# Patient Record
Sex: FEMALE | Race: WHITE | Employment: OTHER | URBAN - METROPOLITAN AREA
[De-identification: names, ages, dates, MRNs, and addresses within clinical notes are randomized per-mention and may not be internally consistent; named-entity substitution may affect disease eponyms.]

---

## 2017-02-10 ENCOUNTER — HOSPITAL ENCOUNTER (INPATIENT)
Age: 72
LOS: 6 days | Discharge: NURSING, REHAB OR LONG-TERM CARE | DRG: 291 | End: 2017-02-16
Attending: EMERGENCY MEDICINE | Admitting: INTERNAL MEDICINE
Payer: MEDICARE

## 2017-02-10 ENCOUNTER — APPOINTMENT (OUTPATIENT)
Dept: CT IMAGING | Age: 72
DRG: 291 | End: 2017-02-10
Attending: EMERGENCY MEDICINE
Payer: MEDICARE

## 2017-02-10 ENCOUNTER — APPOINTMENT (OUTPATIENT)
Dept: GENERAL RADIOLOGY | Age: 72
DRG: 291 | End: 2017-02-10
Attending: EMERGENCY MEDICINE
Payer: MEDICARE

## 2017-02-10 ENCOUNTER — APPOINTMENT (OUTPATIENT)
Dept: GENERAL RADIOLOGY | Age: 72
DRG: 291 | End: 2017-02-10
Attending: FAMILY MEDICINE
Payer: MEDICARE

## 2017-02-10 DIAGNOSIS — J18.9 PNEUMONIA OF BOTH LOWER LOBES DUE TO INFECTIOUS ORGANISM: ICD-10-CM

## 2017-02-10 DIAGNOSIS — E86.0 DEHYDRATION: ICD-10-CM

## 2017-02-10 DIAGNOSIS — G93.40 ACUTE ENCEPHALOPATHY: Primary | ICD-10-CM

## 2017-02-10 DIAGNOSIS — E87.5 ACUTE HYPERKALEMIA: ICD-10-CM

## 2017-02-10 DIAGNOSIS — N17.9 ACUTE RENAL FAILURE, UNSPECIFIED ACUTE RENAL FAILURE TYPE (HCC): ICD-10-CM

## 2017-02-10 LAB
ALBUMIN SERPL BCP-MCNC: 3.4 G/DL (ref 3.4–5)
ALBUMIN/GLOB SERPL: 0.8 {RATIO} (ref 0.8–1.7)
ALP SERPL-CCNC: 64 U/L (ref 45–117)
ALT SERPL-CCNC: 24 U/L (ref 13–56)
AMMONIA PLAS-SCNC: 12 UMOL/L (ref 11–32)
AMPHET UR QL SCN: NEGATIVE
ANION GAP BLD CALC-SCNC: 15 MMOL/L (ref 3–18)
ANION GAP BLD CALC-SCNC: 9 MMOL/L (ref 3–18)
APPEARANCE UR: CLEAR
ARTERIAL PATENCY WRIST A: YES
AST SERPL W P-5'-P-CCNC: 29 U/L (ref 15–37)
BACTERIA URNS QL MICRO: ABNORMAL /HPF
BARBITURATES UR QL SCN: NEGATIVE
BASE DEFICIT BLD-SCNC: 6 MMOL/L
BASOPHILS # BLD AUTO: 0 K/UL (ref 0–0.06)
BASOPHILS # BLD: 0 % (ref 0–2)
BDY SITE: ABNORMAL
BENZODIAZ UR QL: POSITIVE
BILIRUB SERPL-MCNC: 0.3 MG/DL (ref 0.2–1)
BILIRUB UR QL: NEGATIVE
BNP SERPL-MCNC: 1284 PG/ML (ref 0–900)
BUN SERPL-MCNC: 42 MG/DL (ref 7–18)
BUN SERPL-MCNC: 44 MG/DL (ref 7–18)
BUN/CREAT SERPL: 19 (ref 12–20)
BUN/CREAT SERPL: 20 (ref 12–20)
CALCIUM SERPL-MCNC: 8.6 MG/DL (ref 8.5–10.1)
CALCIUM SERPL-MCNC: 9.1 MG/DL (ref 8.5–10.1)
CANNABINOIDS UR QL SCN: NEGATIVE
CHLORIDE SERPL-SCNC: 105 MMOL/L (ref 100–108)
CHLORIDE SERPL-SCNC: 106 MMOL/L (ref 100–108)
CK MB CFR SERPL CALC: 2.1 % (ref 0–4)
CK MB SERPL-MCNC: 2.1 NG/ML (ref 0.5–3.6)
CK SERPL-CCNC: 101 U/L (ref 26–192)
CO2 SERPL-SCNC: 21 MMOL/L (ref 21–32)
CO2 SERPL-SCNC: 26 MMOL/L (ref 21–32)
COCAINE UR QL SCN: NEGATIVE
COLOR UR: YELLOW
CREAT SERPL-MCNC: 2.05 MG/DL (ref 0.6–1.3)
CREAT SERPL-MCNC: 2.35 MG/DL (ref 0.6–1.3)
DIFFERENTIAL METHOD BLD: ABNORMAL
EOSINOPHIL # BLD: 0 K/UL (ref 0–0.4)
EOSINOPHIL NFR BLD: 0 % (ref 0–5)
EPITH CASTS URNS QL MICRO: ABNORMAL /LPF (ref 0–5)
ERYTHROCYTE [DISTWIDTH] IN BLOOD BY AUTOMATED COUNT: 13.8 % (ref 11.6–14.5)
ERYTHROCYTE [DISTWIDTH] IN BLOOD BY AUTOMATED COUNT: 13.8 % (ref 11.6–14.5)
GAS FLOW.O2 O2 DELIVERY SYS: ABNORMAL L/MIN
GAS FLOW.O2 SETTING OXYMISER: 2 L/M
GLOBULIN SER CALC-MCNC: 4.1 G/DL (ref 2–4)
GLUCOSE SERPL-MCNC: 111 MG/DL (ref 74–99)
GLUCOSE SERPL-MCNC: 117 MG/DL (ref 74–99)
GLUCOSE UR STRIP.AUTO-MCNC: NEGATIVE MG/DL
HCO3 BLD-SCNC: 21.3 MMOL/L (ref 22–26)
HCT VFR BLD AUTO: 28.1 % (ref 35–45)
HCT VFR BLD AUTO: 29.2 % (ref 35–45)
HDSCOM,HDSCOM: ABNORMAL
HGB BLD-MCNC: 8.9 G/DL (ref 12–16)
HGB BLD-MCNC: 9.3 G/DL (ref 12–16)
HGB UR QL STRIP: NEGATIVE
INR PPP: 1.1 (ref 0.8–1.2)
KETONES UR QL STRIP.AUTO: NEGATIVE MG/DL
LACTATE SERPL-SCNC: 0.9 MMOL/L (ref 0.4–2)
LEUKOCYTE ESTERASE UR QL STRIP.AUTO: NEGATIVE
LYMPHOCYTES # BLD AUTO: 18 % (ref 21–52)
LYMPHOCYTES # BLD: 2.1 K/UL (ref 0.9–3.6)
MAGNESIUM SERPL-MCNC: 1.9 MG/DL (ref 1.8–2.4)
MCH RBC QN AUTO: 31.9 PG (ref 24–34)
MCH RBC QN AUTO: 32 PG (ref 24–34)
MCHC RBC AUTO-ENTMCNC: 31.7 G/DL (ref 31–37)
MCHC RBC AUTO-ENTMCNC: 31.8 G/DL (ref 31–37)
MCV RBC AUTO: 100.3 FL (ref 74–97)
MCV RBC AUTO: 100.7 FL (ref 74–97)
METHADONE UR QL: NEGATIVE
MONOCYTES # BLD: 1.4 K/UL (ref 0.05–1.2)
MONOCYTES NFR BLD AUTO: 12 % (ref 3–10)
NEUTS SEG # BLD: 8 K/UL (ref 1.8–8)
NEUTS SEG NFR BLD AUTO: 70 % (ref 40–73)
NITRITE UR QL STRIP.AUTO: NEGATIVE
O2/TOTAL GAS SETTING VFR VENT: 28 %
OPIATES UR QL: POSITIVE
PCO2 BLD: 46.3 MMHG (ref 35–45)
PCP UR QL: NEGATIVE
PH BLD: 7.27 [PH] (ref 7.35–7.45)
PH UR STRIP: 5 [PH] (ref 5–8)
PLATELET # BLD AUTO: 292 K/UL (ref 135–420)
PLATELET # BLD AUTO: 305 K/UL (ref 135–420)
PMV BLD AUTO: 10.5 FL (ref 9.2–11.8)
PMV BLD AUTO: 10.8 FL (ref 9.2–11.8)
PO2 BLD: 87 MMHG (ref 80–100)
POTASSIUM SERPL-SCNC: 5.8 MMOL/L (ref 3.5–5.5)
POTASSIUM SERPL-SCNC: 6 MMOL/L (ref 3.5–5.5)
PROT SERPL-MCNC: 7.5 G/DL (ref 6.4–8.2)
PROT UR STRIP-MCNC: ABNORMAL MG/DL
PROTHROMBIN TIME: 13.9 SEC (ref 11.5–15.2)
RBC # BLD AUTO: 2.79 M/UL (ref 4.2–5.3)
RBC # BLD AUTO: 2.91 M/UL (ref 4.2–5.3)
RBC #/AREA URNS HPF: 0 /HPF (ref 0–5)
RBC MORPH BLD: ABNORMAL
SAO2 % BLD: 95 % (ref 92–97)
SERVICE CMNT-IMP: ABNORMAL
SODIUM SERPL-SCNC: 140 MMOL/L (ref 136–145)
SODIUM SERPL-SCNC: 142 MMOL/L (ref 136–145)
SP GR UR REFRACTOMETRY: 1.02 (ref 1–1.03)
SPECIMEN TYPE: ABNORMAL
TROPONIN I SERPL-MCNC: <0.02 NG/ML (ref 0–0.06)
UROBILINOGEN UR QL STRIP.AUTO: 0.2 EU/DL (ref 0.2–1)
WBC # BLD AUTO: 11.5 K/UL (ref 4.6–13.2)
WBC # BLD AUTO: 11.7 K/UL (ref 4.6–13.2)
WBC URNS QL MICRO: ABNORMAL /HPF (ref 0–5)

## 2017-02-10 PROCEDURE — C1751 CATH, INF, PER/CENT/MIDLINE: HCPCS

## 2017-02-10 PROCEDURE — 94640 AIRWAY INHALATION TREATMENT: CPT

## 2017-02-10 PROCEDURE — 87040 BLOOD CULTURE FOR BACTERIA: CPT | Performed by: EMERGENCY MEDICINE

## 2017-02-10 PROCEDURE — 74011250636 HC RX REV CODE- 250/636: Performed by: INTERNAL MEDICINE

## 2017-02-10 PROCEDURE — 83735 ASSAY OF MAGNESIUM: CPT | Performed by: INTERNAL MEDICINE

## 2017-02-10 PROCEDURE — 96366 THER/PROPH/DIAG IV INF ADDON: CPT

## 2017-02-10 PROCEDURE — 74011000250 HC RX REV CODE- 250

## 2017-02-10 PROCEDURE — 74011000258 HC RX REV CODE- 258: Performed by: EMERGENCY MEDICINE

## 2017-02-10 PROCEDURE — 77030011943

## 2017-02-10 PROCEDURE — 74011250636 HC RX REV CODE- 250/636: Performed by: EMERGENCY MEDICINE

## 2017-02-10 PROCEDURE — 85610 PROTHROMBIN TIME: CPT | Performed by: EMERGENCY MEDICINE

## 2017-02-10 PROCEDURE — 74011250636 HC RX REV CODE- 250/636: Performed by: FAMILY MEDICINE

## 2017-02-10 PROCEDURE — 71010 XR CHEST PORT: CPT

## 2017-02-10 PROCEDURE — 74011000250 HC RX REV CODE- 250: Performed by: EMERGENCY MEDICINE

## 2017-02-10 PROCEDURE — 36600 WITHDRAWAL OF ARTERIAL BLOOD: CPT

## 2017-02-10 PROCEDURE — 77030013140 HC MSK NEB VYRM -A

## 2017-02-10 PROCEDURE — 85027 COMPLETE CBC AUTOMATED: CPT | Performed by: INTERNAL MEDICINE

## 2017-02-10 PROCEDURE — 82803 BLOOD GASES ANY COMBINATION: CPT

## 2017-02-10 PROCEDURE — 99285 EMERGENCY DEPT VISIT HI MDM: CPT

## 2017-02-10 PROCEDURE — 74011636637 HC RX REV CODE- 636/637: Performed by: EMERGENCY MEDICINE

## 2017-02-10 PROCEDURE — 77030034850

## 2017-02-10 PROCEDURE — 96365 THER/PROPH/DIAG IV INF INIT: CPT

## 2017-02-10 PROCEDURE — 80048 BASIC METABOLIC PNL TOTAL CA: CPT | Performed by: INTERNAL MEDICINE

## 2017-02-10 PROCEDURE — 82550 ASSAY OF CK (CPK): CPT | Performed by: EMERGENCY MEDICINE

## 2017-02-10 PROCEDURE — 82140 ASSAY OF AMMONIA: CPT | Performed by: EMERGENCY MEDICINE

## 2017-02-10 PROCEDURE — 51702 INSERT TEMP BLADDER CATH: CPT

## 2017-02-10 PROCEDURE — C1752 CATH,HEMODIALYSIS,SHORT-TERM: HCPCS

## 2017-02-10 PROCEDURE — 83605 ASSAY OF LACTIC ACID: CPT | Performed by: EMERGENCY MEDICINE

## 2017-02-10 PROCEDURE — 36415 COLL VENOUS BLD VENIPUNCTURE: CPT | Performed by: INTERNAL MEDICINE

## 2017-02-10 PROCEDURE — 93005 ELECTROCARDIOGRAM TRACING: CPT

## 2017-02-10 PROCEDURE — 80307 DRUG TEST PRSMV CHEM ANLYZR: CPT | Performed by: EMERGENCY MEDICINE

## 2017-02-10 PROCEDURE — 75810000137 HC PLCMT CENT VENOUS CATH

## 2017-02-10 PROCEDURE — 85025 COMPLETE CBC W/AUTO DIFF WBC: CPT | Performed by: EMERGENCY MEDICINE

## 2017-02-10 PROCEDURE — 96376 TX/PRO/DX INJ SAME DRUG ADON: CPT

## 2017-02-10 PROCEDURE — 70450 CT HEAD/BRAIN W/O DYE: CPT

## 2017-02-10 PROCEDURE — 83880 ASSAY OF NATRIURETIC PEPTIDE: CPT | Performed by: EMERGENCY MEDICINE

## 2017-02-10 PROCEDURE — 80053 COMPREHEN METABOLIC PANEL: CPT | Performed by: EMERGENCY MEDICINE

## 2017-02-10 PROCEDURE — 65610000006 HC RM INTENSIVE CARE

## 2017-02-10 PROCEDURE — 81001 URINALYSIS AUTO W/SCOPE: CPT | Performed by: EMERGENCY MEDICINE

## 2017-02-10 PROCEDURE — 96375 TX/PRO/DX INJ NEW DRUG ADDON: CPT

## 2017-02-10 RX ORDER — IPRATROPIUM BROMIDE AND ALBUTEROL SULFATE 2.5; .5 MG/3ML; MG/3ML
3 SOLUTION RESPIRATORY (INHALATION)
Status: COMPLETED | OUTPATIENT
Start: 2017-02-10 | End: 2017-02-10

## 2017-02-10 RX ORDER — LORAZEPAM 2 MG/ML
1 INJECTION INTRAMUSCULAR ONCE
Status: COMPLETED | OUTPATIENT
Start: 2017-02-10 | End: 2017-02-10

## 2017-02-10 RX ORDER — LEVOTHYROXINE SODIUM 25 UG/1
25 TABLET ORAL
Status: DISCONTINUED | OUTPATIENT
Start: 2017-02-11 | End: 2017-02-10 | Stop reason: SDUPTHER

## 2017-02-10 RX ORDER — CALCIUM GLUCONATE 94 MG/ML
1 INJECTION, SOLUTION INTRAVENOUS
Status: DISCONTINUED | OUTPATIENT
Start: 2017-02-10 | End: 2017-02-10 | Stop reason: CLARIF

## 2017-02-10 RX ORDER — NOREPINEPHRINE BITARTRATE/D5W 8 MG/250ML
2-16 PLASTIC BAG, INJECTION (ML) INTRAVENOUS
Status: DISCONTINUED | OUTPATIENT
Start: 2017-02-11 | End: 2017-02-16 | Stop reason: HOSPADM

## 2017-02-10 RX ORDER — ATENOLOL 25 MG/1
25 TABLET ORAL DAILY
Status: DISCONTINUED | OUTPATIENT
Start: 2017-02-11 | End: 2017-02-11

## 2017-02-10 RX ORDER — LEVOTHYROXINE SODIUM 200 UG/1
200 TABLET ORAL
Status: DISCONTINUED | OUTPATIENT
Start: 2017-02-11 | End: 2017-02-10 | Stop reason: SDUPTHER

## 2017-02-10 RX ORDER — FENTANYL CITRATE 50 UG/ML
100 INJECTION, SOLUTION INTRAMUSCULAR; INTRAVENOUS ONCE
Status: COMPLETED | OUTPATIENT
Start: 2017-02-10 | End: 2017-02-10

## 2017-02-10 RX ORDER — FLUTICASONE PROPIONATE AND SALMETEROL 500; 50 UG/1; UG/1
1 POWDER RESPIRATORY (INHALATION) EVERY 12 HOURS
Status: DISCONTINUED | OUTPATIENT
Start: 2017-02-10 | End: 2017-02-14

## 2017-02-10 RX ORDER — SODIUM CHLORIDE 0.9 % (FLUSH) 0.9 %
5-10 SYRINGE (ML) INJECTION AS NEEDED
Status: DISCONTINUED | OUTPATIENT
Start: 2017-02-10 | End: 2017-02-16 | Stop reason: HOSPADM

## 2017-02-10 RX ORDER — FENTANYL CITRATE 50 UG/ML
50 INJECTION, SOLUTION INTRAMUSCULAR; INTRAVENOUS
Status: COMPLETED | OUTPATIENT
Start: 2017-02-10 | End: 2017-02-10

## 2017-02-10 RX ORDER — FUROSEMIDE 10 MG/ML
40 INJECTION INTRAMUSCULAR; INTRAVENOUS
Status: COMPLETED | OUTPATIENT
Start: 2017-02-10 | End: 2017-02-10

## 2017-02-10 RX ORDER — SODIUM CHLORIDE 9 MG/ML
50 INJECTION, SOLUTION INTRAVENOUS CONTINUOUS
Status: DISCONTINUED | OUTPATIENT
Start: 2017-02-10 | End: 2017-02-14

## 2017-02-10 RX ORDER — SODIUM CHLORIDE 9 MG/ML
500 INJECTION, SOLUTION INTRAVENOUS
Status: COMPLETED | OUTPATIENT
Start: 2017-02-10 | End: 2017-02-10

## 2017-02-10 RX ORDER — SODIUM CHLORIDE 9 MG/ML
500 INJECTION, SOLUTION INTRAVENOUS ONCE
Status: COMPLETED | OUTPATIENT
Start: 2017-02-10 | End: 2017-02-10

## 2017-02-10 RX ORDER — SODIUM POLYSTYRENE SULFONATE 15 G/60ML
30 SUSPENSION ORAL; RECTAL
Status: ACTIVE | OUTPATIENT
Start: 2017-02-10 | End: 2017-02-11

## 2017-02-10 RX ORDER — ACETAMINOPHEN 325 MG/1
325 TABLET ORAL
Status: DISCONTINUED | OUTPATIENT
Start: 2017-02-10 | End: 2017-02-14

## 2017-02-10 RX ORDER — ASPIRIN 81 MG/1
81 TABLET ORAL DAILY
Status: DISCONTINUED | OUTPATIENT
Start: 2017-02-11 | End: 2017-02-16 | Stop reason: HOSPADM

## 2017-02-10 RX ORDER — NALOXONE HYDROCHLORIDE 1 MG/ML
1 INJECTION INTRAMUSCULAR; INTRAVENOUS; SUBCUTANEOUS
Status: COMPLETED | OUTPATIENT
Start: 2017-02-10 | End: 2017-02-10

## 2017-02-10 RX ORDER — DULOXETIN HYDROCHLORIDE 60 MG/1
60 CAPSULE, DELAYED RELEASE ORAL 2 TIMES DAILY
Status: DISCONTINUED | OUTPATIENT
Start: 2017-02-10 | End: 2017-02-16 | Stop reason: HOSPADM

## 2017-02-10 RX ORDER — PREGABALIN 100 MG/1
100 CAPSULE ORAL 3 TIMES DAILY
Status: DISCONTINUED | OUTPATIENT
Start: 2017-02-10 | End: 2017-02-16 | Stop reason: HOSPADM

## 2017-02-10 RX ORDER — ONDANSETRON 2 MG/ML
4 INJECTION INTRAMUSCULAR; INTRAVENOUS
Status: DISCONTINUED | OUTPATIENT
Start: 2017-02-10 | End: 2017-02-16 | Stop reason: HOSPADM

## 2017-02-10 RX ORDER — OMEPRAZOLE 20 MG/1
20 CAPSULE, DELAYED RELEASE ORAL DAILY
COMMUNITY

## 2017-02-10 RX ORDER — ALBUTEROL SULFATE 0.83 MG/ML
2.5 SOLUTION RESPIRATORY (INHALATION)
Status: DISCONTINUED | OUTPATIENT
Start: 2017-02-10 | End: 2017-02-16 | Stop reason: HOSPADM

## 2017-02-10 RX ORDER — HEPARIN SODIUM 5000 [USP'U]/ML
5000 INJECTION, SOLUTION INTRAVENOUS; SUBCUTANEOUS EVERY 8 HOURS
Status: DISCONTINUED | OUTPATIENT
Start: 2017-02-10 | End: 2017-02-16 | Stop reason: HOSPADM

## 2017-02-10 RX ORDER — FLUTICASONE PROPIONATE 50 MCG
1 SPRAY, SUSPENSION (ML) NASAL DAILY
Status: DISCONTINUED | OUTPATIENT
Start: 2017-02-11 | End: 2017-02-16 | Stop reason: HOSPADM

## 2017-02-10 RX ORDER — SODIUM CHLORIDE 0.9 % (FLUSH) 0.9 %
5-10 SYRINGE (ML) INJECTION EVERY 8 HOURS
Status: DISCONTINUED | OUTPATIENT
Start: 2017-02-10 | End: 2017-02-16 | Stop reason: HOSPADM

## 2017-02-10 RX ORDER — PANTOPRAZOLE SODIUM 40 MG/1
40 TABLET, DELAYED RELEASE ORAL DAILY
Status: DISCONTINUED | OUTPATIENT
Start: 2017-02-11 | End: 2017-02-16 | Stop reason: HOSPADM

## 2017-02-10 RX ORDER — IPRATROPIUM BROMIDE AND ALBUTEROL SULFATE 2.5; .5 MG/3ML; MG/3ML
SOLUTION RESPIRATORY (INHALATION)
Status: COMPLETED
Start: 2017-02-10 | End: 2017-02-10

## 2017-02-10 RX ORDER — DEXTROSE 50 % IN WATER (D50W) INTRAVENOUS SYRINGE
25
Status: COMPLETED | OUTPATIENT
Start: 2017-02-10 | End: 2017-02-10

## 2017-02-10 RX ADMIN — FENTANYL CITRATE 100 MCG: 50 INJECTION, SOLUTION INTRAMUSCULAR; INTRAVENOUS at 15:44

## 2017-02-10 RX ADMIN — METHYLPREDNISOLONE SODIUM SUCCINATE 125 MG: 125 INJECTION, POWDER, FOR SOLUTION INTRAMUSCULAR; INTRAVENOUS at 15:53

## 2017-02-10 RX ADMIN — PIPERACILLIN SODIUM,TAZOBACTAM SODIUM 3.38 G: 3; .375 INJECTION, POWDER, FOR SOLUTION INTRAVENOUS at 15:54

## 2017-02-10 RX ADMIN — HEPARIN SODIUM 5000 UNITS: 5000 INJECTION, SOLUTION INTRAVENOUS; SUBCUTANEOUS at 20:55

## 2017-02-10 RX ADMIN — SODIUM CHLORIDE 1000 MG: 900 INJECTION, SOLUTION INTRAVENOUS at 16:41

## 2017-02-10 RX ADMIN — LORAZEPAM 1 MG: 2 INJECTION INTRAMUSCULAR; INTRAVENOUS at 15:46

## 2017-02-10 RX ADMIN — IPRATROPIUM BROMIDE AND ALBUTEROL SULFATE 3 ML: .5; 3 SOLUTION RESPIRATORY (INHALATION) at 14:55

## 2017-02-10 RX ADMIN — SODIUM CHLORIDE 250 ML: 900 INJECTION, SOLUTION INTRAVENOUS at 21:30

## 2017-02-10 RX ADMIN — SODIUM CHLORIDE 125 ML/HR: 900 INJECTION, SOLUTION INTRAVENOUS at 20:44

## 2017-02-10 RX ADMIN — HUMAN INSULIN 10 UNITS: 100 INJECTION, SOLUTION SUBCUTANEOUS at 14:26

## 2017-02-10 RX ADMIN — CALCIUM GLUCONATE 1 G: 94 INJECTION, SOLUTION INTRAVENOUS at 14:34

## 2017-02-10 RX ADMIN — SODIUM CHLORIDE 500 ML/HR: 900 INJECTION, SOLUTION INTRAVENOUS at 18:09

## 2017-02-10 RX ADMIN — DEXTROSE MONOHYDRATE 25 G: 25 INJECTION, SOLUTION INTRAVENOUS at 14:27

## 2017-02-10 RX ADMIN — FUROSEMIDE 40 MG: 10 INJECTION, SOLUTION INTRAMUSCULAR; INTRAVENOUS at 17:15

## 2017-02-10 RX ADMIN — NALOXONE HYDROCHLORIDE 1 MG: 1 INJECTION PARENTERAL at 14:40

## 2017-02-10 RX ADMIN — FENTANYL CITRATE 50 MCG: 50 INJECTION, SOLUTION INTRAMUSCULAR; INTRAVENOUS at 15:19

## 2017-02-10 RX ADMIN — FENTANYL CITRATE 50 MCG: 50 INJECTION, SOLUTION INTRAMUSCULAR; INTRAVENOUS at 15:05

## 2017-02-10 RX ADMIN — Medication 1 MG: at 15:24

## 2017-02-10 RX ADMIN — IPRATROPIUM BROMIDE AND ALBUTEROL SULFATE 3 ML: .5; 3 SOLUTION RESPIRATORY (INHALATION) at 13:30

## 2017-02-10 RX ADMIN — SODIUM CHLORIDE 500 ML: 900 INJECTION, SOLUTION INTRAVENOUS at 15:57

## 2017-02-10 NOTE — ED NOTES
Patient being combative swinging legs and arms around. Not alert at this time. Patient on NC. Son at bedside. Dr. Steph Ordaz giving orders for ativan and fentanyl.

## 2017-02-10 NOTE — ED PROVIDER NOTES
HPI Comments: 12:29 PM  Sam Glaser is a 80 y.o. female presenting to the ED via EMS from [facility] for evaluation of AMS x 3 days. EMS reports pt is usually A&O x 4. EMS denies fever or vomiting. EMS reports pt 94% saturated on 3 L and . Pt denies HA. Hx limited due to pt's AMS. The history is provided by the EMS personnel. The history is limited by the condition of the patient. Past Medical History:   Diagnosis Date    Arthritis     Asthma     CAD (coronary artery disease)      angina, last episode 06/2012    Chronic bronchitis (HCC)     Chronic kidney disease      stage 3    Chronic obstructive pulmonary disease (HCC)     Chronic pain      cervical, lumbar, knees, ankles, elbows    Fibromyalgia     GERD (gastroesophageal reflux disease)     Hypertension 1993    Psychiatric disorder      anxiety, depression    Thyroid disease        Past Surgical History:   Procedure Laterality Date    Hx hysterectomy      Hx oophorectomy       left    Hx appendectomy      Hx cholecystectomy      Hx orthopaedic       cervical  x5    Hx urological  1984 and 1985     kidney stone surgery    Hx lithotripsy       x 4    Hx cervical fusion       anterior x 2    Hx cervical fusion       posterior x 3    Hx cervical fusion  1996     Removal of titanium plate and screws    Hx small bowel resection      Hx tonsillectomy      Hx adenoidectomy      Hx cataract removal       OU         History reviewed. No pertinent family history. Social History     Social History    Marital status: SINGLE     Spouse name: N/A    Number of children: N/A    Years of education: N/A     Occupational History    Not on file.      Social History Main Topics    Smoking status: Never Smoker    Smokeless tobacco: Never Used    Alcohol use No    Drug use: No    Sexual activity: Yes     Birth control/ protection: IUD, None     Other Topics Concern    Not on file     Social History Narrative         ALLERGIES: Ambien [zolpidem]; Aspirin; Codeine; Darvon [propoxyphene]; Iodinated contrast media - oral and iv dye; Pcn [penicillins]; Shellfish derived; Zanaflex [tizanidine]; and Levaquin [levofloxacin]    Review of Systems   Unable to perform ROS: Mental status change   Neurological: Negative for headaches. Vitals:    02/11/17 2320 02/12/17 0000 02/12/17 0100 02/12/17 0130   BP:  102/54 91/47 108/55   Pulse:  84 78 81   Resp:  18 17 18   Temp: 98.4 °F (36.9 °C)      SpO2:  95% 95% 96%   Weight:       Height:                Physical Exam   Constitutional: She appears well-developed and well-nourished. Obese, minimally responsive. HENT:   Head: Normocephalic and atraumatic. Right Ear: External ear normal.   Left Ear: External ear normal.   Nose: Nose normal.   Mouth/Throat: Oropharynx is clear and moist.   Eyes: Conjunctivae and EOM are normal. Pupils are equal, round, and reactive to light. Neck: Normal range of motion. Neck supple. No JVD present. No tracheal deviation present. Cardiovascular: Normal rate, regular rhythm, normal heart sounds and intact distal pulses. Exam reveals no gallop and no friction rub. No murmur heard. Pulmonary/Chest: Effort normal. No respiratory distress. She has no wheezes. She has no rales. Coarse breath sounds   Abdominal: Soft. Bowel sounds are normal. She exhibits no distension and no mass. There is no tenderness. There is no rebound and no guarding. Musculoskeletal: Normal range of motion. She exhibits no edema or tenderness. Neurological: She is alert. She has normal reflexes. No cranial nerve deficit. Confused to place and time. Able to say name. Skin: Skin is warm and dry. Ecchymosis (multiple) noted. No rash noted. Psychiatric: She has a normal mood and affect. Her behavior is normal.   Nursing note and vitals reviewed. RESULTS:    EKG interpretation: (Preliminary)  Rate 78 bpm. NSR. No acute changes.   EKG read by Sarah Bullard Madelaine Padilla MD at 12:44 PM    3:54 PM  RADIOLOGY FINDINGS  Chest X-ray shows poor lung volume. Nothing acute. Pending review by Radiologist  Recorded by JES Fenton, as dictated by Mukund Pond MD    CT HEAD WO CONT   Final Result   IMPRESSION:      1. No acute intracranial abnormality identified. Of note, noncontrast head CT  can be normal in the context of early acute stroke. 2. Subcortical and periventricular periventricular white matter hypoattenuation,  a nonspecific finding likely pertaining to chronic ischemic microvascular  Change. As read by the radiologist.          Labs Reviewed   CBC WITH AUTOMATED DIFF - Abnormal; Notable for the following:        Result Value    RBC 2.91 (*)     HGB 9.3 (*)     HCT 29.2 (*)     .3 (*)     LYMPHOCYTES 18 (*)     MONOCYTES 12 (*)     ABS.  MONOCYTES 1.4 (*)     All other components within normal limits   METABOLIC PANEL, COMPREHENSIVE - Abnormal; Notable for the following:     Potassium 6.0 (*)     Glucose 111 (*)     BUN 44 (*)     Creatinine 2.35 (*)     GFR est AA 25 (*)     GFR est non-AA 20 (*)     Globulin 4.1 (*)     All other components within normal limits   URINALYSIS W/ RFLX MICROSCOPIC - Abnormal; Notable for the following:     Protein TRACE (*)     All other components within normal limits   PRO-BNP - Abnormal; Notable for the following:     NT pro-BNP 1284 (*)     All other components within normal limits   DRUG SCREEN, URINE - Abnormal; Notable for the following:     BENZODIAZEPINE POSITIVE (*)     OPIATES POSITIVE (*)     All other components within normal limits   URINE MICROSCOPIC ONLY - Abnormal; Notable for the following:     Bacteria FEW (*)     All other components within normal limits   METABOLIC PANEL, BASIC - Abnormal; Notable for the following:     Potassium 5.8 (*)     Glucose 117 (*)     BUN 42 (*)     Creatinine 2.05 (*)     GFR est AA 29 (*)     GFR est non-AA 24 (*)     All other components within normal limits CBC W/O DIFF - Abnormal; Notable for the following:     RBC 2.79 (*)     HGB 8.9 (*)     HCT 28.1 (*)     .7 (*)     All other components within normal limits   METABOLIC PANEL, BASIC - Abnormal; Notable for the following:     CO2 18 (*)     Glucose 162 (*)     BUN 44 (*)     Creatinine 2.01 (*)     BUN/Creatinine ratio 22 (*)     GFR est AA 30 (*)     GFR est non-AA 24 (*)     Calcium 8.4 (*)     All other components within normal limits   CBC W/O DIFF - Abnormal; Notable for the following:     RBC 2.64 (*)     HGB 8.4 (*)     HCT 26.6 (*)     .8 (*)     All other components within normal limits   METABOLIC PANEL, BASIC - Abnormal; Notable for the following:     Chloride 110 (*)     CO2 18 (*)     Glucose 149 (*)     BUN 47 (*)     Creatinine 1.93 (*)     BUN/Creatinine ratio 24 (*)     GFR est AA 31 (*)     GFR est non-AA 26 (*)     Calcium 8.3 (*)     All other components within normal limits   POC G3 - Abnormal; Notable for the following:     pH (POC) 7.272 (*)     pCO2 (POC) 46.3 (*)     HCO3 (POC) 21.3 (*)     All other components within normal limits   CULTURE, BLOOD   CARDIAC PANEL,(CK, CKMB & TROPONIN)   BLOOD GAS, ARTERIAL   LACTIC ACID, PLASMA   PROTHROMBIN TIME + INR   AMMONIA   MAGNESIUM   CBC W/O DIFF       No results found for this or any previous visit (from the past 12 hour(s)). MDM  Number of Diagnoses or Management Options  Acute encephalopathy:   Acute hyperkalemia:   Acute renal failure, unspecified acute renal failure type [facility]):   Dehydration:   Diagnosis management comments: INITIAL CLINICAL IMPRESSION and PLANS:  The patient presents with the primary complaint(s) of: AMS. The presentation, to include historical aspects and clinical findings are consistent with the DX of Encephalopathy. However, other possible DX's to consider as primary, associated with, or exacerbated by include:    1. CVA  2. Metabolic derrangement  3.   Dehydration  DDx - Confusion, Stroke, Dehydration, Hypoglycemia, Respiratory failure, Sepsis, UTI    Considering the above, my initial management plan to evaluate and therapeutic interventions include the following and as noted in the orders:    1. Labs:CBC, CMP, U/A, cardia enzymes  2. Imaging: Head CT    The patient was minimally verbal on presentation. She was given Narcan and became more alert with withdrawal symptoms, tremor and vomiting. She was given sedation. Head CT scan was unremarkable. Labs remarkable for hyperkalemia. Patient was given Calcium gluconate, Insulin and D50. Patient has renal impairment with elevated BUN/Cr. The patient became hypotensive in ED and was given fluid resuscitation. CXR concerning for pneumonia, blood cultures were drawn and patient given Zosyn and vancomycin. CVL was placed for presser therapy. Patient has metabolic acidosis. Case discussed with hospitalist who agrees with admission to ICU.        Amount and/or Complexity of Data Reviewed  Clinical lab tests: ordered and reviewed  Tests in the radiology section of CPT®: ordered and reviewed (CT head, CXR)  Tests in the medicine section of CPT®: ordered and reviewed (EKG)    Critical Care  Total time providing critical care: 30-74 minutes    ED Course     MEDICATIONS GIVEN:  Medications   sodium chloride (NS) flush 5-10 mL (10 mL IntraVENous Given 2/11/17 2200)   sodium chloride (NS) flush 5-10 mL (not administered)   sodium polystyrene (KAYEXALATE) 15 gram/60 mL oral suspension 30 g (0 g Oral Held 2/10/17 1341)   sodium chloride (NS) flush 5-10 mL (not administered)   acetaminophen (TYLENOL) tablet 325 mg (not administered)   albuterol (PROVENTIL VENTOLIN) nebulizer solution 2.5 mg (2.5 mg Nebulization Given 2/11/17 0111)   aspirin delayed-release tablet 81 mg (81 mg Oral Given 2/11/17 1006)   DULoxetine (CYMBALTA) capsule 60 mg (60 mg Oral Given 2/11/17 2143)   pantoprazole (PROTONIX) tablet 40 mg (40 mg Oral Given 2/11/17 0904)   fluticasone (FLONASE) 50 mcg/actuation nasal spray 1 Spray (1 Spray Both Nostrils Given 2/11/17 0909)   fluticasone-salmeterol (ADVAIR) 500mcg-50mcg/puff (1 Puff Inhalation Given 2/11/17 2145)   guaiFENesin SR (MUCINEX) tablet 600 mg (600 mg Oral Given 2/11/17 2143)   pregabalin (LYRICA) capsule 100 mg (100 mg Oral Given 2/11/17 2143)   0.9% sodium chloride infusion (50 mL/hr IntraVENous Rate Change 2/11/17 1222)   ondansetron (ZOFRAN) injection 4 mg (not administered)   heparin (porcine) injection 5,000 Units (5,000 Units SubCUTAneous Given 2/11/17 2144)   levothyroxine (SYNTHROID) tablet 225 mcg (225 mcg Oral Given 2/11/17 0904)   NOREPINephrine (LEVOPHED) 8,000 mcg in dextrose 5% 250 mL infusion (0 mcg/min IntraVENous Stopped 2/11/17 1700)   piperacillin-tazobactam (ZOSYN) 3.375 g in 0.9% sodium chloride (MBP/ADV) 100 mL MBP (3.375 g IntraVENous New Bag 2/11/17 2144)   vancomycin (VANCOCIN) 1,500 mg in 0.9% sodium chloride 500 mL IVPB (1,500 mg IntraVENous New Bag 2/11/17 2344)   Vancomycin- Rx to Dose & Monitor (not administered)   bumetanide (BUMEX) injection 1 mg (1 mg IntraVENous Given 2/11/17 2145)   albuterol-ipratropium (DUO-NEB) 2.5 MG-0.5 MG/3 ML (3 mL Nebulization Given 2/10/17 1455)   dextrose (D50W) injection syrg 25 g (25 g IntraVENous Given 2/10/17 1427)   insulin regular (NOVOLIN R, HUMULIN R) injection 10 Units (10 Units IntraVENous Given 2/10/17 1426)   calcium gluconate 1 g in 0.9% sodium chloride 100 mL IVPB (0 g IntraVENous IV Completed 2/10/17 1627)   naloxone (NARCAN) injection 1 mg (1 mg IntraVENous Given 2/10/17 1440)   albuterol-ipratropium (DUO-NEB) 2.5 MG-0.5 MG/3 ML (3 mL Nebulization Given 2/10/17 1455)   fentaNYL citrate (PF) injection 50 mcg (50 mcg IntraVENous Given 2/10/17 1505)   methylPREDNISolone (PF) (SOLU-MEDROL) injection 125 mg (125 mg IntraVENous Given 2/10/17 1553)   piperacillin-tazobactam (ZOSYN) 3.375 g in 0.9% sodium chloride (MBP/ADV) 100 mL MBP (0 g IntraVENous IV Completed 2/10/17 1635) vancomycin (VANCOCIN) 1,000 mg in 0.9% sodium chloride (MBP/ADV) 250 mL adv (0 g IntraVENous IV Completed 2/10/17 1841)   fentaNYL citrate (PF) injection 50 mcg (50 mcg IntraVENous Given 2/10/17 1519)   LORazepam (ATIVAN) injection 1 mg (1 mg IntraVENous Given 2/10/17 1524)   fentaNYL citrate (PF) injection 100 mcg (100 mcg IntraVENous Given 2/10/17 1544)   LORazepam (ATIVAN) injection 1 mg (1 mg IntraVENous Given 2/10/17 1546)   0.9% sodium chloride infusion 500 mL (0 mL IntraVENous IV Completed 2/10/17 1636)   furosemide (LASIX) injection 40 mg (40 mg IntraVENous Given 2/10/17 1715)   0.9% sodium chloride infusion (0 mL/hr IntraVENous IV Completed 2/10/17 1848)   sodium chloride 0.9 % bolus infusion 250 mL (0 mL IntraVENous Stopped 2/10/17 2200)       Central Line  Date/Time: 2/10/2017 7:01 PM  Performed by: Jacquelyn Kaye by: Barron Enrique     Consent:     Consent obtained:  Verbal    Consent given by: Pt's son. Risks discussed:  Arterial puncture and bleeding    Alternatives discussed:  No treatment  Pre-procedure details:     Hand hygiene: Hand hygiene performed prior to insertion      Sterile barrier technique: All elements of maximal sterile technique followed      Skin preparation:  ChloraPrep  Procedure details:     Location:  R internal jugular    Site selection rationale:  Ultrasound    Patient position:  Trendelenburg    Procedural supplies:  Triple lumen    Catheter size:  7 Fr    Landmarks identified: yes      Ultrasound guidance: yes      Sterile ultrasound techniques: Sterile gel and sterile probe covers were used      Number of attempts:  1    Successful placement: yes    Post-procedure details:     Post-procedure:  Dressing applied and line sutured    Assessment:  Blood return through all ports and free fluid flow    Patient tolerance of procedure: Tolerated well, no immediate complications          PROGRESS NOTE:  12:29 PM  Initial assessment performed.      PROGRESS NOTE:  2:54 PM   Pt given narcan, became more alert started shaking and coughing. Pt is now wheezing and having breathing difficulty. PROGRESS NOTE:   3:06 PM  Pt has been re-examined by Jesus Best MD. Pt improved after DuoNeb. Pt still with coarse breath sounds. PROGRESS NOTE:   3:21 PM  Pt still confused and agitated. CONSULT NOTE:   3:31 PM  Juan Joseph MD spoke with Eileen Johnson MD   Specialty: Internal Medicine, hospitalist group   Discussed pt's hx, disposition, and available diagnostic and imaging results. Reviewed care plans. Consulting physician agrees to admit to ICU. ADMISSION NOTE:  3:31 PM  Patient is being admitted to the hospital by Eileen Johnson MD. The results of their tests and reasons for their admission have been discussed with them and/or available family. They convey agreement and understanding for the need to be admitted and for their admission diagnosis. CONSULT NOTE:   6:58 PM   Juan Joseph MD spoke with Eileen Johnson MD   Specialty: Internal Medicine, hospitalist group   Consulting physician agrees with central line placement. CONDITIONS ON ADMISSION:  Deep Vein Thrombosis is not present at the time of admission. Thrombosis is not present at the time of admission. Urinary Tract Infection is not present at the time of admission. Pneumonia is not present at the time of admission. MRSA is not present at the time of admission. Wound infection is not present at the time of admission. Pressure Ulcer is not present at the time of admission. CLINICAL IMPRESSION    1. Acute encephalopathy    2. Dehydration    3. Acute hyperkalemia    4. Acute renal failure, unspecified acute renal failure type ([address]    5. Pneumonia of both lower lobes due to infectious organism      3:34 PM  I have spent 30 minutes of critical care time involved in lab review, consultations with specialist, family decision-making, and documentation.   During this entire length of time I was immediately available to the patient. Critical Care: The reason for providing this level of medical care for this critically ill patient was due a critical illness that impaired one or more vital organ systems such that there was a high probability of imminent or life threatening deterioration in the patients condition. This care involved high complexity decision making to assess, manipulate, and support vital system functions, to treat this degreee vital organ system failure and to prevent further life threatening deterioration of the patients condition. SCRIBE ATTESTATION STATEMENT  Documented by:Lacho Cam for and in the presence of Ivette Giron MD.     PROVIDER ATTESTATION STATEMENT  I personally performed the services described in the documentation, reviewed the documentation, as recorded by the scribe in my presence, and it accurately and completely records my words and actions.   Ivette Giron MD.

## 2017-02-10 NOTE — Clinical Note
Status[de-identified] Inpatient [101] Type of Bed: Intensive Care [6] Inpatient Hospitalization Certified Necessary for the Following Reasons: 4. Patient requires ICU level of care interventions (further clarification in H&P documentation) Admitting Diagnosis: Acute encephalopathy [[id number]] Admitting Physician: Erin Bolden [[id number]] Attending Physician: Erin Bolden [[id number]] Estimated Length of Stay: > or = to 2 Midnights Discharge Plan[de-identified] Other (Specify)

## 2017-02-10 NOTE — ED NOTES
Fentanyl patch to upper back removed per Dr. Mark Bullard. Surjit lazar Rn witnessed removal of patch.

## 2017-02-10 NOTE — ED NOTES
Verbal consent via phone for central line insertion given by Margie Sanchez patients son. Due to patient being altered and unable to give consent.

## 2017-02-10 NOTE — ED NOTES
Patient sliding herself down in bed. Patient confused at this time. NC in place and IV vancomycin infusing. Son at bedside.

## 2017-02-10 NOTE — ED TRIAGE NOTES
Patient arrived via EMS from [facility] for altered mental status x3 days. Patient arrived alert x2. Wheezing noted. Sepsis Screening completed    (  )Patient meets SIRS criteria. ( x )Patient does not meet SIRS criteria.       SIRS Criteria is achieved when two or more of the following are present   Temperature < 96.8°F (36°C) or > 100.9°F (38.3°C)   Heart Rate > 90 beats per minute   Respiratory Rate > 20 beats per minute   WBC count > 12,000 or <4,000 or > 10% bands

## 2017-02-10 NOTE — ED NOTES
Patient given narcan patient laying at 30 degree angle. . Patient immediately began to wake up and start clinching her legs and arms up and coughing. Patient appears to have bitten her tongue. Dr. Thiago Murdock at bedside and suctioned patient. Thick yellow mucous obtained.

## 2017-02-10 NOTE — ED NOTES
Dr. Shreya Reyes aware of patient BP. Verbal order for 500ml bolus. Md aware of patients elevated BNP and still okay with fluids.

## 2017-02-10 NOTE — H&P
History & Physical    Patient: Jaky Gurrola MRN: [id number]  CSN: [id number]    YOB: 1945  Age: 80 y.o. Sex: female      DOA: 2/10/2017  Primary Care Provider:  Manju Hsieh. Trevon Jay MD      Assessment/Plan     Patient Active Problem List   Diagnosis Code    Cataract H26.9    DDD (degenerative disc disease), cervical M50.30    H/O cervical spine surgery Z98.890    COPD (chronic obstructive pulmonary disease) ([facility] J44.9    Acidosis E87.2    COPD exacerbation ([facility] J44.1    Acute on chronic respiratory failure ([facility]) J96.20    Obesity E66.9    Benign hypertension I10    GERD (gastroesophageal reflux disease) K21.9    Acute diastolic CHF (congestive heart failure) ([facility]) I50.31    Acute encephalopathy G93.40       Active problems:  1. CNS failure/altered mental status  2. Hyperkalemia  3. Acute renal failure  4. Pneumonia  5. Chronic pain syndrome    -Admit patient to ICU for close monitoring.  -Altered mental status is likely due to infection as is acute renal failure. Check renal ultrasound.  -Continue vancomycin and Zosyn.  -Hold opioid analgesia and benzodiazepines given altered mental status.  -Continue patients other medications except lisinopril given acute renal failure  -Monitor BMP every six hours times four and administer Kayexalate as needed. -Uncertain volume status. Will get echocardiogram to further evaluate. Given transient hypotension will administer fluid small boluses and continue gentle fluid hydration. -DVT prophylaxis: heparin and SCDs  -Code status: full code            CC: Altered mental status and low sats       HPI:     Jaky Gurrola is a 80 y.o. female who has a past medical history significant for hypothyroidism, anxiety disorder, hypertension, Thierno Lather, coronary artery disease, asthma/COPD and osteoarthritis presenting with altered mental status and decreased oxygen saturations.  The patient also has a history of chronic pain for which she uses chronic narcotics. Upon arrival she was given Narcan for reversal resulting in substantial agitation prompting her to be given additional analgesia. Upon interview she is unable to contribute to to a critical medical illness. On arrival she was afebrile, pulse 79, blood pressure 181/150, respirations 22 with oxygen saturations of 100% on 4 L by nasal cannula. Laboratory evaluation was significant for worsening anemia hemoglobin 9.3 down from 11.2, potassium 6.0 and creatinine which worsened to 2.35 from 1.01. A CT without contrast of the head showed no acute disease. A chest x-ray showed pulmonary vascular congestion with bilateral edema versus infiltrates. A recent echocardiogram from November of last year showed grade one diastolic dysfunction with a preserved ejection fraction of 62%. In the ER she was given breathing treatments, insulin, D5, Kayexalate, calcium gluconate, Narcan as mentioned above, fentanyl 50 µg, vancomycin, Zosyn, Solu-Medrol 125 mg, Ativan and Lasix. Subsequently, hospital medicine was contacted for admission to the hospital and further management.         Past Medical History   Diagnosis Date    Arthritis     Asthma     CAD (coronary artery disease)      angina, last episode 06/2012    Chronic bronchitis (HCC)     Chronic kidney disease      stage 3    Chronic obstructive pulmonary disease (HCC)     Chronic pain      cervical, lumbar, knees, ankles, elbows    Fibromyalgia     GERD (gastroesophageal reflux disease)     Hypertension 1993    Psychiatric disorder      anxiety, depression    Thyroid disease        Past Surgical History   Procedure Laterality Date    Hx hysterectomy      Hx oophorectomy       left    Hx appendectomy      Hx cholecystectomy      Hx orthopaedic       cervical  x5    Hx urological  1984 and 1985     kidney stone surgery    Hx lithotripsy       x 4    Hx cervical fusion       anterior x 2    Hx cervical fusion       posterior x 3    Hx cervical fusion 1996     Removal of titanium plate and screws    Hx small bowel resection      Hx tonsillectomy      Hx adenoidectomy      Hx cataract removal       OU       History reviewed. No pertinent family history. Social History     Social History    Marital status: SINGLE     Spouse name: N/A    Number of children: N/A    Years of education: N/A     Social History Main Topics    Smoking status: Never Smoker    Smokeless tobacco: Never Used    Alcohol use No    Drug use: No    Sexual activity: Yes     Birth control/ protection: IUD, None     Other Topics Concern    None     Social History Narrative       Prior to Admission medications    Medication Sig Start Date End Date Taking? Authorizing Provider   omeprazole (PRILOSEC) 20 mg capsule Take 20 mg by mouth daily. Yes Phys MD Thea   lisinopril (PRINIVIL, ZESTRIL) 40 mg tablet Take 1 Tab by mouth daily. 12/1/16  Yes Kelvin Gould MD   oxyCODONE-acetaminophen (PERCOCET 10)  mg per tablet Take 1-1.5 Tabs by mouth every four (4) hours as needed. Max Daily Amount: 9 Tabs. 12/1/16  Yes Kelvin Gould MD   levothyroxine (SYNTHROID) 25 mcg tablet Take  by mouth Daily (before breakfast). Takes a total of 225 mcg. Yes Historical Provider   levothyroxine (SYNTHROID) 200 mcg tablet Take 200 mcg by mouth Daily (before breakfast). Takes a total of 225 mcg. Yes Historical Provider   pregabalin (LYRICA) 100 mg capsule Take 100 mg by mouth three (3) times daily. Yes Historical Provider   cyanocobalamin (VITAMIN B12) 1,000 mcg/mL injection 1,000 mcg by IntraMUSCular route every month. Yes Historical Provider   albuterol (PROVENTIL VENTOLIN) 2.5 mg /3 mL (0.083 %) nebulizer solution 2.5 mg by Nebulization route every four (4) hours as needed. Yes Historical Provider   aspirin 81 mg tablet Take 81 mg by mouth daily. Yes Historical Provider   atenolol (TENORMIN) 25 mg tablet Take 25 mg by mouth daily as needed.  As needed for palpatations   Yes Historical Provider   Cetirizine (ZYRTEC) 10 mg cap Take  by mouth daily. Yes Historical Provider   montelukast (SINGULAIR) 10 mg tablet Take 10 mg by mouth daily. Yes Historical Provider   diazePAM (VALIUM) 5 mg tablet Take 1 Tab by mouth two (2) times daily as needed for Anxiety. Max Daily Amount: 10 mg. 12/1/16   Angel Mays MD   predniSONE (DELTASONE) 20 mg tablet 40 mg daily X 3 days, 20 mg daily X 4 days, then 10 mg daily X 4 days 12/1/16   Angel Mays MD   nystatin (MYCOSTATIN) powder Apply  to affected area two (2) times a day. 12/1/16   Angel Mays MD   fluticasone (FLONASE) 50 mcg/actuation nasal spray 1 Tichnor by Both Nostrils route daily. Historical Provider   magnesium hydroxide (MILK OF MAGNESIA) 400 mg/5 mL suspension Take 30 mL by mouth daily as needed for Constipation. Historical Provider   bisacodyl (DULCOLAX, BISACODYL,) 10 mg suppository Insert 10 mg into rectum daily as needed. Historical Provider   mineral oil (FLEET MINERAL OIL) enema Insert  into rectum daily as needed for Constipation. Historical Provider   esomeprazole (NEXIUM) 20 mg capsule Take 20 mg by mouth daily. Historical Provider   acetaminophen (TYLENOL) 325 mg tablet Take 325 mg by mouth every four (4) hours as needed for Pain. Historical Provider   diclofenac (VOLTAREN) 1 % gel Apply 2 g to affected area three (3) times daily. Apply to both shoulders and both knees. Historical Provider   miconazole (ELPIDIO ANTIFUNGAL) 2 % topical cream Apply  to affected area three (3) times daily. Apply to groin rash. Historical Provider   cholestyramine-sucrose Ney Huge) 4 gram powder Take  by mouth daily as needed (diarrhea). Historical Provider   hydrOXYzine (ATARAX) 25 mg tablet Take 25 mg by mouth every six (6) hours as needed for Itching. Irene Sidhu MD   cyclobenzaprine (FLEXERIL) 10 mg tablet Take 10 mg by mouth two (2) times daily as needed for Muscle Spasm(s).     Irene Sidhu MD   fluticasone-salmeterol (ADVAIR DISKUS) 500-50 mcg/dose diskus inhaler Take 1 Puff by inhalation every twelve (12) hours. May use inhaler on DOS    Historical Provider   nitroglycerin (NITROSTAT) 0.4 mg SL tablet 0.4 mg by SubLINGual route every five (5) minutes as needed. Historical Provider   guaiFENesin SR (MUCINEX) 600 mg SR tablet Take 600 mg by mouth two (2) times a day. Historical Provider   DULoxetine (CYMBALTA) 60 mg capsule Take 60 mg by mouth two (2) times a day. Indications: CHRONIC MUSCULOSKELETAL PAIN    Historical Provider       Allergies   Allergen Reactions    Ambien [Zolpidem] Other (comments)     Sleep drive    Aspirin Other (comments)     bruising    Codeine Hives    Darvon [Propoxyphene] Unknown (comments)    Iodinated Contrast Media - Oral And Iv Dye Hives     Iodine on skin is ok per pt.  Pcn [Penicillins] Nausea and Vomiting    Shellfish Derived Hives, Shortness of Breath and Swelling     Iodine on skin is ok per pt.  Zanaflex [Tizanidine] Other (comments)     disorientated    Levaquin [Levofloxacin] Hives     Red rash at IV site while infusing       Review of Systems  Unobtainable due to critical illness        Physical Exam:     Physical Exam:  Visit Vitals    BP 92/76    Pulse (!) 101    Temp 97.8 °F (36.6 °C)    Resp 19    Ht 4' 11.84\" (1.52 m)  Comment: from admission 16    Wt 99.8 kg (220 lb)    SpO2 99%    BMI 43.19 kg/m2    O2 Flow Rate (L/min): 2 l/min O2 Device: Nasal cannula    Temp (24hrs), Av.8 °F (36.6 °C), Min:97.8 °F (36.6 °C), Max:97.8 °F (36.6 °C)    02/10 0701 - 02/10 1900  In: -   Out: 550 [Urine:550]              General:  Somulent, agitated at times, no distress. Oriented x 1. Head:  Normocephalic, without obvious abnormality, atraumatic. Eyes:  Conjunctivae/corneas clear, sclera anicteric   Nose: Nares normal. No drainage or sinus tenderness. Throat: Lips, mucosa, and tongue normal.    Neck: Supple, symmetrical, trachea midline, no adenopathy. Lungs:   Clear to auscultation bilaterally. Heart:  Regular rate and rhythm, S1, S2 normal, no murmur, click, rub or gallop. Abdomen: Soft, non-tender. Bowel sounds normal. No masses,  No organomegaly. Extremities: Extremities normal, atraumatic, no cyanosis or edema. Capillary refill normal.   Pulses: 2+ and symmetric all extremities. Skin: Skin color pink, turgor normal. No rashes or lesions   Neurologic: CNII-XII intact. No focal motor or sensory deficit. Labs Reviewed: All lab results for the last 24 hours reviewed. Recent Results (from the past 24 hour(s))   EKG, 12 LEAD, INITIAL    Collection Time: 02/10/17 12:44 PM   Result Value Ref Range    Ventricular Rate 78 BPM    Atrial Rate 78 BPM    P-R Interval 160 ms    QRS Duration 82 ms    Q-T Interval 394 ms    QTC Calculation (Bezet) 449 ms    Calculated P Axis 55 degrees    Calculated R Axis 61 degrees    Calculated T Axis 65 degrees    Diagnosis       Normal sinus rhythm  Normal ECG  When compared with ECG of 25-NOV-2016 14:47,  No significant change was found     CBC WITH AUTOMATED DIFF    Collection Time: 02/10/17 12:55 PM   Result Value Ref Range    WBC 11.5 4.6 - 13.2 K/uL    RBC 2.91 (L) 4.20 - 5.30 M/uL    HGB 9.3 (L) 12.0 - 16.0 g/dL    HCT 29.2 (L) 35.0 - 45.0 %    .3 (H) 74.0 - 97.0 FL    MCH 32.0 24.0 - 34.0 PG    MCHC 31.8 31.0 - 37.0 g/dL    RDW 13.8 11.6 - 14.5 %    PLATELET 548 108 - 661 K/uL    MPV 10.8 9.2 - 11.8 FL    NEUTROPHILS 70 40 - 73 %    LYMPHOCYTES 18 (L) 21 - 52 %    MONOCYTES 12 (H) 3 - 10 %    EOSINOPHILS 0 0 - 5 %    BASOPHILS 0 0 - 2 %    ABS. NEUTROPHILS 8.0 1.8 - 8.0 K/UL    ABS. LYMPHOCYTES 2.1 0.9 - 3.6 K/UL    ABS. MONOCYTES 1.4 (H) 0.05 - 1.2 K/UL    ABS. EOSINOPHILS 0.0 0.0 - 0.4 K/UL    ABS.  BASOPHILS 0.0 0.0 - 0.06 K/UL    RBC COMMENTS NORMOCYTIC, NORMOCHROMIC      DF AUTOMATED     METABOLIC PANEL, COMPREHENSIVE    Collection Time: 02/10/17 12:55 PM   Result Value Ref Range    Sodium 140 136 - 145 mmol/L    Potassium 6.0 (H) 3.5 - 5.5 mmol/L    Chloride 105 100 - 108 mmol/L    CO2 26 21 - 32 mmol/L    Anion gap 9 3.0 - 18 mmol/L    Glucose 111 (H) 74 - 99 mg/dL    BUN 44 (H) 7.0 - 18 MG/DL    Creatinine 2.35 (H) 0.6 - 1.3 MG/DL    BUN/Creatinine ratio 19 12 - 20      GFR est AA 25 (L) >60 ml/min/1.73m2    GFR est non-AA 20 (L) >60 ml/min/1.73m2    Calcium 9.1 8.5 - 10.1 MG/DL    Bilirubin, total 0.3 0.2 - 1.0 MG/DL    ALT (SGPT) 24 13 - 56 U/L    AST (SGOT) 29 15 - 37 U/L    Alk.  phosphatase 64 45 - 117 U/L    Protein, total 7.5 6.4 - 8.2 g/dL    Albumin 3.4 3.4 - 5.0 g/dL    Globulin 4.1 (H) 2.0 - 4.0 g/dL    A-G Ratio 0.8 0.8 - 1.7     URINALYSIS W/ RFLX MICROSCOPIC    Collection Time: 02/10/17 12:55 PM   Result Value Ref Range    Color YELLOW      Appearance CLEAR      Specific gravity 1.020 1.005 - 1.030      pH (UA) 5.0 5.0 - 8.0      Protein TRACE (A) NEG mg/dL    Glucose NEGATIVE  NEG mg/dL    Ketone NEGATIVE  NEG mg/dL    Bilirubin NEGATIVE  NEG      Blood NEGATIVE  NEG      Urobilinogen 0.2 0.2 - 1.0 EU/dL    Nitrites NEGATIVE  NEG      Leukocyte Esterase NEGATIVE  NEG     CARDIAC PANEL,(CK, CKMB & TROPONIN)    Collection Time: 02/10/17 12:55 PM   Result Value Ref Range     26 - 192 U/L    CK - MB 2.1 0.5 - 3.6 ng/ml    CK-MB Index 2.1 0.0 - 4.0 %    Troponin-I, Qt. <0.02 0.00 - 0.06 NG/ML   PRO-BNP    Collection Time: 02/10/17 12:55 PM   Result Value Ref Range    NT pro-BNP 1284 (H) 0 - 900 PG/ML   LACTIC ACID, PLASMA    Collection Time: 02/10/17 12:55 PM   Result Value Ref Range    Lactic acid 0.9 0.4 - 2.0 MMOL/L   DRUG SCREEN, URINE    Collection Time: 02/10/17 12:55 PM   Result Value Ref Range    BENZODIAZEPINE POSITIVE (A) NEG      BARBITURATES NEGATIVE  NEG      THC (TH-CANNABINOL) NEGATIVE  NEG      OPIATES POSITIVE (A) NEG      PCP(PHENCYCLIDINE) NEGATIVE  NEG      COCAINE NEGATIVE  NEG      AMPHETAMINE NEGATIVE  NEG      METHADONE NEGATIVE  NEG      HDSCOM (NOTE) PROTHROMBIN TIME + INR    Collection Time: 02/10/17 12:55 PM   Result Value Ref Range    Prothrombin time 13.9 11.5 - 15.2 sec    INR 1.1 0.8 - 1.2     URINE MICROSCOPIC ONLY    Collection Time: 02/10/17 12:55 PM   Result Value Ref Range    WBC 2 to 4 0 - 5 /hpf    RBC 0 0 - 5 /hpf    Epithelial cells FEW 0 - 5 /lpf    Bacteria FEW (A) NEG /hpf   POC G3    Collection Time: 02/10/17  1:30 PM   Result Value Ref Range    Device: NASAL CANNULA      Flow rate (POC) 2 L/M    FIO2 (POC) 28 %    pH (POC) 7.272 (L) 7.35 - 7.45      pCO2 (POC) 46.3 (H) 35.0 - 45.0 MMHG    pO2 (POC) 87 80 - 100 MMHG    HCO3 (POC) 21.3 (L) 22 - 26 MMOL/L    sO2 (POC) 95 92 - 97 %    Base deficit (POC) 6 mmol/L    Allens test (POC) YES      Site RIGHT RADIAL      Specimen type (POC) ARTERIAL      Performed by Irvin De Dios    AMMONIA    Collection Time: 02/10/17  5:10 PM   Result Value Ref Range    Ammonia 12 11 - 32 UMOL/L       Procedures/imaging: see electronic medical records for all procedures/Xrays and details which were not copied into this note but were reviewed prior to creation of Plan        CC: Sheba Umana.  Elta Kanner, MD

## 2017-02-11 ENCOUNTER — APPOINTMENT (OUTPATIENT)
Dept: ULTRASOUND IMAGING | Age: 72
DRG: 291 | End: 2017-02-11
Attending: INTERNAL MEDICINE
Payer: MEDICARE

## 2017-02-11 PROBLEM — G92.8 TOXIC METABOLIC ENCEPHALOPATHY: Status: ACTIVE | Noted: 2017-02-11

## 2017-02-11 PROBLEM — E87.5 HYPERKALEMIA: Status: ACTIVE | Noted: 2017-02-11

## 2017-02-11 PROBLEM — N17.9 ACUTE RENAL FAILURE (ARF) (HCC): Status: ACTIVE | Noted: 2017-02-11

## 2017-02-11 LAB
ANION GAP BLD CALC-SCNC: 15 MMOL/L (ref 3–18)
ANION GAP BLD CALC-SCNC: 17 MMOL/L (ref 3–18)
BUN SERPL-MCNC: 44 MG/DL (ref 7–18)
BUN SERPL-MCNC: 47 MG/DL (ref 7–18)
BUN/CREAT SERPL: 22 (ref 12–20)
BUN/CREAT SERPL: 24 (ref 12–20)
CALCIUM SERPL-MCNC: 8.3 MG/DL (ref 8.5–10.1)
CALCIUM SERPL-MCNC: 8.4 MG/DL (ref 8.5–10.1)
CHLORIDE SERPL-SCNC: 108 MMOL/L (ref 100–108)
CHLORIDE SERPL-SCNC: 110 MMOL/L (ref 100–108)
CO2 SERPL-SCNC: 18 MMOL/L (ref 21–32)
CO2 SERPL-SCNC: 18 MMOL/L (ref 21–32)
CREAT SERPL-MCNC: 1.93 MG/DL (ref 0.6–1.3)
CREAT SERPL-MCNC: 2.01 MG/DL (ref 0.6–1.3)
ERYTHROCYTE [DISTWIDTH] IN BLOOD BY AUTOMATED COUNT: 13.7 % (ref 11.6–14.5)
GLUCOSE SERPL-MCNC: 149 MG/DL (ref 74–99)
GLUCOSE SERPL-MCNC: 162 MG/DL (ref 74–99)
HCT VFR BLD AUTO: 26.6 % (ref 35–45)
HGB BLD-MCNC: 8.4 G/DL (ref 12–16)
MCH RBC QN AUTO: 31.8 PG (ref 24–34)
MCHC RBC AUTO-ENTMCNC: 31.6 G/DL (ref 31–37)
MCV RBC AUTO: 100.8 FL (ref 74–97)
PLATELET # BLD AUTO: 294 K/UL (ref 135–420)
PMV BLD AUTO: 10.5 FL (ref 9.2–11.8)
POTASSIUM SERPL-SCNC: 5.2 MMOL/L (ref 3.5–5.5)
POTASSIUM SERPL-SCNC: 5.4 MMOL/L (ref 3.5–5.5)
RBC # BLD AUTO: 2.64 M/UL (ref 4.2–5.3)
SODIUM SERPL-SCNC: 141 MMOL/L (ref 136–145)
SODIUM SERPL-SCNC: 145 MMOL/L (ref 136–145)
WBC # BLD AUTO: 11.4 K/UL (ref 4.6–13.2)

## 2017-02-11 PROCEDURE — 80048 BASIC METABOLIC PNL TOTAL CA: CPT | Performed by: INTERNAL MEDICINE

## 2017-02-11 PROCEDURE — 80048 BASIC METABOLIC PNL TOTAL CA: CPT | Performed by: FAMILY MEDICINE

## 2017-02-11 PROCEDURE — 74011000250 HC RX REV CODE- 250: Performed by: FAMILY MEDICINE

## 2017-02-11 PROCEDURE — 74011000250 HC RX REV CODE- 250: Performed by: INTERNAL MEDICINE

## 2017-02-11 PROCEDURE — 36415 COLL VENOUS BLD VENIPUNCTURE: CPT | Performed by: INTERNAL MEDICINE

## 2017-02-11 PROCEDURE — 74011000258 HC RX REV CODE- 258: Performed by: FAMILY MEDICINE

## 2017-02-11 PROCEDURE — 85027 COMPLETE CBC AUTOMATED: CPT | Performed by: INTERNAL MEDICINE

## 2017-02-11 PROCEDURE — 77030011989 HC AIRWY NP ROBTZ RUSC -A

## 2017-02-11 PROCEDURE — 74011250637 HC RX REV CODE- 250/637: Performed by: INTERNAL MEDICINE

## 2017-02-11 PROCEDURE — 74011250636 HC RX REV CODE- 250/636: Performed by: FAMILY MEDICINE

## 2017-02-11 PROCEDURE — 65610000006 HC RM INTENSIVE CARE

## 2017-02-11 PROCEDURE — 77030013140 HC MSK NEB VYRM -A

## 2017-02-11 PROCEDURE — 77010033678 HC OXYGEN DAILY

## 2017-02-11 PROCEDURE — 74011250636 HC RX REV CODE- 250/636: Performed by: INTERNAL MEDICINE

## 2017-02-11 PROCEDURE — 76770 US EXAM ABDO BACK WALL COMP: CPT

## 2017-02-11 PROCEDURE — 94640 AIRWAY INHALATION TREATMENT: CPT

## 2017-02-11 PROCEDURE — 36591 DRAW BLOOD OFF VENOUS DEVICE: CPT

## 2017-02-11 RX ORDER — BUMETANIDE 0.25 MG/ML
1 INJECTION INTRAMUSCULAR; INTRAVENOUS EVERY 12 HOURS
Status: DISCONTINUED | OUTPATIENT
Start: 2017-02-11 | End: 2017-02-12

## 2017-02-11 RX ADMIN — DULOXETINE 60 MG: 60 CAPSULE, DELAYED RELEASE ORAL at 21:43

## 2017-02-11 RX ADMIN — GUAIFENESIN 600 MG: 600 TABLET, EXTENDED RELEASE ORAL at 09:04

## 2017-02-11 RX ADMIN — FLUTICASONE PROPIONATE AND SALMETEROL 1 PUFF: 50; 500 POWDER RESPIRATORY (INHALATION) at 21:45

## 2017-02-11 RX ADMIN — LEVOTHYROXINE SODIUM 225 MCG: 200 TABLET ORAL at 09:04

## 2017-02-11 RX ADMIN — BUMETANIDE 1 MG: 0.25 INJECTION, SOLUTION INTRAMUSCULAR; INTRAVENOUS at 12:23

## 2017-02-11 RX ADMIN — BUMETANIDE 1 MG: 0.25 INJECTION, SOLUTION INTRAMUSCULAR; INTRAVENOUS at 21:45

## 2017-02-11 RX ADMIN — FLUTICASONE PROPIONATE AND SALMETEROL 1 PUFF: 50; 500 POWDER RESPIRATORY (INHALATION) at 09:09

## 2017-02-11 RX ADMIN — HEPARIN SODIUM 5000 UNITS: 5000 INJECTION, SOLUTION INTRAVENOUS; SUBCUTANEOUS at 12:23

## 2017-02-11 RX ADMIN — PIPERACILLIN SODIUM,TAZOBACTAM SODIUM 3.38 G: 3; .375 INJECTION, POWDER, FOR SOLUTION INTRAVENOUS at 21:44

## 2017-02-11 RX ADMIN — Medication 10 ML: at 00:17

## 2017-02-11 RX ADMIN — PREGABALIN 100 MG: 100 CAPSULE ORAL at 21:43

## 2017-02-11 RX ADMIN — DULOXETINE 60 MG: 60 CAPSULE, DELAYED RELEASE ORAL at 09:04

## 2017-02-11 RX ADMIN — PIPERACILLIN SODIUM,TAZOBACTAM SODIUM 3.38 G: 3; .375 INJECTION, POWDER, FOR SOLUTION INTRAVENOUS at 10:32

## 2017-02-11 RX ADMIN — ASPIRIN 81 MG: 81 TABLET, COATED ORAL at 10:06

## 2017-02-11 RX ADMIN — PANTOPRAZOLE SODIUM 40 MG: 40 TABLET, DELAYED RELEASE ORAL at 09:04

## 2017-02-11 RX ADMIN — HEPARIN SODIUM 5000 UNITS: 5000 INJECTION, SOLUTION INTRAVENOUS; SUBCUTANEOUS at 06:11

## 2017-02-11 RX ADMIN — VANCOMYCIN HYDROCHLORIDE 1500 MG: 10 INJECTION, POWDER, LYOPHILIZED, FOR SOLUTION INTRAVENOUS at 23:44

## 2017-02-11 RX ADMIN — SODIUM CHLORIDE 125 ML/HR: 900 INJECTION, SOLUTION INTRAVENOUS at 03:52

## 2017-02-11 RX ADMIN — PREGABALIN 100 MG: 100 CAPSULE ORAL at 15:15

## 2017-02-11 RX ADMIN — Medication 10 ML: at 22:00

## 2017-02-11 RX ADMIN — Medication 2 MCG/MIN: at 00:09

## 2017-02-11 RX ADMIN — PREGABALIN 100 MG: 100 CAPSULE ORAL at 09:05

## 2017-02-11 RX ADMIN — FLUTICASONE PROPIONATE 1 SPRAY: 50 SPRAY, METERED NASAL at 09:09

## 2017-02-11 RX ADMIN — ALBUTEROL SULFATE 2.5 MG: 2.5 SOLUTION RESPIRATORY (INHALATION) at 01:11

## 2017-02-11 RX ADMIN — PIPERACILLIN SODIUM,TAZOBACTAM SODIUM 3.38 G: 3; .375 INJECTION, POWDER, FOR SOLUTION INTRAVENOUS at 15:15

## 2017-02-11 RX ADMIN — GUAIFENESIN 600 MG: 600 TABLET, EXTENDED RELEASE ORAL at 21:43

## 2017-02-11 RX ADMIN — HEPARIN SODIUM 5000 UNITS: 5000 INJECTION, SOLUTION INTRAVENOUS; SUBCUTANEOUS at 21:44

## 2017-02-11 RX ADMIN — Medication 10 ML: at 12:23

## 2017-02-11 RX ADMIN — Medication 10 ML: at 06:13

## 2017-02-11 RX ADMIN — VANCOMYCIN HYDROCHLORIDE 1500 MG: 10 INJECTION, POWDER, LYOPHILIZED, FOR SOLUTION INTRAVENOUS at 11:09

## 2017-02-11 NOTE — PROGRESS NOTES
Pharmacy Dosing Services: Vancomycin    Consult for Vancomycin Dosing by Pharmacy by Dr. Urmila Cueva provided for this 80y.o. year old female , for indication of HCAP. Day of Therapy 02    Ht Readings from Last 1 Encounters:   02/10/17 152 cm (59.84\")        Wt Readings from Last 1 Encounters:   02/10/17 99.8 kg (220 lb)        Previous Regimen Vancomycin 1gm x1 (ED Dose)   Last Level NA   Other Current Antibiotics Zosyn 3.375 gm IV q6h   Significant Cultures Pending   Serum Creatinine Lab Results   Component Value Date/Time    Creatinine 2.01 02/11/2017 02:50 AM      Creatinine Clearance Estimated Creatinine Clearance: 41 mL/min (based on Cr of 2.01). BUN Lab Results   Component Value Date/Time    BUN 44 02/11/2017 02:50 AM      WBC Lab Results   Component Value Date/Time    WBC 11.4 02/11/2017 02:50 AM      H/H Lab Results   Component Value Date/Time    HGB 8.4 02/11/2017 02:50 AM      Platelets Lab Results   Component Value Date/Time    PLATELET 246 24/44/5613 02:50 AM      Temp 98.4 °F (36.9 °C)     Vancomycin 1 gm dose given in ED on 10Feb 2017@ 17:00. Follow with maintenance dose of Vancomycin 1500 mg IV q12h, beginning @ 11:00 [id number]. Dose calculated to approximate a therapeutic trough of 15-20 mcg/mL. Pharmacy to follow daily and will make changes to dose and/or frequency based on clinical status. Damion Elias, Pharm. D.   Clinical Pharmacist  [phone]

## 2017-02-11 NOTE — ED NOTES
TRANSFER - OUT REPORT:    Verbal report given to [facility]) on [address]  being transferred to ICU(unit) for routine progression of care       Report consisted of patients Situation, Background, Assessment and   Recommendations(SBAR). Information from the following report(s) SBAR, ED Summary, Intake/Output, MAR and Recent Results was reviewed with the receiving nurse. Lines:   Peripheral IV 02/10/17 (Active)       Peripheral IV 02/10/17 Wrist (Active)   Site Assessment Clean, dry, & intact 2/10/2017  3:57 PM   Phlebitis Assessment 0 2/10/2017  3:57 PM   Infiltration Assessment 0 2/10/2017  3:57 PM   Dressing Status Clean, dry, & intact 2/10/2017  3:57 PM   Dressing Type Transparent 2/10/2017  3:57 PM       Peripheral IV 02/10/17 Right Wrist (Active)   Site Assessment Clean, dry, & intact 2/10/2017  3:57 PM   Phlebitis Assessment 0 2/10/2017  3:57 PM   Infiltration Assessment 0 2/10/2017  3:57 PM   Dressing Status Clean, dry, & intact 2/10/2017  3:57 PM   Dressing Type Transparent 2/10/2017  3:57 PM        Opportunity for questions and clarification was provided.       Patient transported with:   Monitor  O2 @ 3 liters  Registered Nurse  [facility]

## 2017-02-11 NOTE — ROUTINE PROCESS
TRANSFER - IN REPORT:    Verbal report received from [facility] (name) on Alessio Tolbert  being received from ED (unit) for routine progression of care      Report consisted of patients Situation, Background, Assessment and   Recommendations(SBAR). Information from the following report(s) SBAR, Kardex, Intake/Output and MAR was reviewed with the receiving nurse. Opportunity for questions and clarification was provided.

## 2017-02-11 NOTE — PROGRESS NOTES
Hospitalist Progress Note    Patient: Meenakshi Clay MRN: [id number]  CSN: [id number]    YOB: 1945  Age: 80 y.o. Sex: female    DOA: 2/10/2017 LOS:  LOS: 1 day          Chief Complaint:    Altered mental status. Assessment/Plan       Hospital Problems  Date Reviewed: 11/25/2016          Codes Class Noted POA    * (Principal)Toxic metabolic encephalopathy [facility]-FM: G92  ICD-9-CM: 349.82  2/11/2017 Yes        Acute renal failure (ARF) ([facility] ICD-10-CM: N17.9  ICD-9-CM: 584.9  2/11/2017 Yes        Hyperkalemia ICD-10-CM: E87.5  ICD-9-CM: 276.7  2/11/2017 Yes        Acute diastolic CHF (congestive heart failure) ([facility] ICD-10-CM: I50.31  ICD-9-CM: 428.31, 428.0  11/28/2016 Yes        COPD (chronic obstructive pulmonary disease) ([facility] ICD-10-CM: J44.9  ICD-9-CM: 212  11/25/2016 Yes        Benign hypertension ICD-10-CM: I10  ICD-9-CM: 401.1  11/25/2016 Yes        GERD (gastroesophageal reflux disease) ICD-10-CM: K21.9  ICD-9-CM: 530.81  11/25/2016 Yes            Re-initiated abx; continue supportive care; awaiting final cultures; wean pressors as tolerated; fluid and electrolyte management protocols; continue ICU status; transition to tele when able.       Subjective:    Patient admitted yesterday to ICU w/ Severe sepsis w/ MODS; TMEx3 days, ARF, HCAP (from [facility] R IJ placed yesterday evening via US guidance; currently on levophed for BP support; BP and HR stable since 0300; BG at goal; Hgb down from 9.3 on admission to 8.4; hyperK resolving; Cr improved; BNP elevated on admission; ammonia normal; UDS positive for benzos and opiates; BCx NGTD; Echo and US pending; vanc and zosyn not continued from ED    Review of systems:    Constitutional: denies fevers, chills, myalgias  Respiratory: denies SOB, cough  Cardiovascular: denies chest pain, palpitations  Gastrointestinal: denies nausea, vomiting, diarrhea      Vital signs/Intake and Output:  Visit Vitals    /51    Pulse 88    Temp 98.4 °F (36.9 °C)    Resp 25    Ht 4' 11.84\" (1.52 m)    Wt 99.8 kg (220 lb)    SpO2 97%    BMI 43.19 kg/m2     Current Shift:     Last three shifts:  02/09 1901 - 02/11 0700  In: 1260.4 [I.V.:1260.4]  Out: 2550 [Urine:2550]    Exam:    General: Well developed, alert, NAD, OX3  Head/Neck: NCAT, supple, No masses, No lymphadenopathy  CVS:Regular rate and rhythm, no M/R/G, S1/S2 heard, no thrill  Lungs:Clear to auscultation bilaterally, no wheezes, rhonchi, or rales  Abdomen: Soft, Nontender, No distention, Normal Bowel sounds, No hepatomegaly  Extremities: No C/C/E, pulses palpable 2+  Skin:normal texture and turgor, no rashes, no lesions  Neuro:grossly normal , follows commands  Psych:appropriate                Labs: Results:       Chemistry Recent Labs      02/11/17   0250  02/10/17   2045  02/10/17   1255   GLU  162*  117*  111*   NA  141  142  140   K  5.4  5.8*  6.0*   CL  108  106  105   CO2  18*  21  26   BUN  44*  42*  44*   CREA  2.01*  2.05*  2.35*   CA  8.4*  8.6  9.1   AGAP  15  15  9   BUCR  22*  20  19   AP   --    --   64   TP   --    --   7.5   ALB   --    --   3.4   GLOB   --    --   4.1*   AGRAT   --    --   0.8      CBC w/Diff Recent Labs      02/11/17   0250  02/10/17   2045  02/10/17   1255   WBC  11.4  11.7  11.5   RBC  2.64*  2.79*  2.91*   HGB  8.4*  8.9*  9.3*   HCT  26.6*  28.1*  29.2*   PLT  294  292  305   GRANS   --    --   70   LYMPH   --    --   18*   EOS   --    --   0      Cardiac Enzymes Recent Labs      02/10/17   1255   CPK  101   CKND1  2.1      Coagulation Recent Labs      02/10/17   1255   PTP  13.9   INR  1.1       Lipid Panel No results found for: CHOL, CHOLPOCT, CHOLX, CHLST, CHOLV, 997795, HDL, LDL, NLDLCT, DLDL, LDLC, DLDLP, 768816, VLDLC, VLDL, TGL, TGLX, TRIGL, [id number], TRIGP, TGLPOCT, 871924, CHHD, CHHDX   BNP No results for input(s): BNPP in the last 72 hours.    Liver Enzymes Recent Labs      02/10/17   1255   TP  7.5   ALB  3.4   AP  64   SGOT  29 Thyroid Studies Lab Results   Component Value Date/Time    TSH 0.04 11/26/2016 05:39 AM        Procedures/imaging: see electronic medical records for all procedures/Xrays and details which were not copied into this note but were reviewed prior to creation of Plan    35 minutes of critical care time spent in the direct evaluation and treatment of this high risk patient. The reason for providing this level of medical care for this critically ill patient was due a critical illness that impaired one or more vital organ systems such that there was a high probability of imminent or life threatening deterioration in the patients condition. This care involved high complexity decision making to assess, manipulate, and support vital system functions, to treat this degreee vital organ system failure and to prevent further life threatening deterioration of the patients condition.         Iram Smallwood Null, DO

## 2017-02-11 NOTE — ROUTINE PROCESS
Bedside and Verbal shift change report given to JOE Foy (oncoming nurse). Report included the following information SBAR, Kardex, Intake/Output, MAR and Recent Results. Alarm parameters reviewed and opportunities for questions provided.

## 2017-02-11 NOTE — WOUND CARE
Patient seen as a part of ICU rounds. Patients skin is intact at this time with bruising. Patient is repositioned frequently. Wound care will continue to monitor during admission.

## 2017-02-11 NOTE — CONSULTS
[facility]  Pulmonary, Critical Care, and Sleep Medicine    Name: Sonia Brewer MRN: [id number]   : 1945 Hospital: [facility] [place]   Date: 2017        Critical Care Initial Patient Consult    Requesting MD:                                                  Reason for CC Consult:  IMPRESSION:   · Hypoxia due to pulmonary edema and atelectasis in setting of prob OHS  · Hypotension, likely due to excessive analgesic  · Morbid obeisty  · Fibromyalgia and chronic pain syndrome  · Acute encephalopathy due to excessive analgesic,  Resolved. · DAVID injury possibly due to diastolic heart failure  · Pulmonary edema      RECOMMENDATIONS:   · Judicious with analgesic  · Prn bipap  · Wean levophed to to off if possible while maitaining sbp greater than 90  · Hold atenolol given hypotension  · Diurese  · ICS  · emperic abx ok  · Replete lyte as needed  · GI and DVt proh     Subjective/History: This patient has been seen and evaluated at the request of Dr. Nadja Yates for hypotension. Patient is a 80 y.o. female with multiple medical problems listed below resident of  [address] [place] [address], brought to ED with mental status changes, somnolent and hypoxia with spo2 of 86%. Pt is chronically on opioids for pain and fibromyalgia. Pt was given narcan,  She apparently woke up coughing  And agitated. Pt was given analgesic. Brought to ICU. Upon arrival noted to be  Hypotensive,  Pt was started on levophed. I was asked to assist in management. Pt remain somnolent but arouseable   Does not appears to be in distress only 2.5mcg/min of levophed.         Past Medical History   Diagnosis Date    Arthritis     Asthma     CAD (coronary artery disease)      angina, last episode 2012    Chronic bronchitis (HCC)     Chronic kidney disease      stage 3    Chronic obstructive pulmonary disease (HCC)     Chronic pain      cervical, lumbar, knees, ankles, elbows    Fibromyalgia  GERD (gastroesophageal reflux disease)     Hypertension 1993    Psychiatric disorder      anxiety, depression    Thyroid disease       Past Surgical History   Procedure Laterality Date    Hx hysterectomy      Hx oophorectomy       left    Hx appendectomy      Hx cholecystectomy      Hx orthopaedic       cervical  x5    Hx urological  1984 and 1985     kidney stone surgery    Hx lithotripsy       x 4    Hx cervical fusion       anterior x 2    Hx cervical fusion       posterior x 3    Hx cervical fusion  1996     Removal of titanium plate and screws    Hx small bowel resection      Hx tonsillectomy      Hx adenoidectomy      Hx cataract removal       OU      Prior to Admission medications    Medication Sig Start Date End Date Taking? Authorizing Provider   omeprazole (PRILOSEC) 20 mg capsule Take 20 mg by mouth daily. Yes Irene Sidhu, MD   lisinopril (PRINIVIL, ZESTRIL) 40 mg tablet Take 1 Tab by mouth daily. 12/1/16  Yes Denise Vazquez MD   oxyCODONE-acetaminophen (PERCOCET 10)  mg per tablet Take 1-1.5 Tabs by mouth every four (4) hours as needed. Max Daily Amount: 9 Tabs. 12/1/16  Yes Denise Vazquez MD   levothyroxine (SYNTHROID) 200 mcg tablet Take 225 mcg by mouth Daily (before breakfast). Takes a total of 225 mcg. Yes Historical Provider   pregabalin (LYRICA) 100 mg capsule Take 100 mg by mouth three (3) times daily. Yes Historical Provider   cyanocobalamin (VITAMIN B12) 1,000 mcg/mL injection 1,000 mcg by IntraMUSCular route every month. Yes Historical Provider   albuterol (PROVENTIL VENTOLIN) 2.5 mg /3 mL (0.083 %) nebulizer solution 2.5 mg by Nebulization route every four (4) hours as needed. Yes Historical Provider   aspirin 81 mg tablet Take 81 mg by mouth daily. Yes Historical Provider   atenolol (TENORMIN) 25 mg tablet Take 25 mg by mouth daily as needed. As needed for palpatations   Yes Historical Provider   Cetirizine (ZYRTEC) 10 mg cap Take  by mouth daily.    Yes Historical Provider   montelukast (SINGULAIR) 10 mg tablet Take 10 mg by mouth daily. Yes Historical Provider   diazePAM (VALIUM) 5 mg tablet Take 1 Tab by mouth two (2) times daily as needed for Anxiety. Max Daily Amount: 10 mg. 12/1/16   Ana Cristina Erwin MD   predniSONE (DELTASONE) 20 mg tablet 40 mg daily X 3 days, 20 mg daily X 4 days, then 10 mg daily X 4 days 12/1/16   Ana Cristina Erwin MD   nystatin (MYCOSTATIN) powder Apply  to affected area two (2) times a day. 12/1/16   Ana Cristina Erwin MD   fluticasone (FLONASE) 50 mcg/actuation nasal spray 1 [place] by Both Nostrils route daily. Historical Provider   magnesium hydroxide (MILK OF MAGNESIA) 400 mg/5 mL suspension Take 30 mL by mouth daily as needed for Constipation. Historical Provider   bisacodyl (DULCOLAX, BISACODYL,) 10 mg suppository Insert 10 mg into rectum daily as needed. Historical Provider   mineral oil (FLEET MINERAL OIL) enema Insert  into rectum daily as needed for Constipation. Historical Provider   esomeprazole (NEXIUM) 20 mg capsule Take 20 mg by mouth daily. Historical Provider   acetaminophen (TYLENOL) 325 mg tablet Take 325 mg by mouth every four (4) hours as needed for Pain. Historical Provider   diclofenac (VOLTAREN) 1 % gel Apply 2 g to affected area three (3) times daily. Apply to both shoulders and both knees. Historical Provider   miconazole (ELPIDIO ANTIFUNGAL) 2 % topical cream Apply  to affected area three (3) times daily. Apply to groin rash. Historical Provider   cholestyramine-sucrose Crow Pill) 4 gram powder Take  by mouth daily as needed (diarrhea). Historical Provider   hydrOXYzine (ATARAX) 25 mg tablet Take 25 mg by mouth every six (6) hours as needed for Itching. Irene Sidhu MD   cyclobenzaprine (FLEXERIL) 10 mg tablet Take 10 mg by mouth two (2) times daily as needed for Muscle Spasm(s).     Irene Sidhu MD   fluticasone-salmeterol (ADVAIR DISKUS) 500-50 mcg/dose diskus inhaler Take 1 Puff by inhalation every twelve (12) hours. May use inhaler on DOS    Historical Provider   nitroglycerin (NITROSTAT) 0.4 mg SL tablet 0.4 mg by SubLINGual route every five (5) minutes as needed. Historical Provider   guaiFENesin SR (MUCINEX) 600 mg SR tablet Take 600 mg by mouth two (2) times a day. Historical Provider   DULoxetine (CYMBALTA) 60 mg capsule Take 60 mg by mouth two (2) times a day.  Indications: CHRONIC MUSCULOSKELETAL PAIN    Historical Provider     Current Facility-Administered Medications   Medication Dose Route Frequency    piperacillin-tazobactam (ZOSYN) 3.375 g in 0.9% sodium chloride (MBP/ADV) 100 mL MBP  3.375 g IntraVENous Q6H    vancomycin (VANCOCIN) 1,500 mg in 0.9% sodium chloride 500 mL IVPB  1,500 mg IntraVENous Q12H    Vancomycin- Rx to Dose & Monitor  1 Each Other Rx Dosing/Monitoring    sodium chloride (NS) flush 5-10 mL  5-10 mL IntraVENous Q8H    aspirin delayed-release tablet 81 mg  81 mg Oral DAILY    atenolol (TENORMIN) tablet 25 mg  25 mg Oral DAILY    DULoxetine (CYMBALTA) capsule 60 mg  60 mg Oral BID    pantoprazole (PROTONIX) tablet 40 mg  40 mg Oral DAILY    fluticasone (FLONASE) 50 mcg/actuation nasal spray 1 Spray  1 Spray Both Nostrils DAILY    fluticasone-salmeterol (ADVAIR) 500mcg-50mcg/puff  1 Puff Inhalation Q12H    guaiFENesin SR (MUCINEX) tablet 600 mg  600 mg Oral BID    pregabalin (LYRICA) capsule 100 mg  100 mg Oral TID    0.9% sodium chloride infusion  150 mL/hr IntraVENous CONTINUOUS    heparin (porcine) injection 5,000 Units  5,000 Units SubCUTAneous Q8H    levothyroxine (SYNTHROID) tablet 225 mcg  225 mcg Oral ACB    NOREPINephrine (LEVOPHED) 8,000 mcg in dextrose 5% 250 mL infusion  2-16 mcg/min IntraVENous TITRATE     Allergies   Allergen Reactions    Ambien [Zolpidem] Other (comments)     Sleep drive    Aspirin Other (comments)     bruising    Codeine Hives    Darvon [Propoxyphene] Unknown (comments)    Iodinated Contrast Media - Oral And Iv Dye Hives     Iodine on skin is ok per pt.  Pcn [Penicillins] Nausea and Vomiting    Shellfish Derived Hives, Shortness of Breath and Swelling     Iodine on skin is ok per pt.  Zanaflex [Tizanidine] Other (comments)     disorientated    Levaquin [Levofloxacin] Hives     Red rash at IV site while infusing      Social History   Substance Use Topics    Smoking status: Never Smoker    Smokeless tobacco: Never Used    Alcohol use No      History reviewed. No pertinent family history. Review of Systems:  A comprehensive review of systems was negative except for that written in the HPI. Objective:   Vital Signs:    Visit Vitals    BP 93/45    Pulse 76    Temp 98.4 °F (36.9 °C)    Resp 21    Ht 4' 11.84\" (1.52 m)  Comment: from admission 16    Wt 99.8 kg (220 lb)    SpO2 93%    BMI 43.19 kg/m2       O2 Device: Nasal cannula   O2 Flow Rate (L/min): 3 l/min   Temp (24hrs), Av.3 °F (36.8 °C), Min:97.8 °F (36.6 °C), Max:98.8 °F (37.1 °C)       Intake/Output:   Last shift:         Last 3 shifts:  1901 -  0700  In: 1260.4 [I.V.:1260.4]  Out: 6941 [Urine:2550]    Intake/Output Summary (Last 24 hours) at 17 1149  Last data filed at 17 0630   Gross per 24 hour   Intake          1260.38 ml   Output             2550 ml   Net         -1289.62 ml     Hemodynamics:   . @MAP     . @CVP       Ventilator Settings:  Mode Rate Tidal Volume Pressure FiO2 PEEP                    Peak airway pressure:      Minute ventilation:        ARDS network Guidelines: Lung protective strategy and Pl pressure goals____________    Physical Exam:    General:  Alert, cooperative, no distress, appears stated age. Head:  Normocephalic, without obvious abnormality, atraumatic. Eyes:  Conjunctivae/corneas clear. PERRL, EOMs intact. Nose: Nares normal. Septum midline. Mucosa normal. No drainage or sinus tenderness.    Throat: Lips, mucosa, and tongue normal. Teeth and gums normal.   Neck: Supple, symmetrical, trachea midline, no adenopathy, thyroid: no enlargment/tenderness/nodules, no carotid bruit and no JVD. Back:   Symmetric, no curvature. ROM normal.   Lungs:   Clear to auscultation bilaterally. Chest wall:  No tenderness or deformity. Heart:  Regular rate and rhythm, S1, S2 normal, no murmur, click, rub or gallop. Abdomen:   Soft, non-tender. Bowel sounds normal. No masses,  No organomegaly. Extremities: Extremities normal, atraumatic, no cyanosis or edema. Pulses: 2+ and symmetric all extremities. Skin: Skin color, texture, turgor normal. No rashes or lesions   Lymph nodes: Cervical, supraclavicular, and axillary nodes normal.   Neurologic: Grossly nonfocal       Data:     Recent Results (from the past 24 hour(s))   EKG, 12 LEAD, INITIAL    Collection Time: 02/10/17 12:44 PM   Result Value Ref Range    Ventricular Rate 78 BPM    Atrial Rate 78 BPM    P-R Interval 160 ms    QRS Duration 82 ms    Q-T Interval 394 ms    QTC Calculation (Bezet) 449 ms    Calculated P Axis 55 degrees    Calculated R Axis 61 degrees    Calculated T Axis 65 degrees    Diagnosis       Normal sinus rhythm  Normal ECG  When compared with ECG of 25-NOV-2016 14:47,  No significant change was found     CBC WITH AUTOMATED DIFF    Collection Time: 02/10/17 12:55 PM   Result Value Ref Range    WBC 11.5 4.6 - 13.2 K/uL    RBC 2.91 (L) 4.20 - 5.30 M/uL    HGB 9.3 (L) 12.0 - 16.0 g/dL    HCT 29.2 (L) 35.0 - 45.0 %    .3 (H) 74.0 - 97.0 FL    MCH 32.0 24.0 - 34.0 PG    MCHC 31.8 31.0 - 37.0 g/dL    RDW 13.8 11.6 - 14.5 %    PLATELET 829 364 - 997 K/uL    MPV 10.8 9.2 - 11.8 FL    NEUTROPHILS 70 40 - 73 %    LYMPHOCYTES 18 (L) 21 - 52 %    MONOCYTES 12 (H) 3 - 10 %    EOSINOPHILS 0 0 - 5 %    BASOPHILS 0 0 - 2 %    ABS. NEUTROPHILS 8.0 1.8 - 8.0 K/UL    ABS. LYMPHOCYTES 2.1 0.9 - 3.6 K/UL    ABS. MONOCYTES 1.4 (H) 0.05 - 1.2 K/UL    ABS. EOSINOPHILS 0.0 0.0 - 0.4 K/UL    ABS.  BASOPHILS 0.0 0.0 - 0.06 K/UL    RBC COMMENTS NORMOCYTIC, NORMOCHROMIC      DF AUTOMATED     METABOLIC PANEL, COMPREHENSIVE    Collection Time: 02/10/17 12:55 PM   Result Value Ref Range    Sodium 140 136 - 145 mmol/L    Potassium 6.0 (H) 3.5 - 5.5 mmol/L    Chloride 105 100 - 108 mmol/L    CO2 26 21 - 32 mmol/L    Anion gap 9 3.0 - 18 mmol/L    Glucose 111 (H) 74 - 99 mg/dL    BUN 44 (H) 7.0 - 18 MG/DL    Creatinine 2.35 (H) 0.6 - 1.3 MG/DL    BUN/Creatinine ratio 19 12 - 20      GFR est AA 25 (L) >60 ml/min/1.73m2    GFR est non-AA 20 (L) >60 ml/min/1.73m2    Calcium 9.1 8.5 - 10.1 MG/DL    Bilirubin, total 0.3 0.2 - 1.0 MG/DL    ALT (SGPT) 24 13 - 56 U/L    AST (SGOT) 29 15 - 37 U/L    Alk.  phosphatase 64 45 - 117 U/L    Protein, total 7.5 6.4 - 8.2 g/dL    Albumin 3.4 3.4 - 5.0 g/dL    Globulin 4.1 (H) 2.0 - 4.0 g/dL    A-G Ratio 0.8 0.8 - 1.7     URINALYSIS W/ RFLX MICROSCOPIC    Collection Time: 02/10/17 12:55 PM   Result Value Ref Range    Color YELLOW      Appearance CLEAR      Specific gravity 1.020 1.005 - 1.030      pH (UA) 5.0 5.0 - 8.0      Protein TRACE (A) NEG mg/dL    Glucose NEGATIVE  NEG mg/dL    Ketone NEGATIVE  NEG mg/dL    Bilirubin NEGATIVE  NEG      Blood NEGATIVE  NEG      Urobilinogen 0.2 0.2 - 1.0 EU/dL    Nitrites NEGATIVE  NEG      Leukocyte Esterase NEGATIVE  NEG     CARDIAC PANEL,(CK, CKMB & TROPONIN)    Collection Time: 02/10/17 12:55 PM   Result Value Ref Range     26 - 192 U/L    CK - MB 2.1 0.5 - 3.6 ng/ml    CK-MB Index 2.1 0.0 - 4.0 %    Troponin-I, Qt. <0.02 0.00 - 0.06 NG/ML   PRO-BNP    Collection Time: 02/10/17 12:55 PM   Result Value Ref Range    NT pro-BNP 1284 (H) 0 - 900 PG/ML   CULTURE, BLOOD    Collection Time: 02/10/17 12:55 PM   Result Value Ref Range    Special Requests: NO SPECIAL REQUESTS      Culture result: NO GROWTH AFTER 17 HOURS     LACTIC ACID, PLASMA    Collection Time: 02/10/17 12:55 PM   Result Value Ref Range    Lactic acid 0.9 0.4 - 2.0 MMOL/L   DRUG SCREEN, URINE    Collection Time: 02/10/17 12:55 PM   Result Value Ref Range    BENZODIAZEPINE POSITIVE (A) NEG      BARBITURATES NEGATIVE  NEG      THC (TH-CANNABINOL) NEGATIVE  NEG      OPIATES POSITIVE (A) NEG      PCP(PHENCYCLIDINE) NEGATIVE  NEG      COCAINE NEGATIVE  NEG      AMPHETAMINE NEGATIVE  NEG      METHADONE NEGATIVE  NEG      HDSCOM (NOTE)    PROTHROMBIN TIME + INR    Collection Time: 02/10/17 12:55 PM   Result Value Ref Range    Prothrombin time 13.9 11.5 - 15.2 sec    INR 1.1 0.8 - 1.2     URINE MICROSCOPIC ONLY    Collection Time: 02/10/17 12:55 PM   Result Value Ref Range    WBC 2 to 4 0 - 5 /hpf    RBC 0 0 - 5 /hpf    Epithelial cells FEW 0 - 5 /lpf    Bacteria FEW (A) NEG /hpf   POC G3    Collection Time: 02/10/17  1:30 PM   Result Value Ref Range    Device: NASAL CANNULA      Flow rate (POC) 2 L/M    FIO2 (POC) 28 %    pH (POC) 7.272 (L) 7.35 - 7.45      pCO2 (POC) 46.3 (H) 35.0 - 45.0 MMHG    pO2 (POC) 87 80 - 100 MMHG    HCO3 (POC) 21.3 (L) 22 - 26 MMOL/L    sO2 (POC) 95 92 - 97 %    Base deficit (POC) 6 mmol/L    Allens test (POC) YES      Site RIGHT RADIAL      Specimen type (POC) ARTERIAL      Performed by Irvin De Dios    AMMONIA    Collection Time: 02/10/17  5:10 PM   Result Value Ref Range    Ammonia 12 11 - 32 UMOL/L   METABOLIC PANEL, BASIC    Collection Time: 02/10/17  8:45 PM   Result Value Ref Range    Sodium 142 136 - 145 mmol/L    Potassium 5.8 (H) 3.5 - 5.5 mmol/L    Chloride 106 100 - 108 mmol/L    CO2 21 21 - 32 mmol/L    Anion gap 15 3.0 - 18 mmol/L    Glucose 117 (H) 74 - 99 mg/dL    BUN 42 (H) 7.0 - 18 MG/DL    Creatinine 2.05 (H) 0.6 - 1.3 MG/DL    BUN/Creatinine ratio 20 12 - 20      GFR est AA 29 (L) >60 ml/min/1.73m2    GFR est non-AA 24 (L) >60 ml/min/1.73m2    Calcium 8.6 8.5 - 10.1 MG/DL   MAGNESIUM    Collection Time: 02/10/17  8:45 PM   Result Value Ref Range    Magnesium 1.9 1.8 - 2.4 mg/dL   CBC W/O DIFF    Collection Time: 02/10/17  8:45 PM   Result Value Ref Range    WBC 11.7 4.6 - 13.2 K/uL    RBC 2.79 (L) 4.20 - 5.30 M/uL    HGB 8.9 (L) 12.0 - 16.0 g/dL    HCT 28.1 (L) 35.0 - 45.0 %    .7 (H) 74.0 - 97.0 FL    MCH 31.9 24.0 - 34.0 PG    MCHC 31.7 31.0 - 37.0 g/dL    RDW 13.8 11.6 - 14.5 %    PLATELET 763 891 - 522 K/uL    MPV 10.5 9.2 - 30.3 FL   METABOLIC PANEL, BASIC    Collection Time: 02/11/17  2:50 AM   Result Value Ref Range    Sodium 141 136 - 145 mmol/L    Potassium 5.4 3.5 - 5.5 mmol/L    Chloride 108 100 - 108 mmol/L    CO2 18 (L) 21 - 32 mmol/L    Anion gap 15 3.0 - 18 mmol/L    Glucose 162 (H) 74 - 99 mg/dL    BUN 44 (H) 7.0 - 18 MG/DL    Creatinine 2.01 (H) 0.6 - 1.3 MG/DL    BUN/Creatinine ratio 22 (H) 12 - 20      GFR est AA 30 (L) >60 ml/min/1.73m2    GFR est non-AA 24 (L) >60 ml/min/1.73m2    Calcium 8.4 (L) 8.5 - 10.1 MG/DL   CBC W/O DIFF    Collection Time: 02/11/17  2:50 AM   Result Value Ref Range    WBC 11.4 4.6 - 13.2 K/uL    RBC 2.64 (L) 4.20 - 5.30 M/uL    HGB 8.4 (L) 12.0 - 16.0 g/dL    HCT 26.6 (L) 35.0 - 45.0 %    .8 (H) 74.0 - 97.0 FL    MCH 31.8 24.0 - 34.0 PG    MCHC 31.6 31.0 - 37.0 g/dL    RDW 13.7 11.6 - 14.5 %    PLATELET 913 412 - 318 K/uL    MPV 10.5 9.2 - 11.8 FL             Telemetry:normal sinus rhythm    Imaging:  I have personally reviewed the patients radiographs and have reviewed the reports:      Best practice :  Not applicableCardiovascular: An arterial systolic blood pressure (SBP) of < or equal to 90mm Hg or a mean arterial pressure (MAP) < or equal to 70mm Hg for at least 1 hour despite adequate fluid resuscitation or adequate intravascular volume status; or the need for vasopressors to maintain SBP>or equal to 90mm Hg or MAP > or equal to 70mm Hg. Increase diuresis  Central Line Bundle Followed      Total critical care time exclusive of procedures: 45minutes  Henry Baig MD  2/11/2017, 11:49 AM

## 2017-02-11 NOTE — H&P
History & Physical    Patient: Sol Gentile MRN: [id number]  CSN: [id number]    YOB: 1945  Age: 80 y.o. Sex: female      DOA: 2/10/2017  Primary Care Provider:  Sheryl Burns. Lamar Serrano MD      Assessment/Plan     Patient Active Problem List   Diagnosis Code    Cataract H26.9    DDD (degenerative disc disease), cervical M50.30    H/O cervical spine surgery Z98.890    COPD (chronic obstructive pulmonary disease) ([facility] J44.9    Acidosis E87.2    COPD exacerbation ([facility] J44.1    Acute on chronic respiratory failure ([facility]) J96.20    Obesity E66.9    Benign hypertension I10    GERD (gastroesophageal reflux disease) K21.9    Acute diastolic CHF (congestive heart failure) ([facility]) I50.31    Acute encephalopathy G93.40       Active problems:  1. CNS failure/altered mental status  2. Hyperkalemia  3. Acute renal failure  4. Pneumonia  5. Chronic pain syndrome    -Admit patient to ICU for close monitoring.  -Altered mental status is likely due to infection as is acute renal failure. Check renal ultrasound.  -Continue vancomycin and Zosyn.  -Hold opioid analgesia and benzodiazepines given altered mental status.  -Continue patients other medications except lisinopril given acute renal failure  -Monitor BMP every six hours times four and administer Kayexalate as needed. -Uncertain volume status. Will get echocardiogram to further evaluate. Given transient hypotension will administer fluid small boluses and continue gentle fluid hydration. -DVT prophylaxis: heparin and SCDs  -Code status: full code            CC: Altered mental status and low sats       HPI:     Sol Gentile is a 80 y.o. female who has a past medical history significant for hypothyroidism, anxiety disorder, hypertension, [place], coronary artery disease, asthma/COPD and osteoarthritis presenting with altered mental status and decreased oxygen saturations.  The patient also has a history of chronic pain for which she uses chronic narcotics. Upon arrival she was given Narcan for reversal resulting in substantial agitation prompting her to be given additional analgesia. Upon interview she is unable to contribute to to a critical medical illness. On arrival she was afebrile, pulse 79, blood pressure 181/150, respirations 22 with oxygen saturations of 100% on 4 L by nasal cannula. Laboratory evaluation was significant for worsening anemia hemoglobin 9.3 down from 11.2, potassium 6.0 and creatinine which worsened to 2.35 from 1.01. A CT without contrast of the head showed no acute disease. A chest x-ray showed pulmonary vascular congestion with bilateral edema versus infiltrates. A recent echocardiogram from November of last year showed grade one diastolic dysfunction with a preserved ejection fraction of 62%. In the ER she was given breathing treatments, insulin, D5, Kayexalate, calcium gluconate, Narcan as mentioned above, fentanyl 50 µg, vancomycin, Zosyn, Solu-Medrol 125 mg, Ativan and Lasix. Subsequently, hospital medicine was contacted for admission to the hospital and further management.         Past Medical History   Diagnosis Date    Arthritis     Asthma     CAD (coronary artery disease)      angina, last episode 06/2012    Chronic bronchitis (HCC)     Chronic kidney disease      stage 3    Chronic obstructive pulmonary disease (HCC)     Chronic pain      cervical, lumbar, knees, ankles, elbows    Fibromyalgia     GERD (gastroesophageal reflux disease)     Hypertension 1993    Psychiatric disorder      anxiety, depression    Thyroid disease        Past Surgical History   Procedure Laterality Date    Hx hysterectomy      Hx oophorectomy       left    Hx appendectomy      Hx cholecystectomy      Hx orthopaedic       cervical  x5    Hx urological  1984 and 1985     kidney stone surgery    Hx lithotripsy       x 4    Hx cervical fusion       anterior x 2    Hx cervical fusion       posterior x 3    Hx cervical fusion 1996     Removal of titanium plate and screws    Hx small bowel resection      Hx tonsillectomy      Hx adenoidectomy      Hx cataract removal       OU       History reviewed. No pertinent family history. Social History     Social History    Marital status: SINGLE     Spouse name: N/A    Number of children: N/A    Years of education: N/A     Social History Main Topics    Smoking status: Never Smoker    Smokeless tobacco: Never Used    Alcohol use No    Drug use: No    Sexual activity: Yes     Birth control/ protection: IUD, None     Other Topics Concern    None     Social History Narrative       Prior to Admission medications    Medication Sig Start Date End Date Taking? Authorizing Provider   omeprazole (PRILOSEC) 20 mg capsule Take 20 mg by mouth daily. Yes Irene Sidhu MD   lisinopril (PRINIVIL, ZESTRIL) 40 mg tablet Take 1 Tab by mouth daily. 12/1/16  Yes Ana Cristina Erwin MD   oxyCODONE-acetaminophen (PERCOCET 10)  mg per tablet Take 1-1.5 Tabs by mouth every four (4) hours as needed. Max Daily Amount: 9 Tabs. 12/1/16  Yes Ana Cristina Erwin MD   levothyroxine (SYNTHROID) 25 mcg tablet Take  by mouth Daily (before breakfast). Takes a total of 225 mcg. Yes Historical Provider   levothyroxine (SYNTHROID) 200 mcg tablet Take 200 mcg by mouth Daily (before breakfast). Takes a total of 225 mcg. Yes Historical Provider   pregabalin (LYRICA) 100 mg capsule Take 100 mg by mouth three (3) times daily. Yes Historical Provider   cyanocobalamin (VITAMIN B12) 1,000 mcg/mL injection 1,000 mcg by IntraMUSCular route every month. Yes Historical Provider   albuterol (PROVENTIL VENTOLIN) 2.5 mg /3 mL (0.083 %) nebulizer solution 2.5 mg by Nebulization route every four (4) hours as needed. Yes Historical Provider   aspirin 81 mg tablet Take 81 mg by mouth daily. Yes Historical Provider   atenolol (TENORMIN) 25 mg tablet Take 25 mg by mouth daily as needed.  As needed for palpatations   Yes Historical Provider   Cetirizine (ZYRTEC) 10 mg cap Take  by mouth daily. Yes Historical Provider   montelukast (SINGULAIR) 10 mg tablet Take 10 mg by mouth daily. Yes Historical Provider   diazePAM (VALIUM) 5 mg tablet Take 1 Tab by mouth two (2) times daily as needed for Anxiety. Max Daily Amount: 10 mg. 12/1/16   Tata Kaye MD   predniSONE (DELTASONE) 20 mg tablet 40 mg daily X 3 days, 20 mg daily X 4 days, then 10 mg daily X 4 days 12/1/16   Tata Kaye MD   nystatin (MYCOSTATIN) powder Apply  to affected area two (2) times a day. 12/1/16   Tata Kaye MD   fluticasone (FLONASE) 50 mcg/actuation nasal spray 1 [place] by Both Nostrils route daily. Historical Provider   magnesium hydroxide (MILK OF MAGNESIA) 400 mg/5 mL suspension Take 30 mL by mouth daily as needed for Constipation. Historical Provider   bisacodyl (DULCOLAX, BISACODYL,) 10 mg suppository Insert 10 mg into rectum daily as needed. Historical Provider   mineral oil (FLEET MINERAL OIL) enema Insert  into rectum daily as needed for Constipation. Historical Provider   esomeprazole (NEXIUM) 20 mg capsule Take 20 mg by mouth daily. Historical Provider   acetaminophen (TYLENOL) 325 mg tablet Take 325 mg by mouth every four (4) hours as needed for Pain. Historical Provider   diclofenac (VOLTAREN) 1 % gel Apply 2 g to affected area three (3) times daily. Apply to both shoulders and both knees. Historical Provider   miconazole (ELPIDIO ANTIFUNGAL) 2 % topical cream Apply  to affected area three (3) times daily. Apply to groin rash. Historical Provider   cholestyramine-sucrose [place]) 4 gram powder Take  by mouth daily as needed (diarrhea). Historical Provider   hydrOXYzine (ATARAX) 25 mg tablet Take 25 mg by mouth every six (6) hours as needed for Itching. Irene Sidhu MD   cyclobenzaprine (FLEXERIL) 10 mg tablet Take 10 mg by mouth two (2) times daily as needed for Muscle Spasm(s).     Irene Sidhu MD   fluticasone-salmeterol (ADVAIR DISKUS) 500-50 mcg/dose diskus inhaler Take 1 Puff by inhalation every twelve (12) hours. May use inhaler on DOS    Historical Provider   nitroglycerin (NITROSTAT) 0.4 mg SL tablet 0.4 mg by SubLINGual route every five (5) minutes as needed. Historical Provider   guaiFENesin SR (MUCINEX) 600 mg SR tablet Take 600 mg by mouth two (2) times a day. Historical Provider   DULoxetine (CYMBALTA) 60 mg capsule Take 60 mg by mouth two (2) times a day. Indications: CHRONIC MUSCULOSKELETAL PAIN    Historical Provider       Allergies   Allergen Reactions    Ambien [Zolpidem] Other (comments)     Sleep drive    Aspirin Other (comments)     bruising    Codeine Hives    Darvon [Propoxyphene] Unknown (comments)    Iodinated Contrast Media - Oral And Iv Dye Hives     Iodine on skin is ok per pt.  Pcn [Penicillins] Nausea and Vomiting    Shellfish Derived Hives, Shortness of Breath and Swelling     Iodine on skin is ok per pt.  Zanaflex [Tizanidine] Other (comments)     disorientated    Levaquin [Levofloxacin] Hives     Red rash at IV site while infusing       Review of Systems  Unobtainable due to critical illness        Physical Exam:     Physical Exam:  Visit Vitals    BP (!) 80/25    Pulse 94    Temp 97.8 °F (36.6 °C)    Resp 16    Ht 4' 11.84\" (1.52 m)  Comment: from admission 16    Wt 99.8 kg (220 lb)    SpO2 95%    BMI 43.19 kg/m2    O2 Flow Rate (L/min): 2 l/min O2 Device: Nasal cannula    Temp (24hrs), Av.8 °F (36.6 °C), Min:97.8 °F (36.6 °C), Max:97.8 °F (36.6 °C)         0701 - 02/10 1900  In: -   Out: 550 [Urine:550]          General:  Somulent, agitated at times, no distress. Oriented x 1. Head:  Normocephalic, without obvious abnormality, atraumatic. Eyes:  Conjunctivae/corneas clear, sclera anicteric   Nose: Nares normal. No drainage or sinus tenderness. Throat: Lips, mucosa, and tongue normal.    Neck: Supple, symmetrical, trachea midline, no adenopathy. Lungs:   Clear to auscultation bilaterally. Heart:  Regular rate and rhythm, S1, S2 normal, no murmur, click, rub or gallop. Abdomen: Soft, non-tender. Bowel sounds normal. No masses,  No organomegaly. Extremities: Extremities normal, atraumatic, no cyanosis or edema. Capillary refill normal.   Pulses: 2+ and symmetric all extremities. Skin: Skin color pink, turgor normal. No rashes or lesions   Neurologic: CNII-XII intact. No focal motor or sensory deficit. Labs Reviewed: All lab results for the last 24 hours reviewed. Recent Results (from the past 24 hour(s))   EKG, 12 LEAD, INITIAL    Collection Time: 02/10/17 12:44 PM   Result Value Ref Range    Ventricular Rate 78 BPM    Atrial Rate 78 BPM    P-R Interval 160 ms    QRS Duration 82 ms    Q-T Interval 394 ms    QTC Calculation (Bezet) 449 ms    Calculated P Axis 55 degrees    Calculated R Axis 61 degrees    Calculated T Axis 65 degrees    Diagnosis       Normal sinus rhythm  Normal ECG  When compared with ECG of 25-NOV-2016 14:47,  No significant change was found     CBC WITH AUTOMATED DIFF    Collection Time: 02/10/17 12:55 PM   Result Value Ref Range    WBC 11.5 4.6 - 13.2 K/uL    RBC 2.91 (L) 4.20 - 5.30 M/uL    HGB 9.3 (L) 12.0 - 16.0 g/dL    HCT 29.2 (L) 35.0 - 45.0 %    .3 (H) 74.0 - 97.0 FL    MCH 32.0 24.0 - 34.0 PG    MCHC 31.8 31.0 - 37.0 g/dL    RDW 13.8 11.6 - 14.5 %    PLATELET 126 178 - 920 K/uL    MPV 10.8 9.2 - 11.8 FL    NEUTROPHILS 70 40 - 73 %    LYMPHOCYTES 18 (L) 21 - 52 %    MONOCYTES 12 (H) 3 - 10 %    EOSINOPHILS 0 0 - 5 %    BASOPHILS 0 0 - 2 %    ABS. NEUTROPHILS 8.0 1.8 - 8.0 K/UL    ABS. LYMPHOCYTES 2.1 0.9 - 3.6 K/UL    ABS. MONOCYTES 1.4 (H) 0.05 - 1.2 K/UL    ABS. EOSINOPHILS 0.0 0.0 - 0.4 K/UL    ABS.  BASOPHILS 0.0 0.0 - 0.06 K/UL    RBC COMMENTS NORMOCYTIC, NORMOCHROMIC      DF AUTOMATED     METABOLIC PANEL, COMPREHENSIVE    Collection Time: 02/10/17 12:55 PM   Result Value Ref Range    Sodium 140 136 - 145 mmol/L    Potassium 6.0 (H) 3.5 - 5.5 mmol/L    Chloride 105 100 - 108 mmol/L    CO2 26 21 - 32 mmol/L    Anion gap 9 3.0 - 18 mmol/L    Glucose 111 (H) 74 - 99 mg/dL    BUN 44 (H) 7.0 - 18 MG/DL    Creatinine 2.35 (H) 0.6 - 1.3 MG/DL    BUN/Creatinine ratio 19 12 - 20      GFR est AA 25 (L) >60 ml/min/1.73m2    GFR est non-AA 20 (L) >60 ml/min/1.73m2    Calcium 9.1 8.5 - 10.1 MG/DL    Bilirubin, total 0.3 0.2 - 1.0 MG/DL    ALT (SGPT) 24 13 - 56 U/L    AST (SGOT) 29 15 - 37 U/L    Alk.  phosphatase 64 45 - 117 U/L    Protein, total 7.5 6.4 - 8.2 g/dL    Albumin 3.4 3.4 - 5.0 g/dL    Globulin 4.1 (H) 2.0 - 4.0 g/dL    A-G Ratio 0.8 0.8 - 1.7     URINALYSIS W/ RFLX MICROSCOPIC    Collection Time: 02/10/17 12:55 PM   Result Value Ref Range    Color YELLOW      Appearance CLEAR      Specific gravity 1.020 1.005 - 1.030      pH (UA) 5.0 5.0 - 8.0      Protein TRACE (A) NEG mg/dL    Glucose NEGATIVE  NEG mg/dL    Ketone NEGATIVE  NEG mg/dL    Bilirubin NEGATIVE  NEG      Blood NEGATIVE  NEG      Urobilinogen 0.2 0.2 - 1.0 EU/dL    Nitrites NEGATIVE  NEG      Leukocyte Esterase NEGATIVE  NEG     CARDIAC PANEL,(CK, CKMB & TROPONIN)    Collection Time: 02/10/17 12:55 PM   Result Value Ref Range     26 - 192 U/L    CK - MB 2.1 0.5 - 3.6 ng/ml    CK-MB Index 2.1 0.0 - 4.0 %    Troponin-I, Qt. <0.02 0.00 - 0.06 NG/ML   PRO-BNP    Collection Time: 02/10/17 12:55 PM   Result Value Ref Range    NT pro-BNP 1284 (H) 0 - 900 PG/ML   LACTIC ACID, PLASMA    Collection Time: 02/10/17 12:55 PM   Result Value Ref Range    Lactic acid 0.9 0.4 - 2.0 MMOL/L   DRUG SCREEN, URINE    Collection Time: 02/10/17 12:55 PM   Result Value Ref Range    BENZODIAZEPINE POSITIVE (A) NEG      BARBITURATES NEGATIVE  NEG      THC (TH-CANNABINOL) NEGATIVE  NEG      OPIATES POSITIVE (A) NEG      PCP(PHENCYCLIDINE) NEGATIVE  NEG      COCAINE NEGATIVE  NEG      AMPHETAMINE NEGATIVE  NEG      METHADONE NEGATIVE  NEG      HDSCOM (NOTE) PROTHROMBIN TIME + INR    Collection Time: 02/10/17 12:55 PM   Result Value Ref Range    Prothrombin time 13.9 11.5 - 15.2 sec    INR 1.1 0.8 - 1.2     URINE MICROSCOPIC ONLY    Collection Time: 02/10/17 12:55 PM   Result Value Ref Range    WBC 2 to 4 0 - 5 /hpf    RBC 0 0 - 5 /hpf    Epithelial cells FEW 0 - 5 /lpf    Bacteria FEW (A) NEG /hpf   POC G3    Collection Time: 02/10/17  1:30 PM   Result Value Ref Range    Device: NASAL CANNULA      Flow rate (POC) 2 L/M    FIO2 (POC) 28 %    pH (POC) 7.272 (L) 7.35 - 7.45      pCO2 (POC) 46.3 (H) 35.0 - 45.0 MMHG    pO2 (POC) 87 80 - 100 MMHG    HCO3 (POC) 21.3 (L) 22 - 26 MMOL/L    sO2 (POC) 95 92 - 97 %    Base deficit (POC) 6 mmol/L    Allens test (POC) YES      Site RIGHT RADIAL      Specimen type (POC) ARTERIAL      Performed by Liliya James    AMMONIA    Collection Time: 02/10/17  5:10 PM   Result Value Ref Range    Ammonia 12 11 - 32 UMOL/L       Procedures/imaging: see electronic medical records for all procedures/Xrays and details which were not copied into this note but were reviewed prior to creation of Plan        CC: Lulla Lennox.  Aquilino Marion MD

## 2017-02-11 NOTE — PROGRESS NOTES
2024 Patient arrived on unit via bed. 2030 Admission assessment completed, See EMR    2126 Called Dr. Paul Restrepo to notify him of patient's SBP 78/37. New order given  For 250ml NS bolus. 1405 [facility] Dr. Iqra Cheema completed right IJ central line insertion at bedside with ultrasound guidance. Positive blood return to all three ports. 12 [facility] Dr. Paul Restrepo to notify him that patient's SBP remains in the high 70's to 80's bolus not effective. New order given for levophed to start if SBP less than 75 and titrate to SBP > 90.    0000 Reassessment completed, see EMR. Levophed started BP 66/30     0030 Ultrasound tech at bedside. 0100 Patient had thick brown blood tinge mucus.

## 2017-02-11 NOTE — PROGRESS NOTES
0002-  updated regarding status and hypotension. Notified of history of hypertension and BP upon arrival to ER being elevated. Current SBP= 75. Orders received to start levophed drip and titrate for a SBP>90. Also received an order to consult pulmonary in the morning, MD to call in the morning per .

## 2017-02-12 LAB
ERYTHROCYTE [DISTWIDTH] IN BLOOD BY AUTOMATED COUNT: 13.9 % (ref 11.6–14.5)
HCT VFR BLD AUTO: 23.4 % (ref 35–45)
HGB BLD-MCNC: 7.6 G/DL (ref 12–16)
MCH RBC QN AUTO: 32.5 PG (ref 24–34)
MCHC RBC AUTO-ENTMCNC: 32.5 G/DL (ref 31–37)
MCV RBC AUTO: 100 FL (ref 74–97)
PLATELET # BLD AUTO: 263 K/UL (ref 135–420)
PMV BLD AUTO: 10.4 FL (ref 9.2–11.8)
RBC # BLD AUTO: 2.34 M/UL (ref 4.2–5.3)
WBC # BLD AUTO: 10 K/UL (ref 4.6–13.2)

## 2017-02-12 PROCEDURE — 74011250636 HC RX REV CODE- 250/636: Performed by: INTERNAL MEDICINE

## 2017-02-12 PROCEDURE — 74011250637 HC RX REV CODE- 250/637: Performed by: INTERNAL MEDICINE

## 2017-02-12 PROCEDURE — 74011250636 HC RX REV CODE- 250/636: Performed by: FAMILY MEDICINE

## 2017-02-12 PROCEDURE — 74011000258 HC RX REV CODE- 258: Performed by: FAMILY MEDICINE

## 2017-02-12 PROCEDURE — 36415 COLL VENOUS BLD VENIPUNCTURE: CPT | Performed by: FAMILY MEDICINE

## 2017-02-12 PROCEDURE — 65660000000 HC RM CCU STEPDOWN

## 2017-02-12 PROCEDURE — 85027 COMPLETE CBC AUTOMATED: CPT | Performed by: FAMILY MEDICINE

## 2017-02-12 PROCEDURE — 74011000250 HC RX REV CODE- 250: Performed by: INTERNAL MEDICINE

## 2017-02-12 PROCEDURE — 77010033678 HC OXYGEN DAILY

## 2017-02-12 RX ORDER — BUMETANIDE 1 MG/1
1 TABLET ORAL DAILY
Status: DISCONTINUED | OUTPATIENT
Start: 2017-02-12 | End: 2017-02-16 | Stop reason: HOSPADM

## 2017-02-12 RX ADMIN — ASPIRIN 81 MG: 81 TABLET, COATED ORAL at 08:39

## 2017-02-12 RX ADMIN — DULOXETINE 60 MG: 60 CAPSULE, DELAYED RELEASE ORAL at 08:39

## 2017-02-12 RX ADMIN — LEVOTHYROXINE SODIUM 225 MCG: 200 TABLET ORAL at 08:39

## 2017-02-12 RX ADMIN — VANCOMYCIN HYDROCHLORIDE 1500 MG: 10 INJECTION, POWDER, LYOPHILIZED, FOR SOLUTION INTRAVENOUS at 11:51

## 2017-02-12 RX ADMIN — PIPERACILLIN SODIUM,TAZOBACTAM SODIUM 3.38 G: 3; .375 INJECTION, POWDER, FOR SOLUTION INTRAVENOUS at 05:18

## 2017-02-12 RX ADMIN — PANTOPRAZOLE SODIUM 40 MG: 40 TABLET, DELAYED RELEASE ORAL at 08:39

## 2017-02-12 RX ADMIN — Medication 10 ML: at 05:18

## 2017-02-12 RX ADMIN — SODIUM CHLORIDE 50 ML/HR: 900 INJECTION, SOLUTION INTRAVENOUS at 16:47

## 2017-02-12 RX ADMIN — HEPARIN SODIUM 5000 UNITS: 5000 INJECTION, SOLUTION INTRAVENOUS; SUBCUTANEOUS at 14:54

## 2017-02-12 RX ADMIN — Medication 8 ML: at 14:00

## 2017-02-12 RX ADMIN — HEPARIN SODIUM 5000 UNITS: 5000 INJECTION, SOLUTION INTRAVENOUS; SUBCUTANEOUS at 05:18

## 2017-02-12 RX ADMIN — ACETAMINOPHEN 325 MG: 325 TABLET, FILM COATED ORAL at 05:25

## 2017-02-12 RX ADMIN — FLUTICASONE PROPIONATE 1 SPRAY: 50 SPRAY, METERED NASAL at 08:39

## 2017-02-12 RX ADMIN — GUAIFENESIN 600 MG: 600 TABLET, EXTENDED RELEASE ORAL at 08:39

## 2017-02-12 RX ADMIN — PREGABALIN 100 MG: 100 CAPSULE ORAL at 16:47

## 2017-02-12 RX ADMIN — BUMETANIDE 1 MG: 0.25 INJECTION, SOLUTION INTRAMUSCULAR; INTRAVENOUS at 08:39

## 2017-02-12 RX ADMIN — PREGABALIN 100 MG: 100 CAPSULE ORAL at 08:39

## 2017-02-12 RX ADMIN — PIPERACILLIN SODIUM,TAZOBACTAM SODIUM 3.38 G: 3; .375 INJECTION, POWDER, FOR SOLUTION INTRAVENOUS at 16:47

## 2017-02-12 RX ADMIN — FLUTICASONE PROPIONATE AND SALMETEROL 1 PUFF: 50; 500 POWDER RESPIRATORY (INHALATION) at 08:39

## 2017-02-12 RX ADMIN — PIPERACILLIN SODIUM,TAZOBACTAM SODIUM 3.38 G: 3; .375 INJECTION, POWDER, FOR SOLUTION INTRAVENOUS at 10:48

## 2017-02-12 NOTE — PROGRESS NOTES
Bedside and Verbal shift change report given to Paul Jiang Rn (oncoming nurse) by Eris Jarrett (offgoing nurse). Report included the following information SBAR, Procedure Summary, Intake/Output, MAR, Accordion, Recent Results, Med Rec Status, Cardiac Rhythm NSR-ST and Alarm Parameters .

## 2017-02-12 NOTE — PROGRESS NOTES
Hospitalist Progress Note    Patient: Dilip Pool MRN: [id number]  [facility]: [id number]    YOB: 1945  Age: 80 y.o. Sex: female    DOA: 2/10/2017 LOS:  LOS: 2 days          Chief Complaint:    Altered mental status. Assessment/Plan       Hospital Problems  Date Reviewed: 11/25/2016          Codes Class Noted POA    * (Principal)Toxic metabolic encephalopathy BPP-27-YK: G92  ICD-9-CM: 349.82  2/11/2017 Yes        Acute renal failure (ARF) ([facility] ICD-10-CM: N17.9  ICD-9-CM: 584.9  2/11/2017 Yes        Hyperkalemia ICD-10-CM: E87.5  ICD-9-CM: 276.7  2/11/2017 Yes        Acute diastolic CHF (congestive heart failure) ([facility] ICD-10-CM: I50.31  ICD-9-CM: 428.31, 428.0  11/28/2016 Yes        COPD (chronic obstructive pulmonary disease) ([facility] ICD-10-CM: J44.9  ICD-9-CM: 494  11/25/2016 Yes        Benign hypertension ICD-10-CM: I10  ICD-9-CM: 401.1  11/25/2016 Yes        GERD (gastroesophageal reflux disease) ICD-10-CM: K21.9  ICD-9-CM: 530.81  11/25/2016 Yes            Continue current antibiotic therapy; awaiting final cx  continue supportive care--fluid and electrolyte management protocols;   transition to tele today   for discharge planning    Subjective:    No complaints or acute events overnight.       Review of systems:    Constitutional: denies fevers, chills, myalgias  Respiratory: denies SOB, cough  Cardiovascular: denies chest pain, palpitations  Gastrointestinal: denies nausea, vomiting, diarrhea      Vital signs/Intake and Output:  Visit Vitals    /54    Pulse 94    Temp 98.1 °F (36.7 °C)    Resp 18    Ht 4' 11.84\" (1.52 m)    Wt 95 kg (209 lb 7 oz)    SpO2 98%    BMI 41.12 kg/m2     Current Shift:  02/12 0701 - 02/12 1900  In: 240 [P.O.:240]  Out: 1275 [PWPEP:4162]  Last three shifts:  02/10 1901 - 02/12 0700  In: 4857.9 [P.O.:480; I.V.:4377.9]  Out: 5251 [Urine:5250]    Exam:    General: Well developed, alert, NAD, OX3  Head/Neck: NCAT, supple, No masses, No lymphadenopathy  CVS:Regular rate and rhythm, no M/R/G, S1/S2 heard, no thrill  Lungs:Clear to auscultation bilaterally, no wheezes, rhonchi, or rales  Abdomen: Soft, Nontender, No distention, Normal Bowel sounds, No hepatomegaly  Extremities: No C/C/E, pulses palpable 2+  Skin:normal texture and turgor, no rashes, no lesions  Neuro:grossly normal , follows commands  Psych:appropriate                Labs: Results:       Chemistry Recent Labs      02/11/17   1000  02/11/17   0250  02/10/17   2045  02/10/17   1255   GLU  149*  162*  117*  111*   NA  145  141  142  140   K  5.2  5.4  5.8*  6.0*   CL  110*  108  106  105   CO2  18*  18*  21  26   BUN  47*  44*  42*  44*   CREA  1.93*  2.01*  2.05*  2.35*   CA  8.3*  8.4*  8.6  9.1   AGAP  17  15  15  9   BUCR  24*  22*  20  19   AP   --    --    --   64   TP   --    --    --   7.5   ALB   --    --    --   3.4   GLOB   --    --    --   4.1*   AGRAT   --    --    --   0.8      CBC w/Diff Recent Labs      02/12/17   0840  02/11/17   0250  02/10/17   2045  02/10/17   1255   WBC  10.0  11.4  11.7  11.5   RBC  2.34*  2.64*  2.79*  2.91*   HGB  7.6*  8.4*  8.9*  9.3*   HCT  23.4*  26.6*  28.1*  29.2*   PLT  263  294  292  305   GRANS   --    --    --   70   LYMPH   --    --    --   18*   EOS   --    --    --   0      Cardiac Enzymes Recent Labs      02/10/17   1255   CPK  101   CKND1  2.1      Coagulation Recent Labs      02/10/17   1255   PTP  13.9   INR  1.1       Lipid Panel No results found for: CHOL, CHOLPOCT, CHOLX, CHLST, CHOLV, 623999, HDL, LDL, NLDLCT, DLDL, LDLC, DLDLP, 114938, VLDLC, VLDL, TGL, TGLX, TRIGL, [id number], TRIGP, TGLPOCT, 628803, CHHD, CHHDX   BNP No results for input(s): BNPP in the last 72 hours.    Liver Enzymes Recent Labs      02/10/17   1255   TP  7.5   ALB  3.4   AP  64   SGOT  29      Thyroid Studies Lab Results   Component Value Date/Time    TSH 0.04 11/26/2016 05:39 AM        Procedures/imaging: see electronic medical records for all procedures/Xrays and details which were not copied into this note but were reviewed prior to creation of [place] [id number], DO

## 2017-02-12 NOTE — WOUND CARE
Patient seen as a part of ICU rounds. No change in patients skin at this time. Patient is repositioned frequently. Wound care will continue to monitor during admission.

## 2017-02-12 NOTE — ROUTINE PROCESS
TRANSFER - IN REPORT:    Verbal report received from [address] [place] [address] Ne., RN(name) on [address]  being received from ICU (unit) for routine progression of care      Report consisted of patients Situation, Background, Assessment and   Recommendations(SBAR). Information from the following report(s) SBAR, Kardex, Intake/Output, MAR, Accordion, Recent Results, Med Rec Status and Cardiac Rhythm NSR was reviewed with the receiving nurse. Opportunity for questions and clarification was provided. Assessment completed upon patients arrival to unit and care assumed.

## 2017-02-12 NOTE — PROGRESS NOTES
Pharmacy Dosing Services: Vancomycin     Consult for Vancomycin Dosing by Pharmacy by Dr. Nyasia Thompson provided for this 80y.o. year old female , for indication of HCAP. Day of Therapy 03    Ht Readings from Last 1 Encounters:   02/10/17 152 cm (59.84\")        Wt Readings from Last 1 Encounters:   02/11/17 95 kg (209 lb 7 oz)        Previous Regimen Vancomycin 1500mg q12   Last Level Trough Due 13Feb @ 10:00   Other Current Antibiotics Zosyn 2.25 gm IV q8   Significant Cultures Pending   Serum Creatinine Lab Results   Component Value Date/Time    Creatinine 1.93 02/11/2017 10:00 AM      Creatinine Clearance Estimated Creatinine Clearance: 40.7 mL/min (based on Cr of 1.93). BUN Lab Results   Component Value Date/Time    BUN 47 02/11/2017 10:00 AM      WBC Lab Results   Component Value Date/Time    WBC 10.0 02/12/2017 08:40 AM      H/H Lab Results   Component Value Date/Time    HGB 7.6 02/12/2017 08:40 AM      Platelets Lab Results   Component Value Date/Time    PLATELET 881 70/54/3646 08:40 AM      Temp 98.2 °F (36.8 °C)     Vancomycin maintenance dose changed to 1500 mg IV q24. Dose calculated to approximate a therapeutic trough of 15-20 mcg/mL. Pharmacy to follow daily and will make changes to dose and/or frequency based on clinical status. Eduardo Villasenor, Pharm. D.   Clinical Pharmacist  [phone]

## 2017-02-12 NOTE — PROGRESS NOTES
1930- Report and care received, assessment completed per flow sheet. Alert, pleasantly confused. NAD. IVF infusing via IV pump without difficulty. 0000- Reassessment completed and without change. 0400- Reassessment completed and without change.

## 2017-02-12 NOTE — PROGRESS NOTES
[facility]  Pulmonary, Critical Care, and Sleep Medicine    Name: Margarita Ballesteros MRN: [id number]   : 1945 Hospital: [facility] [place]   Date: 2017             Requesting MD:                                                  Reason for CC Consult:  IMPRESSION:   · Hypoxia due to pulmonary edema and atelectasis in setting of prob OHS, improved  · Hypotension, likely due to excessive analgesic, resolved  · Morbid obeisty  · Fibromyalgia and chronic pain syndrome  · Acute encephalopathy due to excessive analgesic,  Resolved. · DAVID injury possibly due to diastolic heart failure  · Pulmonary edema resolved      RECOMMENDATIONS:   · Judicious with analgesic  · Prn bipap  · Hold atenolol given hypotension  · Diurese, convert to po bumex today  · ICS  · emperic abx ok  · Replete lyte as needed  · GI and DVt proh  · Ok to transfer to telemetry     Subjective/History:     Pt alert and appropriate. Tolerating po. Off bipap. Now on nasal cannula. This patient has been seen and evaluated at the request of Dr. Saira Saravia for hypotension. Patient is a 80 y.o. female with multiple medical problems listed below resident of  [address] [place] [address], brought to ED with mental status changes, somnolent and hypoxia with spo2 of 86%. Pt is chronically on opioids for pain and fibromyalgia. Pt was given narcan,  She apparently woke up coughing  And agitated. Pt was given analgesic. Brought to ICU. Upon arrival noted to be  Hypotensive,  Pt was started on levophed. I was asked to assist in management. Pt remain somnolent but arouseable   Does not appears to be in distress only 2.5mcg/min of levophed.         Past Medical History   Diagnosis Date    Arthritis     Asthma     CAD (coronary artery disease)      angina, last episode 2012    Chronic bronchitis (HCC)     Chronic kidney disease      stage 3    Chronic obstructive pulmonary disease (HCC)     Chronic pain cervical, lumbar, knees, ankles, elbows    Fibromyalgia     GERD (gastroesophageal reflux disease)     Hypertension 1993    Psychiatric disorder      anxiety, depression    Thyroid disease       Past Surgical History   Procedure Laterality Date    Hx hysterectomy      Hx oophorectomy       left    Hx appendectomy      Hx cholecystectomy      Hx orthopaedic       cervical  x5    Hx urological  1984 and 1985     kidney stone surgery    Hx lithotripsy       x 4    Hx cervical fusion       anterior x 2    Hx cervical fusion       posterior x 3    Hx cervical fusion  1996     Removal of titanium plate and screws    Hx small bowel resection      Hx tonsillectomy      Hx adenoidectomy      Hx cataract removal       OU      Prior to Admission medications    Medication Sig Start Date End Date Taking? Authorizing Provider   omeprazole (PRILOSEC) 20 mg capsule Take 20 mg by mouth daily. Yes Irene Sidhu MD   lisinopril (PRINIVIL, ZESTRIL) 40 mg tablet Take 1 Tab by mouth daily. 12/1/16  Yes Bernie Regan MD   oxyCODONE-acetaminophen (PERCOCET 10)  mg per tablet Take 1-1.5 Tabs by mouth every four (4) hours as needed. Max Daily Amount: 9 Tabs. 12/1/16  Yes Bernie Regan MD   levothyroxine (SYNTHROID) 200 mcg tablet Take 225 mcg by mouth Daily (before breakfast). Takes a total of 225 mcg. Yes Historical Provider   pregabalin (LYRICA) 100 mg capsule Take 100 mg by mouth three (3) times daily. Yes Historical Provider   cyanocobalamin (VITAMIN B12) 1,000 mcg/mL injection 1,000 mcg by IntraMUSCular route every month. Yes Historical Provider   albuterol (PROVENTIL VENTOLIN) 2.5 mg /3 mL (0.083 %) nebulizer solution 2.5 mg by Nebulization route every four (4) hours as needed. Yes Historical Provider   aspirin 81 mg tablet Take 81 mg by mouth daily. Yes Historical Provider   atenolol (TENORMIN) 25 mg tablet Take 25 mg by mouth daily as needed.  As needed for palpatations   Yes Historical Provider Cetirizine (ZYRTEC) 10 mg cap Take  by mouth daily. Yes Historical Provider   montelukast (SINGULAIR) 10 mg tablet Take 10 mg by mouth daily. Yes Historical Provider   diazePAM (VALIUM) 5 mg tablet Take 1 Tab by mouth two (2) times daily as needed for Anxiety. Max Daily Amount: 10 mg. 12/1/16   Kellee Ovalles MD   predniSONE (DELTASONE) 20 mg tablet 40 mg daily X 3 days, 20 mg daily X 4 days, then 10 mg daily X 4 days 12/1/16   Kellee Ovalles MD   nystatin (MYCOSTATIN) powder Apply  to affected area two (2) times a day. 12/1/16   Kellee Ovalles MD   fluticasone (FLONASE) 50 mcg/actuation nasal spray 1 [place] by Both Nostrils route daily. Historical Provider   magnesium hydroxide (MILK OF MAGNESIA) 400 mg/5 mL suspension Take 30 mL by mouth daily as needed for Constipation. Historical Provider   bisacodyl (DULCOLAX, BISACODYL,) 10 mg suppository Insert 10 mg into rectum daily as needed. Historical Provider   mineral oil (FLEET MINERAL OIL) enema Insert  into rectum daily as needed for Constipation. Historical Provider   esomeprazole (NEXIUM) 20 mg capsule Take 20 mg by mouth daily. Historical Provider   acetaminophen (TYLENOL) 325 mg tablet Take 325 mg by mouth every four (4) hours as needed for Pain. Historical Provider   diclofenac (VOLTAREN) 1 % gel Apply 2 g to affected area three (3) times daily. Apply to both shoulders and both knees. Historical Provider   miconazole (ELPIDIO ANTIFUNGAL) 2 % topical cream Apply  to affected area three (3) times daily. Apply to groin rash. Historical Provider   cholestyramine-sucrose Ludie Janeth) 4 gram powder Take  by mouth daily as needed (diarrhea). Historical Provider   hydrOXYzine (ATARAX) 25 mg tablet Take 25 mg by mouth every six (6) hours as needed for Itching. Irene Sidhu MD   cyclobenzaprine (FLEXERIL) 10 mg tablet Take 10 mg by mouth two (2) times daily as needed for Muscle Spasm(s).     Irene Sidhu MD   fluticasone-salmeterol (ADVAIR DISKUS) 500-50 mcg/dose diskus inhaler Take 1 Puff by inhalation every twelve (12) hours. May use inhaler on DOS    Historical Provider   nitroglycerin (NITROSTAT) 0.4 mg SL tablet 0.4 mg by SubLINGual route every five (5) minutes as needed. Historical Provider   guaiFENesin SR (MUCINEX) 600 mg SR tablet Take 600 mg by mouth two (2) times a day. Historical Provider   DULoxetine (CYMBALTA) 60 mg capsule Take 60 mg by mouth two (2) times a day.  Indications: CHRONIC MUSCULOSKELETAL PAIN    Historical Provider     Current Facility-Administered Medications   Medication Dose Route Frequency    piperacillin-tazobactam (ZOSYN) 3.375 g in 0.9% sodium chloride (MBP/ADV) 100 mL MBP  3.375 g IntraVENous Q6H    vancomycin (VANCOCIN) 1,500 mg in 0.9% sodium chloride 500 mL IVPB  1,500 mg IntraVENous Q12H    Vancomycin- Rx to Dose & Monitor  1 Each Other Rx Dosing/Monitoring    bumetanide (BUMEX) injection 1 mg  1 mg IntraVENous Q12H    sodium chloride (NS) flush 5-10 mL  5-10 mL IntraVENous Q8H    aspirin delayed-release tablet 81 mg  81 mg Oral DAILY    DULoxetine (CYMBALTA) capsule 60 mg  60 mg Oral BID    pantoprazole (PROTONIX) tablet 40 mg  40 mg Oral DAILY    fluticasone (FLONASE) 50 mcg/actuation nasal spray 1 Spray  1 Spray Both Nostrils DAILY    fluticasone-salmeterol (ADVAIR) 500mcg-50mcg/puff  1 Puff Inhalation Q12H    guaiFENesin SR (MUCINEX) tablet 600 mg  600 mg Oral BID    pregabalin (LYRICA) capsule 100 mg  100 mg Oral TID    0.9% sodium chloride infusion  50 mL/hr IntraVENous CONTINUOUS    heparin (porcine) injection 5,000 Units  5,000 Units SubCUTAneous Q8H    levothyroxine (SYNTHROID) tablet 225 mcg  225 mcg Oral ACB    NOREPINephrine (LEVOPHED) 8,000 mcg in dextrose 5% 250 mL infusion  2-16 mcg/min IntraVENous TITRATE     Allergies   Allergen Reactions    Ambien [Zolpidem] Other (comments)     Sleep drive    Aspirin Other (comments)     bruising    Codeine Hives    Darvon [Propoxyphene] Unknown (comments)    Iodinated Contrast Media - Oral And Iv Dye Hives     Iodine on skin is ok per pt.  Pcn [Penicillins] Nausea and Vomiting    Shellfish Derived Hives, Shortness of Breath and Swelling     Iodine on skin is ok per pt.  Zanaflex [Tizanidine] Other (comments)     disorientated    Levaquin [Levofloxacin] Hives     Red rash at IV site while infusing      Social History   Substance Use Topics    Smoking status: Never Smoker    Smokeless tobacco: Never Used    Alcohol use No      History reviewed. No pertinent family history. Review of Systems:  A comprehensive review of systems was negative except for that written in the HPI. Objective:   Vital Signs:    Visit Vitals    /85 (BP 1 Location: Left arm, BP Patient Position: At rest)    Pulse 78    Temp 97.8 °F (36.6 °C)    Resp 16    Ht 4' 11.84\" (1.52 m)  Comment: from admission 16    Wt 95 kg (209 lb 7 oz)    SpO2 97%    BMI 41.12 kg/m2       O2 Device: Nasal cannula   O2 Flow Rate (L/min): 3 l/min   Temp (24hrs), Av.1 °F (36.7 °C), Min:97.8 °F (36.6 °C), Max:98.7 °F (37.1 °C)       Intake/Output:   Last shift:         Last 3 shifts: 02/10 1901 -  0700  In: 4857.9 [P.O.:480; I.V.:4377.9]  Out: 5251 [Urine:5250]    Intake/Output Summary (Last 24 hours) at 17 0920  Last data filed at 17 0617   Gross per 24 hour   Intake          3597.56 ml   Output             3251 ml   Net           346.56 ml     Hemodynamics:   . @MAP     . @CVP       Ventilator Settings:  Mode Rate Tidal Volume Pressure FiO2 PEEP                    Peak airway pressure:      Minute ventilation:        ARDS network Guidelines: Lung protective strategy and Pl pressure goals____________    Physical Exam:    General:  Alert, cooperative, no distress, appears stated age. Head:  Normocephalic, without obvious abnormality, atraumatic. Eyes:  Conjunctivae/corneas clear. PERRL, EOMs intact. Nose: Nares normal. Septum midline.  Mucosa normal. No drainage or sinus tenderness. Throat: Lips, mucosa, and tongue normal. Teeth and gums normal.   Neck: Supple, symmetrical, trachea midline, no adenopathy, thyroid: no enlargment/tenderness/nodules, no carotid bruit and no JVD. Back:   Symmetric, no curvature. ROM normal.   Lungs:   Clear to auscultation bilaterally. Chest wall:  No tenderness or deformity. Heart:  Regular rate and rhythm, S1, S2 normal, no murmur, click, rub or gallop. Abdomen:   Soft, non-tender. Bowel sounds normal. No masses,  No organomegaly. Extremities: Extremities normal, atraumatic, no cyanosis or edema. Pulses: 2+ and symmetric all extremities. Skin: Skin color, texture, turgor normal. No rashes or lesions   Lymph nodes: Cervical, supraclavicular, and axillary nodes normal.   Neurologic: Grossly nonfocal       Data:     Recent Results (from the past 24 hour(s))   METABOLIC PANEL, BASIC    Collection Time: 02/11/17 10:00 AM   Result Value Ref Range    Sodium 145 136 - 145 mmol/L    Potassium 5.2 3.5 - 5.5 mmol/L    Chloride 110 (H) 100 - 108 mmol/L    CO2 18 (L) 21 - 32 mmol/L    Anion gap 17 3.0 - 18 mmol/L    Glucose 149 (H) 74 - 99 mg/dL    BUN 47 (H) 7.0 - 18 MG/DL    Creatinine 1.93 (H) 0.6 - 1.3 MG/DL    BUN/Creatinine ratio 24 (H) 12 - 20      GFR est AA 31 (L) >60 ml/min/1.73m2    GFR est non-AA 26 (L) >60 ml/min/1.73m2    Calcium 8.3 (L) 8.5 - 10.1 MG/DL             Telemetry:normal sinus rhythm    Imaging:  I have personally reviewed the patients radiographs and have reviewed the reports:      Best practice :  Not applicableCardiovascular: An arterial systolic blood pressure (SBP) of < or equal to 90mm Hg or a mean arterial pressure (MAP) < or equal to 70mm Hg for at least 1 hour despite adequate fluid resuscitation or adequate intravascular volume status; or the need for vasopressors to maintain SBP>or equal to 90mm Hg or MAP > or equal to 70mm Hg. Increase diuresis  Central Line Bundle Followed Total critical care time exclusive of procedures: 30minutes  Rachael Saba MD  2/12/2017, 11:49 AM

## 2017-02-12 NOTE — PROGRESS NOTES
0730- Bedside and Verbal shift change report given to EDIL Lopez (oncoming nurse) by Kate Garsia RN (offgoing nurse). Report included the following information SBAR, Kardex, Intake/Output, MAR, Recent Results and Cardiac Rhythm SR.     0800- Initial shift assessment complete. VS stable. No complaints of pain. Will continue to monitor. 1200- Reassessment complete. VS remain stable. Will continue to monitor. TRANSFER - OUT REPORT:    Verbal report given to [facility], RN(name) on [address]  being transferred to  telemetry(unit) for routine progression of care       Report consisted of patients Situation, Background, Assessment and   Recommendations(SBAR). Information from the following report(s) SBAR, Kardex, Intake/Output, MAR and Recent Results was reviewed with the receiving nurse. Lines:   Triple Lumen 02/10/17 Right Internal jugular (Active)   Central Line Being Utilized Yes 2/12/2017  4:00 AM   Criteria for Appropriate Use Irritant/vesicant medication 2/11/2017  7:30 PM   Site Assessment Clean, dry, & intact 2/12/2017  4:00 AM   Infiltration Assessment 0 2/12/2017  4:00 AM   Affected Extremity/Extremities Color distal to insertion site pink (or appropriate for race) 2/12/2017  4:00 AM   Date of Last Dressing Change 02/11/17 2/11/2017  4:00 PM   Dressing Status Clean, dry, & intact 2/12/2017  4:00 AM   Dressing Type Disk with Chlorhexadine Gluconate (CHG); Transparent 2/12/2017  4:00 AM   Proximal Hub Color/Line Status Infusing 2/12/2017  4:00 AM   Positive Blood Return (Medial Site) Yes 2/11/2017  4:00 PM   Medial Hub Color/Line Status Capped 2/12/2017  4:00 AM   Positive Blood Return (Lateral Site) Yes 2/11/2017  4:00 PM   Distal Hub Color/Line Status Capped 2/12/2017  4:00 AM   Positive Blood Return (Site #3) Yes 2/11/2017  4:00 PM   Alcohol Cap Used Yes 2/11/2017  4:00 PM       Peripheral IV 02/10/17 (Active)   Site Assessment Clean, dry, & intact 2/12/2017  4:00 AM   Phlebitis Assessment 0 2/12/2017  4:00 AM   Infiltration Assessment 0 2/12/2017  4:00 AM   Dressing Status Clean, dry, & intact 2/12/2017  4:00 AM   Dressing Type Transparent 2/12/2017  4:00 AM   Hub Color/Line Status Capped 2/12/2017  4:00 AM   Action Taken Open ports on tubing capped 2/11/2017  8:00 AM   Alcohol Cap Used Yes 2/11/2017  4:00 PM       Peripheral IV 02/10/17 Wrist (Active)   Site Assessment Clean, dry, & intact 2/12/2017  4:00 AM   Phlebitis Assessment 0 2/12/2017  4:00 AM   Infiltration Assessment 0 2/12/2017  4:00 AM   Dressing Status Clean, dry, & intact 2/12/2017  4:00 AM   Dressing Type Transparent 2/12/2017  4:00 AM   Hub Color/Line Status Capped 2/12/2017  4:00 AM   Alcohol Cap Used Yes 2/11/2017  4:00 PM       Peripheral IV 02/10/17 Right Wrist (Active)   Site Assessment Clean, dry, & intact 2/12/2017  4:00 AM   Phlebitis Assessment 0 2/12/2017  4:00 AM   Infiltration Assessment 0 2/12/2017  4:00 AM   Dressing Status Clean, dry, & intact 2/12/2017  4:00 AM   Dressing Type Transparent 2/12/2017  4:00 AM   Hub Color/Line Status Capped 2/12/2017  4:00 AM   Alcohol Cap Used Yes 2/11/2017  4:00 PM        Opportunity for questions and clarification was provided.       Patient transported with:   Monitor  O2 @ 3 liters  Patient-specific medications from Pharmacy  Registered Nurse

## 2017-02-13 PROBLEM — J18.9 PNA (PNEUMONIA): Status: ACTIVE | Noted: 2017-02-13

## 2017-02-13 LAB
ANION GAP BLD CALC-SCNC: 9 MMOL/L (ref 3–18)
BUN SERPL-MCNC: 24 MG/DL (ref 7–18)
BUN/CREAT SERPL: 22 (ref 12–20)
CALCIUM SERPL-MCNC: 8.5 MG/DL (ref 8.5–10.1)
CHLORIDE SERPL-SCNC: 112 MMOL/L (ref 100–108)
CO2 SERPL-SCNC: 26 MMOL/L (ref 21–32)
CREAT SERPL-MCNC: 1.08 MG/DL (ref 0.6–1.3)
DATE LAST DOSE: ABNORMAL
ERYTHROCYTE [DISTWIDTH] IN BLOOD BY AUTOMATED COUNT: 13.3 % (ref 11.6–14.5)
GLUCOSE SERPL-MCNC: 91 MG/DL (ref 74–99)
HCT VFR BLD AUTO: 25.3 % (ref 35–45)
HGB BLD-MCNC: 8.2 G/DL (ref 12–16)
MCH RBC QN AUTO: 32.2 PG (ref 24–34)
MCHC RBC AUTO-ENTMCNC: 32.4 G/DL (ref 31–37)
MCV RBC AUTO: 99.2 FL (ref 74–97)
PLATELET # BLD AUTO: 267 K/UL (ref 135–420)
PMV BLD AUTO: 9.9 FL (ref 9.2–11.8)
POTASSIUM SERPL-SCNC: 4 MMOL/L (ref 3.5–5.5)
RBC # BLD AUTO: 2.55 M/UL (ref 4.2–5.3)
REPORTED DOSE,DOSE: ABNORMAL UNITS
REPORTED DOSE/TIME,TMG: 1100
SODIUM SERPL-SCNC: 147 MMOL/L (ref 136–145)
VANCOMYCIN TROUGH SERPL-MCNC: 24.4 UG/ML (ref 10–20)
WBC # BLD AUTO: 9.3 K/UL (ref 4.6–13.2)

## 2017-02-13 PROCEDURE — 93306 TTE W/DOPPLER COMPLETE: CPT

## 2017-02-13 PROCEDURE — 74011250636 HC RX REV CODE- 250/636: Performed by: INTERNAL MEDICINE

## 2017-02-13 PROCEDURE — 74011250636 HC RX REV CODE- 250/636: Performed by: FAMILY MEDICINE

## 2017-02-13 PROCEDURE — 36415 COLL VENOUS BLD VENIPUNCTURE: CPT | Performed by: FAMILY MEDICINE

## 2017-02-13 PROCEDURE — 80048 BASIC METABOLIC PNL TOTAL CA: CPT | Performed by: FAMILY MEDICINE

## 2017-02-13 PROCEDURE — 74011250637 HC RX REV CODE- 250/637: Performed by: INTERNAL MEDICINE

## 2017-02-13 PROCEDURE — 74011000258 HC RX REV CODE- 258: Performed by: FAMILY MEDICINE

## 2017-02-13 PROCEDURE — 85027 COMPLETE CBC AUTOMATED: CPT | Performed by: FAMILY MEDICINE

## 2017-02-13 PROCEDURE — 65660000000 HC RM CCU STEPDOWN

## 2017-02-13 PROCEDURE — 80202 ASSAY OF VANCOMYCIN: CPT | Performed by: FAMILY MEDICINE

## 2017-02-13 RX ORDER — GUAIFENESIN/DEXTROMETHORPHAN 100-10MG/5
10 SYRUP ORAL
COMMUNITY
End: 2017-02-16

## 2017-02-13 RX ORDER — MAG HYDROX/ALUMINUM HYD/SIMETH 200-200-20
15 SUSPENSION, ORAL (FINAL DOSE FORM) ORAL
COMMUNITY

## 2017-02-13 RX ORDER — TRAZODONE HYDROCHLORIDE 50 MG/1
25 TABLET ORAL ONCE
Status: COMPLETED | OUTPATIENT
Start: 2017-02-14 | End: 2017-02-13

## 2017-02-13 RX ORDER — FENTANYL 25 UG/1
1 PATCH TRANSDERMAL
Status: ON HOLD | COMMUNITY
End: 2017-02-16

## 2017-02-13 RX ORDER — SULFAMETHOXAZOLE AND TRIMETHOPRIM 800; 160 MG/1; MG/1
1 TABLET ORAL 2 TIMES DAILY
COMMUNITY
End: 2017-02-16

## 2017-02-13 RX ORDER — MENTHOL AND ZINC OXIDE .44; 20.625 G/100G; G/100G
OINTMENT TOPICAL 2 TIMES DAILY
Status: ON HOLD | COMMUNITY
End: 2021-06-17 | Stop reason: ALTCHOICE

## 2017-02-13 RX ORDER — OXYCODONE AND ACETAMINOPHEN 10; 325 MG/1; MG/1
1 TABLET ORAL
COMMUNITY
End: 2017-02-16

## 2017-02-13 RX ORDER — FENTANYL 25 UG/1
1 PATCH TRANSDERMAL
Status: DISCONTINUED | OUTPATIENT
Start: 2017-02-14 | End: 2017-02-16 | Stop reason: HOSPADM

## 2017-02-13 RX ADMIN — DULOXETINE 60 MG: 60 CAPSULE, DELAYED RELEASE ORAL at 00:25

## 2017-02-13 RX ADMIN — FLUTICASONE PROPIONATE 1 SPRAY: 50 SPRAY, METERED NASAL at 10:23

## 2017-02-13 RX ADMIN — Medication 10 ML: at 00:26

## 2017-02-13 RX ADMIN — GUAIFENESIN 600 MG: 600 TABLET, EXTENDED RELEASE ORAL at 22:21

## 2017-02-13 RX ADMIN — LEVOTHYROXINE SODIUM 225 MCG: 200 TABLET ORAL at 06:30

## 2017-02-13 RX ADMIN — PANTOPRAZOLE SODIUM 40 MG: 40 TABLET, DELAYED RELEASE ORAL at 10:23

## 2017-02-13 RX ADMIN — PREGABALIN 100 MG: 100 CAPSULE ORAL at 00:25

## 2017-02-13 RX ADMIN — PIPERACILLIN SODIUM,TAZOBACTAM SODIUM 3.38 G: 3; .375 INJECTION, POWDER, FOR SOLUTION INTRAVENOUS at 22:21

## 2017-02-13 RX ADMIN — HEPARIN SODIUM 5000 UNITS: 5000 INJECTION, SOLUTION INTRAVENOUS; SUBCUTANEOUS at 10:59

## 2017-02-13 RX ADMIN — PIPERACILLIN SODIUM,TAZOBACTAM SODIUM 3.38 G: 3; .375 INJECTION, POWDER, FOR SOLUTION INTRAVENOUS at 06:33

## 2017-02-13 RX ADMIN — DULOXETINE 60 MG: 60 CAPSULE, DELAYED RELEASE ORAL at 22:21

## 2017-02-13 RX ADMIN — VANCOMYCIN HYDROCHLORIDE 1500 MG: 10 INJECTION, POWDER, LYOPHILIZED, FOR SOLUTION INTRAVENOUS at 23:29

## 2017-02-13 RX ADMIN — FLUTICASONE PROPIONATE AND SALMETEROL 1 PUFF: 50; 500 POWDER RESPIRATORY (INHALATION) at 00:26

## 2017-02-13 RX ADMIN — BUMETANIDE 1 MG: 1 TABLET ORAL at 10:23

## 2017-02-13 RX ADMIN — GUAIFENESIN 600 MG: 600 TABLET, EXTENDED RELEASE ORAL at 10:23

## 2017-02-13 RX ADMIN — PIPERACILLIN SODIUM,TAZOBACTAM SODIUM 3.38 G: 3; .375 INJECTION, POWDER, FOR SOLUTION INTRAVENOUS at 10:22

## 2017-02-13 RX ADMIN — PIPERACILLIN SODIUM,TAZOBACTAM SODIUM 3.38 G: 3; .375 INJECTION, POWDER, FOR SOLUTION INTRAVENOUS at 16:17

## 2017-02-13 RX ADMIN — GUAIFENESIN 600 MG: 600 TABLET, EXTENDED RELEASE ORAL at 00:25

## 2017-02-13 RX ADMIN — TRAZODONE HYDROCHLORIDE 25 MG: 50 TABLET ORAL at 23:29

## 2017-02-13 RX ADMIN — ASPIRIN 81 MG: 81 TABLET, COATED ORAL at 10:23

## 2017-02-13 RX ADMIN — DULOXETINE 60 MG: 60 CAPSULE, DELAYED RELEASE ORAL at 10:23

## 2017-02-13 RX ADMIN — Medication 10 ML: at 16:16

## 2017-02-13 RX ADMIN — HEPARIN SODIUM 5000 UNITS: 5000 INJECTION, SOLUTION INTRAVENOUS; SUBCUTANEOUS at 00:25

## 2017-02-13 RX ADMIN — PREGABALIN 100 MG: 100 CAPSULE ORAL at 22:21

## 2017-02-13 RX ADMIN — Medication 10 ML: at 06:30

## 2017-02-13 RX ADMIN — ACETAMINOPHEN 325 MG: 325 TABLET, FILM COATED ORAL at 16:15

## 2017-02-13 RX ADMIN — PIPERACILLIN SODIUM,TAZOBACTAM SODIUM 3.38 G: 3; .375 INJECTION, POWDER, FOR SOLUTION INTRAVENOUS at 00:24

## 2017-02-13 RX ADMIN — HEPARIN SODIUM 5000 UNITS: 5000 INJECTION, SOLUTION INTRAVENOUS; SUBCUTANEOUS at 17:28

## 2017-02-13 RX ADMIN — FLUTICASONE PROPIONATE AND SALMETEROL 1 PUFF: 50; 500 POWDER RESPIRATORY (INHALATION) at 22:21

## 2017-02-13 RX ADMIN — FLUTICASONE PROPIONATE AND SALMETEROL 1 PUFF: 50; 500 POWDER RESPIRATORY (INHALATION) at 10:23

## 2017-02-13 RX ADMIN — SODIUM CHLORIDE 50 ML/HR: 900 INJECTION, SOLUTION INTRAVENOUS at 17:27

## 2017-02-13 RX ADMIN — PREGABALIN 100 MG: 100 CAPSULE ORAL at 16:15

## 2017-02-13 RX ADMIN — PREGABALIN 100 MG: 100 CAPSULE ORAL at 10:23

## 2017-02-13 NOTE — PROGRESS NOTES
conducted an initial consultation and Spiritual Assessment for Dolly Lazcano, who is a 80 y.o.,female. Patients Primary Language is: [place]. According to the patients EMR Orthodox Affiliation is: Worship. The reason the Patient came to the hospital is:   Patient Active Problem List    Diagnosis Date Noted    PNA (pneumonia) 02/13/2017    Toxic metabolic encephalopathy 38/60/8492    Acute renal failure (ARF) ([facility] 02/11/2017    Hyperkalemia 02/11/2017    Acute encephalopathy 02/10/2017    Acute diastolic CHF (congestive heart failure) ([facility] 11/28/2016    [facility] 11/25/2016    Acidosis 11/25/2016    COPD exacerbation ([facility] 11/25/2016    Acute on chronic respiratory failure ([facility] 11/25/2016    Obesity 11/25/2016    Benign hypertension 11/25/2016    GERD (gastroesophageal reflux disease) 11/25/2016    DDD (degenerative disc disease), cervical 08/27/2013    H/O cervical spine surgery 08/27/2013    Cataract 05/15/2013        The  provided the following Interventions:  Initiated a relationship of care and support. Explored issues of minoo, belief, spirituality and Sikh/ritual needs while hospitalized. Listened empathically. Provided chaplaincy education. Provided information about Spiritual Care Services. Offered prayer and assurance of continued prayers on patient's behalf. Chart reviewed. The following outcomes were achieved:  Patient shared limited information about both their medical narrative and spiritual journey/beliefs. Patient processed feeling about current hospitalization. Patient expressed gratitude for the 's visit. Assessment:  Patient does not have any Sikh/cultural needs that will affect patients preferences in health care. Patient did not indicate any spiritual or Sikh issues which require Spiritual Care Services interventions at this time.        Plan:  Chaplains will continue to follow and will provide pastoral care on an as needed/requested basis.  recommends bedside caregivers page  on duty if patient shows signs of acute spiritual or emotional distress. MONTRELL Guevara. [employer]  [phone]

## 2017-02-13 NOTE — PROGRESS NOTES
1808 [facility] care of pt from Amna Chand RN. Pt resting comfortably , no signs of distress, call bell within reach. 0740 Assessment completed, pt had BM. Denies any pain or discomfort, on 3L NC. Will continue to monitor    1505 Notified that pt hr in 140- 150s, went to assess pt and pt is asleep, no signs of distress or discomfort. 1515 [place] taken out, pt aware to use call bell to notify staff of bedpan. 1600 Pt able to urinate without difficulty in the bedpan. Kolby Swann 83 to reconcile pt medication list, was told by RN that \"She didn't have time to list all the medication\" and that she would fax medication orders. Attempted to go over medications with pt (as its part of admission database) and she states \"she doesn't remember all the medications because the nurses give it to her\" Will wait for medication orders so that meds can be reconciled.       Shift summary- see above

## 2017-02-13 NOTE — PROGRESS NOTES
Pharmacy Dosing Services: Vancomycin    Consult for Vancomycin Dosing by Pharmacy by Dr. Lenka Corona. Consult provided for this 80y.o. year old female , for indication of HCAP  Day of Therapy 4  Scr = 1.08 (improving)  CrCl ~ 50 ml/min   WBC = 9.3    Vancomycin Trough: 24.4 (2/13/17 at 10:18) above therapeutic range: 15 - 20   Dose was held for ~ 12 hours. Dose will continue to:  Vancomycin 1500 mg IV every 24 hours, scheduled for 2/13/17 at 22:00. Ht Readings from Last 1 Encounters:   02/12/17 152 cm (59.84\")        Wt Readings from Last 1 Encounters:   02/12/17 95 kg (209 lb 7 oz)        Previous Regimen Vancomycin 1500 mg IV q12h   Other Current Antibiotics Zosyn 3.375 grams IV q6h   Serum Creatinine Lab Results   Component Value Date/Time    Creatinine 1.08 02/13/2017 05:00 AM      Creatinine Clearance   CrCl ~ 50 ml/min    BUN Lab Results   Component Value Date/Time    BUN 24 02/13/2017 05:00 AM      WBC Lab Results   Component Value Date/Time    WBC 9.3 02/13/2017 05:00 AM      H/H Lab Results   Component Value Date/Time    HGB 8.2 02/13/2017 05:00 AM      Platelets Lab Results   Component Value Date/Time    PLATELET 714 60/04/8715 05:00 AM      Temp 97.9 °F (36.6 °C)     Pharmacy to follow daily and will make changes to dose and/or frequency based on clinical status.   Pharmacist Arcelia Lundborg, [address]

## 2017-02-13 NOTE — PROGRESS NOTES
1925: Assumed patient care, she was in bed resting quietly with no signs of distress noted. Patient was alert to person and place. Vital signs were stable. MEWS score was a one. Patient denied any pain, discomfort, nausea, vomiting, dizziness or anxiety. Respiratory status remained stable on 3L via nasal cannula. White board was updated and explained. Bed was locked and in lowest position. Call bell, water and personal belongings were within reach. Patient had no questions, comments or concerns after bedside shift report.

## 2017-02-13 NOTE — PROGRESS NOTES
Readmission Risk Assessment:     Moderate Risk and MSSP/Good Help ACO patients    RRAT Score:  13-20    Initial Assessment:chart reviewed pt admitted for toxic metabolic encephalopathy, pt from [address] [place] [address] , cm will cont to follow thru,and speak with family regarding discharge planning. Emergency Contact: listed in chart  Pertinent Medical Hx:    See chart     PCP/Specialists: Fanny 36:       DME:          Moderate Risk Care Transition Plan:  1. Evaluate for [place] or H2H, SNF, acute rehab, community care coordination of resources. 2. Involve patient/caregiver in assessment, planning, education and implement of intervention. 3. CM daily patient care huddles/interdisciplinary rounds. 4. PCP/Specialist appointment within 5  7 days made prior to discharge. 5. Facilitate transportation and logistics for follow-up appointments. 6. Medication reconciliation [address]  7. Formal handoff between hospital provider and post-acute provider to transition patient  Handoff to [address] Nurse Navigator or PCP practice.

## 2017-02-13 NOTE — PROGRESS NOTES
[facility]  Pulmonary, Critical Care, and Sleep Medicine    Name: Kimmy Lantigua MRN: [id number]   : 1945 Hospital: [facility] [place]   Date: 2017        IMPRESSION:   · Hypoxia due to pulmonary edema and atelectasis in setting of prob OHS, improved  · Hypotension, likely due to excessive analgesic, resolved  · Morbid obeisty  · Fibromyalgia and chronic pain syndrome  · Acute encephalopathy due to excessive analgesic, Resolved. · DAVID injury possibly due to diastolic heart failure  · Pulmonary edema resolved     Patient Active Problem List   Diagnosis Code    Cataract H26.9    DDD (degenerative disc disease), cervical M50.30    H/O cervical spine surgery Z98.890    COPD (chronic obstructive pulmonary disease) ([facility] [address] J44.9    Acidosis E87.2    COPD exacerbation ([facility] [address] J44.1    Acute on chronic respiratory failure ([facility]) J96.20    Obesity E66.9    Benign hypertension I10    GERD (gastroesophageal reflux disease) K21.9    Acute diastolic CHF (congestive heart failure) ([facility]) I50.31    Acute encephalopathy G93.40    Toxic metabolic encephalopathy A01    Acute renal failure (ARF) ([facility]) N17.9    Hyperkalemia E87.5    PNA (pneumonia) J18.9      PLAN:   · Prn bipap  · Hold atenolol given hypotension  · Diurese, convert to po bumex today  · ICS  · emperic abx ok  · Replete lyte as needed  · GI and DVt proh     Subjective/Interval History:         ROS:Pertinent items are noted in HPI.     Objective:   Vital Signs:    Visit Vitals    /77    Pulse 91    Temp 97.9 °F (36.6 °C)    Resp 18    Ht 4' 11.84\" (1.52 m)    Wt 95 kg (209 lb 7 oz)    SpO2 100%    Breastfeeding No    BMI 41.12 kg/m2       O2 Device: Nasal cannula   O2 Flow Rate (L/min): 3 l/min   Temp (24hrs), Av °F (36.7 °C), Min:97.5 °F (36.4 °C), Max:98.5 °F (36.9 °C)       Intake/Output:   Last shift:      701 - 1900  In: 0   Out: 300 [Urine:300]  Last 3 shifts: 1901 -  0700  In: 2584.2 [P.O.:480; I.V.:2104.2]  Out: 4726 [Urine:4725]    Intake/Output Summary (Last 24 hours) at 02/13/17 1831  Last data filed at 02/13/17 0740   Gross per 24 hour   Intake          1079.17 ml   Output             1200 ml   Net          -120.83 ml        Physical Exam:    General: in no apparent distress   HEENT: Normal   Neck: No abnormally enlarged lymph nodes. RIJ noted      Lungs: rhonchi   Heart: Regular rate and rhythm   Abdomen: abdomen is soft without significant tenderness, masses, organomegaly or guarding   Extremity: negative       DATA:  Labs:  Recent Labs      02/13/17   0500  02/12/17   0840  02/11/17   0250   WBC  9.3  10.0  11.4   HGB  8.2*  7.6*  8.4*   HCT  25.3*  23.4*  26.6*   PLT  267  263  294     Recent Labs      02/13/17   0500  02/11/17   1000  02/11/17   0250  02/10/17   2045   NA  147*  145  141  142   K  4.0  5.2  5.4  5.8*   CL  112*  110*  108  106   CO2  26  18*  18*  21   GLU  91  149*  162*  117*   BUN  24*  47*  44*  42*   CREA  1.08  1.93*  2.01*  2.05*   CA  8.5  8.3*  8.4*  8.6   MG   --    --    --   1.9     No results for input(s): PH, PCO2, PO2, HCO3, FIO2 in the last 72 hours.       Imaging:  []I have personally reviewed the patients radiographs  []Radiographs reviewed with radiologist   []No change from prior, tubes and lines in adequate position  []Improved   []Worsening        Merritt Mobley MD

## 2017-02-13 NOTE — INTERDISCIPLINARY ROUNDS
IDR/SLIDR Summary          Patient: Dominga Aguero MRN: [id number]    Age: 80 y.o. YOB: 1945 Room/Bed: [facility]/   Admit Diagnosis: Acute encephalopathy  Acute encephalopathy  Principal Diagnosis: Toxic metabolic encephalopathy   Goals: remove cook, voiding trial  Readmission: NO  Quality Measure: Not applicable  VTE Prophylaxis: Chemical  Influenza Vaccine screening completed? NO  Pneumococcal Vaccine screening completed? YES  Mobility needs: Yes   Nutrition plan:Yes  Consults: P. T and O.T. Financial concerns:No  Escalated to CM? YES  RRAT Score: 17   Interventions:  Testing due for pt today? NO  LOS: 3 days Expected length of stay 3 days  Discharge plan: conv. Center([facility])   PCP: Mariel Parr.  Carmen Díaz MD  Transportation needs: Yes    Days before discharge:two or more days before discharge   Discharge disposition: Nursing Home    Signed:     Linnette Oropeza RN  2/13/2017  11:30 AM

## 2017-02-13 NOTE — PROGRESS NOTES
Hospitalist Progress Note-critical care note     Patient: Alessio Tolbert MRN: [id number]  CSN: [id number]    YOB: 1945  Age: 80 y.o. Sex: female    DOA: 2/10/2017 LOS:  LOS: 3 days            Chief complaint:    Assessment/Plan         Patient Active Problem List   Diagnosis Code    Cataract H26.9    DDD (degenerative disc disease), cervical M50.30    H/O cervical spine surgery Z98.890    COPD (chronic obstructive pulmonary disease) ([facility] [address] J44.9    Acidosis E87.2    COPD exacerbation ([facility] [address] J44.1    Acute on chronic respiratory failure ([facility]) J96.20    Obesity E66.9    Benign hypertension I10    GERD (gastroesophageal reflux disease) K21.9    Acute diastolic CHF (congestive heart failure) ([facility]) I50.31    Acute encephalopathy G93.40    Toxic metabolic encephalopathy F26    Acute renal failure (ARF) ([facility]) N17.9    Hyperkalemia E87.5     1. Hypoxia s/p pulmonary edema   Improving, will contue wean off nc O2  2. Acute diastolic chf  Echo done still pending. Will continue bumex, continue f/u with renal function. cxr on admission; interstitial edema with right perihilar and  retrocardiac left basilar atelectasis/infiltrate. 3. Kim   improving, will continue avoid iv contrast   4 PNA   Continue abx and breathing tx,   5.ams   Improving  6. Hyperkalemia   Resolved  7 chronic pain  Continue current meds   8 acute encephalopathy due to pain meds   Will d.c joey and PT/OT  Subjective ; feel fine   Nurse; no acute issue   Review of systems:    General: No fevers or chills. Cardiovascular: No chest pain or pressure. No palpitations. Pulmonary: No shortness of breath. Gastrointestinal: No nausea, vomiting.      Vital signs/Intake and Output:  Visit Vitals    /83    Pulse (!) 103    Temp 97.9 °F (36.6 °C)    Resp 18    Ht 4' 11.84\" (1.52 m)    Wt 95 kg (209 lb 7 oz)    SpO2 99%    BMI 41.12 kg/m2     Current Shift:  02/13 0701 - 02/13 1900  In: 0   Out: 300 [Urine:300]  Last three shifts:  02/11 1901 - 02/13 0700  In: 2584.2 [P.O.:480; I.V.:2104.2]  Out: 4726 [Urine:4725]    Physical Exam:  General: WD, WN. Alert, cooperative, no acute distress    HEENT: NC, Atraumatic. PERRLA, anicteric sclerae. Lungs: CTA Bilaterally. No Wheezing/Rhonchi/Rales. Heart:  Regular  rhythm,  +murmur, No Rubs, No Gallops  Abdomen: Soft, Non distended, Non tender.  +Bowel sounds,   Extremities: No c/c/e  Psych:   Not anxious or agitated. Neurologic:  No acute neurological deficit. Labs: Results:       Chemistry Recent Labs      02/13/17   0500  02/11/17   1000  02/11/17   0250   GLU  91  149*  162*   NA  147*  145  141   K  4.0  5.2  5.4   CL  112*  110*  108   CO2  26  18*  18*   BUN  24*  47*  44*   CREA  1.08  1.93*  2.01*   CA  8.5  8.3*  8.4*   AGAP  9  17  15   BUCR  22*  24*  22*      CBC w/Diff Recent Labs      02/13/17   0500  02/12/17   0840  02/11/17   0250   WBC  9.3  10.0  11.4   RBC  2.55*  2.34*  2.64*   HGB  8.2*  7.6*  8.4*   HCT  25.3*  23.4*  26.6*   PLT  267  263  294      Cardiac Enzymes No results for input(s): CPK, CKND1, ADILSON in the last 72 hours. No lab exists for component: CKRMB, TROIP   Coagulation No results for input(s): PTP, INR, APTT in the last 72 hours. No lab exists for component: INREXT    Lipid Panel No results found for: CHOL, CHOLPOCT, CHOLX, CHLST, CHOLV, [id number], HDL, LDL, NLDLCT, DLDL, LDLC, DLDLP, 139185, VLDLC, VLDL, TGL, TGLX, TRIGL, [id number], TRIGP, TGLPOCT, [id number], CHHD, CHHDX   BNP No results for input(s): BNPP in the last 72 hours. Liver Enzymes No results for input(s): TP, ALB, TBIL, AP, SGOT, GPT in the last 72 hours.     No lab exists for component: DBIL   Thyroid Studies Lab Results   Component Value Date/Time    TSH 0.04 11/26/2016 05:39 AM        Procedures/imaging: see electronic medical records for all procedures/Xrays and details which were not copied into this note but were reviewed prior to creation of Susanne Mason MD

## 2017-02-13 NOTE — ROUTINE PROCESS
Shift summary:  Patient had uneventful shift with no pain or SOB. Patient rested quietly and vitals were stable. Alarm parameters reviewed and opportunities for questions provided. Bedside and Verbal shift change report given to Mike Judd RN   (oncoming nurse) by Earlene Cheadle, RN   (offgoing nurse). Report included the following information SBAR, Kardex, Procedure Summary, Intake/Output, MAR and Accordion.

## 2017-02-14 PROBLEM — R00.0 SINUS TACHYCARDIA: Status: ACTIVE | Noted: 2017-02-14

## 2017-02-14 PROBLEM — E83.42 HYPOMAGNESEMIA: Status: ACTIVE | Noted: 2017-02-14

## 2017-02-14 PROBLEM — R07.9 CHEST PAIN: Status: ACTIVE | Noted: 2017-02-14

## 2017-02-14 PROBLEM — F41.9 ANXIETY: Status: ACTIVE | Noted: 2017-02-14

## 2017-02-14 LAB
ANION GAP BLD CALC-SCNC: 8 MMOL/L (ref 3–18)
BUN SERPL-MCNC: 12 MG/DL (ref 7–18)
BUN/CREAT SERPL: 13 (ref 12–20)
CALCIUM SERPL-MCNC: 8.2 MG/DL (ref 8.5–10.1)
CHLORIDE SERPL-SCNC: 110 MMOL/L (ref 100–108)
CK MB CFR SERPL CALC: 1.9 % (ref 0–4)
CK MB SERPL-MCNC: 0.8 NG/ML (ref 0.5–3.6)
CK SERPL-CCNC: 42 U/L (ref 26–192)
CO2 SERPL-SCNC: 28 MMOL/L (ref 21–32)
CREAT SERPL-MCNC: 0.9 MG/DL (ref 0.6–1.3)
ERYTHROCYTE [DISTWIDTH] IN BLOOD BY AUTOMATED COUNT: 12.9 % (ref 11.6–14.5)
GLUCOSE SERPL-MCNC: 90 MG/DL (ref 74–99)
HCT VFR BLD AUTO: 26.1 % (ref 35–45)
HGB BLD-MCNC: 8.5 G/DL (ref 12–16)
MAGNESIUM SERPL-MCNC: 1.4 MG/DL (ref 1.8–2.4)
MCH RBC QN AUTO: 31.7 PG (ref 24–34)
MCHC RBC AUTO-ENTMCNC: 32.6 G/DL (ref 31–37)
MCV RBC AUTO: 97.4 FL (ref 74–97)
PLATELET # BLD AUTO: 259 K/UL (ref 135–420)
PMV BLD AUTO: 9.8 FL (ref 9.2–11.8)
POTASSIUM SERPL-SCNC: 3.4 MMOL/L (ref 3.5–5.5)
RBC # BLD AUTO: 2.68 M/UL (ref 4.2–5.3)
SODIUM SERPL-SCNC: 146 MMOL/L (ref 136–145)
TROPONIN I SERPL-MCNC: <0.02 NG/ML (ref 0–0.06)
WBC # BLD AUTO: 9.3 K/UL (ref 4.6–13.2)

## 2017-02-14 PROCEDURE — 74011250637 HC RX REV CODE- 250/637: Performed by: INTERNAL MEDICINE

## 2017-02-14 PROCEDURE — 92610 EVALUATE SWALLOWING FUNCTION: CPT

## 2017-02-14 PROCEDURE — 65660000000 HC RM CCU STEPDOWN

## 2017-02-14 PROCEDURE — 74011000250 HC RX REV CODE- 250: Performed by: HOSPITALIST

## 2017-02-14 PROCEDURE — 77030013140 HC MSK NEB VYRM -A

## 2017-02-14 PROCEDURE — 74011250636 HC RX REV CODE- 250/636: Performed by: FAMILY MEDICINE

## 2017-02-14 PROCEDURE — 80048 BASIC METABOLIC PNL TOTAL CA: CPT | Performed by: FAMILY MEDICINE

## 2017-02-14 PROCEDURE — 93005 ELECTROCARDIOGRAM TRACING: CPT

## 2017-02-14 PROCEDURE — 74011000258 HC RX REV CODE- 258: Performed by: FAMILY MEDICINE

## 2017-02-14 PROCEDURE — 74011250636 HC RX REV CODE- 250/636: Performed by: HOSPITALIST

## 2017-02-14 PROCEDURE — 74011250636 HC RX REV CODE- 250/636: Performed by: INTERNAL MEDICINE

## 2017-02-14 PROCEDURE — 94760 N-INVAS EAR/PLS OXIMETRY 1: CPT

## 2017-02-14 PROCEDURE — 97161 PT EVAL LOW COMPLEX 20 MIN: CPT

## 2017-02-14 PROCEDURE — 77010033678 HC OXYGEN DAILY

## 2017-02-14 PROCEDURE — 74011250637 HC RX REV CODE- 250/637: Performed by: HOSPITALIST

## 2017-02-14 PROCEDURE — 97116 GAIT TRAINING THERAPY: CPT

## 2017-02-14 PROCEDURE — 83735 ASSAY OF MAGNESIUM: CPT | Performed by: HOSPITALIST

## 2017-02-14 PROCEDURE — 94640 AIRWAY INHALATION TREATMENT: CPT

## 2017-02-14 PROCEDURE — 74011000250 HC RX REV CODE- 250: Performed by: INTERNAL MEDICINE

## 2017-02-14 PROCEDURE — 97165 OT EVAL LOW COMPLEX 30 MIN: CPT

## 2017-02-14 PROCEDURE — 82550 ASSAY OF CK (CPK): CPT | Performed by: HOSPITALIST

## 2017-02-14 PROCEDURE — 97535 SELF CARE MNGMENT TRAINING: CPT

## 2017-02-14 PROCEDURE — 85027 COMPLETE CBC AUTOMATED: CPT | Performed by: FAMILY MEDICINE

## 2017-02-14 RX ORDER — BUDESONIDE 0.5 MG/2ML
500 INHALANT ORAL
Status: DISCONTINUED | OUTPATIENT
Start: 2017-02-14 | End: 2017-02-16 | Stop reason: HOSPADM

## 2017-02-14 RX ORDER — LISINOPRIL 20 MG/1
20 TABLET ORAL DAILY
Status: DISCONTINUED | OUTPATIENT
Start: 2017-02-14 | End: 2017-02-16 | Stop reason: HOSPADM

## 2017-02-14 RX ORDER — ACETAMINOPHEN 325 MG/1
650 TABLET ORAL
Status: DISCONTINUED | OUTPATIENT
Start: 2017-02-14 | End: 2017-02-16 | Stop reason: HOSPADM

## 2017-02-14 RX ORDER — POTASSIUM CHLORIDE 20 MEQ/1
20 TABLET, EXTENDED RELEASE ORAL
Status: COMPLETED | OUTPATIENT
Start: 2017-02-14 | End: 2017-02-14

## 2017-02-14 RX ORDER — MAGNESIUM SULFATE HEPTAHYDRATE 40 MG/ML
2 INJECTION, SOLUTION INTRAVENOUS
Status: COMPLETED | OUTPATIENT
Start: 2017-02-14 | End: 2017-02-14

## 2017-02-14 RX ORDER — ATENOLOL 25 MG/1
25 TABLET ORAL DAILY
Status: DISCONTINUED | OUTPATIENT
Start: 2017-02-14 | End: 2017-02-16 | Stop reason: HOSPADM

## 2017-02-14 RX ORDER — ARFORMOTEROL TARTRATE 15 UG/2ML
15 SOLUTION RESPIRATORY (INHALATION)
Status: DISCONTINUED | OUTPATIENT
Start: 2017-02-14 | End: 2017-02-16 | Stop reason: HOSPADM

## 2017-02-14 RX ORDER — HYDROXYZINE HYDROCHLORIDE 10 MG/1
10 TABLET, FILM COATED ORAL
Status: DISCONTINUED | OUTPATIENT
Start: 2017-02-14 | End: 2017-02-16 | Stop reason: HOSPADM

## 2017-02-14 RX ORDER — TRAZODONE HYDROCHLORIDE 50 MG/1
25 TABLET ORAL ONCE
Status: COMPLETED | OUTPATIENT
Start: 2017-02-15 | End: 2017-02-14

## 2017-02-14 RX ORDER — LABETALOL HYDROCHLORIDE 5 MG/ML
10 INJECTION, SOLUTION INTRAVENOUS ONCE
Status: COMPLETED | OUTPATIENT
Start: 2017-02-14 | End: 2017-02-14

## 2017-02-14 RX ORDER — GUAIFENESIN 100 MG/5ML
324 LIQUID (ML) ORAL DAILY
Status: DISCONTINUED | OUTPATIENT
Start: 2017-02-14 | End: 2017-02-15 | Stop reason: ALTCHOICE

## 2017-02-14 RX ORDER — SAME BUTANEDISULFONATE/BETAINE 400-600 MG
500 POWDER IN PACKET (EA) ORAL 2 TIMES DAILY
Status: DISCONTINUED | OUTPATIENT
Start: 2017-02-14 | End: 2017-02-16 | Stop reason: HOSPADM

## 2017-02-14 RX ADMIN — ACETAMINOPHEN 650 MG: 325 TABLET, FILM COATED ORAL at 23:34

## 2017-02-14 RX ADMIN — Medication 500 MG: at 21:25

## 2017-02-14 RX ADMIN — GUAIFENESIN 600 MG: 600 TABLET, EXTENDED RELEASE ORAL at 09:47

## 2017-02-14 RX ADMIN — ACETAMINOPHEN 325 MG: 325 TABLET, FILM COATED ORAL at 16:22

## 2017-02-14 RX ADMIN — LEVOTHYROXINE SODIUM 225 MCG: 200 TABLET ORAL at 06:26

## 2017-02-14 RX ADMIN — ACETAMINOPHEN 325 MG: 325 TABLET, FILM COATED ORAL at 12:49

## 2017-02-14 RX ADMIN — Medication 500 MG: at 12:49

## 2017-02-14 RX ADMIN — ALBUTEROL SULFATE 2.5 MG: 2.5 SOLUTION RESPIRATORY (INHALATION) at 08:59

## 2017-02-14 RX ADMIN — ASPIRIN 81 MG 324 MG: 81 TABLET ORAL at 10:20

## 2017-02-14 RX ADMIN — ARFORMOTEROL TARTRATE 15 MCG: 15 SOLUTION RESPIRATORY (INHALATION) at 20:57

## 2017-02-14 RX ADMIN — POTASSIUM CHLORIDE 20 MEQ: 20 TABLET, EXTENDED RELEASE ORAL at 09:47

## 2017-02-14 RX ADMIN — PIPERACILLIN SODIUM,TAZOBACTAM SODIUM 3.38 G: 3; .375 INJECTION, POWDER, FOR SOLUTION INTRAVENOUS at 09:48

## 2017-02-14 RX ADMIN — LISINOPRIL 20 MG: 20 TABLET ORAL at 10:20

## 2017-02-14 RX ADMIN — BUMETANIDE 1 MG: 1 TABLET ORAL at 10:22

## 2017-02-14 RX ADMIN — ARFORMOTEROL TARTRATE 15 MCG: 15 SOLUTION RESPIRATORY (INHALATION) at 11:47

## 2017-02-14 RX ADMIN — HEPARIN SODIUM 5000 UNITS: 5000 INJECTION, SOLUTION INTRAVENOUS; SUBCUTANEOUS at 18:33

## 2017-02-14 RX ADMIN — MAGNESIUM SULFATE IN WATER 2 G: 40 INJECTION, SOLUTION INTRAVENOUS at 18:00

## 2017-02-14 RX ADMIN — Medication 30 ML: at 09:46

## 2017-02-14 RX ADMIN — BUDESONIDE 500 MCG: 0.5 INHALANT RESPIRATORY (INHALATION) at 20:57

## 2017-02-14 RX ADMIN — GUAIFENESIN 600 MG: 600 TABLET, EXTENDED RELEASE ORAL at 21:25

## 2017-02-14 RX ADMIN — ATENOLOL 25 MG: 25 TABLET ORAL at 09:48

## 2017-02-14 RX ADMIN — DULOXETINE 60 MG: 60 CAPSULE, DELAYED RELEASE ORAL at 21:25

## 2017-02-14 RX ADMIN — HEPARIN SODIUM 5000 UNITS: 5000 INJECTION, SOLUTION INTRAVENOUS; SUBCUTANEOUS at 03:55

## 2017-02-14 RX ADMIN — PANTOPRAZOLE SODIUM 40 MG: 40 TABLET, DELAYED RELEASE ORAL at 09:48

## 2017-02-14 RX ADMIN — VANCOMYCIN HYDROCHLORIDE 1500 MG: 10 INJECTION, POWDER, LYOPHILIZED, FOR SOLUTION INTRAVENOUS at 23:34

## 2017-02-14 RX ADMIN — TRAZODONE HYDROCHLORIDE 25 MG: 50 TABLET ORAL at 23:34

## 2017-02-14 RX ADMIN — BUDESONIDE 500 MCG: 0.5 INHALANT RESPIRATORY (INHALATION) at 11:47

## 2017-02-14 RX ADMIN — PREGABALIN 100 MG: 100 CAPSULE ORAL at 21:25

## 2017-02-14 RX ADMIN — PREGABALIN 100 MG: 100 CAPSULE ORAL at 09:48

## 2017-02-14 RX ADMIN — PIPERACILLIN SODIUM,TAZOBACTAM SODIUM 3.38 G: 3; .375 INJECTION, POWDER, FOR SOLUTION INTRAVENOUS at 03:55

## 2017-02-14 RX ADMIN — DULOXETINE 60 MG: 60 CAPSULE, DELAYED RELEASE ORAL at 09:47

## 2017-02-14 RX ADMIN — ACETAMINOPHEN 325 MG: 325 TABLET, FILM COATED ORAL at 08:50

## 2017-02-14 RX ADMIN — Medication 10 ML: at 16:23

## 2017-02-14 RX ADMIN — PIPERACILLIN SODIUM,TAZOBACTAM SODIUM 4.5 G: 4; .5 INJECTION, POWDER, FOR SOLUTION INTRAVENOUS at 21:26

## 2017-02-14 RX ADMIN — FLUTICASONE PROPIONATE 1 SPRAY: 50 SPRAY, METERED NASAL at 09:49

## 2017-02-14 RX ADMIN — LABETALOL HYDROCHLORIDE 10 MG: 5 INJECTION, SOLUTION INTRAVENOUS at 09:53

## 2017-02-14 RX ADMIN — HEPARIN SODIUM 5000 UNITS: 5000 INJECTION, SOLUTION INTRAVENOUS; SUBCUTANEOUS at 09:48

## 2017-02-14 RX ADMIN — PIPERACILLIN SODIUM,TAZOBACTAM SODIUM 3.38 G: 3; .375 INJECTION, POWDER, FOR SOLUTION INTRAVENOUS at 16:23

## 2017-02-14 RX ADMIN — Medication 10 ML: at 21:26

## 2017-02-14 RX ADMIN — MAGNESIUM SULFATE IN WATER 2 G: 40 INJECTION, SOLUTION INTRAVENOUS at 16:00

## 2017-02-14 RX ADMIN — PREGABALIN 100 MG: 100 CAPSULE ORAL at 16:22

## 2017-02-14 NOTE — PROGRESS NOTES
[facility] pt from  [address] . Pt resting quietly , no signs of distress, call bell within reach. 0900 Pt states that she is having chest pain, lower right. Spoke with - order for stat EKG, cardiac enzymes, 324 mg chewable aspirin. 56 Spoke with  regarding pt HR and pt medication list, will start on medications. 1200 Called to room by pct and rt, pt was swishing and spit after getting Pulmicort, immediately started coughing after swallowing water, will call MD to get SLP consult. Prior to this pt has been able to eat, take medications without difficulty. 46 Spoke with , will get SLP evaluation      Shift Summary- uneventful, see above.

## 2017-02-14 NOTE — PROGRESS NOTES
Bedside and Verbal shift change report given to KELSEY Iraheta RN (oncoming nurse) by Marietta Taylor RN (offgoing nurse). Report included the following information SBAR, Kardex, ED Summary, Procedure Summary, Intake/Output, MAR, Accordion, Recent Results and Med Rec Status.

## 2017-02-14 NOTE — PROGRESS NOTES
Problem: Self Care Deficits Care Plan (Adult)  Goal: *Acute Goals and Plan of Care (Insert Text)  Outcome: Resolved/Met Date Met:  02/14/17  OCCUPATIONAL THERAPY EVALUATION/DISCHARGE     Patient: Jaky Gurrola (19 y.o. female)  Date: 2/14/2017  Primary Diagnosis: Acute encephalopathy  Acute encephalopathy        Precautions:   Fall      ASSESSMENT AND RECOMMENDATIONS:  Based on the objective data described below, the patient presents with ability to perform ADL tasks at baseline level. Pt supine in bed upon entering, agreeable to therapy. Pt completed supine to sit transfer with supervision. While seated EOB, pt donned R sock. Pt required assist to don L sock, however reports using sock aid at baseline. Pt completed sit to stand transfer with SBA. Pt completed functional/bathroom mobility, toilet transfer, all aspects of toileting, and grooming while seated on toilet with supervision using RW. Pt returned to supine in bed with supervision. Pt reports she is at her baseline with ADL tasks. Pt with no further OT/ADL concerns at this time. Skilled occupational therapy is not indicated at this time.      Education: Role of OT in acute care, plan of care     Discharge Recommendations: Skilled Nursing Facility  Further Equipment Recommendations for Discharge: N/A        SUBJECTIVE:   Patient stated I don't think I need OT.      OBJECTIVE DATA SUMMARY:       Past Medical History   Diagnosis Date    Arthritis      Asthma      CAD (coronary artery disease)         angina, last episode 06/2012    Chronic bronchitis (HCC)      Chronic kidney disease         stage 3    Chronic obstructive pulmonary disease (HCC)      Chronic pain         cervical, lumbar, knees, ankles, elbows    Fibromyalgia      GERD (gastroesophageal reflux disease)      Hypertension 1993    Psychiatric disorder         anxiety, depression    Thyroid disease       Past Surgical History   Procedure Laterality Date    Hx hysterectomy        Hx oophorectomy           left    Hx appendectomy        Hx cholecystectomy        Hx orthopaedic           cervical  x5    Hx urological   1984 and 1985       kidney stone surgery    Hx lithotripsy           x 4    Hx cervical fusion           anterior x 2    Hx cervical fusion           posterior x 3    Hx cervical fusion   1996       Removal of titanium plate and screws    Hx small bowel resection        Hx tonsillectomy        Hx adenoidectomy        Hx cataract removal           OU     Barriers to Learning/Limitations: None  Compensate with: visual, verbal, tactile, kinesthetic cues/model  GCODES(GO):Self Care  Current  CI= 1-19%   Goal  CI= 1-19%   D/C  CI= 1-19%. The severity rating is based on the Other modified barthel index  Prior Level of Function/Home Situation: Pt reports independence with ADLs  Home Situation  Home Environment: Skilled nursing facility  One/Two Story Residence: One story  Living Alone: No  Support Systems: Skilled nursing facility, Child(jennifer)  Patient Expects to be Discharged to[de-identified] Skilled nursing facility  Current DME Used/Available at Home: Wheelchair, Stephy Hoit, rollator, Walker, rolling     Cognitive/Behavioral Status:  Neurologic State: Alert  Orientation Level: Oriented X4  Cognition: Follows commands  Safety/Judgement: Fall prevention   Coordination:  Coordination: Within functional limits  Fine Motor Skills-Upper: Left Intact; Right Intact    Gross Motor Skills-Upper: Left Intact; Right Intact  Balance:  Sitting: Intact  Standing: Intact; With support  Strength:  Strength: Generally decreased, functional     Tone & Sensation:  Tone: Normal  Sensation: Intact     Range of Motion:  AROM: Within functional limits  PROM: Within functional limits     Functional Mobility and Transfers for ADLs:  Bed Mobility:  Supine to Sit: Supervision  Sit to Supine: Supervision     Transfers:  Sit to Stand: Stand-by asssistance              Toilet Transfer : Supervision Bathroom Mobility: Stand-by assistance  ADL Assessment:  Oral Facial Hygiene/Grooming: Supervision     Upper Body Dressing: Setup     Lower Body Dressing: Moderate assistance     Toileting: Supervision     ADL Intervention:  Grooming  Grooming Assistance: Supervision/set up  Washing Hands: Supervision/set-up (seated)     Upper Body Dressing Assistance  Dressing Assistance: [address] Gown: Supervision/ set-up     Lower Body Dressing Assistance  Dressing Assistance: Moderate assistance  Socks: Moderate assistance  Leg Crossed Method Used: No  Position Performed: Bending forward method;Seated edge of bed     Toileting  Toileting Assistance: Supervision/set up  Bladder Hygiene: Supervision/set-up  Bowel Hygiene: Supervision/set-up     Cognitive Retraining  Safety/Judgement: Fall prevention     Pain:  Pre-treatment: 0  Post-treatment: 0  Activity Tolerance:   good  Please refer to the flowsheet for vital signs taken during this treatment. After treatment:   [ ]  Patient left in no apparent distress sitting up in chair  [X]  Patient left in no apparent distress in bed  [X]  Call bell left within reach  [X]  Nursing notified  [ ]  Caregiver present  [ ]  Bed alarm activated      COMMUNICATION/EDUCATION:   Communication/Collaboration:  [X]      Home safety education was provided and the patient/caregiver indicated understanding. [X]      Patient/family have participated as able and agree with findings and recommendations. [ ]      Patient is unable to participate in plan of care at this time.      Osmel Shore MS OTR/L  Time Calculation: 28 mins

## 2017-02-14 NOTE — INTERDISCIPLINARY ROUNDS
IDR/SLIDR Summary          Patient: Fredi Multani MRN: [id number]    Age: 80 y.o. YOB: 1945 Room/Bed: [facility]   Admit Diagnosis: Acute encephalopathy  Acute encephalopathy  Principal Diagnosis: Toxic metabolic encephalopathy   Goals: Control chest pain, work w/skilled therapists, keep CVL until two peripheral sites can be established. Readmission: NO  Quality Measure: CHF  VTE Prophylaxis: Chemical  Influenza Vaccine screening completed? YES  Mobility needs: Yes   Nutrition plan:No  Consults: P. T and O.T. Financial concerns:No  Escalated to CM? YES  RRAT Score: 17     Testing due for pt today? NO  LOS: 4 days Expected length of stay 4.9 days  Discharge plan: Return to SNF    PCP: Nunu Valverde. Nataliia Kaye MD  Transportation needs: Yes    Days before discharge:one day until discharge   Discharge disposition: Nursing Home\  Dr. Estephanie Strange.  Hosey Meigs, RN, SN Lorenza and SN Queenie     Signed:     Raquel Sheets RN  2/14/2017  2:54 AM

## 2017-02-14 NOTE — PROGRESS NOTES
Patient was visited by IRIS. Volunteer followed up with patient and/or family and reported no needs to this . Chaplains will continue to follow and will provide pastoral care as needed or requested. [address], M.Div.   Board Certified   [phone] - Office

## 2017-02-14 NOTE — ROUTINE PROCESS
Bedside and Verbal shift change report given to Bienvenido Huynh  (oncoming nurse) by Farooq Arenas RN  (offgoing nurse). Report given with SBAR, Kardex, Intake/Output and Recent Results.

## 2017-02-14 NOTE — PROGRESS NOTES
Problem: Dysphagia (Adult)  Goal: *Acute Goals and Plan of Care (Insert Text)  Dysphagia Present: mild     Recommendations:  Diet: regular diet  Meds: 1 @ a time  Aspiration Precautions  Other: Small sips/bites, slow rate, alternate solids/liquids    Goals: Patient will:  1. Tolerate PO trials with 0 s/s overt distress in 4/5 trials  2. Utilize compensatory swallow strategies/maneuvers (decrease bite/sip, size/rate, alt. liq/sol) with min cues in 4/5 trials  3. Perform oral-motor/laryngeal exercises to increase oropharyngeal swallow function with min cues  4. Complete an objective swallow study (i.e., MBSS) to assess swallow integrity, r/o aspiration, and determine of safest LRD, min A  Outcome: Progressing Towards Goal  SPEECH LANGUAGE PATHOLOGY BEDSIDE SWALLOW EVALUATION     Patient: Alessio Tolbert (78 y.o. female)  Date: 2/14/2017  Primary Diagnosis: Acute encephalopathy  Acute encephalopathy        Precautions: aspiration  Fall      ASSESSMENT :  Based on the objective data described below, the patient presents with mild oropharyngeal dysphagia. Clinical evaluation of swallow completed with pt A&Ox4. Some missing dentition, otherwise oral motor exam intact. Note concern re: med pass this date resulting in \"choking\" event; RN and pt report no other dysphagia since admission. PO assessment of thin liquids +straw single and successive sips, puree and regular solids self-fed without overt s/s penetration/aspiration present. Mildly labored mastication though pt demo use of small bites at slow rate and alternating solids/liquids as needed. Encouraged pt to also take small sips and maintain upright positioning for PO intake. Recommended meds in puree however pt refused strategy. Educated re: aspiration risk, diet consistencies and safe swallow strategies. Recommend regular diet with ST to f/u for diet tolerance, education and swallow strategy training.       Patient will benefit from skilled intervention to address the above impairments. Patients rehabilitation potential is considered to be Good  Factors which may influence rehabilitation potential include:   [ ]            None noted  [X]            Mental ability/status  [X]            Medical condition  [ ]            Home/family situation and support systems  [ ]            Safety awareness  [ ]            Pain tolerance/management  [ ]            Other:        PLAN : regular diet, swallow safety precautions  Recommendations and Planned Interventions:  ST to f/u for diet tolerance, education, swallow strategy training  Frequency/Duration: Patient will be followed by speech-language pathology 1-2 times per day/4-7 days per week to address goals. Discharge Recommendations: [facility] as outpatient should further concern for aspiration arise       SUBJECTIVE:   Patient stated I'm not going to take my pills in applesauce like an old person.       OBJECTIVE:       Past Medical History   Diagnosis Date    Arthritis      Asthma      CAD (coronary artery disease)         angina, last episode 06/2012    Chronic bronchitis (HCC)      Chronic kidney disease         stage 3    Chronic obstructive pulmonary disease (HCC)      Chronic pain         cervical, lumbar, knees, ankles, elbows    Fibromyalgia      GERD (gastroesophageal reflux disease)      Hypertension 1993    Psychiatric disorder         anxiety, depression    Thyroid disease       Past Surgical History   Procedure Laterality Date    Hx hysterectomy        Hx oophorectomy           left    Hx appendectomy        Hx cholecystectomy        Hx orthopaedic           cervical  x5    Hx urological   1984 and 1985       kidney stone surgery    Hx lithotripsy           x 4    Hx cervical fusion           anterior x 2    Hx cervical fusion           posterior x 3    Hx cervical fusion   1996       Removal of titanium plate and screws    Hx small bowel resection        Hx tonsillectomy        Hx adenoidectomy        Hx cataract removal           OU     Prior Level of Function/Home Situation: per chart/pt  Home Situation  Home Environment: Skilled nursing facility  One/Two Story Residence: One story  Living Alone: No  Support Systems: Skilled nursing facility, Child(jennifer)  Patient Expects to be Discharged to[de-identified] Skilled nursing facility  Current DME Used/Available at Home: Wheelchair, Deion Mast, rollator, Walker, rolling  Diet prior to admission: regular/thin  Current Diet:  Soft/thin   Cognitive and Communication Status:  Neurologic State: Alert  Orientation Level: Oriented X4  Cognition: Follows commands, Appropriate for age attention/concentration  Perception: Appears intact  Perseveration: No perseveration noted  Safety/Judgement: Decreased awareness of environment, Decreased insight into deficits  Oral Assessment:  Oral Assessment  Labial: No impairment  Dentition: Limited;Natural  Oral Hygiene: fair  Lingual: No impairment  Velum: No impairment  Mandible: No impairment  P.O. Trials:  Patient Position: 90 EOB  Vocal quality prior to P.O.: No impairment  Consistency Presented: Thin liquid;Puree; Solid  How Presented: Self-fed/presented;Spoon;Straw;Successive swallows  How Much:  (x4oz thin, x4oz puree, x2 solid crackers)  Bolus Acceptance: No impairment  Bolus Formation/Control: Impaired  Type of Impairment: Mastication  Propulsion: No impairment  Oral Residue: None  Initiation of Swallow: No impairment  Laryngeal Elevation: Functional  Aspiration Signs/Symptoms: Infiltrate on chest xray  Pharyngeal Phase Characteristics: No impairment, issues, or problems   Effective Modifications:  Alternate liquids/solids;Small sips and bites  Cues for Modifications: Minimal        Oral Phase Severity: Mild  Pharyngeal Phase Severity : Mild     GCODESwallowing:   Swallow Current Status CI= 1-19%   Swallow Goal Status CI= 1-19%     The severity rating is based on the following outcomes:  OFELIA Noms Swallow Level 6 Clinical Judgement     PAIN:  Start of Eval: 6  End of Eval: 6      After treatment:   [ ]            Patient left in no apparent distress sitting up in chair  [X]            Patient left in no apparent distress in bed  [X]            Call bell left within reach  [X]            Nursing notified  [ ]            Family present  [ ]            Caregiver present  [ ]            Bed alarm activated      COMMUNICATION/EDUCATION:   [X]            Aspiration precautions; swallow safety; compensatory techniques. [X]            Patient/family have participated as able in goal setting and plan of care. [X]            Patient/family agree to work toward stated goals and plan of care. [ ]            Patient understands intent and goals of therapy; neutral about participation. [ ]            Patient unable to participate in goal setting/plan of care; educ ongoing with interdisciplinary staff  [ ]             Posted safety precautions in patient's room.      Thank you for this referral.  Yoana Gramajo, MARIBELL  Time Calculation: 15 mins

## 2017-02-14 NOTE — PROGRESS NOTES
[address] and reported to Dr Tiffanie Brooks that the PTA medication list has been updated. Notified him that the patient is c/o anxiety from not having her Duragesic patch. Received an order for 25 mcg duragesic patch q3 days and 25mg PO trazodone once. [id number] Shift Summary: Pt rested well overnight with no complaints except as noted above. No new clinical concerns noted.

## 2017-02-14 NOTE — PROGRESS NOTES
PRN NEB TX GIVEN @ 0902 FOR CHEST TIGHTNESS. SPO2 99% ON 3L NC.  HR . BS COARSE WITH WHEEZING BILATERALLY PRE AND POST NEB.   NURSING NOTIFIEDL

## 2017-02-14 NOTE — PROGRESS NOTES
Hospitalist Progress Note-critical care note     Patient: Shira Parmar MRN: [id number]  [facility]: [id number]    YOB: 1945  Age: 80 y.o. Sex: female    DOA: 2/10/2017 LOS:  LOS: 4 days            Chief complaint: chest pain, pna anxiety, pulmonary edema , hypomagnesemia, Sinus tachy cardia     Assessment/Plan         Patient Active Problem List   Diagnosis Code    Cataract H26.9    DDD (degenerative disc disease), cervical M50.30    H/O cervical spine surgery Z98.890    COPD (chronic obstructive pulmonary disease) ([facility] J44.9    Acidosis E87.2    COPD exacerbation ([facility] J44.1    Acute on chronic respiratory failure ([facility]) J96.20    Obesity E66.9    Benign hypertension I10    GERD (gastroesophageal reflux disease) K21.9    Acute diastolic CHF (congestive heart failure) ([facility]) I50.31    Acute encephalopathy G93.40    Toxic metabolic encephalopathy L41    Acute renal failure (ARF) ([facility]) N17.9    Hyperkalemia E87.5    PNA (pneumonia) J18.9    Chest pain R07.9     1. Hypoxia s/p pulmonary edema   Improving, will contue wean off nc O2  2. Acute diastolic chf  Echo 60 %. Will continue bumex, continue f/u with renal function. cxr on admission; interstitial edema with right perihilar and  retrocardiac left basilar atelectasis/infiltrate. 3. Kim  Resolved, will d/c fluid. will continue avoid iv contrast   4 PNA   Continue abx and breathing tx, add pulmcort and brovana   5.ams   Resolved   6. Hyperkalemia   Resolved  7 chronic pain  Continue current meds   8 acute encephalopathy due to pain meds   Back to baseline   9 chest pain  Not cardiogenic, likely due to reflux and chronic pain. ekg Sinus tachycardia. ce wnl, pain resolved per gi cocktail, control anxiety    10 anxiety   Continue low dose atarax   11. Sinus tachycardia   Mg replacement.  Restart atenolol  12 hypomagnesemia   Mg replacement     Subjective ; my chest hurt and having pain all over   Nurse; chest pain    All questions have been answered. 55 total min's spent on patient care including >50% on counseling/coordinating care. Discussed the above assessments. also discussed labs, medications and hospital course    Review of systems:    General: No fevers or chills. Cardiovascular:  chest pain, no  pressure. No palpitations. Pulmonary: No shortness of breath. Gastrointestinal: No nausea, vomiting. Vital signs/Intake and Output:  Visit Vitals    /67 (BP 1 Location: Left arm, BP Patient Position: At rest)    Pulse 89    Temp 98.1 °F (36.7 °C)    Resp 17    Ht 4' 11.84\" (1.52 m)    Wt 97.3 kg (214 lb 8 oz)    SpO2 100%    Breastfeeding No    BMI 42.12 kg/m2     Current Shift:     Last three shifts:  02/12 1901 - 02/14 0700  In: 1539.2 [P.O.:500; I.V.:1039.2]  Out: 1200 [Urine:1200]    Physical Exam:  General: WD, WN. Alert, cooperative, no acute distress    HEENT: NC, Atraumatic. PERRLA, anicteric sclerae. Lungs: Mild  Wheezing. No Rhonchi/Rales. Tenderness  On epigastric area   Heart:  Regular  rhythm,  +murmur, No Rubs, No Gallops  Abdomen: Soft, Non distended, Non tender.  +Bowel sounds,   Extremities: No c/c/e   Psych:    anxious, no agitated. Neurologic:  No acute neurological deficit. Labs: Results:       Chemistry Recent Labs      02/14/17   0400  02/13/17   0500   GLU  90  91   NA  146*  147*   K  3.4*  4.0   CL  110*  112*   CO2  28  26   BUN  12  24*   CREA  0.90  1.08   CA  8.2*  8.5   AGAP  8  9   BUCR  13  22*      CBC w/Diff Recent Labs      02/14/17   0400  02/13/17   0500  02/12/17   0840   WBC  9.3  9.3  10.0   RBC  2.68*  2.55*  2.34*   HGB  8.5*  8.2*  7.6*   HCT  26.1*  25.3*  23.4*   PLT  259  267  263      Cardiac Enzymes Recent Labs      02/14/17   0915   CPK  42   CKND1  1.9      Coagulation No results for input(s): PTP, INR, APTT in the last 72 hours.     No lab exists for component: INREXT, INREXT    Lipid Panel No results found for: CHOL, [address], [address] [facility], [address] [place] [address], [address] [place] [address] Rd, [id number], HDL, LDL, NLDLCT, DLDL, LDLC, DLDLP, [id number], VLDLC, VLDL, TGL, TGLX, TRIGL, [id number], TRIGP, TGLPOCT, [id number], CHHD, CHHDX   BNP No results for input(s): BNPP in the last 72 hours. Liver Enzymes No results for input(s): TP, ALB, TBIL, AP, SGOT, GPT in the last 72 hours.     No lab exists for component: DBIL   Thyroid Studies Lab Results   Component Value Date/Time    TSH 0.04 11/26/2016 05:39 AM        Procedures/imaging: see electronic medical records for all procedures/Xrays and details which were not copied into this note but were reviewed prior to creation of Obed Cunningham MD

## 2017-02-14 NOTE — PROGRESS NOTES
Problem: Mobility Impaired (Adult and Pediatric)  Goal: *Acute Goals and Plan of Care (Insert Text)  Physical Therapy Goals  Initiated 2/14/2017 and to be accomplished within 2-8 day(s)  1. Patient will move from supine to sit and sit to supine in bed with supervision/set-up. 2. Patient will transfer from bed to chair and chair to bed with supervision/set-up using the least restrictive device. 3. Patient will perform sit to stand with supervision/set-up. 4. Patient will ambulate with supervision/set-up for 150 feet with the least restrictive device. Outcome: Progressing Towards Goal  PHYSICAL THERAPY EVALUATION     Patient: Faheem Bartlett (43 y.o. female)  Date: 2/14/2017  Primary Diagnosis: Acute encephalopathy  Acute encephalopathy        Precautions:   Fall      ASSESSMENT :  Based on the objective data described below, the patient presents with decreased functional mobility with regards to bed mobility, transfers, gait, balance, and tolerance to activity. Patient was seen with OT this session. Patient sat up to edge of bed with supervision. Patient then stood up to a rolling walker with CGA, requiring additional time to gain her balance. Patient then ambulated to the restroom to void. Patient then ambulated back to her bed. Gait is slow with widened base of support and additional time to take steps. However the patient's gait is fairly steady. Patient was returned back to supine in bed. Will continue PT to further improve activity tolerance and safety during gait. Recommend return to long term care at discharge. Patient will benefit from skilled intervention to address the above impairments.   Patients rehabilitation potential is considered to be Good  Factors which may influence rehabilitation potential include:   [X]         None noted  [ ]         Mental ability/status  [ ]         Medical condition  [ ]         Home/family situation and support systems  [ ]         Safety awareness  [ ] Pain tolerance/management  [ ]         Other:        PLAN :  Recommendations and Planned Interventions:  [X]           Bed Mobility Training             [ ]    Neuromuscular Re-Education  [X]           Transfer Training                   [ ]    Orthotic/Prosthetic Training  [X]           Gait Training                          [ ]    Modalities  [X]           Therapeutic Exercises          [ ]    Edema Management/Control  [X]           Therapeutic Activities            [X]    Patient and Family Training/Education  [ ]           Other (comment):     Frequency/Duration: Patient will be followed by physical therapy daily to address goals. Discharge Recommendations: Skilled Nursing Facility/Long Term Care  Further Equipment Recommendations for Discharge: N/A       SUBJECTIVE:   Patient stated I like hanging out with the old people. 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       OBJECTIVE DATA SUMMARY:       Past Medical History   Diagnosis Date    Arthritis      Asthma      CAD (coronary artery disease)         angina, last episode 06/2012    Chronic bronchitis (HCC)      Chronic kidney disease         stage 3    Chronic obstructive pulmonary disease (HCC)      Chronic pain         cervical, lumbar, knees, ankles, elbows    Fibromyalgia      GERD (gastroesophageal reflux disease)      Hypertension 1993    Psychiatric disorder         anxiety, depression    Thyroid disease       Past Surgical History   Procedure Laterality Date    Hx hysterectomy        Hx oophorectomy           left    Hx appendectomy        Hx cholecystectomy        Hx orthopaedic           cervical  x5    Hx urological   1984 and 1985       kidney stone surgery    Hx lithotripsy           x 4    Hx cervical fusion           anterior x 2    Hx cervical fusion           posterior x 3    Hx cervical fusion   1996       Removal of titanium plate and screws    Hx small bowel resection        Hx tonsillectomy        Hx adenoidectomy        Hx cataract removal           OU     Barriers to Learning/Limitations: None  Compensate with: N/A  Prior Level of Function/Home Situation: Patient reports living in long term care. Uses a walker to ambulate. Home Situation  Home Environment: Skilled nursing facility  One/Two Story Residence: One story  Living Alone: No  Support Systems: Skilled nursing facility, Child(jennifer)  Patient Expects to be Discharged to[de-identified] Skilled nursing facility  Current DME Used/Available at Home: Wheelchair, Walker, rollator, Walker, rolling  Critical Behavior:  Neurologic State: Alert  Orientation Level: Oriented X4  Cognition: Follows commands; Appropriate for age attention/concentration  Safety/Judgement: Decreased awareness of environment;Decreased insight into deficits  Psychosocial  Patient Behaviors: Calm; Cooperative  Purposeful Interaction: Yes  Pt Identified Daily Priority: Clinical issues (comment); Communication issues (comment)  Caritas Process: Nurture loving kindness;Establish trust;Teaching/learning; Attend basic human needs  Caring Interventions: Reassure; Therapeutic modalities  Reassure: Therapeutic listening;Support family; Sit with patient;Caring rounds  Therapeutic Modalities: Humor; Intentional therapeutic touch  Skin Condition/Temp: Dry;Warm  Skin Integrity: Intact  Skin Integumentary  Skin Color: Appropriate for ethnicity; Ecchymosis (comment) (bue)  Skin Condition/Temp: Dry;Warm  Skin Integrity: Intact  Turgor: Non-tenting  Hair Growth: Present  Varicosities: Absent  Strength:    Strength: Generally decreased, functional  Tone & Sensation:   Tone: Normal  Sensation: Intact     Range Of Motion:  AROM: Within functional limits  PROM: Within functional limits  Functional Mobility:  Bed Mobility:  Supine to Sit: Supervision  Sit to Supine: Supervision  Transfers:  Sit to Stand: Stand-by asssistance  Stand to Sit: Stand-by asssistance  Balance:   Sitting: Intact  Standing: Intact; With support  Ambulation/Gait Training:  Distance (ft): 35 Feet (ft)  Assistive Device: Gait belt;Walker, rolling  Ambulation - Level of Assistance: Contact guard assistance  Gait Description (WDL): Exceptions to WDL  Gait Abnormalities: Decreased step clearance  Base of Support: Widened  Stance: Time  Speed/Rozina: Slow  Step Length: Right shortened;Left shortened  Swing Pattern: Right asymmetrical;Left asymmetrical     Pain:  Pain Scale 1: Numeric (0 - 10)  Pain Intensity 1: 6  Pain Location 1: Back  Pain Orientation 1: Lower  Pain Description 1: Aching  Pain Intervention(s) 1: Medication (see MAR)  Activity Tolerance:   Fair  Please refer to the flowsheet for vital signs taken during this treatment. After treatment:   [ ]         Patient left in no apparent distress sitting up in chair  [X]         Patient left in no apparent distress in bed  [X]         Call bell left within reach  [X]         Nursing notified  [ ]         Caregiver present  [ ]         Bed alarm activated      COMMUNICATION/EDUCATION:   [X]         Fall prevention education was provided and the patient/caregiver indicated understanding. [X]         Patient/family have participated as able in goal setting and plan of care. [X]         Patient/family agree to work toward stated goals and plan of care. [ ]         Patient understands intent and goals of therapy, but is neutral about his/her participation. [ ]         Patient is unable to participate in goal setting and plan of care.      Thank you for this referral.  Loye Court   Time Calculation: 29 mins      Eval Complexity: History: MEDIUM  Complexity : 1-2 comorbidities / personal factors will impact the outcome/ POC Exam:MEDIUM Complexity : 3 Standardized tests and measures addressing body structure, function, activity limitation and / or participation in recreation  Presentation: LOW Complexity : Stable, uncomplicated  Clinical Decision Making:Low Complexity   Overall Complexity:LOW

## 2017-02-15 LAB
ANION GAP BLD CALC-SCNC: 7 MMOL/L (ref 3–18)
ATRIAL RATE: 78 BPM
BUN SERPL-MCNC: 7 MG/DL (ref 7–18)
BUN/CREAT SERPL: 8 (ref 12–20)
CALCIUM SERPL-MCNC: 8.2 MG/DL (ref 8.5–10.1)
CALCULATED P AXIS, ECG09: 55 DEGREES
CALCULATED R AXIS, ECG10: 61 DEGREES
CALCULATED T AXIS, ECG11: 65 DEGREES
CHLORIDE SERPL-SCNC: 110 MMOL/L (ref 100–108)
CO2 SERPL-SCNC: 29 MMOL/L (ref 21–32)
CREAT SERPL-MCNC: 0.83 MG/DL (ref 0.6–1.3)
DATE LAST DOSE: NORMAL
DIAGNOSIS, 93000: NORMAL
GLUCOSE SERPL-MCNC: 95 MG/DL (ref 74–99)
P-R INTERVAL, ECG05: 160 MS
POTASSIUM SERPL-SCNC: 3.4 MMOL/L (ref 3.5–5.5)
Q-T INTERVAL, ECG07: 394 MS
QRS DURATION, ECG06: 82 MS
QTC CALCULATION (BEZET), ECG08: 449 MS
REPORTED DOSE,DOSE: NORMAL UNITS
REPORTED DOSE/TIME,TMG: 2300
SODIUM SERPL-SCNC: 146 MMOL/L (ref 136–145)
VANCOMYCIN TROUGH SERPL-MCNC: 16 UG/ML (ref 10–20)
VENTRICULAR RATE, ECG03: 78 BPM

## 2017-02-15 PROCEDURE — 74011250637 HC RX REV CODE- 250/637: Performed by: INTERNAL MEDICINE

## 2017-02-15 PROCEDURE — 36415 COLL VENOUS BLD VENIPUNCTURE: CPT | Performed by: FAMILY MEDICINE

## 2017-02-15 PROCEDURE — 94760 N-INVAS EAR/PLS OXIMETRY 1: CPT

## 2017-02-15 PROCEDURE — 74011250636 HC RX REV CODE- 250/636: Performed by: INTERNAL MEDICINE

## 2017-02-15 PROCEDURE — 97116 GAIT TRAINING THERAPY: CPT

## 2017-02-15 PROCEDURE — 77010033678 HC OXYGEN DAILY

## 2017-02-15 PROCEDURE — 74011000250 HC RX REV CODE- 250: Performed by: HOSPITALIST

## 2017-02-15 PROCEDURE — 65660000000 HC RM CCU STEPDOWN

## 2017-02-15 PROCEDURE — 94640 AIRWAY INHALATION TREATMENT: CPT

## 2017-02-15 PROCEDURE — 74011000258 HC RX REV CODE- 258: Performed by: FAMILY MEDICINE

## 2017-02-15 PROCEDURE — 74011250636 HC RX REV CODE- 250/636: Performed by: FAMILY MEDICINE

## 2017-02-15 PROCEDURE — 80048 BASIC METABOLIC PNL TOTAL CA: CPT | Performed by: FAMILY MEDICINE

## 2017-02-15 PROCEDURE — 74011250637 HC RX REV CODE- 250/637: Performed by: HOSPITALIST

## 2017-02-15 PROCEDURE — 80202 ASSAY OF VANCOMYCIN: CPT | Performed by: FAMILY MEDICINE

## 2017-02-15 RX ORDER — POTASSIUM CHLORIDE 20MEQ/15ML
20 LIQUID (ML) ORAL
Status: COMPLETED | OUTPATIENT
Start: 2017-02-15 | End: 2017-02-15

## 2017-02-15 RX ADMIN — BUDESONIDE 500 MCG: 0.5 INHALANT RESPIRATORY (INHALATION) at 08:16

## 2017-02-15 RX ADMIN — BUDESONIDE 500 MCG: 0.5 INHALANT RESPIRATORY (INHALATION) at 20:37

## 2017-02-15 RX ADMIN — GUAIFENESIN 600 MG: 600 TABLET, EXTENDED RELEASE ORAL at 23:46

## 2017-02-15 RX ADMIN — Medication 500 MG: at 10:00

## 2017-02-15 RX ADMIN — PIPERACILLIN SODIUM,TAZOBACTAM SODIUM 4.5 G: 4; .5 INJECTION, POWDER, FOR SOLUTION INTRAVENOUS at 17:04

## 2017-02-15 RX ADMIN — HEPARIN SODIUM 5000 UNITS: 5000 INJECTION, SOLUTION INTRAVENOUS; SUBCUTANEOUS at 05:41

## 2017-02-15 RX ADMIN — PIPERACILLIN SODIUM,TAZOBACTAM SODIUM 4.5 G: 4; .5 INJECTION, POWDER, FOR SOLUTION INTRAVENOUS at 10:09

## 2017-02-15 RX ADMIN — ATENOLOL 25 MG: 25 TABLET ORAL at 10:00

## 2017-02-15 RX ADMIN — HEPARIN SODIUM 5000 UNITS: 5000 INJECTION, SOLUTION INTRAVENOUS; SUBCUTANEOUS at 17:05

## 2017-02-15 RX ADMIN — DULOXETINE 60 MG: 60 CAPSULE, DELAYED RELEASE ORAL at 23:46

## 2017-02-15 RX ADMIN — DULOXETINE 60 MG: 60 CAPSULE, DELAYED RELEASE ORAL at 10:00

## 2017-02-15 RX ADMIN — ARFORMOTEROL TARTRATE 15 MCG: 15 SOLUTION RESPIRATORY (INHALATION) at 20:37

## 2017-02-15 RX ADMIN — VANCOMYCIN HYDROCHLORIDE 1500 MG: 10 INJECTION, POWDER, LYOPHILIZED, FOR SOLUTION INTRAVENOUS at 23:53

## 2017-02-15 RX ADMIN — LISINOPRIL 20 MG: 20 TABLET ORAL at 10:00

## 2017-02-15 RX ADMIN — PIPERACILLIN SODIUM,TAZOBACTAM SODIUM 4.5 G: 4; .5 INJECTION, POWDER, FOR SOLUTION INTRAVENOUS at 05:41

## 2017-02-15 RX ADMIN — BUMETANIDE 1 MG: 1 TABLET ORAL at 10:00

## 2017-02-15 RX ADMIN — GUAIFENESIN 600 MG: 600 TABLET, EXTENDED RELEASE ORAL at 10:00

## 2017-02-15 RX ADMIN — PIPERACILLIN SODIUM,TAZOBACTAM SODIUM 4.5 G: 4; .5 INJECTION, POWDER, FOR SOLUTION INTRAVENOUS at 23:47

## 2017-02-15 RX ADMIN — Medication 10 ML: at 23:47

## 2017-02-15 RX ADMIN — ACETAMINOPHEN 650 MG: 325 TABLET, FILM COATED ORAL at 23:53

## 2017-02-15 RX ADMIN — LEVOTHYROXINE SODIUM 225 MCG: 200 TABLET ORAL at 10:00

## 2017-02-15 RX ADMIN — Medication 500 MG: at 23:47

## 2017-02-15 RX ADMIN — PANTOPRAZOLE SODIUM 40 MG: 40 TABLET, DELAYED RELEASE ORAL at 10:00

## 2017-02-15 RX ADMIN — Medication 10 ML: at 05:41

## 2017-02-15 RX ADMIN — POTASSIUM CHLORIDE 20 MEQ: 20 SOLUTION ORAL at 10:08

## 2017-02-15 RX ADMIN — HEPARIN SODIUM 5000 UNITS: 5000 INJECTION, SOLUTION INTRAVENOUS; SUBCUTANEOUS at 10:10

## 2017-02-15 RX ADMIN — PREGABALIN 100 MG: 100 CAPSULE ORAL at 17:04

## 2017-02-15 RX ADMIN — FLUTICASONE PROPIONATE 1 SPRAY: 50 SPRAY, METERED NASAL at 10:00

## 2017-02-15 RX ADMIN — PREGABALIN 100 MG: 100 CAPSULE ORAL at 23:46

## 2017-02-15 RX ADMIN — Medication 10 ML: at 17:09

## 2017-02-15 RX ADMIN — ASPIRIN 81 MG 324 MG: 81 TABLET ORAL at 09:59

## 2017-02-15 RX ADMIN — ASPIRIN 81 MG: 81 TABLET, COATED ORAL at 10:00

## 2017-02-15 RX ADMIN — PREGABALIN 100 MG: 100 CAPSULE ORAL at 10:00

## 2017-02-15 RX ADMIN — ARFORMOTEROL TARTRATE 15 MCG: 15 SOLUTION RESPIRATORY (INHALATION) at 08:16

## 2017-02-15 RX ADMIN — HYDROXYZINE HYDROCHLORIDE 10 MG: 10 TABLET, FILM COATED ORAL at 23:53

## 2017-02-15 NOTE — PROGRESS NOTES
Pharmacist Renal Dosing Progress Note for Zosyn    The following medication: Zosyn was automatically dose-adjusted per THE [facility] P&T Committee Protocol, with respect to renal function. Consult provided for this   80 y.o. , female , for the indication of HCAP  Improvement in renal function. Scr = 0.9    CrCl ~ 60 ml/min     Dose adjusted to:  Zosyn 4.5 grams IV every 6 hours. Pt Weight:   Wt Readings from Last 1 Encounters:   02/14/17 97.3 kg (214 lb 8 oz)     Previous Regimen   Zosyn 3.375 grams IV every 6 hours   Serum Creatinine Lab Results   Component Value Date/Time    Creatinine 0.90 02/14/2017 04:00 AM       Creatinine Clearance Estimated Creatinine Clearance: 89.3 mL/min (based on Cr of 0.9). BUN Lab Results   Component Value Date/Time    BUN 12 02/14/2017 04:00 AM         Pharmacy to continue to monitor patient daily. Will make dosage adjustments based upon changing renal function.   Signed BRENDA MoraD. Contact information:

## 2017-02-15 NOTE — PROGRESS NOTES
Cm attempted to contact patients son listed on information contact, voicemail full,will attempt again tomorrow to discuss possible discharge tomorrow.

## 2017-02-15 NOTE — ROUTINE PROCESS
Bedside and Verbal shift change report given to [address] [place] [address], [phone] (oncoming nurse) by Padmini Hairston (offgoing nurse). Report given with SBAR, Kardex, Intake/Output, MAR and Recent Results.

## 2017-02-15 NOTE — ROUTINE PROCESS
Bedside and Verbal shift change report given to A Myrtle RN  (oncoming nurse) by Anna Colunga RN (offgoing nurse). Report included the following information SBAR, Kardex, ED Summary, Procedure Summary, Intake/Output, MAR, Accordion, Recent Results and Med Rec Status.

## 2017-02-15 NOTE — PROGRESS NOTES
Hospitalist Progress Note-critical care note     Patient: Gayathri Bernardo MRN: [id number]  CSN: [id number]    YOB: 1945  Age: 80 y.o. Sex: female    DOA: 2/10/2017 LOS:  LOS: 5 days            Chief complaint: chest pain, pna anxiety, pulmonary edema , hypomagnesemia, Sinus tachy cardia     Assessment/Plan         Patient Active Problem List   Diagnosis Code    Cataract H26.9    DDD (degenerative disc disease), cervical M50.30    H/O cervical spine surgery Z98.890    COPD (chronic obstructive pulmonary disease) ([facility] J44.9    Acidosis E87.2    COPD exacerbation ([facility] J44.1    Acute on chronic respiratory failure ([facility]) J96.20    Obesity E66.9    Benign hypertension I10    GERD (gastroesophageal reflux disease) K21.9    Acute diastolic CHF (congestive heart failure) ([facility]) I50.31    Acute encephalopathy G93.40    Toxic metabolic encephalopathy E36    Acute renal failure (ARF) ([facility]) N17.9    Hyperkalemia E87.5    PNA (pneumonia) J18.9    Chest pain R07.9    Anxiety F41.9    Hypomagnesemia E83.42    Sinus tachycardia R00.0     1. Hypoxia s/p pulmonary edema   Improving, will contue wean off nc O2  2. Acute diastolic chf  Echo 60 %. Will continue buxmax   continue f/u with renal function. cxr on admission; interstitial edema with right perihilar and  retrocardiac left basilar atelectasis/infiltrate. 3. Kim  Resolved, will d/c fluid. will continue avoid iv contrast   4 PNA   Continue abx and breathing tx day 6 abx   add pulmcort and brovana   5.ams   Resolved   6. Hypokalemia    Replaced   7 chronic pain  Continue current meds   8 acute encephalopathy due to pain meds   Back to baseline   9 chest pain  Not cardiogenic, likely due to reflux and chronic pain. ekg Sinus tachycardia. ce wnl, pain resolved per gi cocktail, control anxiety    10 anxiety   Continue low dose atarax   11. Sinus tachycardia   Resolved.  atenolol  12 hypomagnesemia   Mg replacement     Subjective ; feel fine Nurse; no acute issue   Will d/c back after insurance authorization. Review of systems:    General: No fevers or chills. Cardiovascular:  chest pain, no  pressure. No palpitations. Pulmonary: No shortness of breath. Gastrointestinal: No nausea, vomiting. Vital signs/Intake and Output:  Visit Vitals    /64 (BP 1 Location: Left arm, BP Patient Position: At rest)    Pulse 81    Temp 98 °F (36.7 °C)    Resp 16    Ht 4' 11.84\" (1.52 m)    Wt 97.3 kg (214 lb 8 oz)    SpO2 98%    Breastfeeding No    BMI 42.12 kg/m2     Current Shift:     Last three shifts:  02/13 1901 - 02/15 0700  In: 0   Out: 1175 [Urine:1175]    Physical Exam:  General: WD, WN. Alert, cooperative, no acute distress    HEENT: NC, Atraumatic. PERRLA, anicteric sclerae. Lungs: No Wheezing. No Rhonchi/Rales. Heart:  Regular  rhythm,  +murmur, No Rubs, No Gallops  Abdomen: Soft, Non distended, Non tender.  +Bowel sounds,   Extremities: No c/c/e   Psych:    anxious, no agitated. Neurologic:  No acute neurological deficit. Labs: Results:       Chemistry Recent Labs      02/15/17   0605  02/14/17   0400  02/13/17   0500   GLU  95  90  91   NA  146*  146*  147*   K  3.4*  3.4*  4.0   CL  110*  110*  112*   CO2  29  28  26   BUN  7  12  24*   CREA  0.83  0.90  1.08   CA  8.2*  8.2*  8.5   AGAP  7  8  9   BUCR  8*  13  22*      CBC w/Diff Recent Labs      02/14/17   0400  02/13/17   0500   WBC  9.3  9.3   RBC  2.68*  2.55*   HGB  8.5*  8.2*   HCT  26.1*  25.3*   PLT  259  267      Cardiac Enzymes Recent Labs      02/14/17   0915   CPK  42   CKND1  1.9      Coagulation No results for input(s): PTP, INR, APTT in the last 72 hours.     No lab exists for component: INREXT, INREXT    Lipid Panel No results found for: CHOL, CHOLPOCT, CHOLX, CHLST, CHOLV, [id number], HDL, LDL, NLDLCT, DLDL, LDLC, DLDLP, 514049, VLDLC, VLDL, TGL, TGLX, TRIGL, [id number], TRIGP, TGLPOCT, [id number], CHHD, CHHDX   BNP No results for input(s): BNPP in the last 72 hours. Liver Enzymes No results for input(s): TP, ALB, TBIL, AP, SGOT, GPT in the last 72 hours.     No lab exists for component: DBIL   Thyroid Studies Lab Results   Component Value Date/Time    TSH 0.04 11/26/2016 05:39 AM        Procedures/imaging: see electronic medical records for all procedures/Xrays and details which were not copied into this note but were reviewed prior to creation of Criss Thomson MD

## 2017-02-15 NOTE — PROGRESS NOTES
Patient was visited by IRIS. Volunteer followed up with patient and/or family and reported no needs to this . Chaplains will continue to follow and will provide pastoral care as needed or requested. Rev.  [facility]  [phone]

## 2017-02-15 NOTE — INTERDISCIPLINARY ROUNDS
IDR/SLIDR Summary          Patient: Gloria Portillo MRN: [id number]    Age: 80 y.o. YOB: 1945 Room/Bed: [facility]   Admit Diagnosis: Acute encephalopathy  Acute encephalopathy  Principal Diagnosis: Toxic metabolic encephalopathy   Goals: [employer] authorization to be started, remove central line prior to d/c  Readmission: NO  Quality Measure: CHF, PNA and COPD  VTE Prophylaxis: Chemical  Influenza Vaccine screening completed? YES  Mobility needs: Yes   Nutrition plan:Yes  Consults: P. T and O.T. Financial concerns:No  Escalated to CM? YES  RRAT Score: 19   Testing due for pt today? NO  LOS: 5 days Expected length of stay 4.9 days  Discharge plan: SNF    PCP: Brody Horn. Rockie Peabody, MD  Transportation needs: Yes    Days before discharge:one day until discharge   Discharge disposition: SNF  Present for rounds: Dr. Asher Correia. Amy Boone RN, Sandro Staples CM, Boni Baeza, PharmD.      Signed:     Marci Vargas RN  2/15/2017  11:32 AM

## 2017-02-15 NOTE — PROGRESS NOTES
Patient was visited by IRIS. Volunteer followed up with patient and/or family and reported no needs to this . Chaplains will continue to follow and will provide pastoral care as needed or requested. [address], M.Div.   Board Certified   [phone] - Office

## 2017-02-16 VITALS
OXYGEN SATURATION: 96 % | BODY MASS INDEX: 42.11 KG/M2 | HEART RATE: 81 BPM | RESPIRATION RATE: 17 BRPM | SYSTOLIC BLOOD PRESSURE: 147 MMHG | TEMPERATURE: 98.3 F | WEIGHT: 214.5 LBS | HEIGHT: 60 IN | DIASTOLIC BLOOD PRESSURE: 81 MMHG

## 2017-02-16 LAB
ANION GAP BLD CALC-SCNC: 8 MMOL/L (ref 3–18)
BACTERIA SPEC CULT: NORMAL
BUN SERPL-MCNC: 8 MG/DL (ref 7–18)
BUN/CREAT SERPL: 9 (ref 12–20)
CALCIUM SERPL-MCNC: 8.2 MG/DL (ref 8.5–10.1)
CHLORIDE SERPL-SCNC: 107 MMOL/L (ref 100–108)
CO2 SERPL-SCNC: 28 MMOL/L (ref 21–32)
CREAT SERPL-MCNC: 0.93 MG/DL (ref 0.6–1.3)
GLUCOSE SERPL-MCNC: 148 MG/DL (ref 74–99)
POTASSIUM SERPL-SCNC: 3.5 MMOL/L (ref 3.5–5.5)
SERVICE CMNT-IMP: NORMAL
SODIUM SERPL-SCNC: 143 MMOL/L (ref 136–145)

## 2017-02-16 PROCEDURE — 74011250636 HC RX REV CODE- 250/636: Performed by: INTERNAL MEDICINE

## 2017-02-16 PROCEDURE — 74011000250 HC RX REV CODE- 250: Performed by: HOSPITALIST

## 2017-02-16 PROCEDURE — 74011250636 HC RX REV CODE- 250/636: Performed by: FAMILY MEDICINE

## 2017-02-16 PROCEDURE — 80048 BASIC METABOLIC PNL TOTAL CA: CPT | Performed by: FAMILY MEDICINE

## 2017-02-16 PROCEDURE — 94640 AIRWAY INHALATION TREATMENT: CPT

## 2017-02-16 PROCEDURE — 74011250637 HC RX REV CODE- 250/637: Performed by: HOSPITALIST

## 2017-02-16 PROCEDURE — 77010033678 HC OXYGEN DAILY

## 2017-02-16 PROCEDURE — 74011000258 HC RX REV CODE- 258: Performed by: FAMILY MEDICINE

## 2017-02-16 PROCEDURE — 94760 N-INVAS EAR/PLS OXIMETRY 1: CPT

## 2017-02-16 PROCEDURE — 36415 COLL VENOUS BLD VENIPUNCTURE: CPT | Performed by: FAMILY MEDICINE

## 2017-02-16 PROCEDURE — 74011250637 HC RX REV CODE- 250/637: Performed by: INTERNAL MEDICINE

## 2017-02-16 RX ORDER — LISINOPRIL 20 MG/1
20 TABLET ORAL DAILY
Qty: 30 TAB | Refills: 0 | Status: ON HOLD
Start: 2017-02-16 | End: 2021-06-17 | Stop reason: ALTCHOICE

## 2017-02-16 RX ORDER — BUMETANIDE 1 MG/1
1 TABLET ORAL DAILY
Qty: 30 TAB | Refills: 0 | Status: ON HOLD | OUTPATIENT
Start: 2017-02-16 | End: 2021-06-17 | Stop reason: ALTCHOICE

## 2017-02-16 RX ORDER — FENTANYL 25 UG/1
1 PATCH TRANSDERMAL
Qty: 5 PATCH | Refills: 0 | Status: ON HOLD | OUTPATIENT
Start: 2017-02-16 | End: 2017-05-24

## 2017-02-16 RX ORDER — SAME BUTANEDISULFONATE/BETAINE 400-600 MG
500 POWDER IN PACKET (EA) ORAL 2 TIMES DAILY
Qty: 12 CAP | Refills: 0 | Status: SHIPPED
Start: 2017-02-16 | End: 2017-02-19

## 2017-02-16 RX ORDER — HYDROXYZINE HYDROCHLORIDE 10 MG/1
10 TABLET, FILM COATED ORAL
Qty: 10 TAB | Refills: 0 | Status: SHIPPED | OUTPATIENT
Start: 2017-02-16 | End: 2017-02-26

## 2017-02-16 RX ADMIN — ARFORMOTEROL TARTRATE 15 MCG: 15 SOLUTION RESPIRATORY (INHALATION) at 07:09

## 2017-02-16 RX ADMIN — Medication 500 MG: at 10:53

## 2017-02-16 RX ADMIN — LISINOPRIL 20 MG: 20 TABLET ORAL at 10:53

## 2017-02-16 RX ADMIN — DULOXETINE 60 MG: 60 CAPSULE, DELAYED RELEASE ORAL at 10:53

## 2017-02-16 RX ADMIN — ATENOLOL 25 MG: 25 TABLET ORAL at 10:53

## 2017-02-16 RX ADMIN — FLUTICASONE PROPIONATE 1 SPRAY: 50 SPRAY, METERED NASAL at 10:53

## 2017-02-16 RX ADMIN — BUMETANIDE 1 MG: 1 TABLET ORAL at 10:53

## 2017-02-16 RX ADMIN — HEPARIN SODIUM 5000 UNITS: 5000 INJECTION, SOLUTION INTRAVENOUS; SUBCUTANEOUS at 03:23

## 2017-02-16 RX ADMIN — HEPARIN SODIUM 5000 UNITS: 5000 INJECTION, SOLUTION INTRAVENOUS; SUBCUTANEOUS at 10:52

## 2017-02-16 RX ADMIN — PIPERACILLIN SODIUM,TAZOBACTAM SODIUM 4.5 G: 4; .5 INJECTION, POWDER, FOR SOLUTION INTRAVENOUS at 11:12

## 2017-02-16 RX ADMIN — ASPIRIN 81 MG: 81 TABLET, COATED ORAL at 10:53

## 2017-02-16 RX ADMIN — PREGABALIN 100 MG: 100 CAPSULE ORAL at 10:53

## 2017-02-16 RX ADMIN — LEVOTHYROXINE SODIUM 225 MCG: 200 TABLET ORAL at 07:03

## 2017-02-16 RX ADMIN — Medication 10 ML: at 07:05

## 2017-02-16 RX ADMIN — PIPERACILLIN SODIUM,TAZOBACTAM SODIUM 4.5 G: 4; .5 INJECTION, POWDER, FOR SOLUTION INTRAVENOUS at 07:05

## 2017-02-16 RX ADMIN — GUAIFENESIN 600 MG: 600 TABLET, EXTENDED RELEASE ORAL at 10:53

## 2017-02-16 RX ADMIN — BUDESONIDE 500 MCG: 0.5 INHALANT RESPIRATORY (INHALATION) at 07:10

## 2017-02-16 RX ADMIN — PANTOPRAZOLE SODIUM 40 MG: 40 TABLET, DELAYED RELEASE ORAL at 10:53

## 2017-02-16 NOTE — PROGRESS NOTES
Problem: Mobility Impaired (Adult and Pediatric)  Goal: *Acute Goals and Plan of Care (Insert Text)  Physical Therapy Goals  Initiated 2/14/2017 and to be accomplished within 2-8 day(s)  1. Patient will move from supine to sit and sit to supine in bed with supervision/set-up. 2. Patient will transfer from bed to chair and chair to bed with supervision/set-up using the least restrictive device. 3. Patient will perform sit to stand with supervision/set-up. 4. Patient will ambulate with supervision/set-up for 150 feet with the least restrictive device. Outcome: Progressing Towards Goal  PHYSICAL THERAPY TREATMENT     Patient: Rashid Bright (11 y.o. female)  Date: 2/15/2017  Diagnosis: Acute encephalopathy  Acute encephalopathy Toxic metabolic encephalopathy  Precautions: Fall   Chart, physical therapy assessment, plan of care and goals were reviewed. ASSESSMENT:  Patient found sitting EOB willing to work with PT. Pt ambulated back and fourth in room with fair balance before sitting EOB due to fatgue. Pt states she wants to be able to use bathroom and this PTA agrees that she can ambulate to bathroom. Pt then clarifies that she plans to do so on her own. Instructed pt that she needs help to commode and to call for nursing. Pt states she plans to call, but will walk on her own if no one responds. [address] Mahoney, 201 notified. Pt left with needs in reach. Pt presents with impulsivity throughout tx. Education: safety with transfers, gait, OOB with assist.  Progression toward goals:  [ ]      Improving appropriately and progressing toward goals  [X]      Improving slowly and progressing toward goals  [ ]      Not making progress toward goals and plan of care will be adjusted       PLAN:  Patient continues to benefit from skilled intervention to address the above impairments. Continue treatment per established plan of care.   Discharge Recommendations:  Rehab / HHPT  Further Equipment Recommendations for Discharge: rolling walker       SUBJECTIVE:   Patient stated I don't know why they wont let me use the bathroom.       OBJECTIVE DATA SUMMARY:   Critical Behavior:  Neurologic State: Alert  Orientation Level: Oriented X4  Cognition: Follows commands  Safety/Judgement: Decreased awareness of environment, Decreased insight into deficits  Functional Mobility Training:  Transfers:  Sit to Stand: Stand-by asssistance  Stand to Sit: Stand-by asssistance  Balance:  Sitting: Intact  Standing: Impaired; With support  Standing - Static: Good  Standing - Dynamic : Fair  Ambulation/Gait Training:  Distance (ft): 40 Feet (ft)  Assistive Device: Walker, rolling;Gait belt  Ambulation - Level of Assistance: Contact guard assistance  Gait Abnormalities: Decreased step clearance  Base of Support: Widened  Stance: Weight shift  Speed/Rozina: Slow  Step Length: Right shortened;Left shortened  Swing Pattern: Right asymmetrical;Left asymmetrical     Pain:  Pre tx pain: 0  Post tx pain: 0  Pain Scale 1: Numeric (0 - 10)  Pain Intensity 1: 0  Activity Tolerance:   Fair  Please refer to the flowsheet for vital signs taken during this treatment.   After treatment:   [ ] Patient left in no apparent distress sitting up in chair  [X] Patient left in no apparent distress in bed  [X] Call bell left within reach  [ ] Nursing notified  [ ] Caregiver present  [ ] Bed alarm activated      Jeaneth Bernal PTA   Time Calculation: 17 mins

## 2017-02-16 NOTE — PROGRESS NOTES
1930: Assumed patient care, she was resting quietly in bed with no signs of distress noted. Patient was alert and oriented to person, place, time and situation. Vital signs were stable, MEWS score was a one. Patient denied any pain, discomfort, nausea, vomiting, dizziness, or anxiety. Respiratory status remained stable on room air. White board was updated and explained. Bed was locked and in lowest position. Call bell, water and personal belongings were within reach. Patient had no questions, comment or concerns after bedside shift report.

## 2017-02-16 NOTE — ROUTINE PROCESS
TRANSFER - OUT REPORT:    Verbal report given to [address] (name) on Jasson Riley  being transferred to [facility]) for routine progression of care       Report consisted of patients Situation, Background, Assessment and   Recommendations(SBAR). Information from the following report(s) SBAR, Kardex, Intake/Output, Med Rec Status and Cardiac Rhythm sr was reviewed with the receiving nurse. Lines:   Peripheral IV 02/10/17 Right Wrist (Active)   Site Assessment Clean, dry, & intact 2/16/2017 10:50 AM   Phlebitis Assessment 0 2/16/2017 10:50 AM   Infiltration Assessment 0 2/16/2017 10:50 AM   Dressing Status Clean, dry, & intact 2/16/2017 10:50 AM   Dressing Type Tape;Transparent 2/16/2017 10:50 AM   Hub Color/Line Status Blue; Infusing 2/16/2017 10:50 AM   Action Taken Open ports on tubing capped 2/16/2017 10:50 AM   Alcohol Cap Used Yes 2/16/2017 10:50 AM        Opportunity for questions and clarification was provided.       Patient transported with:   O2 @ 3 liters

## 2017-02-16 NOTE — ROUTINE PROCESS
Bedside and Verbal shift change report given to GABRIELA Kelly RN (oncoming nurse) by Domingo Jay RN  (offgoing nurse).  Report included the following information SBAR, Kardex, Intake/Output, MAR and Cardiac Rhythm SR.

## 2017-02-16 NOTE — PROGRESS NOTES
Vancomycin trough = 16.0 mcg/mL at 2210 on 2/15/17    Due to the therapeutic vancomycin levels, no change will be made in the current dosing regimen of Vancomycin 1500 mg IV every 24 hours. Pharmacy will continue to follow daily and will make changes to dose and/or frequency based on clinical status.     Pharmacist Makayla Barr, [address]

## 2017-02-16 NOTE — PROGRESS NOTES
1430 Pt spent a fair day. No acute events. Central line to rt neck d/cd as per Dr Louis Kong. Tolerated well. Clear transparent dressing applied. Pt picked up via stretcher accompanied by 2 ambulance attendents, left in stable condition.

## 2017-02-16 NOTE — DISCHARGE SUMMARY
Discharge Summary    Patient: Lala Garduno MRN: [id number]  CSN: [id number]    YOB: 1945  Age: 80 y.o. Sex: female    DOA: 2/10/2017 LOS:  LOS: 6 days   Discharge Date:      Primary Care Provider:  Colin Vickers. Basil Snow MD    Admission Diagnoses: Acute encephalopathy  Acute encephalopathy    Discharge Diagnoses:    Patient Active Problem List   Diagnosis Code    Cataract H26.9    DDD (degenerative disc disease), cervical M50.30    H/O cervical spine surgery Z98.890    COPD (chronic obstructive pulmonary disease) ([facility] J44.9    Acidosis E87.2    COPD exacerbation ([facility] [address] J44.1    Acute on chronic respiratory failure ([facility]) J96.20    Obesity E66.9    Benign hypertension I10    GERD (gastroesophageal reflux disease) K21.9    Acute diastolic CHF (congestive heart failure) ([facility]) I50.31    Acute encephalopathy G93.40    Toxic metabolic encephalopathy I30    Acute renal failure (ARF) ([facility]) N17.9    Hyperkalemia E87.5    PNA (pneumonia) J18.9    Chest pain R07.9    Anxiety F41.9    Hypomagnesemia E83.42    Sinus tachycardia R00.0       Discharge Condition: Stable    Discharge Medications:     Current Discharge Medication List      START taking these medications    Details   Saccharomyces boulardii (FLORASTOR) 250 mg capsule Take 2 Caps by mouth two (2) times a day for 3 days. Qty: 12 Cap, Refills: 0      bumetanide (BUMEX) 1 mg tablet Take 1 Tab by mouth daily. Qty: 30 Tab, Refills: 0         CONTINUE these medications which have CHANGED    Details   lisinopril (PRINIVIL, ZESTRIL) 20 mg tablet Take 1 Tab by mouth daily. Qty: 30 Tab, Refills: 0      fentaNYL (DURAGESIC) 25 mcg/hr PATCH 1 Patch by TransDERmal route every seventy-two (72) hours. Max Daily Amount: 1 Patch. Qty: 5 Patch, Refills: 0      hydrOXYzine HCl (ATARAX) 10 mg tablet Take 1 Tab by mouth three (3) times daily as needed for Itching for up to 10 days.   Qty: 10 Tab, Refills: 0         CONTINUE these medications which have NOT CHANGED    Details   Menthol-Zinc Oxide (RISAMINE) 0.44-20.6 % oint Apply  to affected area two (2) times a day. Indications: Apply to sacrum to maintain skin integrity      alum-mag hydroxide-simeth (MYLANTA) 200-200-20 mg/5 mL susp Take 15 mL by mouth four (4) times daily as needed. tiotropium (SPIRIVA WITH HANDIHALER) 18 mcg inhalation capsule Take 1 Cap by inhalation daily. multivitamin, tx-iron-ca-min (THERA-M W/ IRON) 9 mg iron-400 mcg tab tablet Take 1 Tab by mouth daily. Oxygen continuous. 3L/min NC      omeprazole (PRILOSEC) 20 mg capsule Take 20 mg by mouth daily. levothyroxine (SYNTHROID) 200 mcg tablet Take 225 mcg by mouth Daily (before breakfast). Takes a total of 225 mcg. pregabalin (LYRICA) 100 mg capsule Take 100 mg by mouth three (3) times daily. cyanocobalamin (VITAMIN B12) 1,000 mcg/mL injection 1,000 mcg by IntraMUSCular route every month. albuterol (PROVENTIL VENTOLIN) 2.5 mg /3 mL (0.083 %) nebulizer solution 2.5 mg by Nebulization route every four (4) hours as needed. aspirin 81 mg tablet Take 81 mg by mouth daily. atenolol (TENORMIN) 25 mg tablet Take 25 mg by mouth daily. As needed for palpatations       Cetirizine (ZYRTEC) 10 mg cap Take  by mouth daily. montelukast (SINGULAIR) 10 mg tablet Take 10 mg by mouth daily. nystatin (MYCOSTATIN) powder Apply  to affected area two (2) times a day. Qty: 1 Bottle, Refills: 0      fluticasone (FLONASE) 50 mcg/actuation nasal spray 1 [place] by Both Nostrils route daily. acetaminophen (TYLENOL) 325 mg tablet Take 650 mg by mouth every six (6) hours as needed for Pain. diclofenac (VOLTAREN) 1 % gel Apply 2 g to affected area three (3) times daily. Apply to both shoulders and both knees. cholestyramine-sucrose (QUESTRAN) 4 gram powder Take  by mouth daily as needed (diarrhea).       fluticasone-salmeterol (ADVAIR DISKUS) 500-50 mcg/dose diskus inhaler Take 1 Puff by inhalation every twelve (12) hours. May use inhaler on DOS      nitroglycerin (NITROSTAT) 0.4 mg SL tablet 0.4 mg by SubLINGual route every five (5) minutes as needed (Give one tab sublingual every 5 minutes x3 doses as needed for chest pain). guaiFENesin SR (MUCINEX) 600 mg SR tablet Take 600 mg by mouth two (2) times a day. DULoxetine (CYMBALTA) 60 mg capsule Take 60 mg by mouth daily. Indications: CHRONIC MUSCULOSKELETAL PAIN         STOP taking these medications       guaiFENesin-dextromethorphan (ROBITUSSIN DM) 100-10 mg/5 mL syrup Comments:   Reason for Stopping:         oxyCODONE-acetaminophen (PERCOCET 10)  mg per tablet Comments:   Reason for Stopping:         sodium phosphate (FLEET ENEMA) 19-7 gram/118 mL enema Comments:   Reason for Stopping:         trimethoprim-sulfamethoxazole (BACTRIM DS) 160-800 mg per tablet Comments:   Reason for Stopping:         magnesium hydroxide (MILK OF MAGNESIA) 400 mg/5 mL suspension Comments:   Reason for Stopping:         bisacodyl (DULCOLAX, BISACODYL,) 10 mg suppository Comments:   Reason for Stopping:         esomeprazole (NEXIUM) 20 mg capsule Comments:   Reason for Stopping:         cyclobenzaprine (FLEXERIL) 10 mg tablet Comments:   Reason for Stopping:               Procedures :pulmonology     Consults: None      PHYSICAL EXAM   Visit Vitals    /81 (BP 1 Location: Left arm, BP Patient Position: At rest)    Pulse 81    Temp 98.3 °F (36.8 °C)    Resp 17    Ht 4' 11.84\" (1.52 m)    Wt 97.3 kg (214 lb 8 oz)    SpO2 96%    Breastfeeding No    BMI 42.12 kg/m2     General: Awake, cooperative, no acute distress    HEENT: NC, Atraumatic. PERRLA, EOMI. Anicteric sclerae. Lungs:  CTA Bilaterally. No Wheezing/Rhonchi/Rales. Heart:  Regular  rhythm,  No murmur, No Rubs, No Gallops  Abdomen: Soft, Non distended, Non tender. +Bowel sounds,   Extremities: No c/c/e  Psych:   Not anxious or agitated. Neurologic:  No acute neurological deficits. Admission HPI :   Claudene Medal is a 80 y.o. female who has a past medical history significant for hypothyroidism, anxiety disorder, hypertension, [place], coronary artery disease, asthma/COPD and osteoarthritis presenting with altered mental status and decreased oxygen saturations. The patient also has a history of chronic pain for which she uses chronic narcotics. Upon arrival she was given Narcan for reversal resulting in substantial agitation prompting her to be given additional analgesia. Upon interview she is unable to contribute to to a critical medical illness. On arrival she was afebrile, pulse 79, blood pressure 181/150, respirations 22 with oxygen saturations of 100% on 4 L by nasal cannula. Laboratory evaluation was significant for worsening anemia hemoglobin 9.3 down from 11.2, potassium 6.0 and creatinine which worsened to 2.35 from 1.01. A CT without contrast of the head showed no acute disease. A chest x-ray showed pulmonary vascular congestion with bilateral edema versus infiltrates. A recent echocardiogram from November of last year showed grade one diastolic dysfunction with a preserved ejection fraction of 62%. In the ER she was given breathing treatments, insulin, D5, Kayexalate, calcium gluconate, Narcan as mentioned above, fentanyl 50 µg, vancomycin, Zosyn, Solu-Medrol 125 mg, Ativan and Lasix. Subsequently, hospital medicine was contacted for admission to the hospital and further management. Hospital Course :   Since she was admitted , narcotics was hold due to AMS. iv abx was given and breathing tx were administrated for  PNA. diuretics was given for pulmonary edema and chf exacerbation and she needed short time levophed and bipap. With treatment, his metal status was back to normal and his celia resolved. She completed abx for pneumonia. We continued treat the pain,  K and mg were replaced. and her tachycardia resolved.  She did presented chest pain during her stay, with ce wnl and ekg no st seg change. The chest pain resolved per gi cocktail. She remained hemodynamic stable.        Activity: Activity as tolerated    Diet: Cardiac Diet    [facility]     Disposition: rehab     Minutes spent on discharge: 60 min       Labs: Results:       Chemistry Recent Labs      02/15/17   0605  02/14/17   0400   GLU  95  90   NA  146*  146*   K  3.4*  3.4*   CL  110*  110*   CO2  29  28   BUN  7  12   CREA  0.83  0.90   CA  8.2*  8.2*   AGAP  7  8   BUCR  8*  13      CBC w/Diff Recent Labs      02/14/17   0400   WBC  9.3   RBC  2.68*   HGB  8.5*   HCT  26.1*   PLT  259      Cardiac Enzymes Recent Labs      02/14/17   0915   CPK  42   CKND1  1.9      Coagulation No results for input(s): PTP, INR, APTT in the last 72 hours. No lab exists for component: INREXT    Lipid Panel No results found for: CHOL, CHOLPOCT, CHOLX, CHLST, CHOLV, [id number], HDL, LDL, NLDLCT, DLDL, LDLC, DLDLP, [id number], VLDLC, VLDL, TGL, TGLX, TRIGL, [id number], TRIGP, TGLPOCT, [id number], CHHD, CHHDX   BNP No results for input(s): BNPP in the last 72 hours. Liver Enzymes No results for input(s): TP, ALB, TBIL, AP, SGOT, GPT in the last 72 hours. No lab exists for component: DBIL   Thyroid Studies Lab Results   Component Value Date/Time    TSH 0.04 11/26/2016 05:39 AM            Significant Diagnostic Studies: Ct Head Wo Cont    Result Date: 2/10/2017  EXAM: CT head INDICATION: Syncopal event, altered level of consciousness. COMPARISON: Correlation is made to several prior examinations, most recently CT head 7/27/2008, as well as prior brain MRI of July 8, 2008. TECHNIQUE: Axial CT imaging of the head was performed without intravenous contrast. One or more dose reduction techniques were used on this CT: automated exposure control, adjustment of the mAs and/or kVp according to patient's size, and iterative reconstruction techniques.  The specific techniques utilized on this CT exam have been documented in the patient's electronic medical record. _______________ FINDINGS: BRAIN AND POSTERIOR FOSSA: There is mild cortical and cerebellar volume loss noted. Ventricular size and configuration is within normal limits. The basilar cisterns are patent. Physiologic bilateral basal ganglia calcifications are noted. There is periventricular white matter hypoattenuation noted, with focal areas of encephalomalacia noted in the right centrum semiovale, and adjacent to the posterior horn of the right lateral ventricle, both likely in keeping with sequela of chronic infarct. There is no intracranial hemorrhage, mass effect, or midline shift. There are no areas of abnormal parenchymal attenuation. EXTRA-AXIAL SPACES AND MENINGES: There are no abnormal extra-axial fluid collections. CALVARIUM: Intact. SINUSES: Clear. OTHER: None. _______________     IMPRESSION: 1. No acute intracranial abnormality identified. Of note, noncontrast head CT can be normal in the context of early acute stroke. 2. Subcortical and periventricular periventricular white matter hypoattenuation, a nonspecific finding likely pertaining to chronic ischemic microvascular change. Us Retroperitoneum Comp    Result Date: 2/11/2017  Retroperitoneal Ultrasound History: Acute renal failure. Comparison: 7/27/2008. Technique: Multiple gray scale sonographic images of the kidneys and bladder were obtained. Additional color Doppler and the Doppler waveform images were obtained . Findings: The right kidney measures 10.4 cm in length and has increased cortical echogenicity. No focal lesions are seen. There is no evidence of hydronephrosis. The left kidney measures 10.1 cm in length with increased cortical echogenicity. No focal lesions are seen. There is no evidence of hydronephrosis. Urinary bladder is decompressed secondary to a Ragland catheter in place.      IMPRESSION: Mildly increased echogenicity of bilateral kidneys suggestive of medical renal disease without evidence of obstructive nephropathy. Xr Chest Port    Result Date: 2/10/2017  Chest, single view Indication: Right central line placement Comparison: 2/10/2017 at 3:13 PM Findings:  Portable upright AP view of the chest was obtained at 10:54 PM. Adequate interval placement of right jugular central venous catheter, tip overlying distal SVC. The cardiomediastinal silhouette appears unchanged, mildly prominent in size. Central vascular and diffusely increased interstitial prominence. Retrocardiac left lower lobe and right perihilar patchy opacities. No definite pleural effusion. No pneumothorax. Right upper quadrant surgical clips of prior cholecystectomy. Advanced degenerative changes of left and right glenohumeral joints. Partially visualized cervical instrumentation hardware. No acute osseous abnormality. Impression: 1. Adequate interval placement of right jugular central venous catheter. No postprocedural pneumothorax. 2. Unchanged vascular congestion and interstitial edema with right perihilar and retrocardiac left basilar atelectasis/infiltrate. Xr Chest Port    Result Date: 2/10/2017  Portable chest 2/10/2017. History: Shortness of breath. Technique: A semierect portable radiograph of the chest was obtained. Comparison:  11/28/2016. Findings: The cardiac silhouette is enlarged but unchanged in size and configuration. Pulmonary vascular congestion is noted with bilateral interstitial edema and/or interstitial infiltration. Patchy right infrahilar infiltration is noted and there is also mild patchy alveolar infiltrate in the right upper lobe. These areas may represent developing alveolar edema but alveolar infiltrate cannot be excluded. There is no pneumothorax or pleural effusion. Extensive degenerative changes are noted in the shoulders, left worse than right. Impression: 1. Pulmonary vascular congestion with bilateral edema and/or infiltrates, as described above.             Lewayne Inocente Medicine CC: Bette Trammell.  Colonel Barbara MD

## 2017-02-16 NOTE — PROGRESS NOTES
Chart reviewed, noted plan for pt to return to [facility] once ins auth had been obtained. Per eDischarge and call to [place] [facility] / Peggy: ins Ivy Diaz has been obtained and they can accept pt today. Met with pt who was agreeable to going to [facility] today and requested str amb. Pt declined CMgr offer to contact her son and reported she would call him herself. Plan: Discharge to [facility], [place] [id number], [facility] via stretcher ambulance. Pt will need her Discharge Summary, Med Rec, and scripts for controlled drugs to accompany her. Pt's nurse to call report to [place] at: [phone].

## 2017-02-17 LAB
ATRIAL RATE: 126 BPM
CALCULATED P AXIS, ECG09: 48 DEGREES
CALCULATED R AXIS, ECG10: 69 DEGREES
CALCULATED T AXIS, ECG11: 49 DEGREES
DIAGNOSIS, 93000: NORMAL
P-R INTERVAL, ECG05: 128 MS
Q-T INTERVAL, ECG07: 334 MS
QRS DURATION, ECG06: 80 MS
QTC CALCULATION (BEZET), ECG08: 483 MS
VENTRICULAR RATE, ECG03: 126 BPM

## 2017-02-17 NOTE — PROGRESS NOTES
Problem: Mobility Impaired (Adult and Pediatric)  Goal: *Acute Goals and Plan of Care (Insert Text)  Physical Therapy Goals  Initiated 2/14/2017 and to be accomplished within 2-8 day(s)  1. Patient will move from supine to sit and sit to supine in bed with supervision/set-up. 2. Patient will transfer from bed to chair and chair to bed with supervision/set-up using the least restrictive device. 3. Patient will perform sit to stand with supervision/set-up. 4. Patient will ambulate with supervision/set-up for 150 feet with the least restrictive device. Outcome: Not Met     Addendum to chart for 2/16:     Goals were ongoing at time of discharge. Will resolve this plan of care as patient transitioned to next level of care. Patient discharged to SNF.      Clive Vaughn PT, DPT

## 2017-05-13 ENCOUNTER — APPOINTMENT (OUTPATIENT)
Dept: GENERAL RADIOLOGY | Age: 72
DRG: 190 | End: 2017-05-13
Attending: EMERGENCY MEDICINE
Payer: MEDICARE

## 2017-05-13 ENCOUNTER — HOSPITAL ENCOUNTER (INPATIENT)
Age: 72
LOS: 11 days | Discharge: NURSING, REHAB OR LONG-TERM CARE | DRG: 190 | End: 2017-05-25
Attending: EMERGENCY MEDICINE | Admitting: INTERNAL MEDICINE
Payer: MEDICARE

## 2017-05-13 DIAGNOSIS — J96.21 ACUTE ON CHRONIC RESPIRATORY FAILURE WITH HYPOXIA (HCC): ICD-10-CM

## 2017-05-13 DIAGNOSIS — J44.1 COPD EXACERBATION (HCC): Primary | ICD-10-CM

## 2017-05-13 DIAGNOSIS — J96.22 ACUTE ON CHRONIC RESPIRATORY FAILURE WITH HYPOXIA AND HYPERCAPNIA (HCC): ICD-10-CM

## 2017-05-13 DIAGNOSIS — R06.03 ACUTE RESPIRATORY DISTRESS: ICD-10-CM

## 2017-05-13 DIAGNOSIS — I50.31 ACUTE DIASTOLIC CHF (CONGESTIVE HEART FAILURE) (HCC): ICD-10-CM

## 2017-05-13 DIAGNOSIS — J96.21 ACUTE ON CHRONIC RESPIRATORY FAILURE WITH HYPOXIA AND HYPERCAPNIA (HCC): ICD-10-CM

## 2017-05-13 PROBLEM — R09.02 HYPOXIA: Status: ACTIVE | Noted: 2017-05-13

## 2017-05-13 PROBLEM — J18.9 HCAP (HEALTHCARE-ASSOCIATED PNEUMONIA): Status: ACTIVE | Noted: 2017-05-13

## 2017-05-13 LAB
ANION GAP BLD CALC-SCNC: 9 MMOL/L (ref 3–18)
APTT PPP: 29.9 SEC (ref 23–36.4)
ARTERIAL PATENCY WRIST A: YES
BASE EXCESS BLD CALC-SCNC: 2 MMOL/L
BASOPHILS # BLD AUTO: 0 K/UL (ref 0–0.06)
BASOPHILS # BLD: 0 % (ref 0–2)
BDY SITE: ABNORMAL
BNP SERPL-MCNC: 995 PG/ML (ref 0–900)
BODY TEMPERATURE: 99.4
BUN SERPL-MCNC: 18 MG/DL (ref 7–18)
BUN/CREAT SERPL: 15 (ref 12–20)
CALCIUM SERPL-MCNC: 9.1 MG/DL (ref 8.5–10.1)
CHLORIDE SERPL-SCNC: 101 MMOL/L (ref 100–108)
CK MB CFR SERPL CALC: 1.5 % (ref 0–4)
CK MB SERPL-MCNC: 1.6 NG/ML (ref 5–25)
CK SERPL-CCNC: 105 U/L (ref 26–192)
CO2 SERPL-SCNC: 30 MMOL/L (ref 21–32)
CREAT SERPL-MCNC: 1.19 MG/DL (ref 0.6–1.3)
DIFFERENTIAL METHOD BLD: ABNORMAL
EOSINOPHIL # BLD: 0.2 K/UL (ref 0–0.4)
EOSINOPHIL NFR BLD: 1 % (ref 0–5)
ERYTHROCYTE [DISTWIDTH] IN BLOOD BY AUTOMATED COUNT: 13 % (ref 11.6–14.5)
GAS FLOW.O2 O2 DELIVERY SYS: ABNORMAL L/MIN
GAS FLOW.O2 SETTING OXYMISER: 3 L/M
GLUCOSE SERPL-MCNC: 128 MG/DL (ref 74–99)
HCO3 BLD-SCNC: 26.6 MMOL/L (ref 22–26)
HCT VFR BLD AUTO: 29.8 % (ref 35–45)
HGB BLD-MCNC: 9.8 G/DL (ref 12–16)
INR PPP: 1.1 (ref 0.8–1.2)
LYMPHOCYTES # BLD AUTO: 14 % (ref 21–52)
LYMPHOCYTES # BLD: 1.5 K/UL (ref 0.9–3.6)
MAGNESIUM SERPL-MCNC: 1.6 MG/DL (ref 1.6–2.6)
MCH RBC QN AUTO: 31.2 PG (ref 24–34)
MCHC RBC AUTO-ENTMCNC: 32.9 G/DL (ref 31–37)
MCV RBC AUTO: 94.9 FL (ref 74–97)
MONOCYTES # BLD: 0.5 K/UL (ref 0.05–1.2)
MONOCYTES NFR BLD AUTO: 5 % (ref 3–10)
NEUTS SEG # BLD: 8.5 K/UL (ref 1.8–8)
NEUTS SEG NFR BLD AUTO: 80 % (ref 40–73)
O2/TOTAL GAS SETTING VFR VENT: 32 %
PCO2 BLD: 44.5 MMHG (ref 35–45)
PH BLD: 7.39 [PH] (ref 7.35–7.45)
PLATELET # BLD AUTO: 289 K/UL (ref 135–420)
PMV BLD AUTO: 10.1 FL (ref 9.2–11.8)
PO2 BLD: 83 MMHG (ref 80–100)
POTASSIUM SERPL-SCNC: 4 MMOL/L (ref 3.5–5.5)
PROTHROMBIN TIME: 13.7 SEC (ref 11.5–15.2)
RBC # BLD AUTO: 3.14 M/UL (ref 4.2–5.3)
SAO2 % BLD: 96 % (ref 92–97)
SERVICE CMNT-IMP: ABNORMAL
SODIUM SERPL-SCNC: 140 MMOL/L (ref 136–145)
SPECIMEN TYPE: ABNORMAL
TROPONIN I SERPL-MCNC: <0.02 NG/ML (ref 0–0.06)
WBC # BLD AUTO: 10.7 K/UL (ref 4.6–13.2)

## 2017-05-13 PROCEDURE — 74011000250 HC RX REV CODE- 250: Performed by: EMERGENCY MEDICINE

## 2017-05-13 PROCEDURE — 87040 BLOOD CULTURE FOR BACTERIA: CPT | Performed by: EMERGENCY MEDICINE

## 2017-05-13 PROCEDURE — 82803 BLOOD GASES ANY COMBINATION: CPT

## 2017-05-13 PROCEDURE — 77030013140 HC MSK NEB VYRM -A

## 2017-05-13 PROCEDURE — 85025 COMPLETE CBC W/AUTO DIFF WBC: CPT | Performed by: EMERGENCY MEDICINE

## 2017-05-13 PROCEDURE — 85610 PROTHROMBIN TIME: CPT | Performed by: EMERGENCY MEDICINE

## 2017-05-13 PROCEDURE — 82550 ASSAY OF CK (CPK): CPT | Performed by: EMERGENCY MEDICINE

## 2017-05-13 PROCEDURE — 93005 ELECTROCARDIOGRAM TRACING: CPT

## 2017-05-13 PROCEDURE — 83735 ASSAY OF MAGNESIUM: CPT | Performed by: EMERGENCY MEDICINE

## 2017-05-13 PROCEDURE — 87186 SC STD MICRODIL/AGAR DIL: CPT | Performed by: EMERGENCY MEDICINE

## 2017-05-13 PROCEDURE — 83880 ASSAY OF NATRIURETIC PEPTIDE: CPT | Performed by: EMERGENCY MEDICINE

## 2017-05-13 PROCEDURE — 71010 XR CHEST PORT: CPT

## 2017-05-13 PROCEDURE — 87077 CULTURE AEROBIC IDENTIFY: CPT | Performed by: EMERGENCY MEDICINE

## 2017-05-13 PROCEDURE — 80048 BASIC METABOLIC PNL TOTAL CA: CPT | Performed by: EMERGENCY MEDICINE

## 2017-05-13 PROCEDURE — 83605 ASSAY OF LACTIC ACID: CPT | Performed by: EMERGENCY MEDICINE

## 2017-05-13 PROCEDURE — 94640 AIRWAY INHALATION TREATMENT: CPT

## 2017-05-13 PROCEDURE — 36600 WITHDRAWAL OF ARTERIAL BLOOD: CPT

## 2017-05-13 PROCEDURE — 85730 THROMBOPLASTIN TIME PARTIAL: CPT | Performed by: EMERGENCY MEDICINE

## 2017-05-13 PROCEDURE — 74011000258 HC RX REV CODE- 258: Performed by: EMERGENCY MEDICINE

## 2017-05-13 PROCEDURE — 99285 EMERGENCY DEPT VISIT HI MDM: CPT

## 2017-05-13 RX ORDER — SODIUM CHLORIDE 0.9 % (FLUSH) 0.9 %
5-10 SYRINGE (ML) INJECTION AS NEEDED
Status: DISCONTINUED | OUTPATIENT
Start: 2017-05-13 | End: 2017-05-17 | Stop reason: SDUPTHER

## 2017-05-13 RX ORDER — IPRATROPIUM BROMIDE AND ALBUTEROL SULFATE 2.5; .5 MG/3ML; MG/3ML
3 SOLUTION RESPIRATORY (INHALATION)
Status: COMPLETED | OUTPATIENT
Start: 2017-05-13 | End: 2017-05-13

## 2017-05-13 RX ORDER — LEVOFLOXACIN 750 MG/1
750 TABLET ORAL
Status: COMPLETED | OUTPATIENT
Start: 2017-05-13 | End: 2017-05-14

## 2017-05-13 RX ORDER — SODIUM CHLORIDE 0.9 % (FLUSH) 0.9 %
5-10 SYRINGE (ML) INJECTION EVERY 8 HOURS
Status: DISCONTINUED | OUTPATIENT
Start: 2017-05-13 | End: 2017-05-17 | Stop reason: SDUPTHER

## 2017-05-13 RX ORDER — MAGNESIUM SULFATE HEPTAHYDRATE 40 MG/ML
2 INJECTION, SOLUTION INTRAVENOUS
Status: COMPLETED | OUTPATIENT
Start: 2017-05-13 | End: 2017-05-14

## 2017-05-13 RX ADMIN — IPRATROPIUM BROMIDE AND ALBUTEROL SULFATE 3 ML: .5; 3 SOLUTION RESPIRATORY (INHALATION) at 20:25

## 2017-05-13 RX ADMIN — IPRATROPIUM BROMIDE AND ALBUTEROL SULFATE 3 ML: .5; 3 SOLUTION RESPIRATORY (INHALATION) at 23:22

## 2017-05-13 RX ADMIN — AZTREONAM 2 G: 2 INJECTION, POWDER, LYOPHILIZED, FOR SOLUTION INTRAMUSCULAR; INTRAVENOUS at 23:27

## 2017-05-13 RX ADMIN — IPRATROPIUM BROMIDE AND ALBUTEROL SULFATE 3 ML: .5; 3 SOLUTION RESPIRATORY (INHALATION) at 23:21

## 2017-05-13 NOTE — Clinical Note
Status[de-identified] Inpatient [101] Type of Bed: Telemetry [19] Inpatient Hospitalization Certified Necessary for the Following Reasons: 3. Patient receiving treatment that can only be provided in an inpatient setting (further clarification in H&P documentation) Admitting [facility]: [facility] [[id number]] Admitting Physician: Aakash Officer [[id number]] Attending Physician: Aakash Officer [[id number]] Estimated Length of Stay: > or = to 2 Midnights Discharge Plan[de-identified] Home with Office Follow-up

## 2017-05-13 NOTE — IP AVS SNAPSHOT
Sabina Balderas 
 
 
 [address] [facility] [address] 
[phone] Patient: Narinder Crisostomo MRN: [id number] You are allergic to the following Allergen Reactions Ambien (Zolpidem) Other (comments) Sleep drive Aspirin Other (comments) bruising Codeine Hives Darvon (Propoxyphene) Unknown (comments) Iodinated Contrast Media - Oral And Iv Dye Hives Iodine on skin is ok per pt. Pcn (Penicillins) Nausea and Vomiting Shellfish Derived Hives Shortness of Breath Swelling Iodine on skin is ok per pt. Zanaflex (Tizanidine) Other (comments) disorientated Levaquin (Levofloxacin) Hives Red rash at IV site while infusing Immunizations Administered for This Admission Name Date Influenza Vaccine (Quad) PF  Deferred () Recent Documentation Height Weight BMI OB Status Smoking Status 1.524 m 99.2 kg 42.71 kg/m2 Hysterectomy Never Smoker Emergency Contacts Name Discharge Info Relation Home Work Mobile Aundrea Baldwin  Child [2] [phone] About your hospitalization You were admitted on:  May 14, 2017 You last received care in the:  [address] [place] [address] You were discharged on:  May 25, 2017 Unit phone number:  [phone] Why you were hospitalized Your primary diagnosis was:  Acute Respiratory Distress Your diagnoses also included:  Copd Exacerbation (Hcc), Hypoxia, Hcap (Healthcare-Associated Pneumonia), Cataract, Ddd (Degenerative Disc Disease), Cervical, H/O Cervical Spine Surgery, Obesity, Benign Hypertension, Gerd (Gastroesophageal Reflux Disease), Acute Diastolic Chf (Congestive Heart Failure) (Hcc), Chest Pain, Hypomagnesemia, Acute On Chronic Respiratory Failure (Hcc), Hypokalemia, 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 [address] [address] 
[phone] Michelle Brooks MD   [address] [facility] [address] [id number] [phone] Janeth Cason MD Call in 2 weeks Infectious disease [address] [facility] 
[phone] Pulmonology Schedule an appointment as soon as possible for a visit COPD follow-up with PFTs needed Current Discharge Medication List  
  
START taking these medications Dose & Instructions Dispensing Information Comments Morning Noon Evening Bedtime  
 dronabinol 2.5 mg capsule Commonly known as:  Jonathanerica Wardamilola Your last dose was: Your next dose is:    
   
   
 Dose:  2.5 mg Take 1 Cap by mouth Before breakfast and dinner. Max Daily Amount: 5 mg. Quantity:  60 Cap Refills:  0  
     
   
   
   
  
 vancomycin 10 gram 1,500 mg IVPB Your last dose was: Your next dose is:    
   
   
 Dose:  1500 mg  
1,500 mg by IntraVENous route every twenty-four (24) hours for 19 days. Indications: Pharmacy to dose. Last dose 6/12 Quantity:  19 Dose Refills:  0 CONTINUE these medications which have CHANGED Dose & Instructions Dispensing Information Comments Morning Noon Evening Bedtime * nystatin powder Commonly known as:  MYCOSTATIN What changed:  Another medication with the same name was added.  Make sure you understand how and when to take each. Your last dose was: Your next dose is:    
   
   
 Apply  to affected area two (2) times a day. Quantity:  1 Bottle Refills:  0  
     
   
   
   
  
 * nystatin 100,000 unit/mL suspension Commonly known as:  MYCOSTATIN What changed: You were already taking a medication with the same name, and this prescription was added. Make sure you understand how and when to take each. Your last dose was: Your next dose is:    
   
   
 Dose:  [id number] Units Take 5 mL by mouth four (4) times daily for 3 days. swish and spit Quantity:  60 mL Refills:  0  
     
   
   
   
  
 * Notice: This list has 2 medication(s) that are the same as other medications prescribed for you. Read the directions carefully, and ask your doctor or other care provider to review them with you. CONTINUE these medications which have NOT CHANGED Dose & Instructions Dispensing Information Comments Morning Noon Evening Bedtime  
 acetaminophen 325 mg tablet Commonly known as:  TYLENOL Your last dose was: Your next dose is:    
   
   
 Dose:  650 mg Take 650 mg by mouth every six (6) hours as needed for Pain. Refills:  0 ADVAIR DISKUS 500-50 mcg/dose diskus inhaler Generic drug:  fluticasone-salmeterol Your last dose was: Your next dose is:    
   
   
 Dose:  1 Puff Take 1 Puff by inhalation every twelve (12) hours. May use inhaler on DOS Refills:  0  
     
   
   
   
  
 albuterol 2.5 mg /3 mL (0.083 %) nebulizer solution Commonly known as:  PROVENTIL VENTOLIN Your last dose was: Your next dose is:    
   
   
 Dose:  2.5 mg  
2.5 mg by Nebulization route every four (4) hours as needed. Refills:  0  
     
   
   
   
  
 alum-mag hydroxide-simeth 200-200-20 mg/5 mL Susp Commonly known as:  MYLANTA Your last dose was: Your next dose is: Dose:  15 mL Take 15 mL by mouth four (4) times daily as needed. Refills:  0  
     
   
   
   
  
 aspirin 81 mg tablet Your last dose was: Your next dose is:    
   
   
 Dose:  81 mg Take 81 mg by mouth daily. Refills:  0  
     
   
   
   
  
 atenolol 25 mg tablet Commonly known as:  TENORMIN Your last dose was: Your next dose is:    
   
   
 Dose:  25 mg Take 25 mg by mouth daily. As needed for palpatations Refills:  0  
     
   
   
   
  
 bumetanide 1 mg tablet Commonly known as:  Ashleigh Alba Your last dose was: Your next dose is:    
   
   
 Dose:  1 mg Take 1 Tab by mouth daily. Quantity:  30 Tab Refills:  0  
     
   
   
   
  
 cyanocobalamin 1,000 mcg/mL injection Commonly known as:  VITAMIN B12 Your last dose was: Your next dose is:    
   
   
 Dose:  1000 mcg  
1,000 mcg by IntraMUSCular route every month. Refills:  0  
     
   
   
   
  
 CYMBALTA 60 mg capsule Generic drug:  DULoxetine Your last dose was: Your next dose is:    
   
   
 Dose:  60 mg Take 60 mg by mouth daily. Indications: CHRONIC MUSCULOSKELETAL PAIN Refills:  0  
     
   
   
   
  
 fentaNYL 25 mcg/hr PATCH Commonly known as:  Denisa Pride Your last dose was: Your next dose is:    
   
   
 Dose:  1 Patch 1 Patch by TransDERmal route every seventy-two (72) hours. Max Daily Amount: 1 Patch. Quantity:  5 Patch Refills:  0  
     
   
   
   
  
 fluticasone 50 mcg/actuation nasal spray Commonly known as:  Mariama Frame Your last dose was: Your next dose is:    
   
   
 Dose:  1 Spray 1 [place] by Both Nostrils route daily. Refills:  0  
     
   
   
   
  
 levothyroxine 200 mcg tablet Commonly known as:  SYNTHROID Your last dose was: Your next dose is:    
   
   
 Dose:  225 mcg Take 225 mcg by mouth Daily (before breakfast). Takes a total of 225 mcg. Refills:  0  
     
   
   
   
  
 lisinopril 20 mg tablet Commonly known as:  Wong Ying Your last dose was: Your next dose is:    
   
   
 Dose:  20 mg Take 1 Tab by mouth daily. Quantity:  30 Tab Refills:  0 LYRICA 100 mg capsule Generic drug:  pregabalin Your last dose was: Your next dose is:    
   
   
 Dose:  100 mg Take 100 mg by mouth three (3) times daily. Refills:  0 MUCINEX 600 mg SR tablet Generic drug:  guaiFENesin SR Your last dose was: Your next dose is:    
   
   
 Dose:  600 mg Take 600 mg by mouth two (2) times a day. Refills:  0  
     
   
   
   
  
 multivitamin, tx-iron-ca-min 9 mg iron-400 mcg Tab tablet Commonly known as:  THERA-M w/ IRON Your last dose was: Your next dose is:    
   
   
 Dose:  1 Tab Take 1 Tab by mouth daily. Refills:  0  
     
   
   
   
  
 nitroglycerin 0.4 mg SL tablet Commonly known as:  NITROSTAT Your last dose was: Your next dose is:    
   
   
 Dose:  0.4 mg  
0.4 mg by SubLINGual route every five (5) minutes as needed (Give one tab sublingual every 5 minutes x3 doses as needed for chest pain). Refills:  0 Oxygen Your last dose was: Your next dose is:    
   
   
 continuous. 3L/min NC Refills:  0 PriLOSEC 20 mg capsule Generic drug:  omeprazole Your last dose was: Your next dose is:    
   
   
 Dose:  20 mg Take 20 mg by mouth daily. Refills:  0  
     
   
   
   
  
 QUESTRAN 4 gram powder Generic drug:  cholestyramine-sucrose Your last dose was: Your next dose is: Take  by mouth daily as needed (diarrhea). Refills:  0  
     
   
   
   
  
 RISAMINE 0.44-20.6 % Oint Generic drug:  Menthol-Zinc Oxide Your last dose was: Your next dose is: Apply  to affected area two (2) times a day. Indications: Apply to sacrum to maintain skin integrity Refills:  0 SINGULAIR 10 mg tablet Generic drug:  montelukast  
   
Your last dose was: Your next dose is:    
   
   
 Dose:  10 mg Take 10 mg by mouth daily. Refills:  0 SPIRIVA WITH HANDIHALER 18 mcg inhalation capsule Generic drug:  tiotropium Your last dose was: Your next dose is:    
   
   
 Dose:  1 Cap Take 1 Cap by inhalation daily. Refills:  0 VOLTAREN 1 % Gel Generic drug:  diclofenac Your last dose was: Your next dose is:    
   
   
 Dose:  2 g Apply 2 g to affected area three (3) times daily. Apply to both shoulders and both knees. Refills:  0 ZyrTEC 10 mg Cap Generic drug:  Cetirizine Your last dose was: Your next dose is: Take  by mouth daily. Refills:  0 Where to Get Your Medications Information on where to get these meds will be given to you by the nurse or doctor. ! Ask your nurse or doctor about these medications  
  dronabinol 2.5 mg capsule  
 fentaNYL 25 mcg/hr PATCH  
 nystatin 100,000 unit/mL suspension  
 vancomycin 10 gram 1,500 mg IVPB Discharge Instructions Learning About Chronic Bronchitis What is chronic bronchitis? Chronic bronchitis is long-term swelling and the buildup of mucus in the airways of your lungs. The airways (bronchial tubes) get inflamed and make a lot of mucus. This can narrow or block the airways, making it hard for you to breathe. It is a form of COPD (chronic obstructive pulmonary disease). Chronic bronchitis is usually caused by smoking. But chemical fumes, dust, or air pollution also can cause it over time. What can you expect when you have chronic bronchitis? Chronic bronchitis gets worse over time. You cannot undo the damage to your lungs. Over time, you may find that: 
· You get short of breath even when you do simple things like get dressed or fix a meal. 
· It is hard to eat or exercise. · You lose weight and feel weaker. Over many years, the swelling and mucus from chronic bronchitis make it more likely that you will get lung infections. But there are things you can do to prevent more damage and feel better. What are the symptoms? The main symptoms of chronic bronchitis are: · A cough that will not go away. · Mucus that comes up when you cough. · Shortness of breath that gets worse when you exercise. At times, your symptoms may suddenly flare up and get much worse. This is a called an exacerbation (say \"egg-NUPUR-quintin-BAY-conrado\"). When this happens, your usual symptoms quickly get worse and stay bad. This can be dangerous. You may have to go to the hospital. 
How can you keep chronic bronchitis from getting worse? Don't smoke. That is the best way to keep chronic bronchitis from getting worse. If you already smoke, it is never too late to stop. If you need help quitting, talk to your doctor about stop-smoking programs and medicines. These can increase your chances of quitting for good. You can do other things to keep chronic bronchitis from getting worse: · Avoid bad air. Air pollution, chemical fumes, and dust also can make chronic bronchitis worse. · Get a flu shot every year. A shot may keep the flu from turning into something more serious, like pneumonia. A flu shot also may lower your chances of having a flare-up. · Get a pneumococcal shot. A shot can prevent some of the serious complications of pneumonia. Ask your doctor how often you should get this shot. How is chronic bronchitis treated? Chronic bronchitis is treated with medicines and oxygen. You also can take steps at home to stay healthy and keep your condition from getting worse. Medicines and oxygen therapy · You may be taking medicines such as: ¨ Bronchodilators. These help open your airways and make breathing easier. Bronchodilators are either short-acting (work for 6 to 9 hours) or long-acting (work for 24 hours). You inhale most bronchodilators, so they start to act quickly. Always carry your quick-relief inhaler with you in case you need it while you are away from home. ¨ Corticosteroids. These reduce airway inflammation. They come in pill or inhaled form. You must take these medicines every day for them to work well. ¨ Antibiotics. These medicines are used when you have a bacterial lung infection. · Take your medicines exactly as prescribed. Call your doctor if you think you are having a problem with your medicine. · Oxygen therapy boosts the amount of oxygen in your blood and helps you breathe easier. Use the flow rate your doctor has recommended, and do not change it without talking to your doctor first. 
Other care at home · If your doctor recommends it, get more exercise. Walking is a good choice. Bit by bit, increase the amount you walk every day. Try for at least 30 minutes on most days of the week. · Learn breathing methodssuch as breathing through pursed lipsto help you become less short of breath. · If your doctor has not set you up with a pulmonary rehabilitation program, talk to him or her about whether rehab is right for you. Rehab includes exercise programs, education about your disease and how to manage it, help with diet and other changes, and emotional support. · Eat regular, healthy meals. Use bronchodilators about 1 hour before you eat to make it easier to eat. Eat several small meals instead of three large ones. Drink beverages at the end of the meal. Avoid foods that are hard to chew. Follow-up care is a key part of your treatment and safety.  Be sure to make and go to all appointments, and call your doctor if you are having problems. It's also a good idea to know your test results and keep a list of the medicines you take. Where can you learn more? Go to http://pily-tony.info/. Enter W325 in the search box to learn more about \"Learning About Chronic Bronchitis. \" Current as of: May 23, 2016 Content Version: 11.2 © 7724-4382 [employer]. Care instructions adapted under license by [employer] (which disclaims liability or warranty for this information). If you have questions about a medical condition or this instruction, always ask your healthcare professional. [place] [address] any warranty or liability for your use of this information. Cardiac Rehabilitation: Care Instructions Your Care Instructions Cardiac rehabilitation is a program for people who have a heart problem, such as a heart attack, heart failure, or a heart valve disease. The program includes exercise, lifestyle changes, education, and emotional support. Cardiac rehab can help you improve the quality of your life through better overall health. It can help you lose weight and feel better about yourself. On your cardiac rehab team, you may have your doctor, a nurse specialist, a dietitian, and a physical therapist. They will design your cardiac rehab program specifically for you. You will learn how to reduce your risk for heart problems, how to manage stress, and how to eat a heart-healthy diet. By the end of the program, you will be ready to maintain a healthier lifestyle on your own. Follow-up care is a key part of your treatment and safety. Be sure to make and go to all appointments, and call your doctor if you are having problems. It's also a good idea to know your test results and keep a list of the medicines you take. How can you care for yourself at home? · Take your medicines exactly as prescribed.  Call your doctor if you think you are having a problem with your medicine. You will get more details on the specific medicines your doctor prescribes. · Weigh yourself every day if your doctor tells you to. Watch for sudden weight gain. Weigh yourself on the same scale with the same amount of clothing at the same time of day. · Plan your meals so that you are eating heart-healthy foods. ¨ Eat a variety of foods daily. Fresh fruits and vegetables and whole-grains are good choices. ¨ Limit your fat intake, especially saturated and trans fat. ¨ Limit salt (sodium). ¨ Increase fiber in your diet. ¨ Limit alcohol. · Learn how to take your pulse so that you can track your heart rate during exercise. · Always check with your doctor before you begin a new exercise program. 
· Warm up before you exercise and cool down afterward for at least 15 minutes each. This will help your heart gradually prepare for and recover from exercise and avoid pushing your heart too hard. · Stop exercising if you have any unusual discomfort, such as chest pain. · Do not smoke. Smoking can make heart problems worse. If you need help quitting, talk to your doctor about stop-smoking programs and medicines. These can increase your chances of quitting for good. When should you call for help? Call 911 anytime you think you may need emergency care. For example, call if: 
· You have severe trouble breathing. · You cough up pink, foamy mucus and you have trouble breathing. · You have symptoms of a heart attack. These may include: ¨ Chest pain or pressure, or a strange feeling in the chest. 
¨ Sweating. ¨ Shortness of breath. ¨ Nausea or vomiting. ¨ Pain, pressure, or a strange feeling in the back, neck, jaw, or upper belly or in one or both shoulders or arms. ¨ Lightheadedness or sudden weakness. ¨ A fast or irregular heartbeat.  
After you call 911, the  may tell you to chew 1 adult-strength or 2 to 4 low-dose aspirin. Wait for an ambulance. Do not try to drive yourself. · You have angina symptoms (such as chest pain or pressure) that do not go away with rest or are not getting better within 5 minutes after you take a dose of nitroglycerin. · You have symptoms of a stroke. These may include: 
¨ Sudden numbness, tingling, weakness, or loss of movement in your face, arm, or leg, especially on only one side of your body. ¨ Sudden vision changes. ¨ Sudden trouble speaking. ¨ Sudden confusion or trouble understanding simple statements. ¨ Sudden problems with walking or balance. ¨ A sudden, severe headache that is different from past headaches. · You passed out (lost consciousness). Call your doctor now or seek immediate medical care if: 
· You have new or increased shortness of breath. · You are dizzy or lightheaded, or you feel like you may faint. · You gain weight suddenly, such as 3 pounds or more in 2 to 3 days. (Your doctor may suggest a different range of weight gain.) · You have increased swelling in your legs, ankles, or feet. Watch closely for changes in your health, and be sure to contact your doctor if you have any problems. Where can you learn more? Go to http://pily-tony.info/. Enter M043 in the search box to learn more about \"Cardiac Rehabilitation: Care Instructions. \" Current as of: May 5, 2016 Content Version: 11.2 © 2021-3473 [employer]. Care instructions adapted under license by [employer] (which disclaims liability or warranty for this information). If you have questions about a medical condition or this instruction, always ask your healthcare professional. [place] [address] any warranty or liability for your use of this information. Chronic Obstructive Pulmonary Disease (COPD): Care Instructions Your Care Instructions Chronic obstructive pulmonary disease (COPD) is a general term for a group of lung diseases, including emphysema and chronic bronchitis. People with COPD have decreased airflow in and out of the lungs, which makes it hard to breathe. The airways also can get clogged with thick mucus. Cigarette smoking is a major cause of COPD. Although there is no cure for COPD, you can slow its progress. Following your treatment plan and taking care of yourself can help you feel better and live longer. Follow-up care is a key part of your treatment and safety. Be sure to make and go to all appointments, and call your doctor if you are having problems. It's also a good idea to know your test results and keep a list of the medicines you take. How can you care for yourself at home? Staying healthy · Do not smoke. This is the most important step you can take to prevent more damage to your lungs. If you need help quitting, talk to your doctor about stop-smoking programs and medicines. These can increase your chances of quitting for good. · Avoid colds and flu. Get a pneumococcal vaccine shot. If you have had one before, ask your doctor whether you need a second dose. Get the flu vaccine every fall. If you must be around people with colds or the flu, wash your hands often. · Avoid secondhand smoke, air pollution, and high altitudes. Also avoid cold, dry air and hot, humid air. Stay at home with your windows closed when air pollution is bad. Medicines and oxygen therapy · Take your medicines exactly as prescribed. Call your doctor if you think you are having a problem with your medicine. · You may be taking medicines such as: ¨ Bronchodilators. These help open your airways and make breathing easier. Bronchodilators are either short-acting (work for 6 to 9 hours) or long-acting (work for 24 hours). You inhale most bronchodilators, so they start to act quickly. Always carry your quick-relief inhaler with you in case you need it while you are away from home. ¨ Corticosteroids (prednisone, budesonide). These reduce airway inflammation. They come in pill or inhaled form. You must take these medicines every day for them to work well. · A spacer may help you get more inhaled medicine to your lungs. Ask your doctor or pharmacist if a spacer is right for you. If it is, ask how to use it properly. · Do not take any vitamins, over-the-counter medicine, or herbal products without talking to your doctor first. 
· If your doctor prescribed antibiotics, take them as directed. Do not stop taking them just because you feel better. You need to take the full course of antibiotics. · Oxygen therapy boosts the amount of oxygen in your blood and helps you breathe easier. Use the flow rate your doctor has recommended, and do not change it without talking to your doctor first. 
Activity · Get regular exercise. Walking is an easy way to get exercise. Start out slowly, and walk a little more each day. · Pay attention to your breathing. You are exercising too hard if you cannot talk while you are exercising. · Take short rest breaks when doing household chores and other activities. · Learn breathing methodssuch as breathing through pursed lipsto help you become less short of breath. · If your doctor has not set you up with a pulmonary rehabilitation program, talk to him or her about whether rehab is right for you. Rehab includes exercise programs, education about your disease and how to manage it, help with diet and other changes, and emotional support. Diet · Eat regular, healthy meals. Use bronchodilators about 1 hour before you eat to make it easier to eat. Eat several small meals instead of three large ones. Drink beverages at the end of the meal. Avoid foods that are hard to chew. · Eat foods that contain protein so that you do not lose muscle mass. Mental health · Talk to your family, friends, or a therapist about your feelings.  It is normal to feel frightened, angry, hopeless, helpless, and even guilty. Talking openly about bad feelings can help you cope. If these feelings last, talk to your doctor. When should you call for help? Call 911 anytime you think you may need emergency care. For example, call if: 
· You have severe trouble breathing. Call your doctor now or seek immediate medical care if: 
· You have new or worse trouble breathing. · You cough up blood. · You have a fever. Watch closely for changes in your health, and be sure to contact your doctor if: 
· You cough more deeply or more often, especially if you notice more mucus or a change in the color of your mucus. · You have new or worse swelling in your legs or belly. · You are not getting better as expected. Where can you learn more? Go to http://pily-tony.info/. Eugene Nguyen in the search box to learn more about \"Chronic Obstructive Pulmonary Disease (COPD): Care Instructions. \" Current as of: May 23, 2016 Content Version: 11.2 © 7949-1299 [employer]. Care instructions adapted under license by [employer] (which disclaims liability or warranty for this information). If you have questions about a medical condition or this instruction, always ask your healthcare professional. [address] any warranty or liability for your use of this information. Heart Failure: Care Instructions Your Care Instructions Heart failure occurs when your heart does not pump as much blood as the body needs. Failure does not mean that the heart has stopped pumping but rather that it is not pumping as well as it should. Over time, this causes fluid buildup in your lungs and other parts of your body. Fluid buildup can cause shortness of breath, fatigue, swollen ankles, and other problems.  By taking medicines regularly, reducing sodium (salt) in your diet, checking your weight every day, and making lifestyle changes, you can feel better and live longer. Follow-up care is a key part of your treatment and safety. Be sure to make and go to all appointments, and call your doctor if you are having problems. It's also a good idea to know your test results and keep a list of the medicines you take. How can you care for yourself at home? Medicines · Be safe with medicines. Take your medicines exactly as prescribed. Call your doctor if you think you are having a problem with your medicine. · Do not take any vitamins, over-the-counter medicine, or herbal products without talking to your doctor first. Tiffany Sands not take ibuprofen (Advil or Motrin) and naproxen (Aleve) without talking to your doctor first. They could make your heart failure worse. · You may be taking some of the following medicine. ¨ Beta-blockers can slow heart rate, decrease blood pressure, and improve your condition. Taking a beta-blocker may lower your chance of needing to be hospitalized. ¨ Angiotensin-converting enzyme inhibitors (ACEIs) reduce the heart's workload, lower blood pressure, and reduce swelling. Taking an ACEI may lower your chance of needing to be hospitalized again. ¨ Angiotensin II receptor blockers (ARBs) work like ACEIs. Your doctor may prescribe them instead of ACEIs. ¨ Diuretics, also called water pills, reduce swelling. ¨ Potassium supplements replace this important mineral, which is sometimes lost with diuretics. ¨ Aspirin and other blood thinners prevent blood clots, which can cause a stroke or heart attack. You will get more details on the specific medicines your doctor prescribes. Diet · Your doctor may suggest that you limit sodium to 2,000 milligrams (mg) a day or less. That is less than 1 teaspoon of salt a day, including all the salt you eat in cooking or in packaged foods.  People get most of their sodium from processed foods. Fast food and restaurant meals also tend to be very high in sodium. · Ask your doctor how much liquid you can drink each day. You may have to limit liquids. Weight · Weigh yourself without clothing at the same time each day. Record your weight. Call your doctor if you gain more than 3 pounds in 2 to 3 days. A sudden weight gain may mean that your heart failure is getting worse. Activity level · Start light exercise (if your doctor says it is okay). Even if you can only do a small amount, exercise will help you get stronger, have more energy, and manage your weight and your stress. Walking is an easy way to get exercise. Start out by walking a little more than you did before. Bit by bit, increase the amount you walk. · When you exercise, watch for signs that your heart is working too hard. You are pushing yourself too hard if you cannot talk while you are exercising. If you become short of breath or dizzy or have chest pain, stop, sit down, and rest. 
· If you feel \"wiped out\" the day after you exercise, walk slower or for a shorter distance until you can work up to a better pace. · Get enough rest at night. Sleeping with 1 or 2 pillows under your upper body and head may help you breathe easier. Lifestyle changes · Do not smoke. Smoking can make a heart condition worse. If you need help quitting, talk to your doctor about stop-smoking programs and medicines. These can increase your chances of quitting for good. Quitting smoking may be the most important step you can take to protect your heart. · Limit alcohol to 2 drinks a day for men and 1 drink a day for women. Too much alcohol can cause health problems. · Avoid getting sick from colds and the flu. Get a pneumococcal vaccine shot. If you have had one before, ask your doctor whether you need another dose. Get a flu shot each year. If you must be around people with colds or the flu, wash your hands often. When should you call for help? Call 911 if you have symptoms of sudden heart failure such as: 
· You have severe trouble breathing. · You cough up pink, foamy mucus. · You have a new irregular or rapid heartbeat. Call your doctor now or seek immediate medical care if: 
· You have new or increased shortness of breath. · You are dizzy or lightheaded, or you feel like you may faint. · You have sudden weight gain, such as 3 pounds or more in 2 to 3 days. · You have increased swelling in your legs, ankles, or feet. · You are suddenly so tired or weak that you cannot do your usual activities. Watch closely for changes in your health, and be sure to contact your doctor if: 
· You develop new symptoms. Where can you learn more? Go to http://pily-tony.info/. Enter R491 in the search box to learn more about \"Heart Failure: Care Instructions. \" Current as of: January 27, 2016 Content Version: 11.2 © 0201-2556 [employer]. Care instructions adapted under license by [employer] (which disclaims liability or warranty for this information). If you have questions about a medical condition or this instruction, always ask your healthcare professional. [address] any warranty or liability for your use of this information. Discharge Orders None [employer] Announcement We are excited to announce that we are making your provider's discharge notes available to you in [employer]. You will see these notes when they are completed and signed by the physician that discharged you from your recent hospital stay. If you have any questions or concerns about any information you see in [employer], please call the Health Information Department where you were seen or reach out to your Primary Care Provider for more information about your plan of care. Introducing [facility]!    
 Leo Mcpherson introduces [employer] patient portal. Now you can access parts of your medical record, email your doctor's office, and request medication refills online. 1. In your internet browser, go to [email or web address] 2. Click on the First Time User? Click Here link in the Sign In box. You will see the New Member Sign Up page. 3. Enter your [employer] Access Code exactly as it appears below. You will not need to use this code after youve completed the sign-up process. If you do not sign up before the expiration date, you must request a new code. · [employer] Access Code: [email or web address] Expires: 8/11/2017  7:10 PM 
 
4. Enter the last four digits of your Social Security Number (xxxx) and Date of Birth (mm/dd/yyyy) as indicated and click Submit. You will be taken to the next sign-up page. 5. Create a [employer] ID. This will be your [employer] login ID and cannot be changed, so think of one that is secure and easy to remember. 6. Create a [employer] password. You can change your password at any time. 7. Enter your Password Reset Question and Answer. This can be used at a later time if you forget your password. 8. Enter your e-mail address. You will receive e-mail notification when new information is available in [address] 9. Click Sign Up. You can now view and download portions of your medical record. 10. Click the Download Summary menu link to download a portable copy of your medical information. If you have questions, please visit the Frequently Asked Questions section of the [employer] website. Remember, [employer] is NOT to be used for urgent needs. For medical emergencies, dial 911. Now available from your iPhone and Android! General Information Please provide this summary of care documentation to your next provider. Patient Signature:  ____________________________________________________________ Date:  ____________________________________________________________  
  
Junior Octavio  Provider Signature: ____________________________________________________________ Date:  ____________________________________________________________

## 2017-05-13 NOTE — ED TRIAGE NOTES
Pt hx COPD on 3L nc complaining worsening shortness of breath and produtive cough x 1 week. Given duo neb and 125mg solu-medrol by medics.

## 2017-05-14 LAB
ALBUMIN SERPL BCP-MCNC: 3.1 G/DL (ref 3.4–5)
ALBUMIN/GLOB SERPL: 0.7 {RATIO} (ref 0.8–1.7)
ALP SERPL-CCNC: 61 U/L (ref 45–117)
ALT SERPL-CCNC: 24 U/L (ref 13–56)
ANION GAP BLD CALC-SCNC: 11 MMOL/L (ref 3–18)
AST SERPL W P-5'-P-CCNC: 28 U/L (ref 15–37)
BASOPHILS # BLD AUTO: 0 K/UL (ref 0–0.06)
BASOPHILS # BLD: 0 % (ref 0–2)
BILIRUB SERPL-MCNC: 0.3 MG/DL (ref 0.2–1)
BUN SERPL-MCNC: 17 MG/DL (ref 7–18)
BUN/CREAT SERPL: 15 (ref 12–20)
CALCIUM SERPL-MCNC: 9.2 MG/DL (ref 8.5–10.1)
CHLORIDE SERPL-SCNC: 103 MMOL/L (ref 100–108)
CK MB CFR SERPL CALC: 1.8 % (ref 0–4)
CK MB SERPL-MCNC: 2.2 NG/ML (ref 5–25)
CK SERPL-CCNC: 121 U/L (ref 26–192)
CO2 SERPL-SCNC: 27 MMOL/L (ref 21–32)
CREAT SERPL-MCNC: 1.1 MG/DL (ref 0.6–1.3)
DIFFERENTIAL METHOD BLD: ABNORMAL
EOSINOPHIL # BLD: 0 K/UL (ref 0–0.4)
EOSINOPHIL NFR BLD: 0 % (ref 0–5)
ERYTHROCYTE [DISTWIDTH] IN BLOOD BY AUTOMATED COUNT: 12.9 % (ref 11.6–14.5)
GLOBULIN SER CALC-MCNC: 4.3 G/DL (ref 2–4)
GLUCOSE BLD STRIP.AUTO-MCNC: 146 MG/DL (ref 70–110)
GLUCOSE BLD STRIP.AUTO-MCNC: 156 MG/DL (ref 70–110)
GLUCOSE BLD STRIP.AUTO-MCNC: 197 MG/DL (ref 70–110)
GLUCOSE BLD STRIP.AUTO-MCNC: 216 MG/DL (ref 70–110)
GLUCOSE SERPL-MCNC: 175 MG/DL (ref 74–99)
HCT VFR BLD AUTO: 28.9 % (ref 35–45)
HGB BLD-MCNC: 9.7 G/DL (ref 12–16)
LACTATE SERPL-SCNC: 0.8 MMOL/L (ref 0.4–2)
LYMPHOCYTES # BLD AUTO: 11 % (ref 21–52)
LYMPHOCYTES # BLD: 0.9 K/UL (ref 0.9–3.6)
MAGNESIUM SERPL-MCNC: 1.9 MG/DL (ref 1.6–2.6)
MCH RBC QN AUTO: 31.3 PG (ref 24–34)
MCHC RBC AUTO-ENTMCNC: 33.6 G/DL (ref 31–37)
MCV RBC AUTO: 93.2 FL (ref 74–97)
MONOCYTES # BLD: 0.2 K/UL (ref 0.05–1.2)
MONOCYTES NFR BLD AUTO: 2 % (ref 3–10)
NEUTS SEG # BLD: 7.3 K/UL (ref 1.8–8)
NEUTS SEG NFR BLD AUTO: 87 % (ref 40–73)
PLATELET # BLD AUTO: 280 K/UL (ref 135–420)
PMV BLD AUTO: 9.8 FL (ref 9.2–11.8)
POTASSIUM SERPL-SCNC: 3.8 MMOL/L (ref 3.5–5.5)
PROT SERPL-MCNC: 7.4 G/DL (ref 6.4–8.2)
RBC # BLD AUTO: 3.1 M/UL (ref 4.2–5.3)
SODIUM SERPL-SCNC: 141 MMOL/L (ref 136–145)
TROPONIN I SERPL-MCNC: <0.02 NG/ML (ref 0–0.06)
WBC # BLD AUTO: 8.4 K/UL (ref 4.6–13.2)

## 2017-05-14 PROCEDURE — 74011250637 HC RX REV CODE- 250/637: Performed by: INTERNAL MEDICINE

## 2017-05-14 PROCEDURE — 94760 N-INVAS EAR/PLS OXIMETRY 1: CPT

## 2017-05-14 PROCEDURE — 74011000258 HC RX REV CODE- 258: Performed by: EMERGENCY MEDICINE

## 2017-05-14 PROCEDURE — 94640 AIRWAY INHALATION TREATMENT: CPT

## 2017-05-14 PROCEDURE — 83735 ASSAY OF MAGNESIUM: CPT | Performed by: INTERNAL MEDICINE

## 2017-05-14 PROCEDURE — 87186 SC STD MICRODIL/AGAR DIL: CPT | Performed by: INTERNAL MEDICINE

## 2017-05-14 PROCEDURE — 80053 COMPREHEN METABOLIC PANEL: CPT | Performed by: INTERNAL MEDICINE

## 2017-05-14 PROCEDURE — 74011636637 HC RX REV CODE- 636/637: Performed by: INTERNAL MEDICINE

## 2017-05-14 PROCEDURE — 77030013140 HC MSK NEB VYRM -A

## 2017-05-14 PROCEDURE — 74011250636 HC RX REV CODE- 250/636: Performed by: INTERNAL MEDICINE

## 2017-05-14 PROCEDURE — 74011250636 HC RX REV CODE- 250/636: Performed by: EMERGENCY MEDICINE

## 2017-05-14 PROCEDURE — 87077 CULTURE AEROBIC IDENTIFY: CPT | Performed by: INTERNAL MEDICINE

## 2017-05-14 PROCEDURE — 74011000250 HC RX REV CODE- 250: Performed by: EMERGENCY MEDICINE

## 2017-05-14 PROCEDURE — 82550 ASSAY OF CK (CPK): CPT | Performed by: INTERNAL MEDICINE

## 2017-05-14 PROCEDURE — 85025 COMPLETE CBC W/AUTO DIFF WBC: CPT | Performed by: INTERNAL MEDICINE

## 2017-05-14 PROCEDURE — 65660000000 HC RM CCU STEPDOWN

## 2017-05-14 PROCEDURE — 87070 CULTURE OTHR SPECIMN AEROBIC: CPT | Performed by: INTERNAL MEDICINE

## 2017-05-14 PROCEDURE — 74011250637 HC RX REV CODE- 250/637: Performed by: EMERGENCY MEDICINE

## 2017-05-14 PROCEDURE — 74011000250 HC RX REV CODE- 250: Performed by: INTERNAL MEDICINE

## 2017-05-14 PROCEDURE — 36415 COLL VENOUS BLD VENIPUNCTURE: CPT | Performed by: INTERNAL MEDICINE

## 2017-05-14 PROCEDURE — 82962 GLUCOSE BLOOD TEST: CPT

## 2017-05-14 PROCEDURE — 77010033678 HC OXYGEN DAILY

## 2017-05-14 RX ORDER — PREGABALIN 100 MG/1
100 CAPSULE ORAL 3 TIMES DAILY
Status: DISCONTINUED | OUTPATIENT
Start: 2017-05-14 | End: 2017-05-25 | Stop reason: HOSPADM

## 2017-05-14 RX ORDER — DULOXETIN HYDROCHLORIDE 60 MG/1
60 CAPSULE, DELAYED RELEASE ORAL DAILY
Status: DISCONTINUED | OUTPATIENT
Start: 2017-05-14 | End: 2017-05-25 | Stop reason: HOSPADM

## 2017-05-14 RX ORDER — ATENOLOL 25 MG/1
25 TABLET ORAL DAILY
Status: DISCONTINUED | OUTPATIENT
Start: 2017-05-14 | End: 2017-05-25 | Stop reason: HOSPADM

## 2017-05-14 RX ORDER — DIPHENHYDRAMINE HCL 12.5MG/5ML
12.5 ELIXIR ORAL
Status: DISCONTINUED | OUTPATIENT
Start: 2017-05-14 | End: 2017-05-25 | Stop reason: HOSPADM

## 2017-05-14 RX ORDER — BUMETANIDE 1 MG/1
1 TABLET ORAL DAILY
Status: DISCONTINUED | OUTPATIENT
Start: 2017-05-14 | End: 2017-05-15

## 2017-05-14 RX ORDER — MAGNESIUM SULFATE 100 %
16 CRYSTALS MISCELLANEOUS AS NEEDED
Status: DISCONTINUED | OUTPATIENT
Start: 2017-05-14 | End: 2017-05-25 | Stop reason: HOSPADM

## 2017-05-14 RX ORDER — MAG HYDROX/ALUMINUM HYD/SIMETH 200-200-20
15 SUSPENSION, ORAL (FINAL DOSE FORM) ORAL
Status: DISCONTINUED | OUTPATIENT
Start: 2017-05-14 | End: 2017-05-25 | Stop reason: HOSPADM

## 2017-05-14 RX ORDER — SODIUM CHLORIDE 0.9 % (FLUSH) 0.9 %
5-10 SYRINGE (ML) INJECTION AS NEEDED
Status: DISCONTINUED | OUTPATIENT
Start: 2017-05-14 | End: 2017-05-25 | Stop reason: HOSPADM

## 2017-05-14 RX ORDER — INSULIN LISPRO 100 [IU]/ML
INJECTION, SOLUTION INTRAVENOUS; SUBCUTANEOUS
Status: DISCONTINUED | OUTPATIENT
Start: 2017-05-14 | End: 2017-05-25 | Stop reason: HOSPADM

## 2017-05-14 RX ORDER — FLUTICASONE FUROATE AND VILANTEROL 200; 25 UG/1; UG/1
1 POWDER RESPIRATORY (INHALATION) DAILY
Status: DISCONTINUED | OUTPATIENT
Start: 2017-05-14 | End: 2017-05-25 | Stop reason: HOSPADM

## 2017-05-14 RX ORDER — GUAIFENESIN 600 MG/1
600 TABLET, EXTENDED RELEASE ORAL EVERY 12 HOURS
Status: DISCONTINUED | OUTPATIENT
Start: 2017-05-14 | End: 2017-05-25 | Stop reason: HOSPADM

## 2017-05-14 RX ORDER — DEXTROSE 50 % IN WATER (D50W) INTRAVENOUS SYRINGE
25-50 AS NEEDED
Status: DISCONTINUED | OUTPATIENT
Start: 2017-05-14 | End: 2017-05-25 | Stop reason: HOSPADM

## 2017-05-14 RX ORDER — CHOLESTYRAMINE 4 G/9G
4 POWDER, FOR SUSPENSION ORAL DAILY PRN
Status: DISCONTINUED | OUTPATIENT
Start: 2017-05-14 | End: 2017-05-25 | Stop reason: HOSPADM

## 2017-05-14 RX ORDER — LORATADINE 10 MG/1
10 TABLET ORAL DAILY
Status: DISCONTINUED | OUTPATIENT
Start: 2017-05-14 | End: 2017-05-25 | Stop reason: HOSPADM

## 2017-05-14 RX ORDER — IPRATROPIUM BROMIDE AND ALBUTEROL SULFATE 2.5; .5 MG/3ML; MG/3ML
3 SOLUTION RESPIRATORY (INHALATION)
Status: DISCONTINUED | OUTPATIENT
Start: 2017-05-14 | End: 2017-05-15

## 2017-05-14 RX ORDER — CYANOCOBALAMIN 1000 UG/ML
1000 INJECTION, SOLUTION INTRAMUSCULAR; SUBCUTANEOUS
Status: DISCONTINUED | OUTPATIENT
Start: 2017-05-14 | End: 2017-05-25 | Stop reason: HOSPADM

## 2017-05-14 RX ORDER — ZINC OXIDE 20 G/100G
OINTMENT TOPICAL 2 TIMES DAILY
Status: DISCONTINUED | OUTPATIENT
Start: 2017-05-14 | End: 2017-05-25 | Stop reason: HOSPADM

## 2017-05-14 RX ORDER — FENTANYL 25 UG/1
1 PATCH TRANSDERMAL
Status: DISCONTINUED | OUTPATIENT
Start: 2017-05-14 | End: 2017-05-25 | Stop reason: HOSPADM

## 2017-05-14 RX ORDER — NYSTATIN 100000 [USP'U]/G
POWDER TOPICAL 2 TIMES DAILY
Status: DISCONTINUED | OUTPATIENT
Start: 2017-05-14 | End: 2017-05-25 | Stop reason: HOSPADM

## 2017-05-14 RX ORDER — OMEPRAZOLE 20 MG/1
20 CAPSULE, DELAYED RELEASE ORAL DAILY
Status: DISCONTINUED | OUTPATIENT
Start: 2017-05-14 | End: 2017-05-25 | Stop reason: HOSPADM

## 2017-05-14 RX ORDER — MONTELUKAST SODIUM 10 MG/1
10 TABLET ORAL DAILY
Status: DISCONTINUED | OUTPATIENT
Start: 2017-05-14 | End: 2017-05-25 | Stop reason: HOSPADM

## 2017-05-14 RX ORDER — FLUTICASONE PROPIONATE 50 MCG
1 SPRAY, SUSPENSION (ML) NASAL DAILY
Status: DISCONTINUED | OUTPATIENT
Start: 2017-05-14 | End: 2017-05-25 | Stop reason: HOSPADM

## 2017-05-14 RX ORDER — CODEINE PHOSPHATE AND GUAIFENESIN 10; 100 MG/5ML; MG/5ML
10 SOLUTION ORAL
Status: DISCONTINUED | OUTPATIENT
Start: 2017-05-14 | End: 2017-05-25 | Stop reason: HOSPADM

## 2017-05-14 RX ORDER — SODIUM CHLORIDE 0.9 % (FLUSH) 0.9 %
5-10 SYRINGE (ML) INJECTION EVERY 8 HOURS
Status: DISCONTINUED | OUTPATIENT
Start: 2017-05-14 | End: 2017-05-25 | Stop reason: HOSPADM

## 2017-05-14 RX ORDER — DICLOFENAC SODIUM 10 MG/G
2 GEL TOPICAL 3 TIMES DAILY
Status: DISCONTINUED | OUTPATIENT
Start: 2017-05-14 | End: 2017-05-19

## 2017-05-14 RX ORDER — NITROGLYCERIN 0.4 MG/1
0.4 TABLET SUBLINGUAL
Status: DISCONTINUED | OUTPATIENT
Start: 2017-05-14 | End: 2017-05-25 | Stop reason: HOSPADM

## 2017-05-14 RX ORDER — DIPHENHYDRAMINE HCL 12.5MG/5ML
ELIXIR ORAL
Status: DISPENSED
Start: 2017-05-14 | End: 2017-05-14

## 2017-05-14 RX ORDER — ASPIRIN 81 MG/1
81 TABLET ORAL DAILY
Status: DISCONTINUED | OUTPATIENT
Start: 2017-05-14 | End: 2017-05-25 | Stop reason: HOSPADM

## 2017-05-14 RX ORDER — ALPRAZOLAM 0.5 MG/1
1 TABLET ORAL
Status: DISCONTINUED | OUTPATIENT
Start: 2017-05-14 | End: 2017-05-25 | Stop reason: HOSPADM

## 2017-05-14 RX ORDER — HEPARIN SODIUM 5000 [USP'U]/ML
5000 INJECTION, SOLUTION INTRAVENOUS; SUBCUTANEOUS EVERY 8 HOURS
Status: DISCONTINUED | OUTPATIENT
Start: 2017-05-14 | End: 2017-05-25 | Stop reason: HOSPADM

## 2017-05-14 RX ORDER — ACETAMINOPHEN 325 MG/1
650 TABLET ORAL
Status: DISCONTINUED | OUTPATIENT
Start: 2017-05-14 | End: 2017-05-25 | Stop reason: HOSPADM

## 2017-05-14 RX ORDER — LISINOPRIL 20 MG/1
20 TABLET ORAL DAILY
Status: DISCONTINUED | OUTPATIENT
Start: 2017-05-14 | End: 2017-05-25 | Stop reason: HOSPADM

## 2017-05-14 RX ORDER — OXYCODONE AND ACETAMINOPHEN 5; 325 MG/1; MG/1
1 TABLET ORAL
Status: DISCONTINUED | OUTPATIENT
Start: 2017-05-14 | End: 2017-05-25 | Stop reason: HOSPADM

## 2017-05-14 RX ORDER — CODEINE PHOSPHATE AND GUAIFENESIN 10; 100 MG/5ML; MG/5ML
SOLUTION ORAL
Status: DISPENSED
Start: 2017-05-14 | End: 2017-05-14

## 2017-05-14 RX ADMIN — Medication 10 ML: at 08:37

## 2017-05-14 RX ADMIN — HEPARIN SODIUM 5000 UNITS: 5000 INJECTION, SOLUTION INTRAVENOUS; SUBCUTANEOUS at 01:34

## 2017-05-14 RX ADMIN — ZINC OXIDE: 200 OINTMENT TOPICAL at 09:00

## 2017-05-14 RX ADMIN — METHYLPREDNISOLONE SODIUM SUCCINATE 40 MG: 40 INJECTION, POWDER, FOR SOLUTION INTRAMUSCULAR; INTRAVENOUS at 16:28

## 2017-05-14 RX ADMIN — NYSTATIN: 100000 POWDER TOPICAL at 22:14

## 2017-05-14 RX ADMIN — Medication 10 ML: at 22:00

## 2017-05-14 RX ADMIN — ASPIRIN 81 MG: 81 TABLET, COATED ORAL at 08:35

## 2017-05-14 RX ADMIN — NYSTATIN: 100000 POWDER TOPICAL at 09:00

## 2017-05-14 RX ADMIN — IPRATROPIUM BROMIDE AND ALBUTEROL SULFATE 3 ML: .5; 3 SOLUTION RESPIRATORY (INHALATION) at 07:28

## 2017-05-14 RX ADMIN — ATENOLOL 25 MG: 25 TABLET ORAL at 08:35

## 2017-05-14 RX ADMIN — IPRATROPIUM BROMIDE AND ALBUTEROL SULFATE 3 ML: .5; 3 SOLUTION RESPIRATORY (INHALATION) at 19:14

## 2017-05-14 RX ADMIN — HEPARIN SODIUM 5000 UNITS: 5000 INJECTION, SOLUTION INTRAVENOUS; SUBCUTANEOUS at 16:29

## 2017-05-14 RX ADMIN — IPRATROPIUM BROMIDE AND ALBUTEROL SULFATE 3 ML: .5; 3 SOLUTION RESPIRATORY (INHALATION) at 11:22

## 2017-05-14 RX ADMIN — LEVOFLOXACIN 750 MG: 750 TABLET, FILM COATED ORAL at 01:35

## 2017-05-14 RX ADMIN — PREGABALIN 100 MG: 100 CAPSULE ORAL at 21:19

## 2017-05-14 RX ADMIN — MULTIPLE VITAMINS W/ MINERALS TAB 1 TABLET: TAB at 08:35

## 2017-05-14 RX ADMIN — AZTREONAM 2 G: 2 INJECTION, POWDER, LYOPHILIZED, FOR SOLUTION INTRAMUSCULAR; INTRAVENOUS at 06:47

## 2017-05-14 RX ADMIN — MAGNESIUM SULFATE HEPTAHYDRATE 2 G: 40 INJECTION, SOLUTION INTRAVENOUS at 01:35

## 2017-05-14 RX ADMIN — PREGABALIN 100 MG: 100 CAPSULE ORAL at 08:35

## 2017-05-14 RX ADMIN — INSULIN LISPRO 4 UNITS: 100 INJECTION, SOLUTION INTRAVENOUS; SUBCUTANEOUS at 22:10

## 2017-05-14 RX ADMIN — GUAIFENESIN AND CODEINE PHOSPHATE 10 ML: 100; 10 SOLUTION ORAL at 16:28

## 2017-05-14 RX ADMIN — ZINC OXIDE: 200 OINTMENT TOPICAL at 22:14

## 2017-05-14 RX ADMIN — OMEPRAZOLE 20 MG: 20 CAPSULE, DELAYED RELEASE ORAL at 08:35

## 2017-05-14 RX ADMIN — HEPARIN SODIUM 5000 UNITS: 5000 INJECTION, SOLUTION INTRAVENOUS; SUBCUTANEOUS at 08:36

## 2017-05-14 RX ADMIN — OXYCODONE HYDROCHLORIDE AND ACETAMINOPHEN 1 TABLET: 5; 325 TABLET ORAL at 16:28

## 2017-05-14 RX ADMIN — SODIUM CHLORIDE 500 MG: 900 INJECTION, SOLUTION INTRAVENOUS at 18:01

## 2017-05-14 RX ADMIN — BUMETANIDE 1 MG: 1 TABLET ORAL at 08:35

## 2017-05-14 RX ADMIN — GUAIFENESIN AND CODEINE PHOSPHATE 10 ML: 100; 10 SOLUTION ORAL at 21:18

## 2017-05-14 RX ADMIN — OXYCODONE HYDROCHLORIDE AND ACETAMINOPHEN 1 TABLET: 5; 325 TABLET ORAL at 01:35

## 2017-05-14 RX ADMIN — GUAIFENESIN 600 MG: 600 TABLET, EXTENDED RELEASE ORAL at 21:19

## 2017-05-14 RX ADMIN — METHYLPREDNISOLONE SODIUM SUCCINATE 40 MG: 40 INJECTION, POWDER, FOR SOLUTION INTRAMUSCULAR; INTRAVENOUS at 01:35

## 2017-05-14 RX ADMIN — IPRATROPIUM BROMIDE AND ALBUTEROL SULFATE 3 ML: .5; 3 SOLUTION RESPIRATORY (INHALATION) at 01:41

## 2017-05-14 RX ADMIN — AZTREONAM 2 G: 2 INJECTION, POWDER, LYOPHILIZED, FOR SOLUTION INTRAMUSCULAR; INTRAVENOUS at 22:10

## 2017-05-14 RX ADMIN — GUAIFENESIN 600 MG: 600 TABLET, EXTENDED RELEASE ORAL at 08:35

## 2017-05-14 RX ADMIN — GUAIFENESIN 600 MG: 600 TABLET, EXTENDED RELEASE ORAL at 01:35

## 2017-05-14 RX ADMIN — Medication 10 ML: at 01:36

## 2017-05-14 RX ADMIN — PREGABALIN 100 MG: 100 CAPSULE ORAL at 16:28

## 2017-05-14 RX ADMIN — INSULIN LISPRO 2 UNITS: 100 INJECTION, SOLUTION INTRAVENOUS; SUBCUTANEOUS at 11:30

## 2017-05-14 RX ADMIN — GUAIFENESIN AND CODEINE PHOSPHATE 10 ML: 100; 10 SOLUTION ORAL at 08:36

## 2017-05-14 RX ADMIN — LISINOPRIL 20 MG: 20 TABLET ORAL at 08:35

## 2017-05-14 RX ADMIN — MONTELUKAST SODIUM 10 MG: 10 TABLET, FILM COATED ORAL at 08:35

## 2017-05-14 RX ADMIN — INSULIN LISPRO 2 UNITS: 100 INJECTION, SOLUTION INTRAVENOUS; SUBCUTANEOUS at 06:48

## 2017-05-14 RX ADMIN — ALUMINUM HYDROXIDE, MAGNESIUM HYDROXIDE, AND SIMETHICONE 15 ML: 200; 200; 20 SUSPENSION ORAL at 08:34

## 2017-05-14 RX ADMIN — Medication 10 ML: at 16:22

## 2017-05-14 RX ADMIN — ALPRAZOLAM 1 MG: 0.5 TABLET ORAL at 22:10

## 2017-05-14 RX ADMIN — AZTREONAM 2 G: 2 INJECTION, POWDER, LYOPHILIZED, FOR SOLUTION INTRAMUSCULAR; INTRAVENOUS at 16:17

## 2017-05-14 RX ADMIN — DULOXETINE 60 MG: 60 CAPSULE, DELAYED RELEASE ORAL at 08:35

## 2017-05-14 RX ADMIN — DIPHENHYDRAMINE HYDROCHLORIDE 12.5 MG: 12.5 SOLUTION ORAL at 03:27

## 2017-05-14 RX ADMIN — GUAIFENESIN AND CODEINE PHOSPHATE 10 ML: 100; 10 SOLUTION ORAL at 03:27

## 2017-05-14 RX ADMIN — IPRATROPIUM BROMIDE AND ALBUTEROL SULFATE 3 ML: .5; 3 SOLUTION RESPIRATORY (INHALATION) at 15:43

## 2017-05-14 RX ADMIN — METHYLPREDNISOLONE SODIUM SUCCINATE 40 MG: 40 INJECTION, POWDER, FOR SOLUTION INTRAMUSCULAR; INTRAVENOUS at 08:35

## 2017-05-14 RX ADMIN — VANCOMYCIN HYDROCHLORIDE 2000 MG: 10 INJECTION, POWDER, LYOPHILIZED, FOR SOLUTION INTRAVENOUS at 01:52

## 2017-05-14 RX ADMIN — Medication 10 ML: at 08:36

## 2017-05-14 RX ADMIN — DIPHENHYDRAMINE HYDROCHLORIDE 12.5 MG: 12.5 SOLUTION ORAL at 21:19

## 2017-05-14 RX ADMIN — OXYCODONE HYDROCHLORIDE AND ACETAMINOPHEN 1 TABLET: 5; 325 TABLET ORAL at 08:35

## 2017-05-14 RX ADMIN — LEVOTHYROXINE SODIUM 225 MCG: 150 TABLET ORAL at 06:47

## 2017-05-14 RX ADMIN — IPRATROPIUM BROMIDE AND ALBUTEROL SULFATE 3 ML: .5; 3 SOLUTION RESPIRATORY (INHALATION) at 23:13

## 2017-05-14 RX ADMIN — LORATADINE 10 MG: 10 TABLET ORAL at 08:35

## 2017-05-14 RX ADMIN — IPRATROPIUM BROMIDE AND ALBUTEROL SULFATE 3 ML: .5; 3 SOLUTION RESPIRATORY (INHALATION) at 04:32

## 2017-05-14 NOTE — ED NOTES
Patient experienced a coughing fit and stated she can't breathe. Patient ripped off all her monitor leads, moved to the chair. Patient states \"I feel crazy, what is happening to me\". Patient replaced on monitor leads, oxygen saturation 100% on room air.

## 2017-05-14 NOTE — PROGRESS NOTES
0120:  Pt transported to room via stretcher by ED personnel. Pt in room coughing. Pt requesting cough medication and something to \"help me sleep\". \"Requested and received orders for benedryl oral elixer 12.5 mg every 6h for sleep and robitussin AC 10 mg/5 ml oral solution every 4h for cough. 2276:  Rapid flu test verbal order by Dr. Nicolle Caceres. Shift summary:  Pt rested comfortably throughout the night.

## 2017-05-14 NOTE — ROUTINE PROCESS
Bedside and Verbal shift change report given to Valentin Ruiz RN (oncoming nurse) by Bahman Luna RN (offgoing nurse).  Report included the following information SBAR, Kardex, Intake/Output, MAR, Recent Results, Med Rec Status and Cardiac Rhythm ST.

## 2017-05-14 NOTE — PROGRESS NOTES
PATIENT IS O2 DEPENDENT BUT HAD REMOVED CANNULA DUE TO DRY NOSE.  SPO2 91-96% ON ROOM AIR.   PROVIDED HUMIDIFICATION FOR O2 AND O2 WAS RESUMED @ 3L NC.

## 2017-05-14 NOTE — H&P
History & Physical    Patient: Fatuma Pod MRN: [id number]  CSN: [id number]    YOB: 1945  Age: 80 y.o. Sex: female      DOA: 5/13/2017  Primary Care Provider:  Irene Sidhu MD      Assessment/Plan     Patient Active Problem List   Diagnosis Code    Cataract H26.9    DDD (degenerative disc disease), cervical M50.30    H/O cervical spine surgery Z98.890    COPD (chronic obstructive pulmonary disease) ([facility] J44.9    Acidosis E87.2    COPD exacerbation ([address] J44.1    Acute on chronic respiratory failure (HCC) J96.20    Obesity E66.9    Benign hypertension I10    GERD (gastroesophageal reflux disease) K21.9    Acute diastolic CHF (congestive heart failure) ([facility]) I50.31    Acute encephalopathy G93.40    Toxic metabolic encephalopathy M79    Acute renal failure (ARF) ([facility]) N17.9    Hyperkalemia E87.5    PNA (pneumonia) J18.9    Chest pain R07.9    Anxiety F41.9    Hypomagnesemia E83.42    Sinus tachycardia R00.0    Acute respiratory distress R06.00    Hypoxia R09.02    HCAP (healthcare-associated pneumonia) J18.9     1. Acute Respiratory Distress with Hypoxia   2. Acute Moderate Exacerbation of Mod Persistent COPD; on home 3L O2  3. HCAP  4. Mild Acute Diastolic CHF exacerbation stage III  5. Elevated BNP  6. HypoMg  7. Atypical Chest Pain; likely non cardiac   8. GERD  9. HTN  10. Obesity BMI >35  11. Cervical DDD  12. Cataract  FULL CODE     -admit to tele; I do not suspect this is PE therefore holding off on getting CTA or PE working up. If needed necessary, will order it. -sepsis bundle initiated by ED. Pancultures. [employer] Abx ordered  -will order sputum cultures and flu swab   -solumedrol IV, duonebs, mucinex. -supplemental O2; Goal O2 >90%  -trend cardiac enzymes, replete Mg. No need for aggressive diuresis at this time.  Will cont her home regimen   -cont home fentanyl for pain, will add percocet 5/325mg 1 tab po q6 hrs prn for pain   -Pulm consult in the morning  -cont other home medications   -accuchecks, ISS as she is on solumedrol   -supportive care         CC: Sob/Cough       HPI:     Syl Bella is a 80 y.o. female with hx as listed [facility] with complaints of sob. Patient states for past 2 weeks she has noticed she has had constant whitish sputum productive cough and meanwhile she had progressively became sob for the past week. She states just getting around at the facility makes her sob. Her cough is even causing her to have chest pain. This week her sputum has been greenish in color, reports of chills and maybe low grade temp. She has been taking meds as prescribed there. Perhaps sick contacts there too. She is on 3L O2 over there. She was never a smoker but her  and children were heavy smokers. She wears a fentanyl patch for her chronic shoulder pain. EMS was called for this worsening sob who gave her solumedrol and duonebs. In the ED she was noted to be hypoxic therefore her O2 was increased. At this time she doesn't have any new complaints. Denies LE swelling, headache, any GI or  symptoms. She wishes to be a DNR but her son has made her Full code. She states before she can legally change it, she will have a conversation with her son. She has been admitted here in the past for COPD exac but last time was 3 months ago for acute metabolic encephalopathy which was thought due to underlying infection (possible PNA). Denies tobacco, alcohol and IVDA.      Past Medical History:   Diagnosis Date    Arthritis     Asthma     CAD (coronary artery disease)     angina, last episode 06/2012    Chronic bronchitis (HCC)     Chronic kidney disease     stage 3    Chronic obstructive pulmonary disease (HCC)     Chronic pain     cervical, lumbar, knees, ankles, elbows    Fibromyalgia     GERD (gastroesophageal reflux disease)     Hypertension 1993    Psychiatric disorder     anxiety, depression    Thyroid disease        Past Surgical History:   Procedure Laterality Date    HX ADENOIDECTOMY      HX APPENDECTOMY      HX CATARACT REMOVAL      OU    HX CERVICAL FUSION      anterior x 2    HX CERVICAL FUSION      posterior x 3    HX CERVICAL FUSION  1996    Removal of titanium plate and screws    HX CHOLECYSTECTOMY      HX HYSTERECTOMY      HX LITHOTRIPSY      x 4    HX OOPHORECTOMY      left    HX ORTHOPAEDIC      cervical  x5    HX SMALL BOWEL RESECTION      HX TONSILLECTOMY      HX UROLOGICAL  1984 and 1985    kidney stone surgery       History reviewed. No pertinent family history. Social History     Social History    Marital status: SINGLE     Spouse name: N/A    Number of children: N/A    Years of education: N/A     Social History Main Topics    Smoking status: Never Smoker    Smokeless tobacco: Never Used    Alcohol use No    Drug use: No    Sexual activity: Yes     Birth control/ protection: IUD, None     Other Topics Concern    None     Social History Narrative       Prior to Admission medications    Medication Sig Start Date End Date Taking? Authorizing Provider   lisinopril (PRINIVIL, ZESTRIL) 20 mg tablet Take 1 Tab by mouth daily. 2/16/17   Daniel Marin MD   fentaNYL (DURAGESIC) 25 mcg/hr PATCH 1 Patch by TransDERmal route every seventy-two (72) hours. Max Daily Amount: 1 Patch. 2/16/17   Daniel Marin MD   bumetanide (BUMEX) 1 mg tablet Take 1 Tab by mouth daily. 2/16/17   Daniel Marin MD   Menthol-Zinc Oxide (RISAMINE) 0.44-20.6 % oint Apply  to affected area two (2) times a day. Indications: Apply to sacrum to maintain skin integrity    Historical Provider   alum-mag hydroxide-simeth (MYLANTA) 200-200-20 mg/5 mL susp Take 15 mL by mouth four (4) times daily as needed. Historical Provider   tiotropium (SPIRIVA WITH HANDIHALER) 18 mcg inhalation capsule Take 1 Cap by inhalation daily.     Historical Provider   multivitamin, tx-iron-ca-min (THERA-M W/ IRON) 9 mg iron-400 mcg tab tablet Take 1 Tab by mouth daily. Historical Provider   Oxygen continuous. 3L/min NC    Historical Provider   omeprazole (PRILOSEC) 20 mg capsule Take 20 mg by mouth daily. Irene Sidhu MD   nystatin (MYCOSTATIN) powder Apply  to affected area two (2) times a day. 12/1/16   Aguilar Martinez MD   levothyroxine (SYNTHROID) 200 mcg tablet Take 225 mcg by mouth Daily (before breakfast). Takes a total of 225 mcg. Historical Provider   pregabalin (LYRICA) 100 mg capsule Take 100 mg by mouth three (3) times daily. Historical Provider   fluticasone (FLONASE) 50 mcg/actuation nasal spray 1 [place] by Both Nostrils route daily. Historical Provider   cyanocobalamin (VITAMIN B12) 1,000 mcg/mL injection 1,000 mcg by IntraMUSCular route every month. Historical Provider   acetaminophen (TYLENOL) 325 mg tablet Take 650 mg by mouth every six (6) hours as needed for Pain. Historical Provider   diclofenac (VOLTAREN) 1 % gel Apply 2 g to affected area three (3) times daily. Apply to both shoulders and both knees. Historical Provider   cholestyramine-sucrose [place]) 4 gram powder Take  by mouth daily as needed (diarrhea). Historical Provider   albuterol (PROVENTIL VENTOLIN) 2.5 mg /3 mL (0.083 %) nebulizer solution 2.5 mg by Nebulization route every four (4) hours as needed. Historical Provider   fluticasone-salmeterol (ADVAIR DISKUS) 500-50 mcg/dose diskus inhaler Take 1 Puff by inhalation every twelve (12) hours. May use inhaler on DOS    Historical Provider   nitroglycerin (NITROSTAT) 0.4 mg SL tablet 0.4 mg by SubLINGual route every five (5) minutes as needed (Give one tab sublingual every 5 minutes x3 doses as needed for chest pain). Historical Provider   aspirin 81 mg tablet Take 81 mg by mouth daily. Historical Provider   atenolol (TENORMIN) 25 mg tablet Take 25 mg by mouth daily.  As needed for palpatations     Historical Provider   guaiFENesin SR (MUCINEX) 600 mg SR tablet Take 600 mg by mouth two (2) times a day. Historical Provider   DULoxetine (CYMBALTA) 60 mg capsule Take 60 mg by mouth daily. Indications: CHRONIC MUSCULOSKELETAL PAIN    Historical Provider   Cetirizine (ZYRTEC) 10 mg cap Take  by mouth daily. Historical Provider   montelukast (SINGULAIR) 10 mg tablet Take 10 mg by mouth daily. Historical Provider       Allergies   Allergen Reactions    Ambien [Zolpidem] Other (comments)     Sleep drive    Aspirin Other (comments)     bruising    Codeine Hives    Darvon [Propoxyphene] Unknown (comments)    Iodinated Contrast Media - Oral And Iv Dye Hives     Iodine on skin is ok per pt.  Pcn [Penicillins] Nausea and Vomiting    Shellfish Derived Hives, Shortness of Breath and Swelling     Iodine on skin is ok per pt.  Zanaflex [Tizanidine] Other (comments)     disorientated    Levaquin [Levofloxacin] Hives     Red rash at IV site while infusing       Review of Systems  Gen: No fever, chills, malaise, weight loss/gain. Heent: No headache, rhinorrhea, epistaxis, ear pain, hearing loss, sinus pain, neck pain/stiffness, sore throat. Heart: No  palpitations  Resp: Nohemoptysis   GI: No nausea, vomiting, diarrhea, constipation, melena or hematochezia. : No urinary obstruction, dysuria or hematuria. Derm: No rash, new skin lesion or pruritis. Musc/skeletal: no bone or joint complains. Vasc: No edema, cyanosis or claudication. Endo: No heat/cold intolerance, no polyuria,polydipsia or polyphagia. Neuro: No unilateral weakness, numbness, tingling. No seizures. Heme: No easy bruising or bleeding.           Physical Exam:     Physical Exam:  Visit Vitals    /57    Pulse (!) 103    Temp 99.6 °F (37.6 °C)    Resp 21    Ht 5' (1.524 m)    Wt 99.8 kg (220 lb)    SpO2 98%    BMI 42.97 kg/m2    O2 Flow Rate (L/min): 8 l/min O2 Device: Aerosol mask    Temp (24hrs), Av.5 °F (37.5 °C), Min:99.4 °F (37.4 °C), Max:99.6 °F (37.6 °C)             General:  Awake, cooperative, no distress. 3L O2 in place    Head:  Normocephalic, without obvious abnormality, atraumatic. Eyes:  Conjunctivae/corneas clear, sclera anicteric, PERRL, EOMs intact. Nose: Nares normal. No drainage or sinus tenderness. Throat: Lips, mucosa, and tongue normal.    Neck: Supple, symmetrical, trachea midline, no adenopathy. Lungs:   Diffuse coarse BS       Heart:  Regular rate and rhythm, S1, S2 normal, no murmur, click, rub or gallop. Abdomen: Soft, non-tender. Bowel sounds normal. No masses,  No organomegaly. Extremities: Extremities normal, atraumatic, no cyanosis, trace b/l LE edema. Capillary refill normal.   Pulses: 2+ and symmetric all extremities. Skin: Skin color pink/pale/mottled/flushed, turgor normal. No rashes or lesions   Neurologic: CNII-XII intact. No focal motor or sensory deficit.        Labs Reviewed:    Recent Results (from the past 24 hour(s))   CARDIAC PANEL,(CK, CKMB & TROPONIN)    Collection Time: 05/13/17  8:00 PM   Result Value Ref Range     26 - 192 U/L    CK - MB 1.6 <3.6 ng/ml    CK-MB Index 1.5 0.0 - 4.0 %    Troponin-I, Qt. <0.02 0.00 - 2.82 NG/ML   METABOLIC PANEL, BASIC    Collection Time: 05/13/17  8:00 PM   Result Value Ref Range    Sodium 140 136 - 145 mmol/L    Potassium 4.0 3.5 - 5.5 mmol/L    Chloride 101 100 - 108 mmol/L    CO2 30 21 - 32 mmol/L    Anion gap 9 3.0 - 18 mmol/L    Glucose 128 (H) 74 - 99 mg/dL    BUN 18 7.0 - 18 MG/DL    Creatinine 1.19 0.6 - 1.3 MG/DL    BUN/Creatinine ratio 15 12 - 20      GFR est AA 54 (L) >60 ml/min/1.73m2    GFR est non-AA 45 (L) >60 ml/min/1.73m2    Calcium 9.1 8.5 - 10.1 MG/DL   CBC WITH AUTOMATED DIFF    Collection Time: 05/13/17  8:00 PM   Result Value Ref Range    WBC 10.7 4.6 - 13.2 K/uL    RBC 3.14 (L) 4.20 - 5.30 M/uL    HGB 9.8 (L) 12.0 - 16.0 g/dL    HCT 29.8 (L) 35.0 - 45.0 %    MCV 94.9 74.0 - 97.0 FL    MCH 31.2 24.0 - 34.0 PG    MCHC 32.9 31.0 - 37.0 g/dL    RDW 13.0 11.6 - 14.5 %    PLATELET 133 292 - 420 K/uL    MPV 10.1 9.2 - 11.8 FL    NEUTROPHILS 80 (H) 40 - 73 %    LYMPHOCYTES 14 (L) 21 - 52 %    MONOCYTES 5 3 - 10 %    EOSINOPHILS 1 0 - 5 %    BASOPHILS 0 0 - 2 %    ABS. NEUTROPHILS 8.5 (H) 1.8 - 8.0 K/UL    ABS. LYMPHOCYTES 1.5 0.9 - 3.6 K/UL    ABS. MONOCYTES 0.5 0.05 - 1.2 K/UL    ABS. EOSINOPHILS 0.2 0.0 - 0.4 K/UL    ABS. BASOPHILS 0.0 0.0 - 0.06 K/UL    DF AUTOMATED     PRO-BNP    Collection Time: 05/13/17  8:00 PM   Result Value Ref Range    NT pro- (H) 0 - 900 PG/ML   PROTHROMBIN TIME + INR    Collection Time: 05/13/17  8:00 PM   Result Value Ref Range    Prothrombin time 13.7 11.5 - 15.2 sec    INR 1.1 0.8 - 1.2     PTT    Collection Time: 05/13/17  8:00 PM   Result Value Ref Range    aPTT 29.9 23.0 - 36.4 SEC   MAGNESIUM    Collection Time: 05/13/17  8:00 PM   Result Value Ref Range    Magnesium 1.6 1.6 - 2.6 mg/dL   EKG, 12 LEAD, INITIAL    Collection Time: 05/13/17  8:53 PM   Result Value Ref Range    Ventricular Rate 110 BPM    Atrial Rate 110 BPM    P-R Interval 144 ms    QRS Duration 84 ms    Q-T Interval 338 ms    QTC Calculation (Bezet) 457 ms    Calculated P Axis 47 degrees    Calculated R Axis 54 degrees    Calculated T Axis 24 degrees    Diagnosis       Sinus tachycardia  Inferior infarct (cited on or before 14-FEB-2017)  Abnormal ECG  When compared with ECG of 14-FEB-2017 09:54,  No significant change was found     POC G3    Collection Time: 05/13/17  9:07 PM   Result Value Ref Range    Device: NASAL CANNULA      Flow rate (POC) 3.0 L/M    FIO2 (POC) 32 %    pH (POC) 7.387 7.35 - 7.45      pCO2 (POC) 44.5 35 - 45 MMHG    pO2 (POC) 83 80 - 100 MMHG    HCO3 (POC) 26.6 (H) 22 - 26 MMOL/L    sO2 (POC) 96 92 - 97 %    Base excess (POC) 2 mmol/L    Allens test (POC) YES      Site RIGHT RADIAL      Patient temp.  99.4      Specimen type (POC) ARTERIAL      Performed by Lori Grimes    LACTIC ACID, PLASMA    Collection Time: 05/13/17 11:35 PM   Result Value Ref Range    Lactic acid 0.8 0.4 - 2.0 MMOL/L       Procedures/imaging: see electronic medical records for all procedures/Xrays and details which were not copied into this note but were reviewed prior to creation of Plan    60 minutes of care time spent in the direct evaluation and treatment of this high risk patient.      CC: Irene Sidhu MD

## 2017-05-14 NOTE — PROGRESS NOTES
Hospitalist Progress Note    Patient: Merritt Lane MRN: [id number]  CSN: [id number]    YOB: 1945  Age: 80 y.o. Sex: female    DOA: 5/13/2017 LOS:  LOS: 0 days          Assessment/Plan     1. Acute Respiratory Distress with Hypoxia; improved  2. Acute Moderate Exacerbation of Mod Persistent COPD; on home 3L O2  3. HCAP  4. Mild Acute Diastolic CHF exacerbation stage III  5. Elevated BNP  6. HypoMg; repleted. Pending am labs   7. Atypical Chest Pain; likely non cardiac   8. GERD  9. HTN  10. Obesity BMI >35  11. Cervical DDD  12. Cataract  FULL CODE        -sepsis bundle initiated by ED. Pancultures. [employer] Abx ordered  -will order sputum cultures and flu swab   -solumedrol IV, duonebs q4hrs, mucinex. Add robitussin prn for cough  -supplemental O2; Goal O2 >90%  -trend cardiac enzymes, replete Mg. No need for aggressive diuresis at this time. Will cont her home regimen   -cont home fentanyl for pain, will add percocet 5/325mg 1 tab po q6 hrs prn for pain   -Consult Pulm called this morning   -cont other home medications   -accuchecks, ISS as she is on solumedrol   -supportive care   -I do not suspect this is PE therefore holding off on getting CTA or PE working up.  If needed necessary, will order it.        Patient Active Problem List   Diagnosis Code    Cataract H26.9    DDD (degenerative disc disease), cervical M50.30    H/O cervical spine surgery Z98.890    COPD (chronic obstructive pulmonary disease) ([address] J44.9    Acidosis E87.2    COPD exacerbation ([address] J44.1    Acute on chronic respiratory failure (HCC) J96.20    Obesity E66.9    Benign hypertension I10    GERD (gastroesophageal reflux disease) K21.9    Acute diastolic CHF (congestive heart failure) ([facility]) I50.31    Acute encephalopathy G93.40    Toxic metabolic encephalopathy B09    Acute renal failure (ARF) ([facility]) N17.9    Hyperkalemia E87.5    PNA (pneumonia) J18.9    Chest pain R07.9    Anxiety F41.9    Hypomagnesemia E83.42    Sinus tachycardia R00.0    Acute respiratory distress R06.00    Hypoxia R09.02    HCAP (healthcare-associated pneumonia) J18.9       Subjective:  Patient states she is still having bough and bringing up yellowish sputum. No other acute events overnight. Her breathing has improved a little this morning.        Review of systems:    Constitutional: denies fevers, chills, myalgias  Respiratory: denies hempoptysis  Cardiovascular: denies chest pain, palpitations  Gastrointestinal: denies nausea, vomiting, diarrhea      Vital signs/Intake and Output:  Visit Vitals    /70 (BP 1 Location: Left arm, BP Patient Position: At rest)    Pulse (!) 106    Temp 97.7 °F (36.5 °C)    Resp 21    Ht 5' (1.524 m)    Wt 99.8 kg (220 lb 0.3 oz)    SpO2 98%    BMI 42.97 kg/m2     Current Shift:  05/13 1901 - 05/14 0700  In: -   Out: 650 [Urine:650]  Last three shifts:       Exam:    General: Well developed, alert, NAD, OX3  Head/Neck: NCAT, supple, No masses, No lymphadenopathy  CVS:Regular rate and rhythm, no M/R/G, S1/S2 heard, no thrill  Lungs:diffuse moderate coarse BS  Abdomen: Soft, Nontender, No distention, Normal Bowel sounds, No hepatomegaly  Extremities: No C/C/E, pulses palpable 2+  Skin:normal texture and turgor, no rashes, no lesions  Neuro:grossly normal , follows commands  Psych:appropriate                Labs: Results:       Chemistry Recent Labs      05/13/17 2000   GLU  128*   NA  140   K  4.0   CL  101   CO2  30   BUN  18   CREA  1.19   CA  9.1   AGAP  9   BUCR  15      CBC w/Diff Recent Labs      05/13/17 2000   WBC  10.7   RBC  3.14*   HGB  9.8*   HCT  29.8*   PLT  289   GRANS  80*   LYMPH  14*   EOS  1      Cardiac Enzymes Recent Labs      05/13/17 2000   CPK  105   CKND1  1.5      Coagulation Recent Labs      05/13/17 2000   PTP  13.7   INR  1.1   APTT  29.9       Lipid Panel No results found for: CHOL, CHOLPOCT, CHOLX, CHLST, CHOLV, 211507, HDL, LDL, NLDLCT, DLDL, LDLC, DLDLP, [id number], VLDLC, VLDL, TGL, TGLX, TRIGL, [id number], TRIGP, TGLPOCT, [id number], CHHD, CHHDX   BNP No results for input(s): BNPP in the last 72 hours. Liver Enzymes No results for input(s): TP, ALB, TBIL, AP, SGOT, GPT in the last 72 hours.     No lab exists for component: DBIL   Thyroid Studies Lab Results   Component Value Date/Time    TSH 0.04 11/26/2016 05:39 AM        Procedures/imaging: see electronic medical records for all procedures/Xrays and details which were not copied into this note but were reviewed prior to creation of Sheila Lanier MD

## 2017-05-14 NOTE — PROGRESS NOTES

## 2017-05-14 NOTE — ED PROVIDER NOTES
HPI Comments: 8:13 PM   Iram Guzman is a 80 y.o. female with Hx of COPD on 3L of O2 presenting to the ED via EMS from [facility] C/O gradually worsening respiratory distress onset a week ago. Pt reports gradually worsening productive cough (pink with streaks of blood initially, now yellow)  and SOB. Pt was given Duo Neb and 125 mg of Solu-medrol by medics. Pt has Fentanyl patch on her right shoulder for her chronic pain. PMHx of HTN, asth,a , GERD, thyroid disease, CAD, chronic pain, anxiety, depression, CKD, fibromyalgia and chronic bronchitis. Pt denies tobacco use but reports having lots of second hand smoke. Pt denies Hx of DVT and any other Sx or complaints. Patient is a 80 y.o. female presenting with respiratory distress syndrome. The history is provided by the patient. No  was used. Respiratory Distress   This is a new problem. The current episode started more than 2 days ago (1 week ago). The problem has not changed since onset. Associated symptoms include cough. Associated medical issues include COPD. Associated medical issues do not include DVT. Written by Rox Fernandez ED Scribe, as dictated by Shy Reid.  Velasquez Alcantar MD      Past Medical History:   Diagnosis Date    Arthritis     Asthma     CAD (coronary artery disease)     angina, last episode 06/2012    Chronic bronchitis (HCC)     Chronic kidney disease     stage 3    Chronic obstructive pulmonary disease (HCC)     Chronic pain     cervical, lumbar, knees, ankles, elbows    Fibromyalgia     GERD (gastroesophageal reflux disease)     Hypertension 1993    Psychiatric disorder     anxiety, depression    Thyroid disease        Past Surgical History:   Procedure Laterality Date    HX ADENOIDECTOMY      HX APPENDECTOMY      HX CATARACT REMOVAL      OU    HX CERVICAL FUSION      anterior x 2    HX CERVICAL FUSION      posterior x 3    HX CERVICAL FUSION  1996    Removal of titanium plate and screws    HX CHOLECYSTECTOMY      HX HYSTERECTOMY      HX LITHOTRIPSY      x 4    HX OOPHORECTOMY      left    HX ORTHOPAEDIC      cervical  x5    HX SMALL BOWEL RESECTION      HX TONSILLECTOMY      HX UROLOGICAL  1984 and 1985    kidney stone surgery         History reviewed. No pertinent family history. Social History     Social History    Marital status: SINGLE     Spouse name: N/A    Number of children: N/A    Years of education: N/A     Occupational History    Not on file. Social History Main Topics    Smoking status: Never Smoker    Smokeless tobacco: Never Used    Alcohol use No    Drug use: No    Sexual activity: Yes     Birth control/ protection: IUD, None     Other Topics Concern    Not on file     Social History Narrative         ALLERGIES: Ambien [zolpidem]; Aspirin; Codeine; Darvon [propoxyphene]; Iodinated contrast media - oral and iv dye; Pcn [penicillins]; Shellfish derived; Zanaflex [tizanidine]; and Levaquin [levofloxacin]    Review of Systems   Respiratory: Positive for cough and shortness of breath. All other systems reviewed and are negative. Vitals:    05/13/17 1901 05/13/17 2000 05/13/17 2018 05/13/17 2030   BP: 157/70 119/50 115/81 133/56   Pulse: (!) 114 (!) 108 (!) 111 (!) 103   Resp: 25 17 16 19   Temp: 99.4 °F (37.4 °C)      SpO2: 99% 94% 100% 95%   Weight: 99.8 kg (220 lb)      Height: 5' (1.524 m)               Physical Exam   Constitutional: She is oriented to person, place, and time. She appears well-developed. No distress. Obese comfortable appearing nontoxic but speaking in only three to five word sentences   HENT:   Head: Normocephalic and atraumatic. Right Ear: External ear normal.   Left Ear: External ear normal.   Mouth/Throat: Oropharynx is clear and moist. Mucous membranes are dry. No oropharyngeal exudate. Eyes: Conjunctivae and EOM are normal. Pupils are equal, round, and reactive to light. No scleral icterus.    No pallor   Neck: Normal range of motion. Neck supple. No JVD present. No tracheal deviation present. No thyromegaly present. Cardiovascular: Normal rate, regular rhythm and normal heart sounds. Borderline tachycardia   Pulmonary/Chest: Effort normal. No stridor. No respiratory distress. She has wheezes. She has rhonchi. Inspiratory and expiratory wheezing and scattered rhonchi on all lung fields   Abdominal: Soft. Bowel sounds are normal. She exhibits no distension. There is no tenderness. There is no rebound and no guarding. Musculoskeletal: Normal range of motion. She exhibits no edema or tenderness. Bilateral edema to both lower legs,non-pitting. Lymphadenopathy:     She has no cervical adenopathy. Neurological: She is alert and oriented to person, place, and time. She has normal reflexes. No cranial nerve deficit. Coordination normal.   Skin: Skin is warm and dry. No rash noted. She is not diaphoretic. No erythema. Good skin turgor   Psychiatric: She has a normal mood and affect. Her behavior is normal. Judgment and thought content normal.   Nursing note and vitals reviewed. RESULTS:      PULSE OXIMETRY NOTE:  Pulse-ox is 99% on RA  Interpretation:  Normal        EKG interpretation: (Preliminary)  Sinus tachycardia, Q waves III aVF, Motion artifact, 110 bpm  EKG read by Denilson. Greg Sanabria MD  at 8:53 PM    X-RAY FINDINGS:  10:55 PM  Chest x-ray shows Cardiomegaly. Hypoinflation but no acute visible infiltrate or effusion x-ray shows   Pending review by Radiologist  Recorded by JES Varma, as dictated by Denilson.  Greg Sanabria MD     XR CHEST PORT    (Results Pending)        Labs Reviewed   METABOLIC PANEL, BASIC - Abnormal; Notable for the following:        Result Value    Glucose 128 (*)     GFR est AA 54 (*)     GFR est non-AA 45 (*)     All other components within normal limits   CBC WITH AUTOMATED DIFF - Abnormal; Notable for the following:     RBC 3.14 (*)     HGB 9.8 (*)     HCT 29.8 (*) NEUTROPHILS 80 (*)     LYMPHOCYTES 14 (*)     ABS. NEUTROPHILS 8.5 (*)     All other components within normal limits   PRO-BNP - Abnormal; Notable for the following:     NT pro- (*)     All other components within normal limits   POC G3 - Abnormal; Notable for the following:     HCO3 (POC) 26.6 (*)     All other components within normal limits   CULTURE, BLOOD   CARDIAC PANEL,(CK, CKMB & TROPONIN)   PROTHROMBIN TIME + INR   PTT   MAGNESIUM   BLOOD GAS, ARTERIAL   LACTIC ACID, PLASMA   LACTIC ACID, PLASMA       Recent Results (from the past 12 hour(s))   CARDIAC PANEL,(CK, CKMB & TROPONIN)    Collection Time: 05/13/17  8:00 PM   Result Value Ref Range     26 - 192 U/L    CK - MB 1.6 <3.6 ng/ml    CK-MB Index 1.5 0.0 - 4.0 %    Troponin-I, Qt. <0.02 0.00 - 1.92 NG/ML   METABOLIC PANEL, BASIC    Collection Time: 05/13/17  8:00 PM   Result Value Ref Range    Sodium 140 136 - 145 mmol/L    Potassium 4.0 3.5 - 5.5 mmol/L    Chloride 101 100 - 108 mmol/L    CO2 30 21 - 32 mmol/L    Anion gap 9 3.0 - 18 mmol/L    Glucose 128 (H) 74 - 99 mg/dL    BUN 18 7.0 - 18 MG/DL    Creatinine 1.19 0.6 - 1.3 MG/DL    BUN/Creatinine ratio 15 12 - 20      GFR est AA 54 (L) >60 ml/min/1.73m2    GFR est non-AA 45 (L) >60 ml/min/1.73m2    Calcium 9.1 8.5 - 10.1 MG/DL   CBC WITH AUTOMATED DIFF    Collection Time: 05/13/17  8:00 PM   Result Value Ref Range    WBC 10.7 4.6 - 13.2 K/uL    RBC 3.14 (L) 4.20 - 5.30 M/uL    HGB 9.8 (L) 12.0 - 16.0 g/dL    HCT 29.8 (L) 35.0 - 45.0 %    MCV 94.9 74.0 - 97.0 FL    MCH 31.2 24.0 - 34.0 PG    MCHC 32.9 31.0 - 37.0 g/dL    RDW 13.0 11.6 - 14.5 %    PLATELET 272 104 - 911 K/uL    MPV 10.1 9.2 - 11.8 FL    NEUTROPHILS 80 (H) 40 - 73 %    LYMPHOCYTES 14 (L) 21 - 52 %    MONOCYTES 5 3 - 10 %    EOSINOPHILS 1 0 - 5 %    BASOPHILS 0 0 - 2 %    ABS. NEUTROPHILS 8.5 (H) 1.8 - 8.0 K/UL    ABS. LYMPHOCYTES 1.5 0.9 - 3.6 K/UL    ABS. MONOCYTES 0.5 0.05 - 1.2 K/UL    ABS.  EOSINOPHILS 0.2 0.0 - 0.4 K/UL    ABS. BASOPHILS 0.0 0.0 - 0.06 K/UL    DF AUTOMATED     PRO-BNP    Collection Time: 05/13/17  8:00 PM   Result Value Ref Range    NT pro- (H) 0 - 900 PG/ML   PROTHROMBIN TIME + INR    Collection Time: 05/13/17  8:00 PM   Result Value Ref Range    Prothrombin time 13.7 11.5 - 15.2 sec    INR 1.1 0.8 - 1.2     PTT    Collection Time: 05/13/17  8:00 PM   Result Value Ref Range    aPTT 29.9 23.0 - 36.4 SEC   MAGNESIUM    Collection Time: 05/13/17  8:00 PM   Result Value Ref Range    Magnesium 1.6 1.6 - 2.6 mg/dL   EKG, 12 LEAD, INITIAL    Collection Time: 05/13/17  8:53 PM   Result Value Ref Range    Ventricular Rate 110 BPM    Atrial Rate 110 BPM    P-R Interval 144 ms    QRS Duration 84 ms    Q-T Interval 338 ms    QTC Calculation (Bezet) 457 ms    Calculated P Axis 47 degrees    Calculated R Axis 54 degrees    Calculated T Axis 24 degrees    Diagnosis       Sinus tachycardia  Inferior infarct (cited on or before 14-FEB-2017)  Abnormal ECG  When compared with ECG of 14-FEB-2017 09:54,  No significant change was found     POC G3    Collection Time: 05/13/17  9:07 PM   Result Value Ref Range    Device: NASAL CANNULA      Flow rate (POC) 3.0 L/M    FIO2 (POC) 32 %    pH (POC) 7.387 7.35 - 7.45      pCO2 (POC) 44.5 35 - 45 MMHG    pO2 (POC) 83 80 - 100 MMHG    HCO3 (POC) 26.6 (H) 22 - 26 MMOL/L    sO2 (POC) 96 92 - 97 %    Base excess (POC) 2 mmol/L    Allens test (POC) YES      Site RIGHT RADIAL      Patient temp. 99.4      Specimen type (POC) ARTERIAL      Performed by Marleni Powers        MDM  Number of Diagnoses or Management Options  COPD exacerbation [facility]):   Diagnosis management comments: DDx: PNA, COPD most probably;  PE ACS possible but unlikely. Aspiration needs to be considered.   ABX added to cover CAP and nosocomial sources       Amount and/or Complexity of Data Reviewed  Clinical lab tests: reviewed and ordered (Cardiac panel, Basic metabolic panel, CBC, Pro BNP, Prothrombin time + INR, PTT, Magnesium, Arterial blood gas)  Tests in the radiology section of CPT®: reviewed and ordered (Chest x-ray)  Tests in the medicine section of CPT®: reviewed and ordered (EKG)  Independent visualization of images, tracings, or specimens: yes (Chest x-ray, EKG)    Risk of Complications, Morbidity, and/or Mortality  Presenting problems: high  Diagnostic procedures: high  Management options: high    Critical Care  Total time providing critical care:  minutes    ED Course     Medications   sodium chloride (NS) flush 5-10 mL (not administered)   sodium chloride (NS) flush 5-10 mL (not administered)   albuterol-ipratropium (DUO-NEB) 2.5 MG-0.5 MG/3 ML (not administered)   albuterol-ipratropium (DUO-NEB) 2.5 MG-0.5 MG/3 ML (not administered)   magnesium sulfate 2 g/50 ml IVPB (premix or compounded) (not administered)   sodium chloride (NS) flush 5-10 mL (not administered)   aztreonam (AZACTAM) 2 g in 0.9% sodium chloride (MBP/ADV) 100 mL MBP (not administered)   vancomycin (VANCOCIN) 1,000 mg in 0.9% sodium chloride (MBP/ADV) 250 mL adv (not administered)   levoFLOXacin (LEVAQUIN) tablet 750 mg (not administered)   albuterol-ipratropium (DUO-NEB) 2.5 MG-0.5 MG/3 ML (3 mL Nebulization Given 5/13/17 2025)        Procedures  PROGRESS NOTE:  8:13 PM  Initial assessment performed. Written by Brandon Mccain ED Scribe, as dictated by Dhruv Koch. Jerome Hay MD     CONSULT NOTE:   10:43 PM  hDruv Koch. Jerome Hay MD  spoke with Nathan Cleveland MD    Specialty: Hospitalist   Discussed pt's hx, disposition, and available diagnostic and imaging results. Reviewed care plans. Consulting physician agrees with plans as outlined. Agrees to admit pt to Telemetry . Written by Brandon Mccain ED Scribe, as dictated by Dhruv Koch. Jerome Hay MD      ADMISSION NOTE:  10:43 PM  Patient is being admitted to the hospital by Nathan Cleveland MD . The results of their tests and reasons for their admission have been discussed with them and/or available family. They convey agreement and understanding for the need to be admitted and for their admission diagnosis. Written by Penne Skiff, ED Scribe, as dictated by Denilson. Leah Salter MD .     CONDITIONS ON ADMISSION:  10:43 PM   Deep Vein Thrombosis is not present at the time of admission. Thrombosis is not present at the time of admission. Urinary Tract Infection is not present at the time of admission. Pneumonia is not present at the time of admission. MRSA is not present at the time of admission. Wound infection is not present at the time of admission. Pressure Ulcer is not present at the time of admission. Written by Penne Skiff, ED Scribe, as dictated by Denilson. Leah Salter MD .      CLINICAL IMPRESSION:    1. COPD exacerbation ([address]        PLAN: Admit to Telemetry    ATTESTATIONS:  This note is prepared by Penne Skiff, acting as Scribe for Denilson. Leah Salter MD.    Denilson. Leah Salter MD : The scribe's documentation has been prepared under my direction and personally reviewed by me in its entirety. I confirm that the note above accurately reflects all work, treatment, procedures, and medical decision making performed by me. CRITICAL CARE NOTE:  8:14 AM  I have spent 75 minutes of critical care time involved in lab review, consultations with specialist, family decision-making, and documentation. During this entire length of time I was immediately available to the patient. Critical Care: The reason for providing this level of medical care for this critically ill patient was due a critical illness that impaired one or more vital organ systems such that there was a high probability of imminent or life threatening deterioration in the patients condition. This care involved high complexity decision making to assess, manipulate, and support vital system functions, to treat this degreee vital organ system failure and to prevent further life threatening deterioration of the patients condition.

## 2017-05-14 NOTE — CONSULTS
Pulmonology Consult    Subjective:   Consult Note: 5/14/2017 1:13 PM    This patient has been seen and evaluated at the request of Dr. Andreina Carrasco. Patient is a 80 y.o. female admitted with cough and sputum production  Non smoker, lives in [facility]  No recent travel or new changes in the living environment  Uses o2 L at baseline  No recent weight changes      Past Medical History:   Diagnosis Date    Arthritis     Asthma     CAD (coronary artery disease)     angina, last episode 06/2012    Chronic bronchitis (HCC)     Chronic kidney disease     stage 3    Chronic obstructive pulmonary disease (HCC)     Chronic pain     cervical, lumbar, knees, ankles, elbows    Fibromyalgia     GERD (gastroesophageal reflux disease)     Hypertension 1993    Psychiatric disorder     anxiety, depression    Thyroid disease      Past Surgical History:   Procedure Laterality Date    HX ADENOIDECTOMY      HX APPENDECTOMY      HX CATARACT REMOVAL      OU    HX CERVICAL FUSION      anterior x 2    HX CERVICAL FUSION      posterior x 3    HX CERVICAL FUSION  1996    Removal of titanium plate and screws    HX CHOLECYSTECTOMY      HX HYSTERECTOMY      HX LITHOTRIPSY      x 4    HX OOPHORECTOMY      left    HX ORTHOPAEDIC      cervical  x5    HX SMALL BOWEL RESECTION      HX TONSILLECTOMY      HX UROLOGICAL  1984 and 1985    kidney stone surgery      History reviewed. No pertinent family history.   Social History   Substance Use Topics    Smoking status: Never Smoker    Smokeless tobacco: Never Used    Alcohol use No      Current Facility-Administered Medications   Medication Dose Route Frequency Provider Last Rate Last Dose    loratadine (CLARITIN) tablet 10 mg  10 mg Oral DAILY Emanuel Carrasco MD   10 mg at 05/14/17 0835    montelukast (SINGULAIR) tablet 10 mg  10 mg Oral DAILY Alvin Lyons MD   10 mg at 05/14/17 0835    DULoxetine (CYMBALTA) capsule 60 mg  60 mg Oral DAILY Alvin Lyons MD   60 mg at 05/14/17 8692    fluticasone-vilanterol (BREO ELLIPTA) 200mcg-25mcg/puff  1 Puff Inhalation DAILY Jonelle Pereira MD        nitroglycerin (NITROSTAT) tablet 0.4 mg  0.4 mg SubLINGual Q5MIN PRN Jonelle Pereira MD        aspirin delayed-release tablet 81 mg  81 mg Oral DAILY Jonelle Pereira MD   81 mg at 05/14/17 0835    atenolol (TENORMIN) tablet 25 mg  25 mg Oral DAILY Jonelle Pereira MD   25 mg at 05/14/17 0835    guaiFENesin ER (MUCINEX) tablet 600 mg  600 mg Oral Q12H Jonelle Pereira MD   600 mg at 05/14/17 0835    levothyroxine (SYNTHROID) tablet 225 mcg  225 mcg Oral ACB Jonelle Pereira MD   225 mcg at 05/14/17 0647    pregabalin (LYRICA) capsule 100 mg  100 mg Oral TID Jonelle Pereira MD   100 mg at 05/14/17 0835    fluticasone (FLONASE) 50 mcg/actuation nasal spray 1 Spray  1 Spray Both Nostrils DAILY Jonelle Pereira MD        cyanocobalamin (VITAMIN B12) injection 1,000 mcg  1,000 mcg IntraMUSCular EVERY MONTH Jonelle Pereira MD        acetaminophen (TYLENOL) tablet 650 mg  650 mg Oral Q6H PRN Jonelle Pereira MD        diclofenac (VOLTAREN) 1 % topical gel 2 g  2 g Topical TID Emanuel Manning MD        cholestyramine (QUESTRAN) packet 4 g  4 g Oral DAILY PRN Jonelle Pereira MD        nystatin (MYCOSTATIN) 100,000 unit/gram powder   Topical BID Emanuel Manning MD        omeprazole (PRILOSEC) capsule 20 mg  20 mg Oral DAILY Emanuel Manning MD   20 mg at 05/14/17 0835    zinc oxide 20 % ointment   Topical BID Emanuel Manning MD        alum-mag hydroxide-simeth (MYLANTA) oral suspension 15 mL  15 mL Oral QID PRN Jonelle Pereira MD   15 mL at 05/14/17 0834    multivitamin, tx-iron-ca-min (THERA-M w/ IRON) tablet 1 Tab  1 Tab Oral DAILY Jonelle Pereira MD   1 Tab at 05/14/17 0835    lisinopril (PRINIVIL, ZESTRIL) tablet 20 mg  20 mg Oral DAILY Emanuel Manning MD   20 mg at 05/14/17 0835    fentaNYL (DURAGESIC) 25 mcg/hr patch 1 Patch  1 Patch TransDERmal Q72H Jonelle Pereira MD   1 Patch at 05/14/17 0329    bumetanide (BUMEX) tablet 1 mg  1 mg Oral DAILY Jonelle Pereira MD   1 mg at 05/14/17 0835    sodium chloride (NS) flush 5-10 mL  5-10 mL IntraVENous Q8H Emanuel Parada MD   10 mL at 05/14/17 0836    sodium chloride (NS) flush 5-10 mL  5-10 mL IntraVENous PRN Shannon Lane MD        heparin (porcine) injection 5,000 Units  5,000 Units SubCUTAneous Q8H Emanuel Parada MD   5,000 Units at 05/14/17 0836    albuterol-ipratropium (DUO-NEB) 2.5 MG-0.5 MG/3 ML  3 mL Nebulization Q4H RT Emanuel Manning MD   3 mL at 05/14/17 1122    insulin lispro (HUMALOG) injection   SubCUTAneous AC&HS Shannon Lane MD   2 Units at 05/14/17 1130    glucose chewable tablet 16 g  16 g Oral PRN Shannon Lane MD        glucagon (GLUCAGEN) injection 1 mg  1 mg IntraMUSCular PRN Shannon Lane MD        dextrose (D50W) injection syrg 12.5-25 g  25-50 mL IntraVENous PRN Shannon Lane MD        oxyCODONE-acetaminophen (PERCOCET) 5-325 mg per tablet 1 Tab  1 Tab Oral Q6H LEONARD Lane MD   1 Tab at 05/14/17 0835    methylPREDNISolone (PF) (SOLU-MEDROL) injection 40 mg  40 mg IntraVENous Q8H Emanuel Parada MD   40 mg at 05/14/17 0835    influenza vaccine 2016-17 (36mos+)(PF) (FLUZONE/FLUARIX/FLULAVAL QUAD) injection 0.5 mL  0.5 mL IntraMUSCular PRIOR TO DISCHARGE Shannon Lane MD        diphenhydrAMINE (BENADRYL) 12.5 mg/5 mL oral elixir 12.5 mg  12.5 mg Oral Q6H PRN Emanuel Manning MD   12.5 mg at 05/14/17 0327    guaiFENesin-codeine (ROBITUSSIN AC) 100-10 mg/5 mL solution 10 mL  10 mL Oral Q4H PRN Emanuel Manning MD   10 mL at 05/14/17 0836    guaiFENesin-codeine (ROBITUSSIN AC) 100-10 mg/5 mL solution             diphenhydrAMINE (BENADRYL) 12.5 mg/5 mL oral elixir             sodium chloride (NS) flush 5-10 mL  5-10 mL IntraVENous Q8H Lisbet Diggs MD   10 mL at 05/14/17 0837    sodium chloride (NS) flush 5-10 mL  5-10 mL IntraVENous PRN Lisbet Diggs MD        sodium chloride (NS) flush 5-10 mL  5-10 mL IntraVENous PRN Lisbet Diggs MD        aztreonam (AZACTAM) 2 g in 0.9% sodium chloride (MBP/ADV) 100 mL MBP  2 g IntraVENous Q8H Dom Padron  mL/hr at 17 0647 2 g at 17 5416    Vancomycin Pharmacokinetic Dosing  1 Each Other Rx Dosing/Monitoring Dom Padron MD        [START ON 5/15/2017] vancomycin (VANCOCIN) 1,250 mg in 0.9% sodium chloride 250 mL IVPB  1,250 mg IntraVENous Q24H Dom Padron MD            Allergies   Allergen Reactions    Ambien [Zolpidem] Other (comments)     Sleep drive    Aspirin Other (comments)     bruising    Codeine Hives    Darvon [Propoxyphene] Unknown (comments)    Iodinated Contrast Media - Oral And Iv Dye Hives     Iodine on skin is ok per pt.  Pcn [Penicillins] Nausea and Vomiting    Shellfish Derived Hives, Shortness of Breath and Swelling     Iodine on skin is ok per pt.  Zanaflex [Tizanidine] Other (comments)     disorientated    Levaquin [Levofloxacin] Hives     Red rash at IV site while infusing       Review of Systems:  Pertinent items are noted in HPI. Objective:     Blood pressure 108/69, pulse 93, temperature 98.1 °F (36.7 °C), resp. rate 22, height 5' (1.524 m), weight 99.8 kg (220 lb 0.3 oz), SpO2 99 %. Temp (24hrs), Av.5 °F (36.9 °C), Min:97.7 °F (36.5 °C), Max:99.6 °F (37.6 °C)      Intake and Output:  Current Shift:  07 - 1900  In: 240 [P.O.:240]  Out: -   Last 3 Shifts: 1901 -  0700  In: -   Out: 650 [Urine:650]    Physical Exam:   Visit Vitals    /69 (BP 1 Location: Left arm, BP Patient Position: Head of bed elevated (Comment degrees))    Pulse 93    Temp 98.1 °F (36.7 °C)    Resp 22    Ht 5' (1.524 m)    Wt 99.8 kg (220 lb 0.3 oz)    SpO2 99%    BMI 42.97 kg/m2     General:  Alert, cooperative, no distress, appears stated age. Head:  Normocephalic, without obvious abnormality, atraumatic. Eyes:  Conjunctivae/corneas clear. PERRL, EOMs intact. Fundi benign. Ears:  Normal TMs and external ear canals both ears. Nose: Nares normal. Septum midline.  Mucosa normal. No drainage or sinus tenderness. Throat: Lips, mucosa, and tongue normal. Teeth and gums normal.   Neck: Supple, symmetrical, trachea midline, no adenopathy, thyroid: no enlargement/tenderness/nodules, no carotid bruit and no JVD. Back:   Symmetric, no curvature. ROM normal. No CVA tenderness. Lungs:   wheezing bilaterally. Chest wall:  No tenderness or deformity. Heart:  Regular rate and rhythm, S1, S2 normal, no murmur, click, rub or gallop. Breast Exam:  No tenderness, masses, or nipple abnormality. Abdomen:   Soft, non-tender. Bowel sounds normal. No masses,  No organomegaly. Genitalia:  Normal female without lesion, discharge or tenderness. Rectal:  Normal tone,  no masses or tenderness  Guaiac negative stool. Extremities: Extremities normal, atraumatic, no cyanosis or edema. Pulses: 2+ and symmetric all extremities. Skin: Skin color, texture, turgor normal. No rashes or lesions. Lymph nodes: Cervical, supraclavicular, and axillary nodes normal.   Neurologic: CNII-XII intact. Normal strength, sensation and reflexes throughout.      Reviewed all labs and radiology and previous notes and admissions    Assessment:     Principal Problem:    Acute respiratory distress (5/13/2017)    Active Problems:    Cataract (5/15/2013)      DDD (degenerative disc disease), cervical (8/27/2013)      H/O cervical spine surgery (8/27/2013)      COPD exacerbation ([address] (11/25/2016)      Obesity (11/25/2016)      Benign hypertension (11/25/2016)      GERD (gastroesophageal reflux disease) (11/25/2016)      Acute diastolic CHF (congestive heart failure) ([address] (11/28/2016)      Chest pain (2/14/2017)      Hypomagnesemia (2/14/2017)      Hypoxia (5/13/2017)      HCAP (healthcare-associated pneumonia) (5/13/2017)        Plan:     Sputum for  Culture  Do not see definite infiltrate on cxr: might be just bronchitis  Continue present abx   Add zithromax  Narrow down abx when sputum culture results are available  Continue steroids and abx  Will start symbicort also  Answered all the questions of the patient  Will need pft's as outpatient to see if she has asthma or copd

## 2017-05-14 NOTE — PROGRESS NOTES
Pharmacy Dosing Services: Vancomycin    Consult for Vancomycin Dosing by Pharmacy by Dr. Emiliano Pitts provided for this 80y.o. year old female , for indication of pneumonia (HAP). Day of Therapy 1    Ht Readings from Last 1 Encounters:   05/13/17 152.4 cm (60\")        Wt Readings from Last 1 Encounters:   05/13/17 99.8 kg (220 lb)     Other Current Antibiotics Aztreonam 2 g IV every 8 hours   Serum Creatinine Lab Results   Component Value Date/Time    Creatinine 1.19 05/13/2017 08:00 PM      Creatinine Clearance Estimated Creatinine Clearance: 46.7 mL/min (based on Cr of 1.19). BUN Lab Results   Component Value Date/Time    BUN 18 05/13/2017 08:00 PM      WBC Lab Results   Component Value Date/Time    WBC 10.7 05/13/2017 08:00 PM      H/H Lab Results   Component Value Date/Time    HGB 9.8 05/13/2017 08:00 PM      Platelets Lab Results   Component Value Date/Time    PLATELET 572 97/09/0809 08:00 PM      Temp 99.4 °F (37.4 °C)     Start Vancomycin therapy, with loading dose of 2000 mg IV once  Follow with maintenance dose of Vancomycin 1250 mg IV every 24 hours starting at 0000 on 05/15/17    Dose calculated to approximate a therapeutic trough of 15-20 mcg/mL. Pharmacy to follow daily and will make changes to dose and/or frequency based on clinical status.     Pharmacist Stephanie Olivier, [address]

## 2017-05-14 NOTE — ROUTINE PROCESS
TRANSFER - OUT REPORT:    Verbal and bedside report given to Luisa Simmons RN (name) on Harrison Rios  being transferred to [facility] [facility] (unit) for routine progression of care       Report consisted of patients Situation, Background, Assessment and   Recommendations(SBAR). Information from the following report(s) SBAR, Kardex, ED Summary, MAR, Recent Results and Cardiac Rhythm SR was reviewed with the receiving nurse. Lines:   Peripheral IV 05/13/17 Right Hand (Active)   Site Assessment Clean, dry, & intact 5/13/2017  7:02 PM   Phlebitis Assessment 0 5/13/2017  7:02 PM   Infiltration Assessment 0 5/13/2017  7:02 PM   Dressing Status Clean, dry, & intact 5/13/2017  7:02 PM   Dressing Type Transparent 5/13/2017  7:02 PM   Hub Color/Line Status Flushed;Patent 5/13/2017  7:02 PM       Peripheral IV 05/13/17 Left; Inner Wrist (Active)   Site Assessment Clean, dry, & intact 5/13/2017 11:37 PM   Phlebitis Assessment 0 5/13/2017 11:37 PM   Infiltration Assessment 0 5/13/2017 11:37 PM   Dressing Status Clean, dry, & intact 5/13/2017 11:37 PM   Hub Color/Line Status Blue 5/13/2017 11:37 PM        Opportunity for questions and clarification was provided. Patient transported with:   Monitor  Registered Nurse  Rainer Pa given to nurse at bedside.

## 2017-05-15 ENCOUNTER — APPOINTMENT (OUTPATIENT)
Dept: CT IMAGING | Age: 72
DRG: 190 | End: 2017-05-15
Attending: HOSPITALIST
Payer: MEDICARE

## 2017-05-15 LAB
ALBUMIN SERPL BCP-MCNC: 3 G/DL (ref 3.4–5)
ALBUMIN/GLOB SERPL: 0.7 {RATIO} (ref 0.8–1.7)
ALP SERPL-CCNC: 58 U/L (ref 45–117)
ALT SERPL-CCNC: 32 U/L (ref 13–56)
ANION GAP BLD CALC-SCNC: 11 MMOL/L (ref 3–18)
AST SERPL W P-5'-P-CCNC: 46 U/L (ref 15–37)
BACTERIA SPEC CULT: ABNORMAL
BACTERIA SPEC CULT: ABNORMAL
BILIRUB SERPL-MCNC: 0.3 MG/DL (ref 0.2–1)
BUN SERPL-MCNC: 25 MG/DL (ref 7–18)
BUN/CREAT SERPL: 22 (ref 12–20)
CALCIUM SERPL-MCNC: 9.1 MG/DL (ref 8.5–10.1)
CHLORIDE SERPL-SCNC: 104 MMOL/L (ref 100–108)
CO2 SERPL-SCNC: 27 MMOL/L (ref 21–32)
CREAT SERPL-MCNC: 1.12 MG/DL (ref 0.6–1.3)
ERYTHROCYTE [DISTWIDTH] IN BLOOD BY AUTOMATED COUNT: 12.8 % (ref 11.6–14.5)
GLOBULIN SER CALC-MCNC: 4.2 G/DL (ref 2–4)
GLUCOSE BLD STRIP.AUTO-MCNC: 142 MG/DL (ref 70–110)
GLUCOSE BLD STRIP.AUTO-MCNC: 154 MG/DL (ref 70–110)
GLUCOSE BLD STRIP.AUTO-MCNC: 166 MG/DL (ref 70–110)
GLUCOSE BLD STRIP.AUTO-MCNC: 167 MG/DL (ref 70–110)
GLUCOSE SERPL-MCNC: 170 MG/DL (ref 74–99)
HCT VFR BLD AUTO: 30.4 % (ref 35–45)
HGB BLD-MCNC: 10 G/DL (ref 12–16)
MCH RBC QN AUTO: 31 PG (ref 24–34)
MCHC RBC AUTO-ENTMCNC: 32.9 G/DL (ref 31–37)
MCV RBC AUTO: 94.1 FL (ref 74–97)
PLATELET # BLD AUTO: 311 K/UL (ref 135–420)
PMV BLD AUTO: 10 FL (ref 9.2–11.8)
POTASSIUM SERPL-SCNC: 3.9 MMOL/L (ref 3.5–5.5)
PROT SERPL-MCNC: 7.2 G/DL (ref 6.4–8.2)
RBC # BLD AUTO: 3.23 M/UL (ref 4.2–5.3)
SERVICE CMNT-IMP: ABNORMAL
SODIUM SERPL-SCNC: 142 MMOL/L (ref 136–145)
WBC # BLD AUTO: 11.6 K/UL (ref 4.6–13.2)

## 2017-05-15 PROCEDURE — 74011250637 HC RX REV CODE- 250/637: Performed by: INTERNAL MEDICINE

## 2017-05-15 PROCEDURE — 71250 CT THORAX DX C-: CPT

## 2017-05-15 PROCEDURE — 80053 COMPREHEN METABOLIC PANEL: CPT | Performed by: INTERNAL MEDICINE

## 2017-05-15 PROCEDURE — 74011000250 HC RX REV CODE- 250: Performed by: INTERNAL MEDICINE

## 2017-05-15 PROCEDURE — 74011250636 HC RX REV CODE- 250/636: Performed by: INTERNAL MEDICINE

## 2017-05-15 PROCEDURE — 36415 COLL VENOUS BLD VENIPUNCTURE: CPT | Performed by: INTERNAL MEDICINE

## 2017-05-15 PROCEDURE — 74011250636 HC RX REV CODE- 250/636: Performed by: EMERGENCY MEDICINE

## 2017-05-15 PROCEDURE — 74011250636 HC RX REV CODE- 250/636: Performed by: HOSPITALIST

## 2017-05-15 PROCEDURE — 85027 COMPLETE CBC AUTOMATED: CPT | Performed by: INTERNAL MEDICINE

## 2017-05-15 PROCEDURE — 65610000006 HC RM INTENSIVE CARE

## 2017-05-15 PROCEDURE — 74011636637 HC RX REV CODE- 636/637: Performed by: INTERNAL MEDICINE

## 2017-05-15 PROCEDURE — 74011000250 HC RX REV CODE- 250: Performed by: EMERGENCY MEDICINE

## 2017-05-15 PROCEDURE — 74011000258 HC RX REV CODE- 258: Performed by: EMERGENCY MEDICINE

## 2017-05-15 PROCEDURE — 82962 GLUCOSE BLOOD TEST: CPT

## 2017-05-15 PROCEDURE — 94640 AIRWAY INHALATION TREATMENT: CPT

## 2017-05-15 PROCEDURE — 87641 MR-STAPH DNA AMP PROBE: CPT | Performed by: INTERNAL MEDICINE

## 2017-05-15 PROCEDURE — 77010033678 HC OXYGEN DAILY

## 2017-05-15 PROCEDURE — 94760 N-INVAS EAR/PLS OXIMETRY 1: CPT

## 2017-05-15 RX ORDER — CLONAZEPAM 0.5 MG/1
0.5 TABLET ORAL 3 TIMES DAILY
Status: DISCONTINUED | OUTPATIENT
Start: 2017-05-15 | End: 2017-05-17

## 2017-05-15 RX ORDER — MUPIROCIN 20 MG/G
OINTMENT TOPICAL 2 TIMES DAILY
Status: DISCONTINUED | OUTPATIENT
Start: 2017-05-15 | End: 2017-05-25 | Stop reason: HOSPADM

## 2017-05-15 RX ORDER — IPRATROPIUM BROMIDE AND ALBUTEROL SULFATE 2.5; .5 MG/3ML; MG/3ML
3 SOLUTION RESPIRATORY (INHALATION)
Status: DISCONTINUED | OUTPATIENT
Start: 2017-05-15 | End: 2017-05-21

## 2017-05-15 RX ORDER — FUROSEMIDE 10 MG/ML
40 INJECTION INTRAMUSCULAR; INTRAVENOUS ONCE
Status: COMPLETED | OUTPATIENT
Start: 2017-05-15 | End: 2017-05-15

## 2017-05-15 RX ORDER — BUMETANIDE 0.25 MG/ML
1 INJECTION INTRAMUSCULAR; INTRAVENOUS EVERY 12 HOURS
Status: DISCONTINUED | OUTPATIENT
Start: 2017-05-15 | End: 2017-05-16

## 2017-05-15 RX ADMIN — GUAIFENESIN 600 MG: 600 TABLET, EXTENDED RELEASE ORAL at 09:53

## 2017-05-15 RX ADMIN — AZTREONAM 2 G: 2 INJECTION, POWDER, LYOPHILIZED, FOR SOLUTION INTRAMUSCULAR; INTRAVENOUS at 15:51

## 2017-05-15 RX ADMIN — OXYCODONE HYDROCHLORIDE AND ACETAMINOPHEN 1 TABLET: 5; 325 TABLET ORAL at 09:44

## 2017-05-15 RX ADMIN — OMEPRAZOLE 20 MG: 20 CAPSULE, DELAYED RELEASE ORAL at 09:44

## 2017-05-15 RX ADMIN — INSULIN LISPRO 2 UNITS: 100 INJECTION, SOLUTION INTRAVENOUS; SUBCUTANEOUS at 17:12

## 2017-05-15 RX ADMIN — LEVOTHYROXINE SODIUM 225 MCG: 150 TABLET ORAL at 06:36

## 2017-05-15 RX ADMIN — Medication 10 ML: at 13:36

## 2017-05-15 RX ADMIN — INSULIN LISPRO 2 UNITS: 100 INJECTION, SOLUTION INTRAVENOUS; SUBCUTANEOUS at 06:31

## 2017-05-15 RX ADMIN — IPRATROPIUM BROMIDE AND ALBUTEROL SULFATE 3 ML: .5; 3 SOLUTION RESPIRATORY (INHALATION) at 03:15

## 2017-05-15 RX ADMIN — BUMETANIDE 1 MG: 0.25 INJECTION INTRAMUSCULAR; INTRAVENOUS at 15:07

## 2017-05-15 RX ADMIN — Medication 10 ML: at 06:34

## 2017-05-15 RX ADMIN — HEPARIN SODIUM 5000 UNITS: 5000 INJECTION, SOLUTION INTRAVENOUS; SUBCUTANEOUS at 22:35

## 2017-05-15 RX ADMIN — ALPRAZOLAM 1 MG: 0.5 TABLET ORAL at 09:53

## 2017-05-15 RX ADMIN — Medication 10 ML: at 06:33

## 2017-05-15 RX ADMIN — BUMETANIDE 1 MG: 0.25 INJECTION INTRAMUSCULAR; INTRAVENOUS at 22:03

## 2017-05-15 RX ADMIN — CLONAZEPAM 0.5 MG: 0.5 TABLET ORAL at 15:52

## 2017-05-15 RX ADMIN — ASPIRIN 81 MG: 81 TABLET, COATED ORAL at 09:44

## 2017-05-15 RX ADMIN — AZTREONAM 2 G: 2 INJECTION, POWDER, LYOPHILIZED, FOR SOLUTION INTRAMUSCULAR; INTRAVENOUS at 22:34

## 2017-05-15 RX ADMIN — MONTELUKAST SODIUM 10 MG: 10 TABLET, FILM COATED ORAL at 09:44

## 2017-05-15 RX ADMIN — HEPARIN SODIUM 5000 UNITS: 5000 INJECTION, SOLUTION INTRAVENOUS; SUBCUTANEOUS at 01:35

## 2017-05-15 RX ADMIN — LORATADINE 10 MG: 10 TABLET ORAL at 09:44

## 2017-05-15 RX ADMIN — OXYCODONE HYDROCHLORIDE AND ACETAMINOPHEN 1 TABLET: 5; 325 TABLET ORAL at 21:14

## 2017-05-15 RX ADMIN — CYANOCOBALAMIN 1000 MCG: 1000 INJECTION, SOLUTION INTRAMUSCULAR at 17:13

## 2017-05-15 RX ADMIN — LISINOPRIL 20 MG: 20 TABLET ORAL at 09:44

## 2017-05-15 RX ADMIN — Medication 10 ML: at 21:12

## 2017-05-15 RX ADMIN — METHYLPREDNISOLONE SODIUM SUCCINATE 40 MG: 40 INJECTION, POWDER, FOR SOLUTION INTRAMUSCULAR; INTRAVENOUS at 01:35

## 2017-05-15 RX ADMIN — MULTIPLE VITAMINS W/ MINERALS TAB 1 TABLET: TAB at 09:44

## 2017-05-15 RX ADMIN — GUAIFENESIN AND CODEINE PHOSPHATE 10 ML: 100; 10 SOLUTION ORAL at 09:44

## 2017-05-15 RX ADMIN — HEPARIN SODIUM 5000 UNITS: 5000 INJECTION, SOLUTION INTRAVENOUS; SUBCUTANEOUS at 08:20

## 2017-05-15 RX ADMIN — GUAIFENESIN 600 MG: 600 TABLET, EXTENDED RELEASE ORAL at 21:11

## 2017-05-15 RX ADMIN — IPRATROPIUM BROMIDE AND ALBUTEROL SULFATE 3 ML: .5; 3 SOLUTION RESPIRATORY (INHALATION) at 10:51

## 2017-05-15 RX ADMIN — FLUTICASONE FUROATE AND VILANTEROL TRIFENATATE 1 PUFF: 200; 25 POWDER RESPIRATORY (INHALATION) at 09:45

## 2017-05-15 RX ADMIN — ATENOLOL 25 MG: 25 TABLET ORAL at 09:44

## 2017-05-15 RX ADMIN — BUMETANIDE 1 MG: 1 TABLET ORAL at 09:44

## 2017-05-15 RX ADMIN — DULOXETINE 60 MG: 60 CAPSULE, DELAYED RELEASE ORAL at 09:44

## 2017-05-15 RX ADMIN — FUROSEMIDE 40 MG: 10 INJECTION, SOLUTION INTRAMUSCULAR; INTRAVENOUS at 11:21

## 2017-05-15 RX ADMIN — PREGABALIN 100 MG: 100 CAPSULE ORAL at 09:44

## 2017-05-15 RX ADMIN — METHYLPREDNISOLONE SODIUM SUCCINATE 40 MG: 40 INJECTION, POWDER, FOR SOLUTION INTRAMUSCULAR; INTRAVENOUS at 10:01

## 2017-05-15 RX ADMIN — PREGABALIN 100 MG: 100 CAPSULE ORAL at 15:52

## 2017-05-15 RX ADMIN — VANCOMYCIN HYDROCHLORIDE 1250 MG: 10 INJECTION, POWDER, LYOPHILIZED, FOR SOLUTION INTRAVENOUS at 01:35

## 2017-05-15 RX ADMIN — CLONAZEPAM 0.5 MG: 0.5 TABLET ORAL at 21:11

## 2017-05-15 RX ADMIN — NYSTATIN: 100000 POWDER TOPICAL at 10:01

## 2017-05-15 RX ADMIN — METHYLPREDNISOLONE SODIUM SUCCINATE 40 MG: 40 INJECTION, POWDER, FOR SOLUTION INTRAMUSCULAR; INTRAVENOUS at 17:10

## 2017-05-15 RX ADMIN — ZINC OXIDE: 200 OINTMENT TOPICAL at 09:54

## 2017-05-15 RX ADMIN — FLUTICASONE PROPIONATE 1 SPRAY: 50 SPRAY, METERED NASAL at 09:45

## 2017-05-15 RX ADMIN — IPRATROPIUM BROMIDE AND ALBUTEROL SULFATE 3 ML: .5; 3 SOLUTION RESPIRATORY (INHALATION) at 06:30

## 2017-05-15 RX ADMIN — SODIUM CHLORIDE 500 MG: 900 INJECTION, SOLUTION INTRAVENOUS at 13:33

## 2017-05-15 RX ADMIN — PREGABALIN 100 MG: 100 CAPSULE ORAL at 21:11

## 2017-05-15 RX ADMIN — HEPARIN SODIUM 5000 UNITS: 5000 INJECTION, SOLUTION INTRAVENOUS; SUBCUTANEOUS at 17:10

## 2017-05-15 RX ADMIN — AZTREONAM 2 G: 2 INJECTION, POWDER, LYOPHILIZED, FOR SOLUTION INTRAMUSCULAR; INTRAVENOUS at 06:30

## 2017-05-15 NOTE — PROGRESS NOTES
Hospitalist Progress Note-critical care note     Patient: Harrison Rios MRN: [id number]  CSN: [id number]    YOB: 1945  Age: 80 y.o. Sex: female    DOA: 5/13/2017 LOS:  LOS: 1 day            Chief complaint: respiratroy distress with hypoxia, copd exacerbation, acute chf. Assessment/Plan         Patient Active Problem List   Diagnosis Code    Cataract H26.9    DDD (degenerative disc disease), cervical M50.30    H/O cervical spine surgery Z98.890    COPD (chronic obstructive pulmonary disease) ([facility] [address] J44.9    Acidosis E87.2    COPD exacerbation ([facility] [address] J44.1    Acute on chronic respiratory failure (HCC) J96.20    Obesity E66.9    Benign hypertension I10    GERD (gastroesophageal reflux disease) K21.9    Acute diastolic CHF (congestive heart failure) ([facility]) I50.31    Acute encephalopathy G93.40    Toxic metabolic encephalopathy S76    Acute renal failure (ARF) ([facility]) N17.9    Hyperkalemia E87.5    PNA (pneumonia) J18.9    Chest pain R07.9    Anxiety F41.9    Hypomagnesemia E83.42    Sinus tachycardia R00.0    Acute respiratory distress R06.00    Hypoxia R09.02    HCAP (healthcare-associated pneumonia) J18.9     1. Acute Respiratory Distress with Hypoxia;   desat on 3 L and increase nc O2 to 5 ,   -ct chest,   -case discussed with     2. Acute Moderate Exacerbation of Mod Persistent COPD; On breathing tx and iv steroid, abx   3. HCAP  On vanc and zosyn     4. Mild Acute Diastolic CHF exacerbation stage III  On diuretics, give one lasix today due to respiratory distress     5. Elevated BNP  6. HypoMg;  Resolved   7. Atypical Chest Pain; likely non cardiac   No chest pain now. Trop wnl.     8. GERD  ppi     9. HTN  Well controlled,  10. Obesity BMI >35  11. Cervical DDD  Continue home pain management   12. Cataract    13. Nasal MRSA  mupri  Subjective: sob and cough with blood   Nurse: desat.  Coughs more , more respiratory distress     Pt presented worsening respiratory distress, lasix was given, ct chest, transfer to icu. Case discussed with Dr. Loren Faulkner, respiratory therapist and RN. Review of systems:    General: No fevers or chills. Cardiovascular: No chest pain or pressure. No palpitations. Pulmonary: shortness of breath and cough with blood   Gastrointestinal: No nausea, vomiting. Vital signs/Intake and Output:  Visit Vitals    /50 (BP 1 Location: Right arm, BP Patient Position: Sitting)    Pulse 84    Temp 97.3 °F (36.3 °C)    Resp 18    Ht 5' (1.524 m)    Wt 99.8 kg (220 lb 0.3 oz)    SpO2 95%    BMI 42.97 kg/m2     Current Shift:     Last three shifts:  05/13 1901 - 05/15 0700  In: 2990 [P.O.:2640; I.V.:350]  Out: 650 [Urine:650]    Physical Exam:  General: WD, WN. Alert, cooperative, no acute distress    HEENT: NC, Atraumatic. PERRLA, anicteric sclerae. Lungs: Increased BS bilaterally and Rales. Heart:  Regular  rhythm,  No murmur, No Rubs, No Gallops  Abdomen: Soft, Non distended, Non tender.  +Bowel sounds,   Extremities: No c/c/e  Psych:   Mild  Anxious,no  agitated. Neurologic:  No acute neurological deficit.              Labs: Results:       Chemistry Recent Labs      05/15/17   0550  05/14/17   0700  05/13/17 2000   GLU  170*  175*  128*   NA  142  141  140   K  3.9  3.8  4.0   CL  104  103  101   CO2  27  27  30   BUN  25*  17  18   CREA  1.12  1.10  1.19   CA  9.1  9.2  9.1   AGAP  11  11  9   BUCR  22*  15  15   AP  58  61   --    TP  7.2  7.4   --    ALB  3.0*  3.1*   --    GLOB  4.2*  4.3*   --    AGRAT  0.7*  0.7*   --       CBC w/Diff Recent Labs      05/15/17   0550  05/14/17   0700  05/13/17 2000   WBC  11.6  8.4  10.7   RBC  3.23*  3.10*  3.14*   HGB  10.0*  9.7*  9.8*   HCT  30.4*  28.9*  29.8*   PLT  311  280  289   GRANS   --   87*  80*   LYMPH   --   11*  14*   EOS   --   0  1      Cardiac Enzymes Recent Labs      05/14/17   0740  05/13/17 2000   CPK  121  105   CKND1  1.8  1.5      Coagulation Recent Labs      05/13/17 2000   PTP  13.7   INR  1.1   APTT  29.9       Lipid Panel No results found for: CHOL, CHOLPOCT, CHOLX, CHLST, CHOLV, [id number], HDL, LDL, NLDLCT, DLDL, LDLC, DLDLP, 337702, VLDLC, VLDL, TGL, TGLX, TRIGL, [id number], TRIGP, TGLPOCT, [id number], CHHD, CHHDX   BNP No results for input(s): BNPP in the last 72 hours.    Liver Enzymes Recent Labs      05/15/17   0550   TP  7.2   ALB  3.0*   AP  58   SGOT  46*      Thyroid Studies Lab Results   Component Value Date/Time    TSH 0.04 11/26/2016 05:39 AM        Procedures/imaging: see electronic medical records for all procedures/Xrays and details which were not copied into this note but were reviewed prior to creation of Pako Thomas MD

## 2017-05-15 NOTE — ROUTINE PROCESS
Bedside and verbal shift change report given to OLIVERIO Mercer RN (oncoming nurse) by Carissa Quarles RN (offgoing nurse). Report included the following information SBAR, Kardex, ED Summary, Procedure Summary, Intake/Output, MAR, Accordion, Recent Results and Med Rec Status.

## 2017-05-15 NOTE — ROUTINE PROCESS
Bedside and Verbal shift change report given to Daryn Valverde RN (oncoming nurse) by Julius Gonsalves RN (offgoing nurse).  Report included the following information SBAR, Kardex, Intake/Output, MAR, Recent Results, Med Rec Status and Cardiac Rhythm ST.

## 2017-05-15 NOTE — ROUTINE PROCESS
Called security for personal belongings- personal i pod, cell phone / [place] [employer]. Plan to transfer pt to ICU.

## 2017-05-15 NOTE — PROGRESS NOTES
0730 Assumed care of pt from Sherly Kilpatrick RN. Pt resting quietly in bed , no signs of distress, call bell within reach. Shift Summary- Shift uneventful. Pt denied chest pain, shortness of breath alleviated with neb tx, pt \"generalized fulll body pain\" relieved with PRN Percocet. Pt ambulated to bedside commode with assistance, but would have incontinent episodes, d/t coughing.

## 2017-05-15 NOTE — PROGRESS NOTES
Subjective: This patient has been seen and evaluated at the request of Dr. Wilmer Gardner. Patient is a 80 y.o. female admitted with cough and sputum production     5/15  [place] anxious this am, blood tinged sputum,  Does not appears to be bright red blood. Not on bipap. Positive fluid 2.99L last 24. No wheezing. Called by Dr. Guy Fairly wanting to transfer to ICU due to worsening respiratory status and hemoptysis. Past Medical History:   Diagnosis Date    Arthritis     Asthma     CAD (coronary artery disease)     angina, last episode 06/2012    Chronic bronchitis (HCC)     Chronic kidney disease     stage 3    Chronic obstructive pulmonary disease (HCC)     Chronic pain     cervical, lumbar, knees, ankles, elbows    Fibromyalgia     GERD (gastroesophageal reflux disease)     Hypertension 1993    Psychiatric disorder     anxiety, depression    Thyroid disease      Past Surgical History:   Procedure Laterality Date    HX ADENOIDECTOMY      HX APPENDECTOMY      HX CATARACT REMOVAL      OU    HX CERVICAL FUSION      anterior x 2    HX CERVICAL FUSION      posterior x 3    HX CERVICAL FUSION  1996    Removal of titanium plate and screws    HX CHOLECYSTECTOMY      HX HYSTERECTOMY      HX LITHOTRIPSY      x 4    HX OOPHORECTOMY      left    HX ORTHOPAEDIC      cervical  x5    HX SMALL BOWEL RESECTION      HX TONSILLECTOMY      HX UROLOGICAL  1984 and 1985    kidney stone surgery      History reviewed. No pertinent family history.   Social History   Substance Use Topics    Smoking status: Never Smoker    Smokeless tobacco: Never Used    Alcohol use No      Current Facility-Administered Medications   Medication Dose Route Frequency Provider Last Rate Last Dose    albuterol-ipratropium (DUO-NEB) 2.5 MG-0.5 MG/3 ML  3 mL Nebulization Q4H PRN MD Syed Mckeonricbandar Mckee ON 5/16/2017] umeclidinium (INCRUSE ELLIPTA) 62.5 mcg/actuation  1 Puff Inhalation DAILY Sonny Mccray MD        clonazePAM (KlonoPIN) tablet 0.5 mg  0.5 mg Oral TID Brenda Fontana MD        bumetanide [facility]) injection 1 mg  1 mg IntraVENous Q12H Brenda Fontana MD        loratadine (CLARITIN) tablet 10 mg  10 mg Oral DAILY Khalif Gil MD   10 mg at 05/15/17 0944    montelukast (SINGULAIR) tablet 10 mg  10 mg Oral DAILY Khalif Gil MD   10 mg at 05/15/17 0944    DULoxetine (CYMBALTA) capsule 60 mg  60 mg Oral DAILY Khalif Gil MD   60 mg at 05/15/17 0944    fluticasone-vilanterol (BREO ELLIPTA) 200mcg-25mcg/puff  1 Puff Inhalation DAILY Khalif Gil MD   1 Puff at 05/15/17 0945    nitroglycerin (NITROSTAT) tablet 0.4 mg  0.4 mg SubLINGual Q5MIN PRN Khalif Gil MD        aspirin delayed-release tablet 81 mg  81 mg Oral DAILY Khalif Gil MD   81 mg at 05/15/17 0944    atenolol (TENORMIN) tablet 25 mg  25 mg Oral DAILY Khalif Gil MD   25 mg at 05/15/17 0944    guaiFENesin ER (MUCINEX) tablet 600 mg  600 mg Oral Q12H Khalif Gil MD   600 mg at 05/15/17 8807    levothyroxine (SYNTHROID) tablet 225 mcg  225 mcg Oral ACB Emanuel Caceres MD   225 mcg at 05/15/17 0636    pregabalin (LYRICA) capsule 100 mg  100 mg Oral TID Khalif iGl MD   100 mg at 05/15/17 0944    fluticasone (FLONASE) 50 mcg/actuation nasal spray 1 Spray  1 Spray Both Nostrils DAILY Emanuel Caceres MD   1 Spray at 05/15/17 0945    cyanocobalamin (VITAMIN B12) injection 1,000 mcg  1,000 mcg IntraMUSCular EVERY MONTH Khalif Gil MD        acetaminophen (TYLENOL) tablet 650 mg  650 mg Oral Q6H PRN Khalif Gil MD        diclofenac (VOLTAREN) 1 % topical gel 2 g  2 g Topical TID Khalif Gil MD   Stopped at 05/14/17 2200    cholestyramine (QUESTRAN) packet 4 g  4 g Oral DAILY PRN Khalif Gil MD        nystatin (MYCOSTATIN) 100,000 unit/gram powder   Topical BID Emanuel Caceres MD        omeprazole (PRILOSEC) capsule 20 mg  20 mg Oral DAILY Khalif Gil MD   20 mg at 05/15/17 0944    zinc oxide 20 % ointment   Topical BID Khalif Gil MD        alum-mag hydroxide-simeth (MYLANTA) oral suspension 15 mL  15 mL Oral QID PRN Ilya Head MD   15 mL at 05/14/17 0834    multivitamin, tx-iron-ca-min (THERA-M w/ IRON) tablet 1 Tab  1 Tab Oral DAILY Ilya Head MD   1 Tab at 05/15/17 0944    lisinopril (PRINIVIL, ZESTRIL) tablet 20 mg  20 mg Oral DAILY Ilya Head MD   20 mg at 05/15/17 0944    fentaNYL (DURAGESIC) 25 mcg/hr patch 1 Patch  1 Patch TransDERmal Q72H Ilya Head MD   1 Patch at 05/14/17 0329    sodium chloride (NS) flush 5-10 mL  5-10 mL IntraVENous Q8H Emanuel Padilla MD   10 mL at 05/15/17 3584    sodium chloride (NS) flush 5-10 mL  5-10 mL IntraVENous PRN Ilya Head MD        heparin (porcine) injection 5,000 Units  5,000 Units SubCUTAneous Q8H Emanuel Padilla MD   5,000 Units at 05/15/17 0820    insulin lispro (HUMALOG) injection   SubCUTAneous AC&HS Ilya Head MD   2 Units at 05/15/17 0631    glucose chewable tablet 16 g  16 g Oral PRN Ilya Head MD        glucagon (GLUCAGEN) injection 1 mg  1 mg IntraMUSCular NUHAN Ilya Head MD        dextrose (D50W) injection syrg 12.5-25 g  25-50 mL IntraVENous PRN Ilya Head MD        oxyCODONE-acetaminophen (PERCOCET) 5-325 mg per tablet 1 Tab  1 Tab Oral Q6H PRN Ilya Head MD   1 Tab at 05/15/17 0944    methylPREDNISolone (PF) (SOLU-MEDROL) injection 40 mg  40 mg IntraVENous Q8H Emanuel Padilla MD   40 mg at 05/15/17 1001    influenza vaccine 2016-17 (36mos+)(PF) (FLUZONE/FLUARIX/FLULAVAL QUAD) injection 0.5 mL  0.5 mL IntraMUSCular PRIOR TO DISCHARGE Ilya Head MD        diphenhydrAMINE (BENADRYL) 12.5 mg/5 mL oral elixir 12.5 mg  12.5 mg Oral Q6H PRN Emanuel Padilla MD   12.5 mg at 05/14/17 2119    guaiFENesin-codeine (ROBITUSSIN AC) 100-10 mg/5 mL solution 10 mL  10 mL Oral Q4H PRN Ilya Head MD   10 mL at 05/15/17 0944    azithromycin (ZITHROMAX) 500 mg in 0.9% sodium chloride (MBP/ADV) 250 mL adv  500 mg IntraVENous Q24H iDdier Fofana  mL/hr at 05/14/17 1801 500 mg at 05/14/17 1801    benzocaine-menthol (Dieudonne Man) lozenge 1 Lozenge  1 Lozenge Mucous Membrane PRN Pablo Rios MD        ALPRAZolam Dulce Maria Marlene) tablet 1 mg  1 mg Oral TID PRN Mari Layton MD   1 mg at 05/15/17 0953    sodium chloride (NS) flush 5-10 mL  5-10 mL IntraVENous Q8H Norm Adair, MD   10 mL at 05/15/17 0634    sodium chloride (NS) flush 5-10 mL  5-10 mL IntraVENous PRN Norm Adair, MD   10 mL at 05/15/17 4353    sodium chloride (NS) flush 5-10 mL  5-10 mL IntraVENous PRN Norm MD Adair        aztreonam (AZACTAM) 2 g in 0.9% sodium chloride (MBP/ADV) 100 mL MBP  2 g IntraVENous Q8H Norm Danger,  mL/hr at 05/15/17 0630 2 g at 05/15/17 0630    Vancomycin Pharmacokinetic Dosing  1 Each Other Rx Dosing/Monitoring Norm MD Adair        vancomycin (VANCOCIN) 1,250 mg in 0.9% sodium chloride 250 mL IVPB  1,250 mg IntraVENous Q24H Norm Adari,  mL/hr at 05/15/17 0135 1,250 mg at 05/15/17 0135        Allergies   Allergen Reactions    Ambien [Zolpidem] Other (comments)     Sleep drive    Aspirin Other (comments)     bruising    Codeine Hives    Darvon [Propoxyphene] Unknown (comments)    Iodinated Contrast Media - Oral And Iv Dye Hives     Iodine on skin is ok per pt.  Pcn [Penicillins] Nausea and Vomiting    Shellfish Derived Hives, Shortness of Breath and Swelling     Iodine on skin is ok per pt.  Zanaflex [Tizanidine] Other (comments)     disorientated    Levaquin [Levofloxacin] Hives     Red rash at IV site while infusing       Review of Systems:  Pertinent items are noted in HPI. Objective:     Blood pressure 125/69, pulse 85, temperature 98.3 °F (36.8 °C), resp. rate 16, height 5' (1.524 m), weight 99.8 kg (220 lb 0.3 oz), SpO2 91 %.  Temp (24hrs), Av.9 °F (36.6 °C), Min:97.3 °F (36.3 °C), Max:98.3 °F (36.8 °C)      Intake and Output:  Current Shift:    Last 3 Shifts: 1 - 05/15 0700  In: 9171 [P.O.:2640; I.V.:350]  Out: 650 [Urine:650]    Physical Exam:   Visit Vitals    /69 (BP 1 Location: Right arm, BP Patient Position: Sitting)    Pulse 85    Temp 98.3 °F (36.8 °C)    Resp 16    Ht 5' (1.524 m)    Wt 99.8 kg (220 lb 0.3 oz)    SpO2 91%    BMI 42.97 kg/m2     General:  Alert, cooperative, no distress, appears stated age. Head:  Normocephalic, without obvious abnormality, atraumatic. Eyes:  Conjunctivae/corneas clear. PERRL, EOMs intact. Fundi benign. Ears:  Normal TMs and external ear canals both ears. Nose: Nares normal. Septum midline. Mucosa normal. No drainage or sinus tenderness. Throat: Lips, mucosa, and tongue normal. Teeth and gums normal.   Neck: Supple, symmetrical, trachea midline, no adenopathy, thyroid: no enlargement/tenderness/nodules, no carotid bruit and no JVD. Back:   Symmetric, no curvature. ROM normal. No CVA tenderness. Lungs:   wheezing bilaterally. Chest wall:  No tenderness or deformity. Heart:  Regular rate and rhythm, S1, S2 normal, no murmur, click, rub or gallop. Breast Exam:  No tenderness, masses, or nipple abnormality. Abdomen:   Soft, non-tender. Bowel sounds normal. No masses,  No organomegaly. Genitalia:  Normal female without lesion, discharge or tenderness. Rectal:  Normal tone,  no masses or tenderness  Guaiac negative stool. Extremities: Extremities normal, atraumatic, no cyanosis or edema. Pulses: 2+ and symmetric all extremities. Skin: Skin color, texture, turgor normal. No rashes or lesions. Lymph nodes: Cervical, supraclavicular, and axillary nodes normal.   Neurologic: CNII-XII intact. Normal strength, sensation and reflexes throughout.      Reviewed all labs and radiology and previous notes and admissions    Assessment:     Acute respiratory distress  COPD  Acute diastolic heart failure  Chronic bronchitis  Severe anxiety disorder    Plan:     Continue present abx   Continue steroids and abx  Will increase the diuretic  Will add incruse ellipta and change combivent to prn  Hemoptysis nonsignificant suspect upper airway issues   Schedule klonopin  Prn bipap if in ICU>

## 2017-05-15 NOTE — PROGRESS NOTES
Patient has large amount of secretions in upper airway. Patient increased to 5lpm for low spo2 of 90%. Nasally suction upper airway down left nare x 1 for pink frothy sputum. Patient then coughed up large amount of pink thick and bubbly in tissue. Spo2 95% on 5lpm nc. Dr Kimberli Cervantes informed of patients change in status.

## 2017-05-15 NOTE — PROGRESS NOTES
1304 Received patient to ICU bed 4. Pt placed on continuous cardiac, pulse ox monitoring. Pt oriented to room. Pt assessment completed. See flow sheet. 1322 Received critical lab result. Pt nasal swab positive for MRSA. Orders received to place patient on contact isolation. [facility] into patients room. Pt c/o burning at IV site. IV site red and swollen. Pt currently had Zithromax infusing. Attempt for new IV site unsuccessful. Patient requesting central line. Dr. Nakul Kovacs paged. Per Dr. Nakul Kovacs she does not think patient needs a central line at this time. Called ED for assistance to start IV. No staff available at this time. 1920 Bedside shift change report given to Neelam Harrison RN (oncoming nurse) by Janice Yates RN   (offgoing nurse). Report included the following information SBAR, Kardex and MAR.

## 2017-05-15 NOTE — PROGRESS NOTES
7936 Assumed care of pt from [place] [address]. Pt resting quietly in bed, eyes closed , no signs of distress, call bell within reach. 0900 Assessment completed, pt appears anxious was very demanding towards food services about breakfast, appeared awake in bed with nasal cannula on her forehead, states she \"couldn't breath\" assisted pt w putting NC back on. Advised to take deep breaths. Pt AOx4, lungs sounds coarse, crackles and inspiratory and expiratory wheeze audible. Change from yesterday, pt states she \"doesn't feel good\" and \" just wants to go to the [place], does not want to fight with her lungs and kidneys anymore\"     2370 Returned with morning medication, pt requested PRN pain medication, cough suppressant and anxiety medication. Again, returned to room with nasal cannula off pt, returned to proper place, bedside swallow performed. Pt able to take medications without difficulty. Pt notes that she is coughing clear sputum with pink streak in it, will let  know    1000 Spoke with  regarding pt's condition, pt appears anxious, updated on lung sounds today. Orders for CT chest. Will continue monitoring until CT. Pt states that she wants to lay back down and rest. Will continue to monitor. 1100 Called to bedside by RT, states that she will try using suction to help pt with secretions, pt did not tolerate well states \"I never want to do that again\" whit bentley noted, MD notified to come to bedside. 0 MD at bedside, update given, MD order stat 40 mg IV Lasix, CT chest and transfer to ICU for increase respiratory distress, pt placed on 5L NC    1115 Pt up at side of table, anxious, states she \"wants to quit fighting, kidney and lungs are terrible\" Asked if she would like Palliative Care to consult, she states she will think about it. 1129  at bedside, no orders received.      1230 Taken to CT via stretcher with myself and PCT, pt resting supine, no apparent respiratory distress, denies shortness of breath. Audible crackles heard. 1304 Pt transferred to ICU, bedside handoff given to YU. 2501 [facility] REPORT:    Verbal report given to Chucky Melendez RN(name) on Candido James  being transferred to ICU(unit) for change in pt condition     Report consisted of patients Situation, Background, Assessment and   Recommendations(SBAR). Information from the following report(s) SBAR, Kardex, ED Summary, Procedure Summary, Intake/Output, MAR, Accordion, Recent Results, Med Rec Status and Cardiac Rhythm NSR was reviewed with the receiving nurse. Lines:   Peripheral IV 05/15/17 Right Arm (Active)   Site Assessment Clean, dry, & intact 5/15/2017  3:09 PM   Phlebitis Assessment 0 5/15/2017  3:09 PM   Infiltration Assessment 0 5/15/2017  3:09 PM   Dressing Status Clean, dry, & intact 5/15/2017  3:09 PM   Dressing Type Transparent;Tape 5/15/2017  3:09 PM   Hub Color/Line Status Blue;Flushed;Patent 5/15/2017  3:09 PM   Action Taken Open ports on tubing capped 5/15/2017  3:09 PM   Alcohol Cap Used Yes 5/15/2017  3:09 PM       Peripheral IV 05/15/17 Right;Other (Comment) Other(comment) (Active)   Site Assessment Clean, dry, & intact 5/15/2017  3:09 PM   Phlebitis Assessment 0 5/15/2017  3:09 PM   Infiltration Assessment 0 5/15/2017  3:09 PM   Dressing Status Clean, dry, & intact 5/15/2017  3:09 PM   Dressing Type Transparent;Tape 5/15/2017  3:09 PM   Hub Color/Line Status Blue;Capped 5/15/2017  3:09 PM   Action Taken Open ports on tubing capped 5/15/2017  3:09 PM   Alcohol Cap Used Yes 5/15/2017  3:09 PM        Opportunity for questions and clarification was provided.       Patient transported with:   Monitor  O2 @ 6 liters  Patient-specific medications from Pharmacy  Registered Nurse  Tech

## 2017-05-15 NOTE — PROGRESS NOTES
1948:  Bedside and Verbal shift change report received from Momo Palencia, RN (offgoing nurse) to Anjelica Long RN (oncoming nurse). Report included the following information SBAR, Kardex, Intake/Output, MAR, Recent Results and Cardiac Rhythm SR. Pt resting in bed. Appears to be in no distress at this time. Call bell within reach. Zone phone number given to pt. Pt instructed to call zone phone or call bell if needed. Pt instructed to call for assistance before ambulating. 2140:  Pt complained of SOB; coughing excessively and becoming anxious. Robitussin AC 10ml given PO and benedryl 12.5 mg given PO. Requested and received order from Dr. Rica Parada for Xanax 1 mg every 8h prn PO for anxiety. Pt was able to stop coughing and calm down afterward. 0108:  Positive blood culture result called by Zoraida Olsen:  Anaerobic gram + cocci in clusters. Called to Dr. Rica Parada who ordered a nasal swab for MRSA.

## 2017-05-15 NOTE — PROGRESS NOTES
Patient was visited by IRIS. Volunteer followed up with patient and/or family and reported no needs to this . Chaplains will continue to follow and will provide pastoral care as needed or requested.     [address] [place] [address] Shira Laboy.  [phone] - Office

## 2017-05-16 LAB
ANION GAP BLD CALC-SCNC: 5 MMOL/L (ref 3–18)
ATRIAL RATE: 110 BPM
BACTERIA SPEC CULT: ABNORMAL
BUN SERPL-MCNC: 36 MG/DL (ref 7–18)
BUN/CREAT SERPL: 31 (ref 12–20)
CALCIUM SERPL-MCNC: 8.3 MG/DL (ref 8.5–10.1)
CALCULATED P AXIS, ECG09: 47 DEGREES
CALCULATED R AXIS, ECG10: 54 DEGREES
CALCULATED T AXIS, ECG11: 24 DEGREES
CHLORIDE SERPL-SCNC: 105 MMOL/L (ref 100–108)
CO2 SERPL-SCNC: 33 MMOL/L (ref 21–32)
CREAT SERPL-MCNC: 1.18 MG/DL (ref 0.6–1.3)
CRP SERPL-MCNC: 9 MG/DL (ref 0–0.3)
DIAGNOSIS, 93000: NORMAL
ERYTHROCYTE [DISTWIDTH] IN BLOOD BY AUTOMATED COUNT: 12.7 % (ref 11.6–14.5)
GLUCOSE BLD STRIP.AUTO-MCNC: 138 MG/DL (ref 70–110)
GLUCOSE BLD STRIP.AUTO-MCNC: 153 MG/DL (ref 70–110)
GLUCOSE BLD STRIP.AUTO-MCNC: 234 MG/DL (ref 70–110)
GLUCOSE BLD STRIP.AUTO-MCNC: 97 MG/DL (ref 70–110)
GLUCOSE SERPL-MCNC: 144 MG/DL (ref 74–99)
GRAM STN SPEC: ABNORMAL
HCT VFR BLD AUTO: 28.1 % (ref 35–45)
HGB BLD-MCNC: 9.4 G/DL (ref 12–16)
MCH RBC QN AUTO: 31 PG (ref 24–34)
MCHC RBC AUTO-ENTMCNC: 33.5 G/DL (ref 31–37)
MCV RBC AUTO: 92.7 FL (ref 74–97)
P-R INTERVAL, ECG05: 144 MS
PLATELET # BLD AUTO: 316 K/UL (ref 135–420)
PMV BLD AUTO: 10.1 FL (ref 9.2–11.8)
POTASSIUM SERPL-SCNC: 3.5 MMOL/L (ref 3.5–5.5)
PREALB SERPL-MCNC: 15.2 MG/DL (ref 20–40)
Q-T INTERVAL, ECG07: 338 MS
QRS DURATION, ECG06: 84 MS
QTC CALCULATION (BEZET), ECG08: 457 MS
RBC # BLD AUTO: 3.03 M/UL (ref 4.2–5.3)
SERVICE CMNT-IMP: ABNORMAL
SERVICE CMNT-IMP: ABNORMAL
SODIUM SERPL-SCNC: 143 MMOL/L (ref 136–145)
VENTRICULAR RATE, ECG03: 110 BPM
WBC # BLD AUTO: 12.4 K/UL (ref 4.6–13.2)

## 2017-05-16 PROCEDURE — 74011250637 HC RX REV CODE- 250/637: Performed by: INTERNAL MEDICINE

## 2017-05-16 PROCEDURE — 74011250636 HC RX REV CODE- 250/636: Performed by: INTERNAL MEDICINE

## 2017-05-16 PROCEDURE — 36415 COLL VENOUS BLD VENIPUNCTURE: CPT | Performed by: INTERNAL MEDICINE

## 2017-05-16 PROCEDURE — 74011636637 HC RX REV CODE- 636/637: Performed by: INTERNAL MEDICINE

## 2017-05-16 PROCEDURE — 84134 ASSAY OF PREALBUMIN: CPT | Performed by: INTERNAL MEDICINE

## 2017-05-16 PROCEDURE — 85027 COMPLETE CBC AUTOMATED: CPT | Performed by: INTERNAL MEDICINE

## 2017-05-16 PROCEDURE — 74011250637 HC RX REV CODE- 250/637: Performed by: HOSPITALIST

## 2017-05-16 PROCEDURE — 74011250636 HC RX REV CODE- 250/636: Performed by: EMERGENCY MEDICINE

## 2017-05-16 PROCEDURE — 74011000250 HC RX REV CODE- 250: Performed by: INTERNAL MEDICINE

## 2017-05-16 PROCEDURE — 80048 BASIC METABOLIC PNL TOTAL CA: CPT | Performed by: INTERNAL MEDICINE

## 2017-05-16 PROCEDURE — 74011000250 HC RX REV CODE- 250: Performed by: EMERGENCY MEDICINE

## 2017-05-16 PROCEDURE — 77010033678 HC OXYGEN DAILY

## 2017-05-16 PROCEDURE — 82652 VIT D 1 25-DIHYDROXY: CPT | Performed by: INTERNAL MEDICINE

## 2017-05-16 PROCEDURE — 82962 GLUCOSE BLOOD TEST: CPT

## 2017-05-16 PROCEDURE — 74011250636 HC RX REV CODE- 250/636: Performed by: FAMILY MEDICINE

## 2017-05-16 PROCEDURE — 74011250637 HC RX REV CODE- 250/637: Performed by: FAMILY MEDICINE

## 2017-05-16 PROCEDURE — 94640 AIRWAY INHALATION TREATMENT: CPT

## 2017-05-16 PROCEDURE — 86140 C-REACTIVE PROTEIN: CPT | Performed by: INTERNAL MEDICINE

## 2017-05-16 PROCEDURE — 65610000006 HC RM INTENSIVE CARE

## 2017-05-16 PROCEDURE — 74011000258 HC RX REV CODE- 258: Performed by: EMERGENCY MEDICINE

## 2017-05-16 RX ORDER — ACETAZOLAMIDE 250 MG/1
250 TABLET ORAL 2 TIMES DAILY
Status: DISCONTINUED | OUTPATIENT
Start: 2017-05-16 | End: 2017-05-18

## 2017-05-16 RX ORDER — BUMETANIDE 0.25 MG/ML
1 INJECTION INTRAMUSCULAR; INTRAVENOUS DAILY
Status: DISCONTINUED | OUTPATIENT
Start: 2017-05-17 | End: 2017-05-16

## 2017-05-16 RX ORDER — FUROSEMIDE 10 MG/ML
40 INJECTION INTRAMUSCULAR; INTRAVENOUS ONCE
Status: COMPLETED | OUTPATIENT
Start: 2017-05-16 | End: 2017-05-16

## 2017-05-16 RX ORDER — DRONABINOL 2.5 MG/1
2.5 CAPSULE ORAL
Status: DISCONTINUED | OUTPATIENT
Start: 2017-05-16 | End: 2017-05-25 | Stop reason: HOSPADM

## 2017-05-16 RX ORDER — BUMETANIDE 0.25 MG/ML
1 INJECTION INTRAMUSCULAR; INTRAVENOUS DAILY
Status: DISCONTINUED | OUTPATIENT
Start: 2017-05-16 | End: 2017-05-18

## 2017-05-16 RX ORDER — POTASSIUM CHLORIDE 20 MEQ/1
20 TABLET, EXTENDED RELEASE ORAL ONCE
Status: COMPLETED | OUTPATIENT
Start: 2017-05-16 | End: 2017-05-16

## 2017-05-16 RX ADMIN — LORATADINE 10 MG: 10 TABLET ORAL at 10:17

## 2017-05-16 RX ADMIN — GUAIFENESIN AND CODEINE PHOSPHATE 10 ML: 100; 10 SOLUTION ORAL at 21:15

## 2017-05-16 RX ADMIN — OXYCODONE HYDROCHLORIDE AND ACETAMINOPHEN 1 TABLET: 5; 325 TABLET ORAL at 21:20

## 2017-05-16 RX ADMIN — INSULIN LISPRO 2 UNITS: 100 INJECTION, SOLUTION INTRAVENOUS; SUBCUTANEOUS at 07:01

## 2017-05-16 RX ADMIN — FUROSEMIDE 40 MG: 10 INJECTION, SOLUTION INTRAMUSCULAR; INTRAVENOUS at 22:08

## 2017-05-16 RX ADMIN — GUAIFENESIN 600 MG: 600 TABLET, EXTENDED RELEASE ORAL at 10:17

## 2017-05-16 RX ADMIN — CLONAZEPAM 0.5 MG: 0.5 TABLET ORAL at 21:15

## 2017-05-16 RX ADMIN — VANCOMYCIN HYDROCHLORIDE 1250 MG: 10 INJECTION, POWDER, LYOPHILIZED, FOR SOLUTION INTRAVENOUS at 02:43

## 2017-05-16 RX ADMIN — CLONAZEPAM 0.5 MG: 0.5 TABLET ORAL at 16:03

## 2017-05-16 RX ADMIN — PREGABALIN 100 MG: 100 CAPSULE ORAL at 10:16

## 2017-05-16 RX ADMIN — GUAIFENESIN 600 MG: 600 TABLET, EXTENDED RELEASE ORAL at 21:15

## 2017-05-16 RX ADMIN — Medication 10 ML: at 21:20

## 2017-05-16 RX ADMIN — ASPIRIN 81 MG: 81 TABLET, COATED ORAL at 10:17

## 2017-05-16 RX ADMIN — ACETAZOLAMIDE 250 MG: 250 TABLET ORAL at 10:35

## 2017-05-16 RX ADMIN — FLUTICASONE PROPIONATE 1 SPRAY: 50 SPRAY, METERED NASAL at 10:36

## 2017-05-16 RX ADMIN — UMECLIDINIUM 1 PUFF: 62.5 AEROSOL, POWDER ORAL at 10:15

## 2017-05-16 RX ADMIN — HEPARIN SODIUM 5000 UNITS: 5000 INJECTION, SOLUTION INTRAVENOUS; SUBCUTANEOUS at 10:11

## 2017-05-16 RX ADMIN — MONTELUKAST SODIUM 10 MG: 10 TABLET, FILM COATED ORAL at 10:17

## 2017-05-16 RX ADMIN — PREGABALIN 100 MG: 100 CAPSULE ORAL at 21:15

## 2017-05-16 RX ADMIN — INSULIN LISPRO 4 UNITS: 100 INJECTION, SOLUTION INTRAVENOUS; SUBCUTANEOUS at 18:33

## 2017-05-16 RX ADMIN — BUMETANIDE 1 MG: 0.25 INJECTION INTRAMUSCULAR; INTRAVENOUS at 16:00

## 2017-05-16 RX ADMIN — DULOXETINE 60 MG: 60 CAPSULE, DELAYED RELEASE ORAL at 10:17

## 2017-05-16 RX ADMIN — OMEPRAZOLE 20 MG: 20 CAPSULE, DELAYED RELEASE ORAL at 10:17

## 2017-05-16 RX ADMIN — LEVOTHYROXINE SODIUM 225 MCG: 150 TABLET ORAL at 06:06

## 2017-05-16 RX ADMIN — NYSTATIN: 100000 POWDER TOPICAL at 10:35

## 2017-05-16 RX ADMIN — HEPARIN SODIUM 5000 UNITS: 5000 INJECTION, SOLUTION INTRAVENOUS; SUBCUTANEOUS at 23:52

## 2017-05-16 RX ADMIN — OXYCODONE HYDROCHLORIDE AND ACETAMINOPHEN 1 TABLET: 5; 325 TABLET ORAL at 02:35

## 2017-05-16 RX ADMIN — ACETAZOLAMIDE 250 MG: 250 TABLET ORAL at 21:15

## 2017-05-16 RX ADMIN — DRONABINOL 2.5 MG: 2.5 CAPSULE ORAL at 14:59

## 2017-05-16 RX ADMIN — AZTREONAM 2 G: 2 INJECTION, POWDER, LYOPHILIZED, FOR SOLUTION INTRAMUSCULAR; INTRAVENOUS at 06:05

## 2017-05-16 RX ADMIN — POTASSIUM CHLORIDE 20 MEQ: 20 TABLET, EXTENDED RELEASE ORAL at 22:08

## 2017-05-16 RX ADMIN — ATENOLOL 25 MG: 25 TABLET ORAL at 10:17

## 2017-05-16 RX ADMIN — CLONAZEPAM 0.5 MG: 0.5 TABLET ORAL at 10:17

## 2017-05-16 RX ADMIN — Medication 10 ML: at 06:06

## 2017-05-16 RX ADMIN — METHYLPREDNISOLONE SODIUM SUCCINATE 40 MG: 40 INJECTION, POWDER, FOR SOLUTION INTRAMUSCULAR; INTRAVENOUS at 10:11

## 2017-05-16 RX ADMIN — METHYLPREDNISOLONE SODIUM SUCCINATE 40 MG: 40 INJECTION, POWDER, FOR SOLUTION INTRAMUSCULAR; INTRAVENOUS at 02:00

## 2017-05-16 RX ADMIN — LISINOPRIL 20 MG: 20 TABLET ORAL at 10:16

## 2017-05-16 RX ADMIN — SODIUM CHLORIDE 500 MG: 900 INJECTION, SOLUTION INTRAVENOUS at 16:01

## 2017-05-16 RX ADMIN — HEPARIN SODIUM 5000 UNITS: 5000 INJECTION, SOLUTION INTRAVENOUS; SUBCUTANEOUS at 18:34

## 2017-05-16 RX ADMIN — PREGABALIN 100 MG: 100 CAPSULE ORAL at 16:03

## 2017-05-16 RX ADMIN — AZTREONAM 2 G: 2 INJECTION, POWDER, LYOPHILIZED, FOR SOLUTION INTRAMUSCULAR; INTRAVENOUS at 22:08

## 2017-05-16 RX ADMIN — ZINC OXIDE: 200 OINTMENT TOPICAL at 10:35

## 2017-05-16 RX ADMIN — NYSTATIN: 100000 POWDER TOPICAL at 21:18

## 2017-05-16 RX ADMIN — GUAIFENESIN AND CODEINE PHOSPHATE 10 ML: 100; 10 SOLUTION ORAL at 06:05

## 2017-05-16 RX ADMIN — IPRATROPIUM BROMIDE AND ALBUTEROL SULFATE 3 ML: .5; 3 SOLUTION RESPIRATORY (INHALATION) at 21:54

## 2017-05-16 RX ADMIN — Medication 10 ML: at 15:08

## 2017-05-16 RX ADMIN — FLUTICASONE FUROATE AND VILANTEROL TRIFENATATE 1 PUFF: 200; 25 POWDER RESPIRATORY (INHALATION) at 09:00

## 2017-05-16 RX ADMIN — METHYLPREDNISOLONE SODIUM SUCCINATE 40 MG: 40 INJECTION, POWDER, FOR SOLUTION INTRAMUSCULAR; INTRAVENOUS at 18:33

## 2017-05-16 RX ADMIN — AZTREONAM 2 G: 2 INJECTION, POWDER, LYOPHILIZED, FOR SOLUTION INTRAMUSCULAR; INTRAVENOUS at 16:02

## 2017-05-16 RX ADMIN — DRONABINOL 2.5 MG: 2.5 CAPSULE ORAL at 18:34

## 2017-05-16 RX ADMIN — MUPIROCIN: 20 OINTMENT TOPICAL at 10:14

## 2017-05-16 RX ADMIN — MULTIPLE VITAMINS W/ MINERALS TAB 1 TABLET: TAB at 10:17

## 2017-05-16 NOTE — PROGRESS NOTES
Report received and assumed care of pt. Assessment completed per flowsheet. Pt drowsy but easily awakened. Orders received from hospitalist for a cook catheter as pt is incontinent and unable to determine I&Os. Lungs with rhonchi and coarse crackles, productive cough bringing up thick pink-tinged sputum. VSS, will continue to monitor.

## 2017-05-16 NOTE — PROGRESS NOTES
Subjective: This patient has been seen and evaluated at the request of Dr. Beatrice Bartlett. Patient is a 80 y.o. female admitted with cough and sputum production     5/16  Pt resting. No issues with overnight. No hemoptysis. No fever or chills. Less anxious with klonopin. Past Medical History:   Diagnosis Date    Arthritis     Asthma     CAD (coronary artery disease)     angina, last episode 06/2012    Chronic bronchitis (HCC)     Chronic kidney disease     stage 3    Chronic obstructive pulmonary disease (HCC)     Chronic pain     cervical, lumbar, knees, ankles, elbows    Fibromyalgia     GERD (gastroesophageal reflux disease)     Hypertension 1993    Psychiatric disorder     anxiety, depression    Thyroid disease      Past Surgical History:   Procedure Laterality Date    HX ADENOIDECTOMY      HX APPENDECTOMY      HX CATARACT REMOVAL      OU    HX CERVICAL FUSION      anterior x 2    HX CERVICAL FUSION      posterior x 3    HX CERVICAL FUSION  1996    Removal of titanium plate and screws    HX CHOLECYSTECTOMY      HX HYSTERECTOMY      HX LITHOTRIPSY      x 4    HX OOPHORECTOMY      left    HX ORTHOPAEDIC      cervical  x5    HX SMALL BOWEL RESECTION      HX TONSILLECTOMY      HX UROLOGICAL  1984 and 1985    kidney stone surgery      History reviewed. No pertinent family history.   Social History   Substance Use Topics    Smoking status: Never Smoker    Smokeless tobacco: Never Used    Alcohol use No      Current Facility-Administered Medications   Medication Dose Route Frequency Provider Last Rate Last Dose    albuterol-ipratropium (DUO-NEB) 2.5 MG-0.5 MG/3 ML  3 mL Nebulization Q4H PRN Bull Mejia MD        umeclidinium (INCRUSE ELLIPTA) 62.5 mcg/actuation  1 Puff Inhalation DAILY Bull Mejia MD        clonazePAM (KlonoPIN) tablet 0.5 mg  0.5 mg Oral TID Bull Mejia MD   0.5 mg at 05/15/17 2111    bumetanide (BUMEX) injection 1 mg  1 mg IntraVENous Q12H Bull Mejia MD 1 mg at 05/15/17 2203    mupirocin (BACTROBAN) 2 % ointment   Both Nostrils BID Den Benjamin MD        loratadine (CLARITIN) tablet 10 mg  10 mg Oral DAILY Esther William MD   10 mg at 05/15/17 0944    montelukast (SINGULAIR) tablet 10 mg  10 mg Oral DAILY Esther William MD   10 mg at 05/15/17 0944    DULoxetine (CYMBALTA) capsule 60 mg  60 mg Oral DAILY Esther William MD   60 mg at 05/15/17 0944    fluticasone-vilanterol (BREO ELLIPTA) 200mcg-25mcg/puff  1 Puff Inhalation DAILY Esther William MD   1 Puff at 05/15/17 0945    nitroglycerin (NITROSTAT) tablet 0.4 mg  0.4 mg SubLINGual Q5MIN PRN Esther William MD        aspirin delayed-release tablet 81 mg  81 mg Oral DAILY Emanuel Sanderson MD   81 mg at 05/15/17 0944    atenolol (TENORMIN) tablet 25 mg  25 mg Oral DAILY Emanuel Sanderson MD   25 mg at 05/15/17 0944    guaiFENesin ER (MUCINEX) tablet 600 mg  600 mg Oral Q12H Emanuel Manning MD   600 mg at 05/15/17 2111    levothyroxine (SYNTHROID) tablet 225 mcg  225 mcg Oral ACB Emanuel Manning MD   225 mcg at 05/16/17 0606    pregabalin (LYRICA) capsule 100 mg  100 mg Oral TID Esther William MD   100 mg at 05/15/17 2111    fluticasone (FLONASE) 50 mcg/actuation nasal spray 1 Spray  1 Spray Both Nostrils DAILY Emanuel Sanderson MD   1 Spray at 05/15/17 0945    cyanocobalamin (VITAMIN B12) injection 1,000 mcg  1,000 mcg IntraMUSCular EVERY MONTH Esther William MD   1,000 mcg at 05/15/17 1713    acetaminophen (TYLENOL) tablet 650 mg  650 mg Oral Q6H PRN Esther William MD        diclofenac (VOLTAREN) 1 % topical gel 2 g  2 g Topical TID Esther William MD   Stopped at 05/14/17 2200    cholestyramine (QUESTRAN) packet 4 g  4 g Oral DAILY PRN Esther William MD        nystatin (MYCOSTATIN) 100,000 unit/gram powder   Topical BID Emanuel Manning MD        omeprazole (PRILOSEC) capsule 20 mg  20 mg Oral DAILY Emanuel Sanderson MD   20 mg at 05/15/17 0944    zinc oxide 20 % ointment   Topical BID Esther William MD        alum-mag hydroxide-simeth (MYLANTA) oral suspension 15 mL 15 mL Oral QID PRN Gracie Roman MD   15 mL at 05/14/17 0834    multivitamin, tx-iron-ca-min (THERA-M w/ IRON) tablet 1 Tab  1 Tab Oral DAILY Gracie Roman MD   1 Tab at 05/15/17 0944    lisinopril (PRINIVIL, ZESTRIL) tablet 20 mg  20 mg Oral DAILY Gracie Roman MD   20 mg at 05/15/17 0944    fentaNYL (DURAGESIC) 25 mcg/hr patch 1 Patch  1 Patch TransDERmal Q72H Gracie Roman MD   1 Patch at 05/14/17 0329    sodium chloride (NS) flush 5-10 mL  5-10 mL IntraVENous Q8H Emanuel Carranza MD   10 mL at 05/16/17 0606    sodium chloride (NS) flush 5-10 mL  5-10 mL IntraVENous PRN Gracie Roman MD        heparin (porcine) injection 5,000 Units  5,000 Units SubCUTAneous Q8H Emanuel Carranza MD   5,000 Units at 05/15/17 2235    insulin lispro (HUMALOG) injection   SubCUTAneous AC&HS Gracie Roman MD   2 Units at 05/16/17 0701    glucose chewable tablet 16 g  16 g Oral PRN Gracie Roman MD        glucagon (GLUCAGEN) injection 1 mg  1 mg IntraMUSCular PRN Gracie Roman MD        dextrose (D50W) injection syrg 12.5-25 g  25-50 mL IntraVENous PRN Gracie Roman MD        oxyCODONE-acetaminophen (PERCOCET) 5-325 mg per tablet 1 Tab  1 Tab Oral Q6H PRN Gracie Roman MD   1 Tab at 05/16/17 0235    methylPREDNISolone (PF) (SOLU-MEDROL) injection 40 mg  40 mg IntraVENous Q8H Emanuel Carranza MD   40 mg at 05/16/17 0200    influenza vaccine 2016-17 (36mos+)(PF) (FLUZONE/FLUARIX/FLULAVAL QUAD) injection 0.5 mL  0.5 mL IntraMUSCular PRIOR TO DISCHARGE Gracie Roman MD        diphenhydrAMINE (BENADRYL) 12.5 mg/5 mL oral elixir 12.5 mg  12.5 mg Oral Q6H PRN Emanuel Carranza MD   12.5 mg at 05/14/17 2119    guaiFENesin-codeine (ROBITUSSIN AC) 100-10 mg/5 mL solution 10 mL  10 mL Oral Q4H PRN Gracie Roman MD   10 mL at 05/16/17 0605    azithromycin (ZITHROMAX) 500 mg in 0.9% sodium chloride (MBP/ADV) 250 mL adv  500 mg IntraVENous Q24H Akil Prasad  mL/hr at 05/15/17 1333 500 mg at 05/15/17 1333    benzocaine-menthol (CEPACOL) lozenge 1 Lozenge  1 Lozenge Mucous Membrane PRN Kenny Rizo MD        ALPRAZolam Mom[place]) tablet 1 mg  1 mg Oral TID PRN Micael Merlin, MD   1 mg at 05/15/17 0953    sodium chloride (NS) flush 5-10 mL  5-10 mL IntraVENous Q8H Jack Corey MD   Stopped at 05/15/17 2200    sodium chloride (NS) flush 5-10 mL  5-10 mL IntraVENous PRN Jack Corey MD   10 mL at 05/15/17 0634    sodium chloride (NS) flush 5-10 mL  5-10 mL IntraVENous PRN Jack Corey MD        aztreonam (AZACTAM) 2 g in 0.9% sodium chloride (MBP/ADV) 100 mL MBP  2 g IntraVENous Q8H Jack Corey  mL/hr at 17 0605 2 g at 17 8936    Vancomycin Pharmacokinetic Dosing  1 Each Other Rx Dosing/Monitoring Jack Corey MD        vancomycin (VANCOCIN) 1,250 mg in 0.9% sodium chloride 250 mL IVPB  1,250 mg IntraVENous Q24H Jack Corey  mL/hr at 17 0243 1,250 mg at 17 0243        Allergies   Allergen Reactions    Ambien [Zolpidem] Other (comments)     Sleep drive    Aspirin Other (comments)     bruising    Codeine Hives    Darvon [Propoxyphene] Unknown (comments)    Iodinated Contrast Media - Oral And Iv Dye Hives     Iodine on skin is ok per pt.  Pcn [Penicillins] Nausea and Vomiting    Shellfish Derived Hives, Shortness of Breath and Swelling     Iodine on skin is ok per pt.  Zanaflex [Tizanidine] Other (comments)     disorientated    Levaquin [Levofloxacin] Hives     Red rash at IV site while infusing       Review of Systems:  Pertinent items are noted in HPI. Objective:     Blood pressure 130/75, pulse 68, temperature 97.6 °F (36.4 °C), resp. rate 15, height 5' (1.524 m), weight 99.7 kg (219 lb 12.8 oz), SpO2 99 %.  Temp (24hrs), Av.7 °F (36.5 °C), Min:97.3 °F (36.3 °C), Max:98.3 °F (36.8 °C)      Intake and Output:  Current Shift:    Last 3 Shifts: 1901 -  0700  In: 4000 [P.O.:2400; I.V.:1600]  Out: 5556 [Urine:1750]    Physical Exam:   Visit Vitals    /75    Pulse 68    Temp 97.6 °F (36.4 °C)    Resp 15    Ht 5' (1.524 m)    Wt 99.7 kg (219 lb 12.8 oz)    SpO2 99%    BMI 42.93 kg/m2     General:  Alert, cooperative, no distress, appears stated age. Head:  Normocephalic, without obvious abnormality, atraumatic. Eyes:  Conjunctivae/corneas clear. PERRL, EOMs intact. Fundi benign. Ears:  Normal TMs and external ear canals both ears. Nose: Nares normal. Septum midline. Mucosa normal. No drainage or sinus tenderness. Throat: Lips, mucosa, and tongue normal. Teeth and gums normal.   Neck: Supple, symmetrical, trachea midline, no adenopathy, thyroid: no enlargement/tenderness/nodules, no carotid bruit and no JVD. Back:   Symmetric, no curvature. ROM normal. No CVA tenderness. Lungs:   wheezing bilaterally. Chest wall:  No tenderness or deformity. Heart:  Regular rate and rhythm, S1, S2 normal, no murmur, click, rub or gallop. Breast Exam:  No tenderness, masses, or nipple abnormality. Abdomen:   Soft, non-tender. Bowel sounds normal. No masses,  No organomegaly. Genitalia:  Normal female without lesion, discharge or tenderness. Rectal:  Normal tone,  no masses or tenderness  Guaiac negative stool. Extremities: Extremities normal, atraumatic, no cyanosis or edema. Pulses: 2+ and symmetric all extremities. Skin: Skin color, texture, turgor normal. No rashes or lesions. Lymph nodes: Cervical, supraclavicular, and axillary nodes normal.   Neurologic: CNII-XII intact. Normal strength, sensation and reflexes throughout. Reviewed all labs and radiology and previous notes and admissions    Assessment:     Acute respiratory distress, improved  COPD  Acute diastolic heart failure  Chronic bronchitis  Severe anxiety disorder    Plan:     Continue present abx   Continue steroids and abx  Continue bumex   incruse ellipta and change combivent to prn  Hemoptysis nonsignificant suspect upper airway issues   Schedule klonopin  Prn bipap.   As I suspect pt hypoventilates

## 2017-05-16 NOTE — PROGRESS NOTES
Hospitalist Progress Note-critical care note     Patient: Susan Hawkins MRN: [id number]  CSN: [id number]    YOB: 1945  Age: 80 y.o. Sex: female    DOA: 5/13/2017 LOS:  LOS: 2 days            Chief complaint: respiratroy distress with hypoxia, copd exacerbation, acute chf. Assessment/Plan         Patient Active Problem List   Diagnosis Code    Cataract H26.9    DDD (degenerative disc disease), cervical M50.30    H/O cervical spine surgery Z98.890    COPD (chronic obstructive pulmonary disease) ([facility] [address] J44.9    Acidosis E87.2    COPD exacerbation ([address] J44.1    Acute on chronic respiratory failure (HCC) J96.20    Obesity E66.9    Benign hypertension I10    GERD (gastroesophageal reflux disease) K21.9    Acute diastolic CHF (congestive heart failure) ([facility]) I50.31    Acute encephalopathy G93.40    Toxic metabolic encephalopathy P67    Acute renal failure (ARF) ([facility]) N17.9    Hyperkalemia E87.5    PNA (pneumonia) J18.9    Chest pain R07.9    Anxiety F41.9    Hypomagnesemia E83.42    Sinus tachycardia R00.0    Acute respiratory distress R06.00    Hypoxia R09.02    HCAP (healthcare-associated pneumonia) J18.9     1. Acute Respiratory Distress with Hypoxia; Feel a little bit better today, still need 5 L NC O2.   -ct chest: consolidation/edema/Multifocal groundglass opacities/consolidative alveolar opacities   2. Acute Moderate Exacerbation of Mod Persistent COPD; On breathing tx and iv steroid, abx   Appreciated pulm input. 3. HCAP  On vanc and zosyn     4. Mild Acute Diastolic CHF exacerbation stage III  On diuretics,     6. HypoMg;  Resolved   7. Atypical Chest Pain; likely non cardiac   No chest pain now. Trop wnl.   8. GERD  ppi   9. HTN  Well controlled,  10. Obesity BMI >35  11. Cervical DDD  Continue home pain management   12. Cataract    13. Nasal MRSA  Mupri, contact isolation     Subjective: sob better ,still cough with blood   Nurse: desat.  Coughs more , more respiratory distress     Pt presented worsening respiratory distress, lasix was given, ct chest, transfer to icu. Case discussed with Dr. Lee Ann Chavez, respiratory therapist and RN. Review of systems:    General: No fevers or chills. Cardiovascular: No chest pain or pressure. No palpitations. Pulmonary: shortness of breath and cough with blood   Gastrointestinal: No nausea, vomiting. Vital signs/Intake and Output:  Visit Vitals    /75    Pulse 68    Temp 98.3 °F (36.8 °C)    Resp 15    Ht 5' (1.524 m)    Wt 99.7 kg (219 lb 12.8 oz)    SpO2 99%    BMI 42.93 kg/m2     Current Shift:  05/16 0701 - 05/16 1900  In: -   Out: 200 [Urine:200]  Last three shifts:  05/14 1901 - 05/16 0700  In: 4000 [P.O.:2400; I.V.:1600]  Out: 1379 [Urine:1750]    Physical Exam:  General: WD, WN. Alert, cooperative, no acute distress    HEENT: NC, Atraumatic. PERRLA, anicteric sclerae. Lungs: Coarse BS and rhonchi   Heart:  Regular  rhythm,  No murmur, No Rubs, No Gallops  Abdomen: Soft, Non distended, Non tender.  +Bowel sounds,   Extremities: No c/c/e  Psych:   no Anxious,no  agitated. Neurologic:  No acute neurological deficit.              Labs: Results:       Chemistry Recent Labs      05/16/17   0420  05/15/17   0550  05/14/17   0700   GLU  144*  170*  175*   NA  143  142  141   K  3.5  3.9  3.8   CL  105  104  103   CO2  33*  27  27   BUN  36*  25*  17   CREA  1.18  1.12  1.10   CA  8.3*  9.1  9.2   AGAP  5  11  11   BUCR  31*  22*  15   AP   --   58  61   TP   --   7.2  7.4   ALB   --   3.0*  3.1*   GLOB   --   4.2*  4.3*   AGRAT   --   0.7*  0.7*      CBC w/Diff Recent Labs      05/16/17   0420  05/15/17   0550  05/14/17   0700  05/13/17   2000   WBC  12.4  11.6  8.4  10.7   RBC  3.03*  3.23*  3.10*  3.14*   HGB  9.4*  10.0*  9.7*  9.8*   HCT  28.1*  30.4*  28.9*  29.8*   PLT  316  311  280  289   GRANS   --    --   87*  80*   LYMPH   --    --   11*  14*   EOS   --    --   0  1      Cardiac Enzymes Recent Labs 05/14/17   0740  05/13/17 2000   CPK  121  105   CKND1  1.8  1.5      Coagulation Recent Labs      05/13/17 2000   PTP  13.7   INR  1.1   APTT  29.9       Lipid Panel No results found for: CHOL, CHOLPOCT, CHOLX, CHLST, CHOLV, [id number], HDL, LDL, NLDLCT, DLDL, LDLC, DLDLP, [id number], VLDLC, VLDL, TGL, TGLX, TRIGL, [id number], TRIGP, TGLPOCT, [id number], CHHD, CHHDX   BNP No results for input(s): BNPP in the last 72 hours.    Liver Enzymes Recent Labs      05/15/17   0550   TP  7.2   ALB  3.0*   AP  58   SGOT  46*      Thyroid Studies Lab Results   Component Value Date/Time    TSH 0.04 11/26/2016 05:39 AM        Procedures/imaging: see electronic medical records for all procedures/Xrays and details which were not copied into this note but were reviewed prior to creation of Shanique Turner MD

## 2017-05-16 NOTE — PROGRESS NOTES
Readmission Risk Assessment:     High Risk and MSSP/[facility] ACO patients    RRAT Score:  21 or greater    Initial Assessment:  Chart reviewed, pt admitted from [place] [address] for resp distress, currently in ICU. CM will follow for needs at discharge. Pt is resident at [place], however will need approval prior to returning for skilled services. Emergency Contact:  See face sheet    Pertinent Medical Hx:   CAD, CKD, GERD, HTN, fibromyalgia, COPD     PCP/Specialists:  [employer]:      DME:     To be determined    High Risk Care Transition Plan:  1. Evaluate for [facility] or [facility], SNF, acute rehab, community care coordination of Resources. 2. Involve patient/caregiver in assessment, planning, education and implement of intervention. 3. CM daily patient care huddles/interdisciplinary rounds. 4. PCP/Specialist appointment within 48 hours of discharge unless otherwise specified. 5. Facilitate transportation and logistics for follow-up appointments. 6. Medication reconciliation [id number]  7. Discharge follow-up phone call within 2  4 days (NN, [employer], Nursing) CM follow up as assigned. 8. Formal handoff between hospital provider and post-acute provider to transition patient  9. Handoff to [facility] Nurse Navigator or PCP practice.

## 2017-05-16 NOTE — INTERDISCIPLINARY ROUNDS
CRITICAL CARE INTERDISCIPLINARY ROUNDS    Patient Information:    Name:   Narinder Crisostomo    Age:   80 y.o. Admission Date:   5/13/2017    Critical Care Day: 2 (RRT 5/15)    Surgery Date:      Attending Provider:   Rinku Cassidy MD    Surgeon:        Consultant(s):   [address] Physician:  Segundo Arriaga    Code Status: Full Code    Problem List:     Patient Active Problem List   Diagnosis Code    Cataract H26.9    DDD (degenerative disc disease), cervical M50.30    H/O cervical spine surgery Z98.890    COPD (chronic obstructive pulmonary disease) ([facility] [address] J44.9    Acidosis E87.2    COPD exacerbation ([address] J44.1    Acute on chronic respiratory failure ([facility] [address] J96.20    Obesity E66.9    Benign hypertension I10    GERD (gastroesophageal reflux disease) K21.9    Acute diastolic CHF (congestive heart failure) ([facility]) I50.31    Acute encephalopathy G93.40    Toxic metabolic encephalopathy Q67    Acute renal failure (ARF) ([facility]) N17.9    Hyperkalemia E87.5    PNA (pneumonia) J18.9    Chest pain R07.9    Anxiety F41.9    Hypomagnesemia E83.42    Sinus tachycardia R00.0    Acute respiratory distress R06.00    Hypoxia R09.02    HCAP (healthcare-associated pneumonia) J18.9       Principal Problem:  Acute respiratory distress    During rounds the following quality care indicators and evidence based practices were addressed :  DVT  And PUD Prophylaxis [place] Day 2 ([facility]  y) ; Central Line Day:na Isolation:MRSA;  Antibiotic Stewardship: reviewed; Probiotics Necessary: na    Ventilator Bundle:      Sepsis Order Set:     Glycemic Control:   Recent Labs      05/16/17   0420  05/15/17   0550  05/14/17   0700  05/13/17 2000   GLU  144*  170*  175*  128*   ;  Recent Labs      05/13/17 2107   PHI  7.387   PCO2I  44.5   PO2I  83    Adjustments     Acute MI/PCI:   Not applicable    Door to Balloon: Admission Time: 1850      Heart Failure:    Not applicable     SCIP Measures for other Surgeries:   Not applicable Pneumonia:    Not applicable    Interdisciplinary team rounds were held with the following team membersNursing, Nutrition, Pharmacy, Physician and Respiratory Therapy. Plan of care discussed. Goals of Care/ Recommendations: add diomax, reduce bumex, encourage OOb and eating, start Marinol,      See clinical pathway and/or care plan for interventions and desired outcomes.     Critical Care Discharge Status:  Red

## 2017-05-16 NOTE — PROGRESS NOTES
Received report and transfer of trust completed with Christina Rhodes RN. Reviewed SBAR, plan and results. 0800 assessment completed and noted. In no distress. Sleeping. Awakened briefly for assessment and returned to sleep. 1000 awake for meds. Discussed those ordered and side effects. Repositioned and states she is \"going to take a nap\". 1200 patient to receive marinol for decreased appetite. 1400 remains sleepy, awakens with stimulation but quickly returns to sleep. 1600 sitting at edge of bed. stataes it is more comfortable to sit over the edge. Patient has table in front to rest on.  1800 returned to bed earlier and resting. Appetite remains poor. Started Marinol, no changes at this time. 1930 report and transfer of trust completed with Christina Rhodes RN.

## 2017-05-16 NOTE — PROGRESS NOTES
INITIAL NUTRITION ASSESSMENT     RECOMMENDATIONS/PLAN:   Will order magic cup TID to aid in meeting estimated needs  Encourage PO intakes >75% to meet estimated needs  Monitor labs/lytes, fluid status, diet tolerance, skin integrity, weight  Will continue to follow and adjust reccs as needed  REASON FOR ASSESSMENT:     [x] Positive Nutrition Screen:    [x] ICU admission  NUTRITION ASSESSMENT:   Client History: 80 yrs old Female admitted with respiratory distress. Reports per rounds is patient is refusing to eat     PMHx: HTN, asthma, GERD, thyroid disease, CAD, chronic pain, anxiety, depression, CKD, fibromyalgia, and chronic bronchitis.   Cultural/Shinto Food Preferences: None Identified    FOOD/NUTRITION HISTORY  Diet History: no indication of poor PO PTA   Food Allergies:  [x] NKFA       Pertinent PTA Medications: bumex, MVI+M, prilosec, synthroid, vitamin B12,     NUTRITION INTAKE   Diet Order:  Cardiac, fluid restriction 1500ml   Average PO Intake:       Patient Vitals for the past 100 hrs:   % Diet Eaten   05/14/17 2041 100 %   05/14/17 1026 45 %   05/14/17 0800 0 %      Pertinent Medications:  [x] Reviewed; bumex, marinol, lispro, synthroid, MVI+M, prednisone, prilosec   Electrolyte Replacement Protocol: []K  []Mg  []PO4    Insulin:  [] SSI  [x] Pre-meal   []  Basal   [] Drip  [] None  Pt expected to meet estimated nutrient needs through next review:          [x]  Yes     Pt will meet estimated needs with PO intakes >75% ANTHROPOMETRICS  Height: 5' (152.4 cm)       Weight: 99.7 kg (219 lb 12.8 oz)    BMI: 42.9 kg/m^2  -  morbidly obese (Greater than or = to 40% BMI)        Weight change: chart review completed no recent significant wt loss noted PTA                                  Comparison to Reference Standards:  IBW: 100 lbs      %IBW: 219%      AdjBW: 58.9 kg    NUTRITION-FOCUSED PHYSICAL ASSESSMENT  Skin: no pressure area per doc   GI: BM 5/14    BIOCHEMICAL DATA & MEDICAL TESTS  Pertinent Labs:  [x] Reviewed    NUTRITION PRESCRIPTION  Calories: 1874 kcal/day based on miffilin x 1.3  Protein: 70-82 g/day based on 1.2-1.4 g/kg ABW  CHO: 234 g/day based on 50% of total energy  Fluid: doc ordered fluid restriction 1500ml daily     NUTRITION DIAGNOSES:   1. Inadequate oral intake related to reduced appetite as evidenced by nursing report in rounds of pt refusing to eat meals. NUTRITION INTERVENTIONS:   INTERVENTIONS:        GOALS:  1. Will order magic cup TID to aid in meeting estimated needs 1. PO intakes >75% of meals and supplements throughout the next 5-7 days     LEARNING NEEDS (Diet, Supplementation, Food/Nutrient-Drug Interaction):   [x] None Identified  [] Inpatient education provided/documented    [] Identified and patient:  [] Declined     [] Was not appropriate/indicated  NUTRITION MONITORING /EVALUATION:   Follow PO intake  Monitor wt  Monitor for additional supplement needs    [] Participated in Interdisciplinary Rounds  [x] [address] [place] [address] Reviewed/Documented  DISCHARGE NUTRITION RECOMMENDATIONS ADDRESSED:        [x] To be determined closer to discharge    NUTRITION RISK:     [x]  At risk                     []  Not currently at risk     Will follow-up per policy.   Dileep Hatfield, [address] [place] [address]  PAGER:  [phone]

## 2017-05-16 NOTE — PROGRESS NOTES
Patient was visited by IRIS. Volunteer followed up with patient and/or family and reported no needs to this . Chaplains will continue to follow and will provide pastoral care as needed or requested.     [address] [place] [address] Shira NÚÑEZ 62.  [phone] - Office

## 2017-05-16 NOTE — PROGRESS NOTES
Occupational Therapy Screening:  Services are indicated. Evaluate and Treat Order is requested. An InBasket screening referral was triggered for occupational therapy based on results obtained during the nursing admission assessment. The patients chart was reviewed and the patient is appropriate for a skilled therapy evaluation. Please order a consult for occupational therapy if you are in agreement and would like an evaluation to be completed. Thank you.     Monse Tirado, MS OTR/L

## 2017-05-17 PROBLEM — E83.42 HYPOMAGNESEMIA: Status: RESOLVED | Noted: 2017-02-14 | Resolved: 2017-05-17

## 2017-05-17 LAB
1,25(OH)2D3 SERPL-MCNC: 57.7 PG/ML (ref 19.9–79.3)
ANION GAP BLD CALC-SCNC: 5 MMOL/L (ref 3–18)
BUN SERPL-MCNC: 49 MG/DL (ref 7–18)
BUN/CREAT SERPL: 38 (ref 12–20)
CALCIUM SERPL-MCNC: 8.4 MG/DL (ref 8.5–10.1)
CHLORIDE SERPL-SCNC: 105 MMOL/L (ref 100–108)
CO2 SERPL-SCNC: 32 MMOL/L (ref 21–32)
CREAT SERPL-MCNC: 1.28 MG/DL (ref 0.6–1.3)
ERYTHROCYTE [DISTWIDTH] IN BLOOD BY AUTOMATED COUNT: 12.6 % (ref 11.6–14.5)
GLUCOSE BLD STRIP.AUTO-MCNC: 138 MG/DL (ref 70–110)
GLUCOSE BLD STRIP.AUTO-MCNC: 155 MG/DL (ref 70–110)
GLUCOSE BLD STRIP.AUTO-MCNC: 175 MG/DL (ref 70–110)
GLUCOSE BLD STRIP.AUTO-MCNC: 254 MG/DL (ref 70–110)
GLUCOSE SERPL-MCNC: 173 MG/DL (ref 74–99)
HCT VFR BLD AUTO: 30.5 % (ref 35–45)
HGB BLD-MCNC: 10.1 G/DL (ref 12–16)
MAGNESIUM SERPL-MCNC: 2.2 MG/DL (ref 1.6–2.6)
MCH RBC QN AUTO: 30.6 PG (ref 24–34)
MCHC RBC AUTO-ENTMCNC: 33.1 G/DL (ref 31–37)
MCV RBC AUTO: 92.4 FL (ref 74–97)
PLATELET # BLD AUTO: 370 K/UL (ref 135–420)
PMV BLD AUTO: 10.3 FL (ref 9.2–11.8)
POTASSIUM SERPL-SCNC: 3.9 MMOL/L (ref 3.5–5.5)
RBC # BLD AUTO: 3.3 M/UL (ref 4.2–5.3)
SODIUM SERPL-SCNC: 142 MMOL/L (ref 136–145)
WBC # BLD AUTO: 17.7 K/UL (ref 4.6–13.2)

## 2017-05-17 PROCEDURE — 36415 COLL VENOUS BLD VENIPUNCTURE: CPT | Performed by: INTERNAL MEDICINE

## 2017-05-17 PROCEDURE — 80048 BASIC METABOLIC PNL TOTAL CA: CPT | Performed by: INTERNAL MEDICINE

## 2017-05-17 PROCEDURE — 85027 COMPLETE CBC AUTOMATED: CPT | Performed by: INTERNAL MEDICINE

## 2017-05-17 PROCEDURE — 74011636637 HC RX REV CODE- 636/637: Performed by: INTERNAL MEDICINE

## 2017-05-17 PROCEDURE — 74011250636 HC RX REV CODE- 250/636: Performed by: INTERNAL MEDICINE

## 2017-05-17 PROCEDURE — 65660000000 HC RM CCU STEPDOWN

## 2017-05-17 PROCEDURE — 77010033678 HC OXYGEN DAILY

## 2017-05-17 PROCEDURE — 74011250637 HC RX REV CODE- 250/637: Performed by: INTERNAL MEDICINE

## 2017-05-17 PROCEDURE — 74011000250 HC RX REV CODE- 250: Performed by: EMERGENCY MEDICINE

## 2017-05-17 PROCEDURE — 74011000250 HC RX REV CODE- 250: Performed by: INTERNAL MEDICINE

## 2017-05-17 PROCEDURE — 83735 ASSAY OF MAGNESIUM: CPT | Performed by: INTERNAL MEDICINE

## 2017-05-17 PROCEDURE — 74011000258 HC RX REV CODE- 258: Performed by: EMERGENCY MEDICINE

## 2017-05-17 PROCEDURE — 82962 GLUCOSE BLOOD TEST: CPT

## 2017-05-17 PROCEDURE — 76450000000

## 2017-05-17 PROCEDURE — 74011250636 HC RX REV CODE- 250/636: Performed by: EMERGENCY MEDICINE

## 2017-05-17 PROCEDURE — 74011250637 HC RX REV CODE- 250/637: Performed by: HOSPITALIST

## 2017-05-17 RX ORDER — CLONAZEPAM 0.5 MG/1
0.25 TABLET ORAL 3 TIMES DAILY
Status: DISCONTINUED | OUTPATIENT
Start: 2017-05-17 | End: 2017-05-19

## 2017-05-17 RX ADMIN — FLUTICASONE FUROATE AND VILANTEROL TRIFENATATE 1 PUFF: 200; 25 POWDER RESPIRATORY (INHALATION) at 09:00

## 2017-05-17 RX ADMIN — Medication 10 ML: at 14:25

## 2017-05-17 RX ADMIN — AZTREONAM 2 G: 2 INJECTION, POWDER, LYOPHILIZED, FOR SOLUTION INTRAMUSCULAR; INTRAVENOUS at 14:35

## 2017-05-17 RX ADMIN — UMECLIDINIUM 1 PUFF: 62.5 AEROSOL, POWDER ORAL at 09:00

## 2017-05-17 RX ADMIN — INSULIN LISPRO 2 UNITS: 100 INJECTION, SOLUTION INTRAVENOUS; SUBCUTANEOUS at 23:46

## 2017-05-17 RX ADMIN — METHYLPREDNISOLONE SODIUM SUCCINATE 40 MG: 40 INJECTION, POWDER, FOR SOLUTION INTRAMUSCULAR; INTRAVENOUS at 17:23

## 2017-05-17 RX ADMIN — METHYLPREDNISOLONE SODIUM SUCCINATE 40 MG: 40 INJECTION, POWDER, FOR SOLUTION INTRAMUSCULAR; INTRAVENOUS at 09:35

## 2017-05-17 RX ADMIN — OXYCODONE HYDROCHLORIDE AND ACETAMINOPHEN 1 TABLET: 5; 325 TABLET ORAL at 05:27

## 2017-05-17 RX ADMIN — ATENOLOL 25 MG: 25 TABLET ORAL at 09:32

## 2017-05-17 RX ADMIN — OMEPRAZOLE 20 MG: 20 CAPSULE, DELAYED RELEASE ORAL at 09:35

## 2017-05-17 RX ADMIN — NYSTATIN: 100000 POWDER TOPICAL at 23:31

## 2017-05-17 RX ADMIN — MONTELUKAST SODIUM 10 MG: 10 TABLET, FILM COATED ORAL at 09:32

## 2017-05-17 RX ADMIN — GUAIFENESIN 600 MG: 600 TABLET, EXTENDED RELEASE ORAL at 20:32

## 2017-05-17 RX ADMIN — DRONABINOL 2.5 MG: 2.5 CAPSULE ORAL at 19:53

## 2017-05-17 RX ADMIN — LORATADINE 10 MG: 10 TABLET ORAL at 09:35

## 2017-05-17 RX ADMIN — MULTIPLE VITAMINS W/ MINERALS TAB 1 TABLET: TAB at 09:32

## 2017-05-17 RX ADMIN — ACETAZOLAMIDE 250 MG: 250 TABLET ORAL at 20:32

## 2017-05-17 RX ADMIN — PREGABALIN 100 MG: 100 CAPSULE ORAL at 17:23

## 2017-05-17 RX ADMIN — INSULIN LISPRO 2 UNITS: 100 INJECTION, SOLUTION INTRAVENOUS; SUBCUTANEOUS at 06:53

## 2017-05-17 RX ADMIN — DRONABINOL 2.5 MG: 2.5 CAPSULE ORAL at 06:54

## 2017-05-17 RX ADMIN — BUMETANIDE 1 MG: 0.25 INJECTION INTRAMUSCULAR; INTRAVENOUS at 14:00

## 2017-05-17 RX ADMIN — SODIUM CHLORIDE 500 MG: 900 INJECTION, SOLUTION INTRAVENOUS at 14:35

## 2017-05-17 RX ADMIN — FLUTICASONE PROPIONATE 1 SPRAY: 50 SPRAY, METERED NASAL at 09:00

## 2017-05-17 RX ADMIN — HEPARIN SODIUM 5000 UNITS: 5000 INJECTION, SOLUTION INTRAVENOUS; SUBCUTANEOUS at 17:23

## 2017-05-17 RX ADMIN — GUAIFENESIN 600 MG: 600 TABLET, EXTENDED RELEASE ORAL at 09:33

## 2017-05-17 RX ADMIN — ACETAZOLAMIDE 250 MG: 250 TABLET ORAL at 09:00

## 2017-05-17 RX ADMIN — CLONAZEPAM 0.25 MG: 0.5 TABLET ORAL at 23:26

## 2017-05-17 RX ADMIN — AZTREONAM 2 G: 2 INJECTION, POWDER, LYOPHILIZED, FOR SOLUTION INTRAMUSCULAR; INTRAVENOUS at 23:25

## 2017-05-17 RX ADMIN — VANCOMYCIN HYDROCHLORIDE 1250 MG: 10 INJECTION, POWDER, LYOPHILIZED, FOR SOLUTION INTRAVENOUS at 02:45

## 2017-05-17 RX ADMIN — MUPIROCIN: 20 OINTMENT TOPICAL at 23:32

## 2017-05-17 RX ADMIN — Medication 10 ML: at 05:32

## 2017-05-17 RX ADMIN — NYSTATIN: 100000 POWDER TOPICAL at 09:00

## 2017-05-17 RX ADMIN — HEPARIN SODIUM 5000 UNITS: 5000 INJECTION, SOLUTION INTRAVENOUS; SUBCUTANEOUS at 09:34

## 2017-05-17 RX ADMIN — ALPRAZOLAM 1 MG: 0.5 TABLET ORAL at 05:27

## 2017-05-17 RX ADMIN — PREGABALIN 100 MG: 100 CAPSULE ORAL at 23:26

## 2017-05-17 RX ADMIN — ZINC OXIDE: 200 OINTMENT TOPICAL at 23:32

## 2017-05-17 RX ADMIN — MUPIROCIN: 20 OINTMENT TOPICAL at 09:00

## 2017-05-17 RX ADMIN — PREGABALIN 100 MG: 100 CAPSULE ORAL at 09:32

## 2017-05-17 RX ADMIN — Medication 10 ML: at 23:32

## 2017-05-17 RX ADMIN — METHYLPREDNISOLONE SODIUM SUCCINATE 40 MG: 40 INJECTION, POWDER, FOR SOLUTION INTRAMUSCULAR; INTRAVENOUS at 00:01

## 2017-05-17 RX ADMIN — LEVOTHYROXINE SODIUM 225 MCG: 150 TABLET ORAL at 05:31

## 2017-05-17 RX ADMIN — INSULIN LISPRO 6 UNITS: 100 INJECTION, SOLUTION INTRAVENOUS; SUBCUTANEOUS at 11:51

## 2017-05-17 RX ADMIN — CLONAZEPAM 0.25 MG: 0.5 TABLET ORAL at 17:23

## 2017-05-17 RX ADMIN — DULOXETINE 60 MG: 60 CAPSULE, DELAYED RELEASE ORAL at 09:32

## 2017-05-17 RX ADMIN — OXYCODONE HYDROCHLORIDE AND ACETAMINOPHEN 1 TABLET: 5; 325 TABLET ORAL at 17:25

## 2017-05-17 RX ADMIN — HEPARIN SODIUM 5000 UNITS: 5000 INJECTION, SOLUTION INTRAVENOUS; SUBCUTANEOUS at 23:29

## 2017-05-17 RX ADMIN — AZTREONAM 2 G: 2 INJECTION, POWDER, LYOPHILIZED, FOR SOLUTION INTRAMUSCULAR; INTRAVENOUS at 06:53

## 2017-05-17 RX ADMIN — ASPIRIN 81 MG: 81 TABLET, COATED ORAL at 09:34

## 2017-05-17 RX ADMIN — LISINOPRIL 20 MG: 20 TABLET ORAL at 09:32

## 2017-05-17 NOTE — PROGRESS NOTES
Report received and assumed care of pt. Assessment completed per flowsheet. Pt drowsy but easily awakened. Ragland intact and draining. Lungs with rhonchi and coarse crackles, productive cough bringing up blood-tinged sputum. VSS, will continue to monitor. 2130 - Pt's SpO2 dropping, audible gurgling sounds in throat. NT sx with return of thick secretions. Pt did not tolerate well. 2145 - Pt's SpO2 in 70s, sitting at side of the bed in tripod position. 2150 - Paged Dr. Mary Delatorre. SpO2 rebounding into the 90s. 2155 - Spoke with Dr. Mary Delatorre, updated him on pt's respiratory status. Orders received for HFNC and Lasix. 2215 - Pt's SpO2 rebounding on NC. Admin'd Lasix per MAR. RT gave neb tx.     0000 - Pt tolerating NC well. Wheezes present in lungs now.

## 2017-05-17 NOTE — PROGRESS NOTES
Subjective: This patient has been seen and evaluated at the request of Dr. Ascencion Watts. Patient is a 80 y.o. female admitted with cough and sputum production     5/17  No issues overnight. Appetite sig improved with marinol   Tolerated po. Reviewed the case during MDR>  Discussed with palliative service. Pt now DNR. Past Medical History:   Diagnosis Date    Arthritis     Asthma     CAD (coronary artery disease)     angina, last episode 06/2012    Chronic bronchitis (HCC)     Chronic kidney disease     stage 3    Chronic obstructive pulmonary disease (HCC)     Chronic pain     cervical, lumbar, knees, ankles, elbows    Fibromyalgia     GERD (gastroesophageal reflux disease)     Hypertension 1993    Psychiatric disorder     anxiety, depression    Thyroid disease      Past Surgical History:   Procedure Laterality Date    HX ADENOIDECTOMY      HX APPENDECTOMY      HX CATARACT REMOVAL      OU    HX CERVICAL FUSION      anterior x 2    HX CERVICAL FUSION      posterior x 3    HX CERVICAL FUSION  1996    Removal of titanium plate and screws    HX CHOLECYSTECTOMY      HX HYSTERECTOMY      HX LITHOTRIPSY      x 4    HX OOPHORECTOMY      left    HX ORTHOPAEDIC      cervical  x5    HX SMALL BOWEL RESECTION      HX TONSILLECTOMY      HX UROLOGICAL  1984 and 1985    kidney stone surgery      History reviewed. No pertinent family history.   Social History   Substance Use Topics    Smoking status: Never Smoker    Smokeless tobacco: Never Used    Alcohol use No      Current Facility-Administered Medications   Medication Dose Route Frequency Provider Last Rate Last Dose    clonazePAM (KlonoPIN) tablet 0.25 mg  0.25 mg Oral TID Iglesia Roque MD        acetaZOLAMIDE (DIAMOX) tablet 250 mg  250 mg Oral BID Robel Plummer MD   250 mg at 05/16/17 2115    dronabinol (MARINOL) capsule 2.5 mg  2.5 mg Oral ACB&D Robel Plummer MD   2.5 mg at 05/17/17 0654    bumetanide (BUMEX) injection 1 mg  1 mg IntraVENous 84 Oleksandr Byrne MD   1 mg at 05/16/17 1600    Electrolyte Replacement Protocol - Potassium Renal Dosing  1 Each Other PRN Joel Rehman MD        Electrolyte Replacement Protocol - Magnesium   1 Each Other PRN Joel Rehman MD        albuterol-ipratropium (DUO-NEB) 2.5 MG-0.5 MG/3 ML  3 mL Nebulization Q4H PRN Robel Plummer MD   3 mL at 05/16/17 2154    umeclidinium (INCRUSE ELLIPTA) 62.5 mcg/actuation  1 Puff Inhalation DAILY Robel Plummer MD   1 Puff at 05/16/17 1015    mupirocin (BACTROBAN) 2 % ointment   Both Nostrils BID Iglesia Roque MD        loratadine (CLARITIN) tablet 10 mg  10 mg Oral DAILY Oracio De La Paz MD   10 mg at 05/17/17 0935    montelukast (SINGULAIR) tablet 10 mg  10 mg Oral DAILY Oracio De La Paz MD   10 mg at 05/17/17 0932    DULoxetine (CYMBALTA) capsule 60 mg  60 mg Oral DAILY Emanuel Watts MD   60 mg at 05/17/17 0932    fluticasone-vilanterol (BREO ELLIPTA) 200mcg-25mcg/puff  1 Puff Inhalation DAILY Oracio De La Paz MD   1 Puff at 05/16/17 0900    nitroglycerin (NITROSTAT) tablet 0.4 mg  0.4 mg SubLINGual Q5MIN PRN Oracio De La Paz MD        aspirin delayed-release tablet 81 mg  81 mg Oral DAILY Oracio De La Paz MD   81 mg at 05/17/17 0934    atenolol (TENORMIN) tablet 25 mg  25 mg Oral DAILY Emanuel Watts MD   25 mg at 05/17/17 0932    guaiFENesin ER (MUCINEX) tablet 600 mg  600 mg Oral Q12H Emanuel Watts MD   600 mg at 05/17/17 0933    levothyroxine (SYNTHROID) tablet 225 mcg  225 mcg Oral ACB Emanuel Watts MD   225 mcg at 05/17/17 0531    pregabalin (LYRICA) capsule 100 mg  100 mg Oral TID Oracio De La Paz MD   100 mg at 05/17/17 0932    fluticasone (FLONASE) 50 mcg/actuation nasal spray 1 Spray  1 Spray Both Nostrils DAILY Emanuel Watts MD   1 Spray at 05/16/17 1036    cyanocobalamin (VITAMIN B12) injection 1,000 mcg  1,000 mcg IntraMUSCular EVERY Ezequiel MD David   1,000 mcg at 05/15/17 1713    acetaminophen (TYLENOL) tablet 650 mg  650 mg Oral Q6H PRN Oracio De La Paz MD        diclofenac (VOLTAREN) 1 % topical gel 2 g  2 g Topical TID Claritza Watson MD   Stopped at 05/14/17 2200    cholestyramine (QUESTRAN) packet 4 g  4 g Oral DAILY PRN Claritza Watson MD        nystatin (MYCOSTATIN) 100,000 unit/gram powder   Topical BID Claritza Watson MD        omeprazole (PRILOSEC) capsule 20 mg  20 mg Oral DAILY Emanuel Zhao MD   20 mg at 05/17/17 0935    zinc oxide 20 % ointment   Topical BID Claritza Watson MD        alum-mag hydroxide-simeth (MYLANTA) oral suspension 15 mL  15 mL Oral QID PRN Claritza Watson MD   15 mL at 05/14/17 0834    multivitamin, tx-iron-ca-min (THERA-M w/ IRON) tablet 1 Tab  1 Tab Oral DAILY Claritza Watson MD   1 Tab at 05/17/17 0932    lisinopril (PRINIVIL, ZESTRIL) tablet 20 mg  20 mg Oral DAILY Emanuel Zhao MD   20 mg at 05/17/17 0932    fentaNYL (DURAGESIC) 25 mcg/hr patch 1 Patch  1 Patch TransDERmal Q72H Emanuel Zhao MD   1 Patch at 05/16/17 2356    sodium chloride (NS) flush 5-10 mL  5-10 mL IntraVENous Q8H Emanuel Zhao MD   10 mL at 05/17/17 0532    sodium chloride (NS) flush 5-10 mL  5-10 mL IntraVENous PRN Claritza Watson MD        heparin (porcine) injection 5,000 Units  5,000 Units SubCUTAneous Q8H Emanuel Zhao MD   5,000 Units at 05/17/17 0934    insulin lispro (HUMALOG) injection   SubCUTAneous AC&HS Claritza Watson MD   2 Units at 05/17/17 0653    glucose chewable tablet 16 g  16 g Oral PRN Claritza Watson MD        glucagon (GLUCAGEN) injection 1 mg  1 mg IntraMUSCular PRN Claritza Watson MD        dextrose (D50W) injection syrg 12.5-25 g  25-50 mL IntraVENous PRN Claritza Watson MD        oxyCODONE-acetaminophen (PERCOCET) 5-325 mg per tablet 1 Tab  1 Tab Oral Q6H PRN Claritza Watson MD   1 Tab at 05/17/17 0527    methylPREDNISolone (PF) (SOLU-MEDROL) injection 40 mg  40 mg IntraVENous Q8H Emanuel Manning MD   40 mg at 05/17/17 0935    influenza vaccine 2016-17 (36mos+)(PF) (FLUZONE/FLUARIX/FLULAVAL QUAD) injection 0.5 mL  0.5 mL IntraMUSCular PRIOR TO DISCHARGE Claritza Watson MD        diphenhydrAMINE (BENADRYL) 12.5 mg/5 mL oral elixir 12.5 mg  12.5 mg Oral Q6H PRN Emanuel Yuen MD   12.5 mg at 17    guaiFENesin-codeine (ROBITUSSIN AC) 100-10 mg/5 mL solution 10 mL  10 mL Oral Q4H PRN Emanuel Manning MD   10 mL at 17    azithromycin (ZITHROMAX) 500 mg in 0.9% sodium chloride (MBP/ADV) 250 mL adv  500 mg IntraVENous Q24H Rory Wood  mL/hr at 17 1601 500 mg at 17 1601    benzocaine-menthol (CEPACOL) lozenge 1 Lozenge  1 Lozenge Mucous Membrane PRN Zoe Powers MD        ALPRAZolam Forrest Fermin) tablet 1 mg  1 mg Oral TID PRN Georgina Ely MD   1 mg at 17 0527    aztreonam (AZACTAM) 2 g in 0.9% sodium chloride (MBP/ADV) 100 mL MBP  2 g IntraVENous Q8H Davy Swartz  mL/hr at 17 0653 2 g at 17 9403    Vancomycin Pharmacokinetic Dosing  1 Each Other Rx Dosing/Monitoring Davy Swartz MD        vancomycin (VANCOCIN) 1,250 mg in 0.9% sodium chloride 250 mL IVPB  1,250 mg IntraVENous Q24H Davy Swartz  mL/hr at 17 0245 1,250 mg at 17 0245        Allergies   Allergen Reactions    Ambien [Zolpidem] Other (comments)     Sleep drive    Aspirin Other (comments)     bruising    Codeine Hives    Darvon [Propoxyphene] Unknown (comments)    Iodinated Contrast Media - Oral And Iv Dye Hives     Iodine on skin is ok per pt.  Pcn [Penicillins] Nausea and Vomiting    Shellfish Derived Hives, Shortness of Breath and Swelling     Iodine on skin is ok per pt.  Zanaflex [Tizanidine] Other (comments)     disorientated    Levaquin [Levofloxacin] Hives     Red rash at IV site while infusing       Review of Systems:  Pertinent items are noted in HPI. Objective:     Blood pressure 120/65, pulse 78, temperature 97.6 °F (36.4 °C), resp. rate 16, height 5' (1.524 m), weight 97.1 kg (214 lb 1.1 oz), SpO2 92 %.  Temp (24hrs), Av.8 °F (36.6 °C), Min:97.6 °F (36.4 °C), Max:98 °F (36.7 °C)      Intake and Output:  Current Shift:  07 -  1900  In: -   Out: 275 [Urine:275]  Last 3 Shifts: 05/15 1901 - 05/17 0700  In: 2884 [P.O.:240; I.V.:1150]  Out: 3525 [Urine:3525]    Physical Exam:   Visit Vitals    /65    Pulse 78    Temp 97.6 °F (36.4 °C)    Resp 16    Ht 5' (1.524 m)    Wt 97.1 kg (214 lb 1.1 oz)    SpO2 92%    BMI 41.81 kg/m2     General:  Alert, cooperative, no distress, appears stated age. Head:  Normocephalic, without obvious abnormality, atraumatic. Eyes:  Conjunctivae/corneas clear. PERRL, EOMs intact. Fundi benign. Ears:  Normal TMs and external ear canals both ears. Nose: Nares normal. Septum midline. Mucosa normal. No drainage or sinus tenderness. Throat: Lips, mucosa, and tongue normal. Teeth and gums normal.   Neck: Supple, symmetrical, trachea midline, no adenopathy, thyroid: no enlargement/tenderness/nodules, no carotid bruit and no JVD. Back:   Symmetric, no curvature. ROM normal. No CVA tenderness. Lungs:   wheezing bilaterally. Chest wall:  No tenderness or deformity. Heart:  Regular rate and rhythm, S1, S2 normal, no murmur, click, rub or gallop. Breast Exam:  No tenderness, masses, or nipple abnormality. Abdomen:   Soft, non-tender. Bowel sounds normal. No masses,  No organomegaly. Genitalia:  Normal female without lesion, discharge or tenderness. Rectal:  Normal tone,  no masses or tenderness  Guaiac negative stool. Extremities: Extremities normal, atraumatic, no cyanosis or edema. Pulses: 2+ and symmetric all extremities. Skin: Skin color, texture, turgor normal. No rashes or lesions. Lymph nodes: Cervical, supraclavicular, and axillary nodes normal.   Neurologic: CNII-XII intact. Normal strength, sensation and reflexes throughout.      Reviewed all labs and radiology and previous notes and admissions    Assessment:     Acute respiratory distress, improved  COPD  Acute diastolic heart failure  Chronic bronchitis  Severe anxiety disorder    Plan:        Continue steroids and abx  Continue bumex and diamox   incruse ellipta and change combivent to prn  Hemoptysis nonsignificant suspect upper airway issues   Schedule klonopin decreased  Probably ok to transfer patient

## 2017-05-17 NOTE — ACP (ADVANCE CARE PLANNING)
Patient completed Advance Directive. EDNA is her son Kirti Mcghee [phone]. She also completed a Durable Do Not Resuscitate form today. She states she does not want CPR or intubation. Copies of both documents on chart, originals returned to the patient.

## 2017-05-17 NOTE — PROGRESS NOTES
Patient was visited by IRIS. Volunteer followed up with patient and/or family and reported no needs to this . Chaplains will continue to follow and will provide pastoral care as needed or requested.     [address] [place] [address] Shira NÚÑEZ 62.  [phone] - Office

## 2017-05-17 NOTE — PROGRESS NOTES
conducted an initial consultation and Spiritual Assessment for Ladonna Herzog, who is a 80 y.o.,female. According to the patients chart Alevism Affiliation is: Mandaeism. The reason the Patient came to the hospital is:   Patient Active Problem List    Diagnosis Date Noted    Acute respiratory distress 05/13/2017    Hypoxia 05/13/2017    HCAP (healthcare-associated pneumonia) 05/13/2017    Chest pain 02/14/2017    Anxiety 02/14/2017    Sinus tachycardia 02/14/2017    PNA (pneumonia) 02/13/2017    Toxic metabolic encephalopathy 74/42/5830    Acute renal failure (ARF) ([facility] [address] [facility] 02/11/2017    Hyperkalemia 02/11/2017    Acute encephalopathy 02/10/2017    Acute diastolic CHF (congestive heart failure) ([facility] [address] 11/28/2016    COPD (chronic obstructive pulmonary disease) ([facility] [address] 11/25/2016    Acidosis 11/25/2016    COPD exacerbation ([facility] [address] [facility] 11/25/2016    Acute on chronic respiratory failure ([facility] [address] 11/25/2016    Obesity 11/25/2016    Benign hypertension 11/25/2016    GERD (gastroesophageal reflux disease) 11/25/2016    DDD (degenerative disc disease), cervical 08/27/2013    H/O cervical spine surgery 08/27/2013    Cataract 05/15/2013        Initiated a relationship of care and support. Provided information about Spiritual Care Services. Offered prayer and assurance of continued prayers on patients behalf. Plan:  Chaplains will continue to follow and will provide pastoral care as needed or requested.  recommends bedside caregivers page  on duty if patient shows signs of acute spiritual or emotional distress. Father MONTRELL Belcher. [facility] - Office

## 2017-05-17 NOTE — CONSULTS
[facility]: [phone]  [facility]: [phone]   [facility]: [phone]    Patient Name: Jacqui Devries  YOB: 1945    Date of Initial Consult: 5/17/17  Reason for Consult: care decisions  Requesting Provider: Dr. Nina Rojas   Primary Care Physician: Mony Segovia MD      SUMMARY:   Jacqui Devries is a 80y.o. year old with a past history of advanced chronic lung disease, HTN, diastolic CHF, and chronic debilitating back and neck pain admitted for third time from [facility] since Nov 2016 for COPD exacerbation/CHF. Has been [facility] resident of [place] for about a year. Only son travels and combination of spinal and lung disease rendered her unable to care for self. Chair and bed bound, O2 dependent. Course this time has been protracted w/ transfer to ICU and HFNC on 5/15. Now back on 5L. PALLIATIVE DIAGNOSES:     Patient Active Problem List   Diagnosis Code    Cataract H26.9    DDD (degenerative disc disease), cervical M50.30    H/O cervical spine surgery Z98.890    COPD (chronic obstructive pulmonary disease) ([facility] J44.9    Acidosis E87.2    COPD exacerbation ([facility] J44.1    Acute on chronic respiratory failure (HCC) J96.20    Obesity E66.9    Benign hypertension I10    GERD (gastroesophageal reflux disease) K21.9    Acute diastolic CHF (congestive heart failure) ([facility]) I50.31    Acute encephalopathy G93.40    Toxic metabolic encephalopathy Z40    Acute renal failure (ARF) ([facility]) N17.9    Hyperkalemia E87.5    PNA (pneumonia) J18.9    Chest pain R07.9    Anxiety F41.9    Sinus tachycardia R00.0    Acute respiratory distress R06.00    Hypoxia R09.02    HCAP (healthcare-associated pneumonia) J18.9     1. PLAN:   1. Met with patient who was alert, oriented and pleasant, very [place].  She conveyed her sense of progressive decline in function and QOL over the last year and that she has been thinking for some time that she would not want CPR and that her goals for care would be improved comfort and to not keep returning to the hospital every few months. She would not wish to be intubated. She has not discussed this with her son who she feels would want her to continue the most aggressive care including CPR. We discussed that these decisions are really hers and that his role is to ensure her care wishes are respected. She asked to complete a DDNR and VA AMD and these were executed. Briefly discussed developing a comfort care plan for return to NH rather than d/c to SNF, but recommended that we include her son in these discussion and allow him to hear her wishes first hand. A comfort care plan in the NH setting would need to include hospice. Questions answered. Agree with use of oxycodone/apap as breakthrough agent with fentanyl patch. Recommend bowel agent. 2. Initial consult note routed to primary continuity provider  3. Communicated plan of care with: Palliative IDT        GOALS OF CARE:   Comfort, no intubation, DNR. Discharge plan to be discussed w/ son.     Advance Care Planning 5/17/2017   Patient's Healthcare Decision Maker is: Named in scanned ACP document   Primary Decision Maker Name German Escobar   Primary Decision Maker Phone Number [phone]   Primary Decision Maker Relationship to Patient Adult child   Confirm Advance Directive Yes, on file   Does the patient have other document types Do Not Resuscitate           HISTORY:     History obtained from: patient    CHIEF COMPLAINT: see summary    HPI/SUBJECTIVE:    The patient is:   [x] Verbal and participatory  [] Non-participatory due to:        Clinical Pain Assessment (nonverbal scale for nonverbal patients): Pain: 7         FUNCTIONAL ASSESSMENT:     Palliative Performance Scale (PPS):  PPS: 40       PSYCHOSOCIAL/SPIRITUAL SCREENING:     Advance Care Planning:  Advance Care Planning 5/17/2017   Patient's Healthcare Decision Maker is: Named in scanned ACP document   Primary Decision Maker Name Dharmesh Marshall   Primary Decision Maker Phone Number [phone]   Primary Decision Maker Relationship to Patient Adult child   Confirm Advance Directive Yes, on file   Does the patient have other document types Do Not Resuscitate        Any spiritual / Sabianist concerns:  [] Yes /  [x] No    Caregiver Burnout:  [] Yes /  [] No /  [x] No Caregiver Present      Anticipatory grief assessment:   [x] Normal  / [] Maladaptive       ESAS Anxiety: Anxiety: 6    ESAS Depression: Depression: 6        REVIEW OF SYSTEMS:     Positive and pertinent negative findings in ROS are noted above in HPI. The following systems were [x] reviewed / [] unable to be reviewed as noted in HPI  Other findings are noted below. Systems: constitutional, ears/nose/mouth/throat, respiratory, gastrointestinal, genitourinary, musculoskeletal, integumentary, neurologic, psychiatric, endocrine. Positive findings noted below. Modified ESAS Completed by: provider   Fatigue: 8 Drowsiness: 7   Depression: 6 Pain: 7   Anxiety: 6 Nausea: 0   Anorexia: 0 Dyspnea: 8   Best Well-Bein Constipation: No     Stool Occurrence(s): 0        PHYSICAL EXAM:     Wt Readings from Last 3 Encounters:   17 97.1 kg (214 lb 1.1 oz)   02/15/17 97.3 kg (214 lb 8 oz)   16 93.1 kg (205 lb 4.8 oz)     Blood pressure 120/65, pulse 78, temperature 97.7 °F (36.5 °C), resp. rate 16, height 5' (1.524 m), weight 97.1 kg (214 lb 1.1 oz), SpO2 92 %.   Pain:  Pain Scale 1: Numeric (0 - 10)  Pain Intensity 1: 0     Pain Location 1: Back, Leg  Pain Orientation 1: Posterior, Right  Pain Description 1: Aching  Pain Intervention(s) 1: Medication (see MAR)  Last bowel movement:     Constitutional: cushingoid, NAD  Eyes: pupils equal, anicteric  ENMT: no nasal discharge, moist mucous membranes  Cardiovascular: regular rhythm, distal pulses intact  Respiratory: breathing mildly labored, symmetric diminished BS bilat  Gastrointestinal: soft non-tender, +bowel sounds  Musculoskeletal: no deformity, no tenderness to palpation  Skin: warm, dry  Neurologic: following commands, moving all extremities  Psychiatric: full affect, no hallucinations  Other:       HISTORY:     Principal Problem:    Acute respiratory distress (5/13/2017)    Active Problems:    Cataract (5/15/2013)      DDD (degenerative disc disease), cervical (8/27/2013)      H/O cervical spine surgery (8/27/2013)      COPD exacerbation ([facility] (11/25/2016)      Acute on chronic respiratory failure ([facility] (11/25/2016)      Obesity (11/25/2016)      Benign hypertension (11/25/2016)      GERD (gastroesophageal reflux disease) (11/25/2016)      Acute diastolic CHF (congestive heart failure) ([facility] (11/28/2016)      Chest pain (2/14/2017)      Hypoxia (5/13/2017)      HCAP (healthcare-associated pneumonia) (5/13/2017)      Past Medical History:   Diagnosis Date    Arthritis     Asthma     CAD (coronary artery disease)     angina, last episode 06/2012    Chronic bronchitis (HCC)     Chronic kidney disease     stage 3    Chronic obstructive pulmonary disease (HCC)     Chronic pain     cervical, lumbar, knees, ankles, elbows    Fibromyalgia     GERD (gastroesophageal reflux disease)     Hypertension 1993    Psychiatric disorder     anxiety, depression    Thyroid disease       Past Surgical History:   Procedure Laterality Date    HX ADENOIDECTOMY      HX APPENDECTOMY      HX CATARACT REMOVAL      OU    HX CERVICAL FUSION      anterior x 2    HX CERVICAL FUSION      posterior x 3    HX CERVICAL FUSION  1996    Removal of titanium plate and screws    HX CHOLECYSTECTOMY      HX HYSTERECTOMY      HX LITHOTRIPSY      x 4    HX OOPHORECTOMY      left    HX ORTHOPAEDIC      cervical  x5    HX SMALL BOWEL RESECTION      HX TONSILLECTOMY      HX UROLOGICAL  1984 and 1985    kidney stone surgery      History reviewed. No pertinent family history.   History reviewed, no pertinent family history. Social History   Substance Use Topics    Smoking status: Never Smoker    Smokeless tobacco: Never Used    Alcohol use No     Allergies   Allergen Reactions    Ambien [Zolpidem] Other (comments)     Sleep drive    Aspirin Other (comments)     bruising    Codeine Hives    Darvon [Propoxyphene] Unknown (comments)    Iodinated Contrast Media - Oral And Iv Dye Hives     Iodine on skin is ok per pt.  Pcn [Penicillins] Nausea and Vomiting    Shellfish Derived Hives, Shortness of Breath and Swelling     Iodine on skin is ok per pt.     Zanaflex [Tizanidine] Other (comments)     disorientated    Levaquin [Levofloxacin] Hives     Red rash at IV site while infusing      Current Facility-Administered Medications   Medication Dose Route Frequency    clonazePAM (KlonoPIN) tablet 0.25 mg  0.25 mg Oral TID    acetaZOLAMIDE (DIAMOX) tablet 250 mg  250 mg Oral BID    dronabinol (MARINOL) capsule 2.5 mg  2.5 mg Oral ACB&D    bumetanide (BUMEX) injection 1 mg  1 mg IntraVENous DAILY    Electrolyte Replacement Protocol - Potassium Renal Dosing  1 Each Other PRN    Electrolyte Replacement Protocol - Magnesium   1 Each Other PRN    albuterol-ipratropium (DUO-NEB) 2.5 MG-0.5 MG/3 ML  3 mL Nebulization Q4H PRN    umeclidinium (INCRUSE ELLIPTA) 62.5 mcg/actuation  1 Puff Inhalation DAILY    mupirocin (BACTROBAN) 2 % ointment   Both Nostrils BID    loratadine (CLARITIN) tablet 10 mg  10 mg Oral DAILY    montelukast (SINGULAIR) tablet 10 mg  10 mg Oral DAILY    DULoxetine (CYMBALTA) capsule 60 mg  60 mg Oral DAILY    fluticasone-vilanterol (BREO ELLIPTA) 200mcg-25mcg/puff  1 Puff Inhalation DAILY    nitroglycerin (NITROSTAT) tablet 0.4 mg  0.4 mg SubLINGual Q5MIN PRN    aspirin delayed-release tablet 81 mg  81 mg Oral DAILY    atenolol (TENORMIN) tablet 25 mg  25 mg Oral DAILY    guaiFENesin ER (MUCINEX) tablet 600 mg  600 mg Oral Q12H    levothyroxine (SYNTHROID) tablet 225 mcg  225 mcg Oral ACB    pregabalin (LYRICA) capsule 100 mg  100 mg Oral TID    fluticasone (FLONASE) 50 mcg/actuation nasal spray 1 Spray  1 Spray Both Nostrils DAILY    cyanocobalamin (VITAMIN B12) injection 1,000 mcg  1,000 mcg IntraMUSCular EVERY MONTH    acetaminophen (TYLENOL) tablet 650 mg  650 mg Oral Q6H PRN    diclofenac (VOLTAREN) 1 % topical gel 2 g  2 g Topical TID    cholestyramine (QUESTRAN) packet 4 g  4 g Oral DAILY PRN    nystatin (MYCOSTATIN) 100,000 unit/gram powder   Topical BID    omeprazole (PRILOSEC) capsule 20 mg  20 mg Oral DAILY    zinc oxide 20 % ointment   Topical BID    alum-mag hydroxide-simeth (MYLANTA) oral suspension 15 mL  15 mL Oral QID PRN    multivitamin, tx-iron-ca-min (THERA-M w/ IRON) tablet 1 Tab  1 Tab Oral DAILY    lisinopril (PRINIVIL, ZESTRIL) tablet 20 mg  20 mg Oral DAILY    fentaNYL (DURAGESIC) 25 mcg/hr patch 1 Patch  1 Patch TransDERmal Q72H    sodium chloride (NS) flush 5-10 mL  5-10 mL IntraVENous Q8H    sodium chloride (NS) flush 5-10 mL  5-10 mL IntraVENous PRN    heparin (porcine) injection 5,000 Units  5,000 Units SubCUTAneous Q8H    insulin lispro (HUMALOG) injection   SubCUTAneous AC&HS    glucose chewable tablet 16 g  16 g Oral PRN    glucagon (GLUCAGEN) injection 1 mg  1 mg IntraMUSCular PRN    dextrose (D50W) injection syrg 12.5-25 g  25-50 mL IntraVENous PRN    oxyCODONE-acetaminophen (PERCOCET) 5-325 mg per tablet 1 Tab  1 Tab Oral Q6H PRN    methylPREDNISolone (PF) (SOLU-MEDROL) injection 40 mg  40 mg IntraVENous Q8H    influenza vaccine 2016-17 (36mos+)(PF) (FLUZONE/FLUARIX/FLULAVAL QUAD) injection 0.5 mL  0.5 mL IntraMUSCular PRIOR TO DISCHARGE    diphenhydrAMINE (BENADRYL) 12.5 mg/5 mL oral elixir 12.5 mg  12.5 mg Oral Q6H PRN    guaiFENesin-codeine (ROBITUSSIN AC) 100-10 mg/5 mL solution 10 mL  10 mL Oral Q4H PRN    azithromycin (ZITHROMAX) 500 mg in 0.9% sodium chloride (MBP/ADV) 250 mL adv  500 mg IntraVENous Q24H  benzocaine-menthol (CEPACOL) lozenge 1 Lozenge  1 Lozenge Mucous Membrane PRN    ALPRAZolam (XANAX) tablet 1 mg  1 mg Oral TID PRN    aztreonam (AZACTAM) 2 g in 0.9% sodium chloride (MBP/ADV) 100 mL MBP  2 g IntraVENous Q8H    Vancomycin Pharmacokinetic Dosing  1 Each Other Rx Dosing/Monitoring    vancomycin (VANCOCIN) 1,250 mg in 0.9% sodium chloride 250 mL IVPB  1,250 mg IntraVENous Q24H        LAB AND IMAGING FINDINGS:     Lab Results   Component Value Date/Time    WBC 17.7 05/17/2017 04:45 AM    HGB 10.1 05/17/2017 04:45 AM    PLATELET 209 85/94/0391 04:45 AM     Lab Results   Component Value Date/Time    Sodium 142 05/17/2017 04:45 AM    Potassium 3.9 05/17/2017 04:45 AM    Chloride 105 05/17/2017 04:45 AM    CO2 32 05/17/2017 04:45 AM    BUN 49 05/17/2017 04:45 AM    Creatinine 1.28 05/17/2017 04:45 AM    Calcium 8.4 05/17/2017 04:45 AM    Magnesium 2.2 05/17/2017 04:45 AM      Lab Results   Component Value Date/Time    AST (SGOT) 46 05/15/2017 05:50 AM    Alk.  phosphatase 58 05/15/2017 05:50 AM    Protein, total 7.2 05/15/2017 05:50 AM    Albumin 3.0 05/15/2017 05:50 AM    Globulin 4.2 05/15/2017 05:50 AM     Lab Results   Component Value Date/Time    INR 1.1 05/13/2017 08:00 PM    Prothrombin time 13.7 05/13/2017 08:00 PM    aPTT 29.9 05/13/2017 08:00 PM      No results found for: IRON, FE, TIBC, IBCT, PSAT, FERR   No results found for: PH, PCO2, PO2  No components found for: [address] [facility]   Lab Results   Component Value Date/Time     05/14/2017 07:40 AM    CK - MB 2.2 05/14/2017 07:40 AM              Total time: 70 min  Counseling / coordination time, spent as noted above: 55 min  > 50% counseling / coordination        Rivera Ferraro MD  Palliative Medicine

## 2017-05-17 NOTE — PROGRESS NOTES
Hospitalist Progress Note-critical care note     Patient: Syl Bella MRN: [id number]  CSN: [id number]    YOB: 1945  Age: 80 y.o. Sex: female    DOA: 5/13/2017 LOS:  LOS: 3 days            Chief complaint: respiratroy distress with hypoxia, copd exacerbation, acute chf. Assessment/Plan         Patient Active Problem List   Diagnosis Code    Cataract H26.9    DDD (degenerative disc disease), cervical M50.30    H/O cervical spine surgery Z98.890    COPD (chronic obstructive pulmonary disease) ([facility] J44.9    Acidosis E87.2    COPD exacerbation ([facility] J44.1    Acute on chronic respiratory failure (HCC) J96.20    Obesity E66.9    Benign hypertension I10    GERD (gastroesophageal reflux disease) K21.9    Acute diastolic CHF (congestive heart failure) ([facility] I50.31    Acute encephalopathy G93.40    Toxic metabolic encephalopathy F15    Acute renal failure (ARF) ([facility]) N17.9    Hyperkalemia E87.5    PNA (pneumonia) J18.9    Chest pain R07.9    Anxiety F41.9    Sinus tachycardia R00.0    Acute respiratory distress R06.00    Hypoxia R09.02    HCAP (healthcare-associated pneumonia) J18.9     1. Acute Respiratory Distress with Hypoxia; Received extra lasix last night , on  5 L NC O2.   -ct chest: consolidation/edema/Multifocal groundglass opacities/consolidative alveolar opacities     2. Acute Moderate Exacerbation of Mod Persistent COPD; On breathing tx and iv steroid, abx   Appreciated pulm input. 3. HCAP  On vanc and zosyn     4. Mild Acute Diastolic CHF exacerbation stage III  On diuretics,  Ef wnl   6. HypoMg;  Resolved   7. Atypical Chest Pain; likely non cardiac   No chest pain now. Trop wnl.   8. GERD  ppi   9. HTN  Well controlled,  10. Obesity BMI >35  11. Cervical DDD  Continue home pain management   12. Cataract   13.  Nasal MRSA  Mupri, contact isolation   14 anxiety  Will decrease klonipin due to sleepiness     Subjective: cough better,  Had a good sleep   Nurse: received extra lasix last night , sleep day and night     Palliative care on board, dnr/I was granted      Transfer to tele   Review of systems:    General: No fevers or chills. Cardiovascular: No chest pain or pressure. No palpitations. Pulmonary: shortness of breath and cough better, still  with blood   Gastrointestinal: No nausea, vomiting. Vital signs/Intake and Output:  Visit Vitals    /65    Pulse 78    Temp 97.6 °F (36.4 °C)    Resp 16    Ht 5' (1.524 m)    Wt 97.1 kg (214 lb 1.1 oz)    SpO2 92%    BMI 41.81 kg/m2     Current Shift:     Last three shifts:  05/15 1901 - 05/17 0700  In: 8226 [P.O.:240; I.V.:1150]  Out: 0652 [Urine:3525]    Physical Exam:  General: WD, WN. Alert, cooperative, no acute distress    HEENT: NC, Atraumatic. PERRLA, anicteric sclerae. Lungs: Coarse BS and rhonchi   Heart:  Regular  rhythm,  No murmur, No Rubs, No Gallops  Abdomen: Soft, Non distended, Non tender.  +Bowel sounds,   Extremities: No c/c/e  Psych:   no Anxious,no  agitated. Neurologic:  No acute neurological deficit. Labs: Results:       Chemistry Recent Labs      05/17/17   0445  05/16/17   0420  05/15/17   0550   GLU  173*  144*  170*   NA  142  143  142   K  3.9  3.5  3.9   CL  105  105  104   CO2  32  33*  27   BUN  49*  36*  25*   CREA  1.28  1.18  1.12   CA  8.4*  8.3*  9.1   AGAP  5  5  11   BUCR  38*  31*  22*   AP   --    --   58   TP   --    --   7.2   ALB   --    --   3.0*   GLOB   --    --   4.2*   AGRAT   --    --   0.7*      CBC w/Diff Recent Labs      05/17/17   0445  05/16/17   0420  05/15/17   0550   WBC  17.7*  12.4  11.6   RBC  3.30*  3.03*  3.23*   HGB  10.1*  9.4*  10.0*   HCT  30.5*  28.1*  30.4*   PLT  370  316  311      Cardiac Enzymes No results for input(s): CPK, CKND1, ADILSON in the last 72 hours. No lab exists for component: CKRMB, TROIP   Coagulation No results for input(s): PTP, INR, APTT in the last 72 hours.     No lab exists for component: INREXT, INREXT Lipid Panel No results found for: CHOL, CHOLPOCT, CHOLX, CHLST, CHOLV, [id number], HDL, LDL, NLDLCT, DLDL, LDLC, DLDLP, 311305, VLDLC, VLDL, TGL, TGLX, TRIGL, [id number], TRIGP, TGLPOCT, [id number], CHHD, CHHDX   BNP No results for input(s): BNPP in the last 72 hours.    Liver Enzymes Recent Labs      05/15/17   0550   TP  7.2   ALB  3.0*   AP  58   SGOT  46*      Thyroid Studies Lab Results   Component Value Date/Time    TSH 0.04 11/26/2016 05:39 AM        Procedures/imaging: see electronic medical records for all procedures/Xrays and details which were not copied into this note but were reviewed prior to creation of Elliott Joseph MD

## 2017-05-18 LAB
ANION GAP BLD CALC-SCNC: 5 MMOL/L (ref 3–18)
BUN SERPL-MCNC: 46 MG/DL (ref 7–18)
BUN/CREAT SERPL: 43 (ref 12–20)
CALCIUM SERPL-MCNC: 8.3 MG/DL (ref 8.5–10.1)
CHLORIDE SERPL-SCNC: 106 MMOL/L (ref 100–108)
CO2 SERPL-SCNC: 32 MMOL/L (ref 21–32)
CREAT SERPL-MCNC: 1.08 MG/DL (ref 0.6–1.3)
DATE LAST DOSE: NORMAL
ERYTHROCYTE [DISTWIDTH] IN BLOOD BY AUTOMATED COUNT: 12.6 % (ref 11.6–14.5)
GLUCOSE BLD STRIP.AUTO-MCNC: 131 MG/DL (ref 70–110)
GLUCOSE BLD STRIP.AUTO-MCNC: 136 MG/DL (ref 70–110)
GLUCOSE BLD STRIP.AUTO-MCNC: 185 MG/DL (ref 70–110)
GLUCOSE BLD STRIP.AUTO-MCNC: 235 MG/DL (ref 70–110)
GLUCOSE BLD STRIP.AUTO-MCNC: 79 MG/DL (ref 70–110)
GLUCOSE BLD STRIP.AUTO-MCNC: 81 MG/DL (ref 70–110)
GLUCOSE SERPL-MCNC: 161 MG/DL (ref 74–99)
HCT VFR BLD AUTO: 30 % (ref 35–45)
HGB BLD-MCNC: 10.1 G/DL (ref 12–16)
MAGNESIUM SERPL-MCNC: 2.3 MG/DL (ref 1.6–2.6)
MCH RBC QN AUTO: 31 PG (ref 24–34)
MCHC RBC AUTO-ENTMCNC: 33.7 G/DL (ref 31–37)
MCV RBC AUTO: 92 FL (ref 74–97)
PLATELET # BLD AUTO: 333 K/UL (ref 135–420)
PMV BLD AUTO: 10 FL (ref 9.2–11.8)
POTASSIUM SERPL-SCNC: 3.6 MMOL/L (ref 3.5–5.5)
RBC # BLD AUTO: 3.26 M/UL (ref 4.2–5.3)
REPORTED DOSE,DOSE: NORMAL UNITS
REPORTED DOSE/TIME,TMG: 200
SODIUM SERPL-SCNC: 143 MMOL/L (ref 136–145)
VANCOMYCIN TROUGH SERPL-MCNC: 15.7 UG/ML (ref 10–20)
WBC # BLD AUTO: 16.2 K/UL (ref 4.6–13.2)

## 2017-05-18 PROCEDURE — 74011250636 HC RX REV CODE- 250/636: Performed by: EMERGENCY MEDICINE

## 2017-05-18 PROCEDURE — 83735 ASSAY OF MAGNESIUM: CPT | Performed by: INTERNAL MEDICINE

## 2017-05-18 PROCEDURE — 74011000250 HC RX REV CODE- 250: Performed by: INTERNAL MEDICINE

## 2017-05-18 PROCEDURE — 77010033678 HC OXYGEN DAILY

## 2017-05-18 PROCEDURE — 74011250636 HC RX REV CODE- 250/636: Performed by: INTERNAL MEDICINE

## 2017-05-18 PROCEDURE — 82962 GLUCOSE BLOOD TEST: CPT

## 2017-05-18 PROCEDURE — 80048 BASIC METABOLIC PNL TOTAL CA: CPT | Performed by: INTERNAL MEDICINE

## 2017-05-18 PROCEDURE — 36415 COLL VENOUS BLD VENIPUNCTURE: CPT | Performed by: INTERNAL MEDICINE

## 2017-05-18 PROCEDURE — 74011250637 HC RX REV CODE- 250/637: Performed by: INTERNAL MEDICINE

## 2017-05-18 PROCEDURE — 74011000250 HC RX REV CODE- 250: Performed by: EMERGENCY MEDICINE

## 2017-05-18 PROCEDURE — 65660000000 HC RM CCU STEPDOWN

## 2017-05-18 PROCEDURE — 80202 ASSAY OF VANCOMYCIN: CPT | Performed by: INTERNAL MEDICINE

## 2017-05-18 PROCEDURE — 74011000258 HC RX REV CODE- 258: Performed by: EMERGENCY MEDICINE

## 2017-05-18 PROCEDURE — 74011250637 HC RX REV CODE- 250/637: Performed by: HOSPITALIST

## 2017-05-18 PROCEDURE — 85027 COMPLETE CBC AUTOMATED: CPT | Performed by: INTERNAL MEDICINE

## 2017-05-18 PROCEDURE — 74011636637 HC RX REV CODE- 636/637: Performed by: INTERNAL MEDICINE

## 2017-05-18 RX ORDER — FUROSEMIDE 40 MG/1
40 TABLET ORAL DAILY
Status: DISCONTINUED | OUTPATIENT
Start: 2017-05-19 | End: 2017-05-25 | Stop reason: HOSPADM

## 2017-05-18 RX ADMIN — METHYLPREDNISOLONE SODIUM SUCCINATE 40 MG: 40 INJECTION, POWDER, FOR SOLUTION INTRAMUSCULAR; INTRAVENOUS at 00:40

## 2017-05-18 RX ADMIN — GUAIFENESIN 600 MG: 600 TABLET, EXTENDED RELEASE ORAL at 09:07

## 2017-05-18 RX ADMIN — NYSTATIN: 100000 POWDER TOPICAL at 22:16

## 2017-05-18 RX ADMIN — PREGABALIN 100 MG: 100 CAPSULE ORAL at 09:07

## 2017-05-18 RX ADMIN — PREGABALIN 100 MG: 100 CAPSULE ORAL at 21:58

## 2017-05-18 RX ADMIN — AZTREONAM 2 G: 2 INJECTION, POWDER, LYOPHILIZED, FOR SOLUTION INTRAMUSCULAR; INTRAVENOUS at 06:58

## 2017-05-18 RX ADMIN — HEPARIN SODIUM 5000 UNITS: 5000 INJECTION, SOLUTION INTRAVENOUS; SUBCUTANEOUS at 17:14

## 2017-05-18 RX ADMIN — ZINC OXIDE: 200 OINTMENT TOPICAL at 09:05

## 2017-05-18 RX ADMIN — GUAIFENESIN 600 MG: 600 TABLET, EXTENDED RELEASE ORAL at 21:59

## 2017-05-18 RX ADMIN — MUPIROCIN: 20 OINTMENT TOPICAL at 21:58

## 2017-05-18 RX ADMIN — ASPIRIN 81 MG: 81 TABLET, COATED ORAL at 09:06

## 2017-05-18 RX ADMIN — Medication 5 ML: at 14:00

## 2017-05-18 RX ADMIN — OXYCODONE HYDROCHLORIDE AND ACETAMINOPHEN 1 TABLET: 5; 325 TABLET ORAL at 09:24

## 2017-05-18 RX ADMIN — INSULIN LISPRO 2 UNITS: 100 INJECTION, SOLUTION INTRAVENOUS; SUBCUTANEOUS at 06:58

## 2017-05-18 RX ADMIN — SODIUM CHLORIDE 500 MG: 900 INJECTION, SOLUTION INTRAVENOUS at 14:39

## 2017-05-18 RX ADMIN — VANCOMYCIN HYDROCHLORIDE 1250 MG: 10 INJECTION, POWDER, LYOPHILIZED, FOR SOLUTION INTRAVENOUS at 03:37

## 2017-05-18 RX ADMIN — INSULIN LISPRO 4 UNITS: 100 INJECTION, SOLUTION INTRAVENOUS; SUBCUTANEOUS at 16:30

## 2017-05-18 RX ADMIN — NYSTATIN: 100000 POWDER TOPICAL at 09:00

## 2017-05-18 RX ADMIN — Medication 10 ML: at 07:18

## 2017-05-18 RX ADMIN — CLONAZEPAM 0.25 MG: 0.5 TABLET ORAL at 21:58

## 2017-05-18 RX ADMIN — ACETAZOLAMIDE 250 MG: 250 TABLET ORAL at 09:07

## 2017-05-18 RX ADMIN — MONTELUKAST SODIUM 10 MG: 10 TABLET, FILM COATED ORAL at 09:08

## 2017-05-18 RX ADMIN — ZINC OXIDE: 200 OINTMENT TOPICAL at 21:58

## 2017-05-18 RX ADMIN — Medication 10 ML: at 22:00

## 2017-05-18 RX ADMIN — LISINOPRIL 20 MG: 20 TABLET ORAL at 09:08

## 2017-05-18 RX ADMIN — CLONAZEPAM 0.25 MG: 0.5 TABLET ORAL at 09:07

## 2017-05-18 RX ADMIN — METHYLPREDNISOLONE SODIUM SUCCINATE 40 MG: 40 INJECTION, POWDER, FOR SOLUTION INTRAMUSCULAR; INTRAVENOUS at 17:15

## 2017-05-18 RX ADMIN — HEPARIN SODIUM 5000 UNITS: 5000 INJECTION, SOLUTION INTRAVENOUS; SUBCUTANEOUS at 09:07

## 2017-05-18 RX ADMIN — PREGABALIN 100 MG: 100 CAPSULE ORAL at 17:15

## 2017-05-18 RX ADMIN — OMEPRAZOLE 20 MG: 20 CAPSULE, DELAYED RELEASE ORAL at 09:06

## 2017-05-18 RX ADMIN — OXYCODONE HYDROCHLORIDE AND ACETAMINOPHEN 1 TABLET: 5; 325 TABLET ORAL at 22:16

## 2017-05-18 RX ADMIN — METHYLPREDNISOLONE SODIUM SUCCINATE 40 MG: 40 INJECTION, POWDER, FOR SOLUTION INTRAMUSCULAR; INTRAVENOUS at 09:06

## 2017-05-18 RX ADMIN — AZTREONAM 2 G: 2 INJECTION, POWDER, LYOPHILIZED, FOR SOLUTION INTRAMUSCULAR; INTRAVENOUS at 17:15

## 2017-05-18 RX ADMIN — LORATADINE 10 MG: 10 TABLET ORAL at 09:07

## 2017-05-18 RX ADMIN — DRONABINOL 2.5 MG: 2.5 CAPSULE ORAL at 07:32

## 2017-05-18 RX ADMIN — FLUTICASONE FUROATE AND VILANTEROL TRIFENATATE 1 PUFF: 200; 25 POWDER RESPIRATORY (INHALATION) at 09:05

## 2017-05-18 RX ADMIN — FLUTICASONE PROPIONATE 1 SPRAY: 50 SPRAY, METERED NASAL at 09:06

## 2017-05-18 RX ADMIN — UMECLIDINIUM 1 PUFF: 62.5 AEROSOL, POWDER ORAL at 09:05

## 2017-05-18 RX ADMIN — DRONABINOL 2.5 MG: 2.5 CAPSULE ORAL at 16:30

## 2017-05-18 RX ADMIN — CLONAZEPAM 0.25 MG: 0.5 TABLET ORAL at 17:15

## 2017-05-18 RX ADMIN — BUMETANIDE 1 MG: 0.25 INJECTION INTRAMUSCULAR; INTRAVENOUS at 10:32

## 2017-05-18 RX ADMIN — MUPIROCIN: 20 OINTMENT TOPICAL at 09:05

## 2017-05-18 RX ADMIN — MULTIPLE VITAMINS W/ MINERALS TAB 1 TABLET: TAB at 09:07

## 2017-05-18 RX ADMIN — ATENOLOL 25 MG: 25 TABLET ORAL at 09:07

## 2017-05-18 RX ADMIN — LEVOTHYROXINE SODIUM 225 MCG: 150 TABLET ORAL at 06:50

## 2017-05-18 RX ADMIN — DULOXETINE 60 MG: 60 CAPSULE, DELAYED RELEASE ORAL at 09:06

## 2017-05-18 RX ADMIN — AZTREONAM 2 G: 2 INJECTION, POWDER, LYOPHILIZED, FOR SOLUTION INTRAMUSCULAR; INTRAVENOUS at 22:01

## 2017-05-18 NOTE — PROGRESS NOTES
Report received and assumed care of pt. Assessment completed per flowsheet. Pt drowsy but easily awakened. Ragland intact and draining. Lungs with rhonchi and coarse crackles, productive cough bringing up blood-tinged sputum. VSS, will continue to monitor. TRANSFER - OUT REPORT:    Verbal report given to [address] on [address] Rios  being transferred to [facility] for routine progression of care       Report consisted of patients Situation, Background, Assessment and   Recommendations(SBAR). Information from the following report(s) SBAR, ED Summary, Intake/Output, MAR and Cardiac Rhythm SR was reviewed with the receiving nurse. Lines:   Peripheral IV 05/15/17 Right Arm (Active)   Site Assessment Clean, dry, & intact 5/17/2017  4:00 PM   Phlebitis Assessment 0 5/17/2017  4:00 PM   Infiltration Assessment 0 5/17/2017  4:00 PM   Dressing Status Clean, dry, & intact 5/17/2017  4:00 PM   Dressing Type Tape;Transparent 5/17/2017  4:00 PM   Hub Color/Line Status Blue;Capped 5/17/2017  4:00 PM   Action Taken Open ports on tubing capped 5/17/2017  4:00 PM   Alcohol Cap Used Yes 5/17/2017  4:00 PM       Peripheral IV 05/15/17 Right;Other (Comment) Other(comment) (Active)   Site Assessment Clean, dry, & intact 5/17/2017  4:00 PM   Phlebitis Assessment 0 5/17/2017  4:00 PM   Infiltration Assessment 0 5/17/2017  4:00 PM   Dressing Status Clean, dry, & intact 5/17/2017  4:00 PM   Dressing Type Tape;Transparent 5/17/2017  4:00 PM   Hub Color/Line Status Blue;Capped 5/17/2017  4:00 PM   Action Taken Open ports on tubing capped 5/17/2017  4:00 PM   Alcohol Cap Used Yes 5/17/2017 12:00 PM        Opportunity for questions and clarification was provided.       Patient transported with:   Monitor  O2 @ 5L liters  Patient-specific medications from Pharmacy  Registered Nurse

## 2017-05-18 NOTE — DIABETES MGMT
NUTRITION / GLYCEMIC CONTROL PLAN OF CARE        Diabetes Management:    -pt with no known h/o T2DM, steroid induced hyperglycemia  -BG out of range requiring some corrective coverage  - recommend:    *continue to monitor BG if corrective coverage consistently >10 units TDD   *may need to add conservative dose of Lantus 5 units  - goals:   *BG in target range non-ICU: <140 mg/dL by 5/23   *PO intake will be 75% of meals offered by 5/23    - TDD: 10 units corrective coverage  - BG range: 131-254 mg/dL  - Hypo: no  - BG in target range (non-ICU: <140; ICU<180): [] Yes  [x] No  - Steroids: Solumedrol 40 mg IV Q8 hours  - Intake: poor   Patient Vitals for the past 100 hrs:   % Diet Eaten   05/17/17 2000 0 %   05/17/17 1600 0 %   05/17/17 1200 0 %   05/17/17 0800 75 %   05/16/17 1658 100 %   05/14/17 2041 100 %   05/14/17 1026 45 %   05/14/17 0800 0 %        Current Insulin regimen:   - Humalog Normal Insulin Sensitivity Corrective Coverage    Home medication/insulin regimen:  No PTA diabetes medications    Recent Glucose Results: Lab Results   Component Value Date/Time     (H) 05/18/2017 01:45 AM    GLUCPOC 131 (H) 05/18/2017 09:43 AM    GLUCPOC 185 (H) 05/18/2017 06:58 AM    GLUCPOC 155 (H) 05/17/2017 11:46 PM       Adequate glycemic control PTA:  [] Yes  [x] No    HbA1c: equivalent  to ave BGlucose of 100 mg/dl for 2-3 months prior to admission     Lab Results   Component Value Date/Time    Hemoglobin A1c 5.1 11/25/2016 03:03 PM         Diet:   Active Orders   Diet    DIET CARDIAC Regular; FR 1500ML       Danielle Mendoza RN, MS  Glycemic Control Team

## 2017-05-18 NOTE — PROGRESS NOTES
0730 Received report. Pt resting in CU bed #4 on all ICU monitoring equipment. Call bell in reach. Resting with eyes closed. 0800 Complete RN assessment done at this time as per doc flow sheet. Pt very drowsy and [place] but does respond to verbal stimuli and then goes right back to sleep. Alert and oriented able to make needs known, denies pain discomfort or sob although states that she has chronic neck back and leg pain for which is is taking a fentanyl patch and PO medications for. She is wearing n/c at 5lpm vss coarse rhonci noted. PIV noted to right shoulder and right arm both 22g both patent with dressings cd&I. Arm with ns at kvo. Unused ports capped. nsr on monitor. Pt encouraged and directed to scoot up in bed she was able to pull herslef up but was very SCANLON. 1000 took all am po medications without difficulties writer unable scan some de to issues with connect care, MAR updated once able. Pt ate [place] toast, mac and drank milk and now wants to sleep again. Palliative care team at bedside pt made self DDNR. [address] [place] completed. 1200 reassessed no changes refusing lunch states shes not hungry but will drink and ensure later. Pt continues to sleep. 1400 no changes pt continues to sleep. flacc 0 vss    1600 reassessed no changes vss transfer order noted but no bed assignment. 1630 pt told CNA that she needs pain medication and nerve pills but when writer went to bedside pt was sleeping. 1725 pt c/o pain percocet given per her request.     1800 no changes resting comfortably vss flacc 0.     1930 no changes pt assigned room Report to [facility] who will call report. Pt continues to sleep vss respirations even and unlabored. Call bell in reach. Bedside and Verbal shift change report given to Robbi Ryan  (oncoming nurse) by Lan Ray RN   (offgoing nurse).  Report included the following information SBAR, Kardex, Intake/Output, MAR, Recent Results, Med Rec Status, Cardiac Rhythm NSR and Alarm Parameters .

## 2017-05-18 NOTE — PROGRESS NOTES
Subjective: This patient has been seen and evaluated at the request of Dr. Kathi Franklin. Patient is a 80 y.o. female admitted with cough and sputum production     5/18  Patient out of icu  Resting comfortably. No issues overnight. Easily arouseable. Less anxious. Appetite significantly improved. Pt has not spoken with son about her care decision. Pt understands he is DNR. Past Medical History:   Diagnosis Date    Arthritis     Asthma     CAD (coronary artery disease)     angina, last episode 06/2012    Chronic bronchitis (HCC)     Chronic kidney disease     stage 3    Chronic obstructive pulmonary disease (HCC)     Chronic pain     cervical, lumbar, knees, ankles, elbows    Fibromyalgia     GERD (gastroesophageal reflux disease)     Hypertension 1993    Psychiatric disorder     anxiety, depression    Thyroid disease      Past Surgical History:   Procedure Laterality Date    HX ADENOIDECTOMY      HX APPENDECTOMY      HX CATARACT REMOVAL      OU    HX CERVICAL FUSION      anterior x 2    HX CERVICAL FUSION      posterior x 3    HX CERVICAL FUSION  1996    Removal of titanium plate and screws    HX CHOLECYSTECTOMY      HX HYSTERECTOMY      HX LITHOTRIPSY      x 4    HX OOPHORECTOMY      left    HX ORTHOPAEDIC      cervical  x5    HX SMALL BOWEL RESECTION      HX TONSILLECTOMY      HX UROLOGICAL  1984 and 1985    kidney stone surgery      History reviewed. No pertinent family history.   Social History   Substance Use Topics    Smoking status: Never Smoker    Smokeless tobacco: Never Used    Alcohol use No      Current Facility-Administered Medications   Medication Dose Route Frequency Provider Last Rate Last Dose    clonazePAM (KlonoPIN) tablet 0.25 mg  0.25 mg Oral TID Nancy Chadwick MD   0.25 mg at 05/18/17 0996    acetaZOLAMIDE (DIAMOX) tablet 250 mg  250 mg Oral BID Vernon Roman MD   250 mg at 05/18/17 0177    dronabinol (MARINOL) capsule 2.5 mg  2.5 mg Oral ACB&D Vernon Roman MD 2.5 mg at 05/18/17 0732    bumetanide (BUMEX) injection 1 mg  1 mg IntraVENous DAILY Bull Mejia MD   1 mg at 05/18/17 1032    Electrolyte Replacement Protocol - Potassium Renal Dosing  1 Each Other PRN Juan Carvalho MD        Electrolyte Replacement Protocol - Magnesium   1 Each Other PRN Juan Carvalho MD        albuterol-ipratropium (DUO-NEB) 2.5 MG-0.5 MG/3 ML  3 mL Nebulization Q4H PRN Bull Mejia MD   3 mL at 05/16/17 2154    umeclidinium (INCRUSE ELLIPTA) 62.5 mcg/actuation  1 Puff Inhalation DAILY Bull Mejia MD   1 Puff at 05/18/17 9243    mupirocin (BACTROBAN) 2 % ointment   Both Nostrils BID Elisabet Sargent MD        loratadine (CLARITIN) tablet 10 mg  10 mg Oral DAILY Lory Molina MD   10 mg at 05/18/17 0907    montelukast (SINGULAIR) tablet 10 mg  10 mg Oral DAILY Lory Molina MD   10 mg at 05/18/17 0908    DULoxetine (CYMBALTA) capsule 60 mg  60 mg Oral DAILY Lory Molina MD   60 mg at 05/18/17 0906    fluticasone-vilanterol (BREO ELLIPTA) 200mcg-25mcg/puff  1 Puff Inhalation DAILY Lory Molina MD   1 Puff at 05/18/17 0905    nitroglycerin (NITROSTAT) tablet 0.4 mg  0.4 mg SubLINGual Q5MIN PRN Lory Molina MD        aspirin delayed-release tablet 81 mg  81 mg Oral DAILY Lory Molina MD   81 mg at 05/18/17 0906    atenolol (TENORMIN) tablet 25 mg  25 mg Oral DAILY Lory Molina MD   25 mg at 05/18/17 7259    guaiFENesin ER (MUCINEX) tablet 600 mg  600 mg Oral Q12H Emanuel Bartlett MD   600 mg at 05/18/17 0907    levothyroxine (SYNTHROID) tablet 225 mcg  225 mcg Oral ACB Emanuel Bartlett MD   225 mcg at 05/18/17 0650    pregabalin (LYRICA) capsule 100 mg  100 mg Oral TID Lory Molina MD   100 mg at 05/18/17 0907    fluticasone (FLONASE) 50 mcg/actuation nasal spray 1 Spray  1 Spray Both Nostrils DAILY Lory Molina MD   1 Spray at 05/18/17 0906    cyanocobalamin (VITAMIN B12) injection 1,000 mcg  1,000 mcg IntraMUSCular Elvis Medina MD   1,000 mcg at 05/15/17 1713    acetaminophen (TYLENOL) tablet 650 mg  650 mg Oral Q6H PRN Rose Torres MD        diclofenac (VOLTAREN) 1 % topical gel 2 g  2 g Topical TID Rose Torres MD   Stopped at 05/14/17 2200    cholestyramine (QUESTRAN) packet 4 g  4 g Oral DAILY PRN Rose Torres MD        nystatin (MYCOSTATIN) 100,000 unit/gram powder   Topical BID Rose Torres MD        omeprazole (PRILOSEC) capsule 20 mg  20 mg Oral DAILY Rose Torres MD   20 mg at 05/18/17 4032    zinc oxide 20 % ointment   Topical BID Rose Torres MD        alum-mag hydroxide-simeth (MYLANTA) oral suspension 15 mL  15 mL Oral QID PRN Rose Torres MD   15 mL at 05/14/17 0834    multivitamin, tx-iron-ca-min (THERA-M w/ IRON) tablet 1 Tab  1 Tab Oral DAILY Rose Torres MD   1 Tab at 05/18/17 0907    lisinopril (PRINIVIL, ZESTRIL) tablet 20 mg  20 mg Oral DAILY Rose Torres MD   20 mg at 05/18/17 0908    fentaNYL (DURAGESIC) 25 mcg/hr patch 1 Patch  1 Patch TransDERmal Q72H Rose Torres MD   1 Patch at 05/16/17 2356    sodium chloride (NS) flush 5-10 mL  5-10 mL IntraVENous Q8H Emanuel Carrillo MD   10 mL at 05/18/17 0718    sodium chloride (NS) flush 5-10 mL  5-10 mL IntraVENous PRLORIE Torres MD        heparin (porcine) injection 5,000 Units  5,000 Units SubCUTAneous Q8H Emanuel Carrillo MD   5,000 Units at 05/18/17 0907    insulin lispro (HUMALOG) injection   SubCUTAneous AC&HS Rose Torres MD   2 Units at 05/18/17 0658    glucose chewable tablet 16 g  16 g Oral PRN Rose Torres MD        glucagon (GLUCAGEN) injection 1 mg  1 mg IntraMUSCular PRN Emanuel Carrillo MD        dextrose (D50W) injection syrg 12.5-25 g  25-50 mL IntraVENous LEONARD Torres MD        oxyCODONE-acetaminophen (PERCOCET) 5-325 mg per tablet 1 Tab  1 Tab Oral Q6H PRN Rose Torres MD   1 Tab at 05/18/17 0924    methylPREDNISolone (PF) (SOLU-MEDROL) injection 40 mg  40 mg IntraVENous Q8H Emanuel Carrillo MD   40 mg at 05/18/17 0906    influenza vaccine 2016-17 (36mos+)(PF) (FLUZONE/FLUARIX/FLULAVAL QUAD) injection 0.5 mL  0.5 mL IntraMUSCular PRIOR TO DISCHARGE Timur Cotton MD        diphenhydrAMINE (BENADRYL) 12.5 mg/5 mL oral elixir 12.5 mg  12.5 mg Oral Q6H PRN Emanuel Goldberg MD   12.5 mg at 17    guaiFENesin-codeine (ROBITUSSIN AC) 100-10 mg/5 mL solution 10 mL  10 mL Oral Q4H PRN Emanuel Manning MD   10 mL at 17    azithromycin (ZITHROMAX) 500 mg in 0.9% sodium chloride (MBP/ADV) 250 mL adv  500 mg IntraVENous Q24H Pj Tovar  mL/hr at 17 1435 500 mg at 17 1435    benzocaine-menthol (CEPACOL) lozenge 1 Lozenge  1 Lozenge Mucous Membrane PRN Kendall Medel MD        ALPRAZolam Willistine Raymundo) tablet 1 mg  1 mg Oral TID PRN Timur Cotton MD   1 mg at 17 0527    aztreonam (AZACTAM) 2 g in 0.9% sodium chloride (MBP/ADV) 100 mL MBP  2 g IntraVENous Q8H Canelo Boyce  mL/hr at 17 0658 2 g at 17 0059    Vancomycin Pharmacokinetic Dosing  1 Each Other Rx Dosing/Monitoring Canelo Boyce MD        vancomycin (VANCOCIN) 1,250 mg in 0.9% sodium chloride 250 mL IVPB  1,250 mg IntraVENous Q24H Canelo Boyce  mL/hr at 17 0337 1,250 mg at 17 1284        Allergies   Allergen Reactions    Ambien [Zolpidem] Other (comments)     Sleep drive    Aspirin Other (comments)     bruising    Codeine Hives    Darvon [Propoxyphene] Unknown (comments)    Iodinated Contrast Media - Oral And Iv Dye Hives     Iodine on skin is ok per pt.  Pcn [Penicillins] Nausea and Vomiting    Shellfish Derived Hives, Shortness of Breath and Swelling     Iodine on skin is ok per pt.  Zanaflex [Tizanidine] Other (comments)     disorientated    Levaquin [Levofloxacin] Hives     Red rash at IV site while infusing       Review of Systems:  Pertinent items are noted in HPI. Objective:     Blood pressure 144/73, pulse 67, temperature 97.9 °F (36.6 °C), resp. rate 17, height 5' (1.524 m), weight 98.4 kg (216 lb 14.9 oz), SpO2 98 %.  Temp (24hrs), Av.8 °F (36.6 °C), Min:97.2 °F (36.2 °C), Max:98.2 °F (36.8 °C)      Intake and Output:  Current Shift:    Last 3 Shifts: 05/16 1901 - 05/18 0700  In: 1300 [P.O.:600; I.V.:700]  Out: 3804 [Urine:3375]    Physical Exam:   Visit Vitals    /73 (BP 1 Location: Right arm, BP Patient Position: At rest)    Pulse 67    Temp 97.9 °F (36.6 °C)    Resp 17    Ht 5' (1.524 m)    Wt 98.4 kg (216 lb 14.9 oz)    SpO2 98%    BMI 42.37 kg/m2     General:  Alert, cooperative, no distress, appears stated age. Head:  Normocephalic, without obvious abnormality, atraumatic. Eyes:  Conjunctivae/corneas clear. PERRL, EOMs intact. Fundi benign. Ears:  Normal TMs and external ear canals both ears. Nose: Nares normal. Septum midline. Mucosa normal. No drainage or sinus tenderness. Throat: Lips, mucosa, and tongue normal. Teeth and gums normal.   Neck: Supple, symmetrical, trachea midline, no adenopathy, thyroid: no enlargement/tenderness/nodules, no carotid bruit and no JVD. Back:   Symmetric, no curvature. ROM normal. No CVA tenderness. Lungs:   wheezing bilaterally. Chest wall:  No tenderness or deformity. Heart:  Regular rate and rhythm, S1, S2 normal, no murmur, click, rub or gallop. Breast Exam:  No tenderness, masses, or nipple abnormality. Abdomen:   Soft, non-tender. Bowel sounds normal. No masses,  No organomegaly. Genitalia:  Normal female without lesion, discharge or tenderness. Rectal:  Normal tone,  no masses or tenderness  Guaiac negative stool. Extremities: Extremities normal, atraumatic, no cyanosis or edema. Pulses: 2+ and symmetric all extremities. Skin: Skin color, texture, turgor normal. No rashes or lesions. Lymph nodes: Cervical, supraclavicular, and axillary nodes normal.   Neurologic: CNII-XII intact. Normal strength, sensation and reflexes throughout.      Reviewed all labs and radiology and previous notes and admissions    Assessment:     Acute respiratory distress, improved  COPD  Acute diastolic heart failure  Chronic bronchitis  Severe anxiety disorder    Plan:        Continue steroids and abx  Stop the diamox today.     Convert bumex to oral   incruse ellipta and change combivent to prn  Hemoptysis nonsignificant suspect upper airway issues    Schedule klonopin decreased  Will sign off

## 2017-05-18 NOTE — PROGRESS NOTES
2030: Assumed patient care, she was alert and oriented to person, place, time and situation. Respiratory status was stable on 4L via nasal cannula. Vital signs were stable. MEWS score was a one. Patient denied any nausea vomiting dizziness or anxiety. White board was updated and explained. Bed was locked and in lowest position. Call bell, water and personal belongings were within reach. Patient had no questions, comments or concerns after bedside shift report. 0700: Patient had an uneventful shift. Respiratory status, vital signs and MEWS score remained stable. Patient was resting quietly with no signs of distress noted. Bed locked and in lowest position. Call bell water and personal belongings were within reach. Patient had no questions, comments or concerns after bedside shift report. Bedside report given to RIC Triana R.N.

## 2017-05-18 NOTE — PROGRESS NOTES
Vancomycin trough = 15.6 @ 0145 05/18/2017   Trough within therapeutic range No changes in therapy presently

## 2017-05-18 NOTE — PROGRESS NOTES
Palliative Medicine Progress Note  [facility] [phone]  [facility]: [phone]   [facility]: [phone]    Patient Name: Hiren August  YOB: 1945    Date of Initial Consult: 5/17/17  Reason for Consult: care decisions  Requesting Provider: Dr. Viral Mancia   Primary Care Physician: Jens Reveles MD      SUMMARY:   Hiren August is a 80y.o. year old with a past history of advanced chronic lung disease, HTN, diastolic CHF, and chronic debilitating back and neck pain admitted for third time from [facility] since Nov 2016 for COPD exacerbation/CHF. Has been ICF resident of [facility] for about a year. Only son travels and combination of spinal and lung disease rendered her unable to care for self. Chair and bed bound, O2 dependent. Course this time has been protracted w/ transfer to ICU and [facility]NC on 5/15. Now back on 5L. 5/18/17: Resting comfortably, easily aroused. Pain improved and rested well. Has not spoken with son since meeting yesterday. PALLIATIVE DIAGNOSES:     Patient Active Problem List   Diagnosis Code    Cataract H26.9    DDD (degenerative disc disease), cervical M50.30    H/O cervical spine surgery Z98.890    COPD (chronic obstructive pulmonary disease) ([address] J44.9    Acidosis E87.2    COPD exacerbation ([address] J44.1    Acute on chronic respiratory failure (HCC) J96.20    Obesity E66.9    Benign hypertension I10    GERD (gastroesophageal reflux disease) K21.9    Acute diastolic CHF (congestive heart failure) ([facility]) I50.31    Acute encephalopathy G93.40    Toxic metabolic encephalopathy L11    Acute renal failure (ARF) ([facility]) N17.9    Hyperkalemia E87.5    PNA (pneumonia) J18.9    Chest pain R07.9    Anxiety F41.9    Sinus tachycardia R00.0    Acute respiratory distress R06.00    Hypoxia R09.02    HCAP (healthcare-associated pneumonia) J18.9     1. PLAN:   1.  Met with patient who was alert, oriented and pleasant, very [place]. She conveyed her sense of progressive decline in function and QOL over the last year and that she has been thinking for some time that she would not want CPR and that her goals for care would be improved comfort and to not keep returning to the hospital every few months. She would not wish to be intubated. She has not discussed this with her son who she feels would want her to continue the most aggressive care including CPR. We discussed that these decisions are really hers and that his role is to ensure her care wishes are respected. She asked to complete a DDNR and VA AMD and these were executed. Briefly discussed developing a comfort care plan for return to NH rather than d/c to SNF, but recommended that we include her son in these discussion and allow him to hear her wishes first hand. A comfort care plan in the NH setting would need to include hospice. Questions answered. Agree with use of oxycodone/apap as breakthrough agent with fentanyl patch. Recommend bowel agent. 7/18/17: Left message with son w/o call back so far. Will try and arrange meeting including him to discuss care plan for d/c. Patient interested in plan focused on comfort with avoidance of rehospitalization. 2. Initial consult note routed to primary continuity provider  3. Communicated plan of care with: Palliative IDT        GOALS OF CARE:   Comfort, no intubation, DNR. Discharge plan to be discussed w/ son.     Advance Care Planning 5/17/2017   Patient's Healthcare Decision Maker is: Named in scanned ACP document   Primary Decision Maker Name Mian Diaz   Primary Decision Maker Phone Number [phone]   Primary Decision Maker Relationship to Patient Adult child   Confirm Advance Directive Yes, on file   Does the patient have other document types Do Not Resuscitate           HISTORY:     History obtained from: patient    CHIEF COMPLAINT: see summary    HPI/SUBJECTIVE:    The patient is:   [x] Verbal and participatory  [] Non-participatory due to:        Clinical Pain Assessment (nonverbal scale for nonverbal patients): Pain: 7         FUNCTIONAL ASSESSMENT:     Palliative Performance Scale (PPS):  PPS: 40       PSYCHOSOCIAL/SPIRITUAL SCREENING:     Advance Care Planning:  Advance Care Planning 2017   Patient's Healthcare Decision Maker is: Named in scanned ACP document   Primary Decision Maker Name Opal Haney   Primary Decision Maker Phone Number [phone]   Primary Decision Maker Relationship to Patient Adult child   Confirm Advance Directive Yes, on file   Does the patient have other document types Do Not Resuscitate        Any spiritual / Denominational concerns:  [] Yes /  [x] No    Caregiver Burnout:  [] Yes /  [] No /  [x] No Caregiver Present      Anticipatory grief assessment:   [x] Normal  / [] Maladaptive       ESAS Anxiety: Anxiety: 6    ESAS Depression: Depression: 6        REVIEW OF SYSTEMS:     Positive and pertinent negative findings in ROS are noted above in HPI. The following systems were [x] reviewed / [] unable to be reviewed as noted in HPI  Other findings are noted below. Systems: constitutional, ears/nose/mouth/throat, respiratory, gastrointestinal, genitourinary, musculoskeletal, integumentary, neurologic, psychiatric, endocrine. Positive findings noted below. Modified ESAS Completed by: provider   Fatigue: 8 Drowsiness: 7   Depression: 6 Pain: 7   Anxiety: 6 Nausea: 0   Anorexia: 0 Dyspnea: 8   Best Well-Bein Constipation: No     Stool Occurrence(s): 0        PHYSICAL EXAM:     Wt Readings from Last 3 Encounters:   17 98.4 kg (216 lb 14.9 oz)   02/15/17 97.3 kg (214 lb 8 oz)   16 93.1 kg (205 lb 4.8 oz)     Blood pressure 120/67, pulse 84, temperature 97.8 °F (36.6 °C), resp. rate 17, height 5' (1.524 m), weight 98.4 kg (216 lb 14.9 oz), SpO2 98 %.   Pain:  Pain Scale 1: Numeric (0 - 10)  Pain Intensity 1: 0     Pain Location 1: Back, Leg, Neck  Pain Orientation 1: Posterior, Right  Pain Description 1: Aching  Pain Intervention(s) 1: Medication (see MAR)  Last bowel movement:     Constitutional: cushingoid, NAD  Eyes: pupils equal, anicteric  ENMT: no nasal discharge, moist mucous membranes  Cardiovascular: regular rhythm, distal pulses intact  Respiratory: breathing mildly labored, symmetric diminished BS bilat  Gastrointestinal: soft non-tender, +bowel sounds  Musculoskeletal: no deformity, no tenderness to palpation  Skin: warm, dry  Neurologic: following commands, moving all extremities  Psychiatric: full affect, no hallucinations  Other:       HISTORY:     Principal Problem:    Acute respiratory distress (5/13/2017)    Active Problems:    Cataract (5/15/2013)      DDD (degenerative disc disease), cervical (8/27/2013)      H/O cervical spine surgery (8/27/2013)      COPD exacerbation (HCC) (11/25/2016)      Acute on chronic respiratory failure (HCC) (11/25/2016)      Obesity (11/25/2016)      Benign hypertension (11/25/2016)      GERD (gastroesophageal reflux disease) (11/25/2016)      Acute diastolic CHF (congestive heart failure) ([facility]) (11/28/2016)      Chest pain (2/14/2017)      Hypoxia (5/13/2017)      HCAP (healthcare-associated pneumonia) (5/13/2017)      Past Medical History:   Diagnosis Date    Arthritis     Asthma     CAD (coronary artery disease)     angina, last episode 06/2012    Chronic bronchitis (HCC)     Chronic kidney disease     stage 3    Chronic obstructive pulmonary disease (HCC)     Chronic pain     cervical, lumbar, knees, ankles, elbows    Fibromyalgia     GERD (gastroesophageal reflux disease)     Hypertension 1993    Psychiatric disorder     anxiety, depression    Thyroid disease       Past Surgical History:   Procedure Laterality Date    HX ADENOIDECTOMY      HX APPENDECTOMY      HX CATARACT REMOVAL      OU    HX CERVICAL FUSION      anterior x 2    HX CERVICAL FUSION      posterior x 3    HX CERVICAL FUSION  1996    Removal of titanium plate and screws    HX CHOLECYSTECTOMY      HX HYSTERECTOMY      HX LITHOTRIPSY      x 4    HX OOPHORECTOMY      left    HX ORTHOPAEDIC      cervical  x5    HX SMALL BOWEL RESECTION      HX TONSILLECTOMY      HX UROLOGICAL  1984 and 1985    kidney stone surgery      History reviewed. No pertinent family history. History reviewed, no pertinent family history. Social History   Substance Use Topics    Smoking status: Never Smoker    Smokeless tobacco: Never Used    Alcohol use No     Allergies   Allergen Reactions    Ambien [Zolpidem] Other (comments)     Sleep drive    Aspirin Other (comments)     bruising    Codeine Hives    Darvon [Propoxyphene] Unknown (comments)    Iodinated Contrast Media - Oral And Iv Dye Hives     Iodine on skin is ok per pt.  Pcn [Penicillins] Nausea and Vomiting    Shellfish Derived Hives, Shortness of Breath and Swelling     Iodine on skin is ok per pt.     Zanaflex [Tizanidine] Other (comments)     disorientated    Levaquin [Levofloxacin] Hives     Red rash at IV site while infusing      Current Facility-Administered Medications   Medication Dose Route Frequency    clonazePAM (KlonoPIN) tablet 0.25 mg  0.25 mg Oral TID    acetaZOLAMIDE (DIAMOX) tablet 250 mg  250 mg Oral BID    dronabinol (MARINOL) capsule 2.5 mg  2.5 mg Oral ACB&D    bumetanide (BUMEX) injection 1 mg  1 mg IntraVENous DAILY    Electrolyte Replacement Protocol - Potassium Renal Dosing  1 Each Other PRN    Electrolyte Replacement Protocol - Magnesium   1 Each Other PRN    albuterol-ipratropium (DUO-NEB) 2.5 MG-0.5 MG/3 ML  3 mL Nebulization Q4H PRN    umeclidinium (INCRUSE ELLIPTA) 62.5 mcg/actuation  1 Puff Inhalation DAILY    mupirocin (BACTROBAN) 2 % ointment   Both Nostrils BID    loratadine (CLARITIN) tablet 10 mg  10 mg Oral DAILY    montelukast (SINGULAIR) tablet 10 mg  10 mg Oral DAILY    DULoxetine (CYMBALTA) capsule 60 mg  60 mg Oral DAILY    fluticasone-vilanterol (BREO ELLIPTA) 200mcg-25mcg/puff  1 Puff Inhalation DAILY    nitroglycerin (NITROSTAT) tablet 0.4 mg  0.4 mg SubLINGual Q5MIN PRN    aspirin delayed-release tablet 81 mg  81 mg Oral DAILY    atenolol (TENORMIN) tablet 25 mg  25 mg Oral DAILY    guaiFENesin ER (MUCINEX) tablet 600 mg  600 mg Oral Q12H    levothyroxine (SYNTHROID) tablet 225 mcg  225 mcg Oral ACB    pregabalin (LYRICA) capsule 100 mg  100 mg Oral TID    fluticasone (FLONASE) 50 mcg/actuation nasal spray 1 Spray  1 Spray Both Nostrils DAILY    cyanocobalamin (VITAMIN B12) injection 1,000 mcg  1,000 mcg IntraMUSCular EVERY MONTH    acetaminophen (TYLENOL) tablet 650 mg  650 mg Oral Q6H PRN    diclofenac (VOLTAREN) 1 % topical gel 2 g  2 g Topical TID    cholestyramine (QUESTRAN) packet 4 g  4 g Oral DAILY PRN    nystatin (MYCOSTATIN) 100,000 unit/gram powder   Topical BID    omeprazole (PRILOSEC) capsule 20 mg  20 mg Oral DAILY    zinc oxide 20 % ointment   Topical BID    alum-mag hydroxide-simeth (MYLANTA) oral suspension 15 mL  15 mL Oral QID PRN    multivitamin, tx-iron-ca-min (THERA-M w/ IRON) tablet 1 Tab  1 Tab Oral DAILY    lisinopril (PRINIVIL, ZESTRIL) tablet 20 mg  20 mg Oral DAILY    fentaNYL (DURAGESIC) 25 mcg/hr patch 1 Patch  1 Patch TransDERmal Q72H    sodium chloride (NS) flush 5-10 mL  5-10 mL IntraVENous Q8H    sodium chloride (NS) flush 5-10 mL  5-10 mL IntraVENous PRN    heparin (porcine) injection 5,000 Units  5,000 Units SubCUTAneous Q8H    insulin lispro (HUMALOG) injection   SubCUTAneous AC&HS    glucose chewable tablet 16 g  16 g Oral PRN    glucagon (GLUCAGEN) injection 1 mg  1 mg IntraMUSCular PRN    dextrose (D50W) injection syrg 12.5-25 g  25-50 mL IntraVENous PRN    oxyCODONE-acetaminophen (PERCOCET) 5-325 mg per tablet 1 Tab  1 Tab Oral Q6H PRN    methylPREDNISolone (PF) (SOLU-MEDROL) injection 40 mg  40 mg IntraVENous Q8H    influenza vaccine 2016-17 (36mos+)(PF) (FLUZONE/FLUARIX/FLULAVAL QUAD) injection 0.5 mL  0.5 mL IntraMUSCular PRIOR TO DISCHARGE    diphenhydrAMINE (BENADRYL) 12.5 mg/5 mL oral elixir 12.5 mg  12.5 mg Oral Q6H PRN    guaiFENesin-codeine (ROBITUSSIN AC) 100-10 mg/5 mL solution 10 mL  10 mL Oral Q4H PRN    azithromycin (ZITHROMAX) 500 mg in 0.9% sodium chloride (MBP/ADV) 250 mL adv  500 mg IntraVENous Q24H    benzocaine-menthol (CEPACOL) lozenge 1 Lozenge  1 Lozenge Mucous Membrane PRN    ALPRAZolam (XANAX) tablet 1 mg  1 mg Oral TID PRN    aztreonam (AZACTAM) 2 g in 0.9% sodium chloride (MBP/ADV) 100 mL MBP  2 g IntraVENous Q8H    Vancomycin Pharmacokinetic Dosing  1 Each Other Rx Dosing/Monitoring    vancomycin (VANCOCIN) 1,250 mg in 0.9% sodium chloride 250 mL IVPB  1,250 mg IntraVENous Q24H        LAB AND IMAGING FINDINGS:     Lab Results   Component Value Date/Time    WBC 16.2 05/18/2017 01:45 AM    HGB 10.1 05/18/2017 01:45 AM    PLATELET 471 24/20/9766 01:45 AM     Lab Results   Component Value Date/Time    Sodium 143 05/18/2017 01:45 AM    Potassium 3.6 05/18/2017 01:45 AM    Chloride 106 05/18/2017 01:45 AM    CO2 32 05/18/2017 01:45 AM    BUN 46 05/18/2017 01:45 AM    Creatinine 1.08 05/18/2017 01:45 AM    Calcium 8.3 05/18/2017 01:45 AM    Magnesium 2.3 05/18/2017 01:45 AM      Lab Results   Component Value Date/Time    AST (SGOT) 46 05/15/2017 05:50 AM    Alk.  phosphatase 58 05/15/2017 05:50 AM    Protein, total 7.2 05/15/2017 05:50 AM    Albumin 3.0 05/15/2017 05:50 AM    Globulin 4.2 05/15/2017 05:50 AM     Lab Results   Component Value Date/Time    INR 1.1 05/13/2017 08:00 PM    Prothrombin time 13.7 05/13/2017 08:00 PM    aPTT 29.9 05/13/2017 08:00 PM      No results found for: IRON, FE, TIBC, IBCT, PSAT, FERR   No results found for: PH, PCO2, PO2  No components found for: [place]   Lab Results   Component Value Date/Time     05/14/2017 07:40 AM    CK - MB 2.2 05/14/2017 07:40 AM              Total time: 15 min  Counseling / coordination time, spent as noted above: 11 min  > 50% counseling / coordination        Giles Campuzano MD  Palliative Medicine

## 2017-05-18 NOTE — PROGRESS NOTES
Hospitalist Progress Note-critical care note     Patient: Ophelia Springer MRN: [id number]  CSN: [id number]    YOB: 1945  Age: 80 y.o. Sex: female    DOA: 5/13/2017 LOS:  LOS: 4 days            Chief complaint: respiratroy distress with hypoxia, copd exacerbation, acute chf. Assessment/Plan         Patient Active Problem List   Diagnosis Code    Cataract H26.9    DDD (degenerative disc disease), cervical M50.30    H/O cervical spine surgery Z98.890    COPD (chronic obstructive pulmonary disease) ([facility] J44.9    Acidosis E87.2    COPD exacerbation ([address] J44.1    Acute on chronic respiratory failure (HCC) J96.20    Obesity E66.9    Benign hypertension I10    GERD (gastroesophageal reflux disease) K21.9    Acute diastolic CHF (congestive heart failure) ([facility]) I50.31    Acute encephalopathy G93.40    Toxic metabolic encephalopathy P49    Acute renal failure (ARF) ([facility]) N17.9    Hyperkalemia E87.5    PNA (pneumonia) J18.9    Chest pain R07.9    Anxiety F41.9    Sinus tachycardia R00.0    Acute respiratory distress R06.00    Hypoxia R09.02    HCAP (healthcare-associated pneumonia) J18.9     1. Acute Respiratory Distress with Hypoxia;  Continue weaning off nc O2, on 4 L now   -ct chest: consolidation/edema/Multifocal groundglass opacities/consolidative alveolar opacities     2. Acute Moderate Exacerbation of Mod Persistent COPD; On breathing tx and iv steroid, abx   Appreciated pulm input. 3. HCAP  On vanc and zosyn ,still coughing  with blood     4. Mild Acute Diastolic CHF exacerbation stage III  On diuretics,  Ef wnl   6. HypoMg;  Resolved   7. Atypical Chest Pain; likely non cardiac   No chest pain now. Trop wnl.     8. GERD  ppi   9. HTN  Well controlled,  10. Obesity BMI >35  11. Cervical DDD  Continue home pain management   12. Cataract   13. Nasal MRSA  Received the treatment.   contact isolation   14 anxiety  Continue low dose  klonipin due to sleepiness Subjective: cough with blood , tired   Nurse: sleep a lot      Will have Pt/ot. D/c joey     Review of systems:    General: No fevers or chills. Cardiovascular: No chest pain or pressure. No palpitations. Pulmonary: shortness of breath and cough better, coughs with blood   Gastrointestinal: No nausea, vomiting. Vital signs/Intake and Output:  Visit Vitals    /70 (BP 1 Location: Right arm, BP Patient Position: At rest)    Pulse 92    Temp 97.7 °F (36.5 °C)    Resp 18    Ht 5' (1.524 m)    Wt 98.4 kg (216 lb 14.9 oz)    SpO2 100%    BMI 42.37 kg/m2     Current Shift:  05/18 0701 - 05/18 1900  In: 0   Out: 1250 [Urine:1250]  Last three shifts:  05/16 1901 - 05/18 0700  In: 1300 [P.O.:600; I.V.:700]  Out: 0423 [Urine:3375]    Physical Exam:  General: WD, WN. Alert, cooperative, no acute distress    HEENT: NC, Atraumatic. PERRLA, anicteric sclerae. Lungs: Coarse BS and rhonchi   Heart:  Regular  rhythm,  No murmur, No Rubs, No Gallops  Abdomen: Soft, Non distended, Non tender.  +Bowel sounds,   Extremities: No c/c/e  Psych:   no Anxious,no  agitated. Neurologic:  No acute neurological deficit. Labs: Results:       Chemistry Recent Labs      05/18/17 0145 05/17/17 0445 05/16/17   0420   GLU  161*  173*  144*   NA  143  142  143   K  3.6  3.9  3.5   CL  106  105  105   CO2  32  32  33*   BUN  46*  49*  36*   CREA  1.08  1.28  1.18   CA  8.3*  8.4*  8.3*   AGAP  5  5  5   BUCR  43*  38*  31*      CBC w/Diff Recent Labs      05/18/17 0145 05/17/17 0445  05/16/17   0420   WBC  16.2*  17.7*  12.4   RBC  3.26*  3.30*  3.03*   HGB  10.1*  10.1*  9.4*   HCT  30.0*  30.5*  28.1*   PLT  333  370  316      Cardiac Enzymes No results for input(s): CPK, CKND1, ADILSON in the last 72 hours. No lab exists for component: CKRMB, TROIP   Coagulation No results for input(s): PTP, INR, APTT in the last 72 hours.     No lab exists for component: INREXT, INREXT    Lipid Panel No results found for: CHOL, CHOLPOCT, CHOLX, CHLST, CHOLV, [id number], HDL, LDL, NLDLCT, DLDL, LDLC, DLDLP, 634061, VLDLC, VLDL, TGL, TGLX, TRIGL, [id number], TRIGP, TGLPOCT, [id number], CHHD, CHHDX   BNP No results for input(s): BNPP in the last 72 hours. Liver Enzymes No results for input(s): TP, ALB, TBIL, AP, SGOT, GPT in the last 72 hours.     No lab exists for component: DBIL   Thyroid Studies Lab Results   Component Value Date/Time    TSH 0.04 11/26/2016 05:39 AM        Procedures/imaging: see electronic medical records for all procedures/Xrays and details which were not copied into this note but were reviewed prior to creation of Bertrand Wilkins MD

## 2017-05-18 NOTE — ROUTINE PROCESS
Bedside shift change report given to Glenn Juarez RN (oncoming nurse) by Sarahy Saunders RN (offgoing nurse). Report included the following information SBAR, Kardex and MAR.

## 2017-05-18 NOTE — WOUND CARE
Patient seen by wound care during monthly prevalance skin assessment. Patient has lavonne score of 20. Skin intact.

## 2017-05-18 NOTE — PROGRESS NOTES
Patient was visited by IRIS. Volunteer followed up with patient and/or family and reported no needs to this . Chaplains will continue to follow and will provide pastoral care as needed or requested. Rev.  [facility]  [phone]

## 2017-05-19 PROBLEM — R78.81 BACTEREMIA DUE TO METHICILLIN RESISTANT STAPHYLOCOCCUS AUREUS: Status: ACTIVE | Noted: 2017-05-19

## 2017-05-19 PROBLEM — B95.62 BACTEREMIA DUE TO METHICILLIN RESISTANT STAPHYLOCOCCUS AUREUS: Status: ACTIVE | Noted: 2017-05-19

## 2017-05-19 PROBLEM — E87.6 HYPOKALEMIA: Status: ACTIVE | Noted: 2017-05-19

## 2017-05-19 LAB
ANION GAP BLD CALC-SCNC: 6 MMOL/L (ref 3–18)
BUN SERPL-MCNC: 40 MG/DL (ref 7–18)
BUN/CREAT SERPL: 38 (ref 12–20)
CALCIUM SERPL-MCNC: 8.5 MG/DL (ref 8.5–10.1)
CHLORIDE SERPL-SCNC: 109 MMOL/L (ref 100–108)
CO2 SERPL-SCNC: 27 MMOL/L (ref 21–32)
CREAT SERPL-MCNC: 1.04 MG/DL (ref 0.6–1.3)
ERYTHROCYTE [DISTWIDTH] IN BLOOD BY AUTOMATED COUNT: 12.9 % (ref 11.6–14.5)
GLUCOSE BLD STRIP.AUTO-MCNC: 143 MG/DL (ref 70–110)
GLUCOSE BLD STRIP.AUTO-MCNC: 198 MG/DL (ref 70–110)
GLUCOSE BLD STRIP.AUTO-MCNC: 242 MG/DL (ref 70–110)
GLUCOSE BLD STRIP.AUTO-MCNC: 290 MG/DL (ref 70–110)
GLUCOSE SERPL-MCNC: 144 MG/DL (ref 74–99)
HCT VFR BLD AUTO: 31.7 % (ref 35–45)
HGB BLD-MCNC: 10.8 G/DL (ref 12–16)
MAGNESIUM SERPL-MCNC: 2.3 MG/DL (ref 1.6–2.6)
MCH RBC QN AUTO: 31.3 PG (ref 24–34)
MCHC RBC AUTO-ENTMCNC: 34.1 G/DL (ref 31–37)
MCV RBC AUTO: 91.9 FL (ref 74–97)
PLATELET # BLD AUTO: 390 K/UL (ref 135–420)
PMV BLD AUTO: 10 FL (ref 9.2–11.8)
POTASSIUM SERPL-SCNC: 3.2 MMOL/L (ref 3.5–5.5)
RBC # BLD AUTO: 3.45 M/UL (ref 4.2–5.3)
SODIUM SERPL-SCNC: 142 MMOL/L (ref 136–145)
WBC # BLD AUTO: 21.6 K/UL (ref 4.6–13.2)

## 2017-05-19 PROCEDURE — 87040 BLOOD CULTURE FOR BACTERIA: CPT | Performed by: HOSPITALIST

## 2017-05-19 PROCEDURE — 82962 GLUCOSE BLOOD TEST: CPT

## 2017-05-19 PROCEDURE — 74011636637 HC RX REV CODE- 636/637: Performed by: INTERNAL MEDICINE

## 2017-05-19 PROCEDURE — 65660000000 HC RM CCU STEPDOWN

## 2017-05-19 PROCEDURE — 74011000250 HC RX REV CODE- 250: Performed by: EMERGENCY MEDICINE

## 2017-05-19 PROCEDURE — 93306 TTE W/DOPPLER COMPLETE: CPT

## 2017-05-19 PROCEDURE — 77010033678 HC OXYGEN DAILY

## 2017-05-19 PROCEDURE — 74011250637 HC RX REV CODE- 250/637: Performed by: INTERNAL MEDICINE

## 2017-05-19 PROCEDURE — 74011000258 HC RX REV CODE- 258: Performed by: EMERGENCY MEDICINE

## 2017-05-19 PROCEDURE — 80048 BASIC METABOLIC PNL TOTAL CA: CPT | Performed by: INTERNAL MEDICINE

## 2017-05-19 PROCEDURE — 74011250636 HC RX REV CODE- 250/636: Performed by: INTERNAL MEDICINE

## 2017-05-19 PROCEDURE — 74011250637 HC RX REV CODE- 250/637: Performed by: HOSPITALIST

## 2017-05-19 PROCEDURE — 94640 AIRWAY INHALATION TREATMENT: CPT

## 2017-05-19 PROCEDURE — 74011250636 HC RX REV CODE- 250/636: Performed by: EMERGENCY MEDICINE

## 2017-05-19 PROCEDURE — 97162 PT EVAL MOD COMPLEX 30 MIN: CPT

## 2017-05-19 PROCEDURE — 83735 ASSAY OF MAGNESIUM: CPT | Performed by: INTERNAL MEDICINE

## 2017-05-19 PROCEDURE — 85027 COMPLETE CBC AUTOMATED: CPT | Performed by: INTERNAL MEDICINE

## 2017-05-19 PROCEDURE — 74011000250 HC RX REV CODE- 250: Performed by: INTERNAL MEDICINE

## 2017-05-19 PROCEDURE — 36415 COLL VENOUS BLD VENIPUNCTURE: CPT | Performed by: INTERNAL MEDICINE

## 2017-05-19 RX ORDER — POTASSIUM CHLORIDE 20 MEQ/1
40 TABLET, EXTENDED RELEASE ORAL
Status: COMPLETED | OUTPATIENT
Start: 2017-05-19 | End: 2017-05-19

## 2017-05-19 RX ADMIN — AZTREONAM 2 G: 2 INJECTION, POWDER, LYOPHILIZED, FOR SOLUTION INTRAMUSCULAR; INTRAVENOUS at 15:47

## 2017-05-19 RX ADMIN — SODIUM CHLORIDE 500 MG: 900 INJECTION, SOLUTION INTRAVENOUS at 13:47

## 2017-05-19 RX ADMIN — PREGABALIN 100 MG: 100 CAPSULE ORAL at 08:52

## 2017-05-19 RX ADMIN — GUAIFENESIN 600 MG: 600 TABLET, EXTENDED RELEASE ORAL at 08:54

## 2017-05-19 RX ADMIN — ALPRAZOLAM 1 MG: 0.5 TABLET ORAL at 06:54

## 2017-05-19 RX ADMIN — HEPARIN SODIUM 5000 UNITS: 5000 INJECTION, SOLUTION INTRAVENOUS; SUBCUTANEOUS at 08:54

## 2017-05-19 RX ADMIN — HEPARIN SODIUM 5000 UNITS: 5000 INJECTION, SOLUTION INTRAVENOUS; SUBCUTANEOUS at 17:51

## 2017-05-19 RX ADMIN — ALPRAZOLAM 1 MG: 0.5 TABLET ORAL at 01:54

## 2017-05-19 RX ADMIN — ZINC OXIDE: 200 OINTMENT TOPICAL at 08:56

## 2017-05-19 RX ADMIN — DRONABINOL 2.5 MG: 2.5 CAPSULE ORAL at 17:51

## 2017-05-19 RX ADMIN — GUAIFENESIN 600 MG: 600 TABLET, EXTENDED RELEASE ORAL at 22:30

## 2017-05-19 RX ADMIN — LISINOPRIL 20 MG: 20 TABLET ORAL at 08:54

## 2017-05-19 RX ADMIN — MULTIPLE VITAMINS W/ MINERALS TAB 1 TABLET: TAB at 08:52

## 2017-05-19 RX ADMIN — UMECLIDINIUM 1 PUFF: 62.5 AEROSOL, POWDER ORAL at 08:55

## 2017-05-19 RX ADMIN — DULOXETINE 60 MG: 60 CAPSULE, DELAYED RELEASE ORAL at 08:52

## 2017-05-19 RX ADMIN — FUROSEMIDE 40 MG: 40 TABLET ORAL at 08:54

## 2017-05-19 RX ADMIN — METHYLPREDNISOLONE SODIUM SUCCINATE 40 MG: 40 INJECTION, POWDER, FOR SOLUTION INTRAMUSCULAR; INTRAVENOUS at 17:51

## 2017-05-19 RX ADMIN — IPRATROPIUM BROMIDE AND ALBUTEROL SULFATE 3 ML: .5; 3 SOLUTION RESPIRATORY (INHALATION) at 05:38

## 2017-05-19 RX ADMIN — METHYLPREDNISOLONE SODIUM SUCCINATE 40 MG: 40 INJECTION, POWDER, FOR SOLUTION INTRAMUSCULAR; INTRAVENOUS at 08:51

## 2017-05-19 RX ADMIN — NYSTATIN: 100000 POWDER TOPICAL at 08:56

## 2017-05-19 RX ADMIN — OXYCODONE HYDROCHLORIDE AND ACETAMINOPHEN 1 TABLET: 5; 325 TABLET ORAL at 06:39

## 2017-05-19 RX ADMIN — HEPARIN SODIUM 5000 UNITS: 5000 INJECTION, SOLUTION INTRAVENOUS; SUBCUTANEOUS at 01:42

## 2017-05-19 RX ADMIN — OXYCODONE HYDROCHLORIDE AND ACETAMINOPHEN 1 TABLET: 5; 325 TABLET ORAL at 16:11

## 2017-05-19 RX ADMIN — FLUTICASONE FUROATE AND VILANTEROL TRIFENATATE 1 PUFF: 200; 25 POWDER RESPIRATORY (INHALATION) at 08:55

## 2017-05-19 RX ADMIN — LEVOTHYROXINE SODIUM 225 MCG: 150 TABLET ORAL at 06:40

## 2017-05-19 RX ADMIN — AZTREONAM 2 G: 2 INJECTION, POWDER, LYOPHILIZED, FOR SOLUTION INTRAMUSCULAR; INTRAVENOUS at 22:30

## 2017-05-19 RX ADMIN — ZINC OXIDE: 200 OINTMENT TOPICAL at 22:31

## 2017-05-19 RX ADMIN — METHYLPREDNISOLONE SODIUM SUCCINATE 40 MG: 40 INJECTION, POWDER, FOR SOLUTION INTRAMUSCULAR; INTRAVENOUS at 01:42

## 2017-05-19 RX ADMIN — AZTREONAM 2 G: 2 INJECTION, POWDER, LYOPHILIZED, FOR SOLUTION INTRAMUSCULAR; INTRAVENOUS at 06:40

## 2017-05-19 RX ADMIN — PREGABALIN 100 MG: 100 CAPSULE ORAL at 17:52

## 2017-05-19 RX ADMIN — DRONABINOL 2.5 MG: 2.5 CAPSULE ORAL at 11:16

## 2017-05-19 RX ADMIN — OMEPRAZOLE 20 MG: 20 CAPSULE, DELAYED RELEASE ORAL at 08:54

## 2017-05-19 RX ADMIN — MUPIROCIN: 20 OINTMENT TOPICAL at 22:30

## 2017-05-19 RX ADMIN — ASPIRIN 81 MG: 81 TABLET, COATED ORAL at 08:51

## 2017-05-19 RX ADMIN — POTASSIUM CHLORIDE 40 MEQ: 20 TABLET, EXTENDED RELEASE ORAL at 08:53

## 2017-05-19 RX ADMIN — Medication 10 ML: at 15:48

## 2017-05-19 RX ADMIN — ATENOLOL 25 MG: 25 TABLET ORAL at 08:53

## 2017-05-19 RX ADMIN — FLUTICASONE PROPIONATE 1 SPRAY: 50 SPRAY, METERED NASAL at 08:55

## 2017-05-19 RX ADMIN — INSULIN LISPRO 4 UNITS: 100 INJECTION, SOLUTION INTRAVENOUS; SUBCUTANEOUS at 22:32

## 2017-05-19 RX ADMIN — LORATADINE 10 MG: 10 TABLET ORAL at 08:52

## 2017-05-19 RX ADMIN — VANCOMYCIN HYDROCHLORIDE 1250 MG: 10 INJECTION, POWDER, LYOPHILIZED, FOR SOLUTION INTRAVENOUS at 01:45

## 2017-05-19 RX ADMIN — PREGABALIN 100 MG: 100 CAPSULE ORAL at 22:30

## 2017-05-19 RX ADMIN — MONTELUKAST SODIUM 10 MG: 10 TABLET, FILM COATED ORAL at 08:54

## 2017-05-19 RX ADMIN — INSULIN LISPRO 2 UNITS: 100 INJECTION, SOLUTION INTRAVENOUS; SUBCUTANEOUS at 06:40

## 2017-05-19 RX ADMIN — NYSTATIN: 100000 POWDER TOPICAL at 22:30

## 2017-05-19 RX ADMIN — MUPIROCIN: 20 OINTMENT TOPICAL at 08:55

## 2017-05-19 NOTE — PALLIATIVE CARE
Follow up palliative medicine visit to this patient on our service. She was asleep when I entered the room but woke easily. She denied pain and stated \"I feel tired today\". She stated she thought that she had slept well last night \"I don't know why I am so tired\". She denied any discomfort of any type. She stated her son had not come yet but \"might today or tomorrow\". She was falling asleep as she spoke to me so I left to allow her to rest. Palliative medicine team would like to speak with patient's son when he arrives so he has clear understanding of his mother's preferences for her medical care. Hopefully Mrs. Kinney is more awake to participate in discussion.

## 2017-05-19 NOTE — PROGRESS NOTES
Cm will need to be made aware of possible d/c date, pt will need insurance auth to return back to [facility], per IDR's yesterday d/c next week.

## 2017-05-19 NOTE — PROGRESS NOTES
Shift summary: Pt A&Ox4, incontinent of urine. Leg pain managed with PO Percocet. Appears to be resting comfortably. No visible signs of distress. Pt slept through the night.      Patient Vitals for the past 12 hrs:   Temp Pulse Resp BP SpO2   05/19/17 0319 98.4 °F (36.9 °C) 88 18 146/72 97 %   05/18/17 2322 98.6 °F (37 °C) 86 18 140/68 98 %   05/18/17 1949 97.2 °F (36.2 °C) 74 18 158/74 97 %

## 2017-05-19 NOTE — ROUTINE PROCESS
Assumed care of pt.   1300:PT saw patient patient refused to work with them. Patient stated she was too tired. This RN explained to patient that in order to go back to [facility] Patient needed to work with PT. Pt in bed resting no sign of distress. Call light within reach.

## 2017-05-19 NOTE — PROGRESS NOTES
Problem: Mobility Impaired (Adult and Pediatric)  Goal: *Acute Goals and Plan of Care (Insert Text)  Physical Therapy Goals  Initiated 5/19/2017 and to be accomplished within 7 day(s)  1. Patient will move from supine <> sit with Min A-CGA in prep for out of bed activity and change of position. 2. Patient will perform sit<> stand with Min A-CGA with rolling walker in prep for transfers/ambulation. 3. Patient will transfer from bed <> chair with CGA with rolling walker for time up in chair for completion of ADL activity. 4. Patient will ambulate 150 feet Min A with LRAD for increase functional mobility. Outcome: Progressing Towards Goal  PHYSICAL THERAPY EVALUATION     Patient: Purvi Noyola (89 y.o. female)  Date: 5/19/2017  Primary Diagnosis: COPD exacerbation ([facility] [address]  Acute respiratory distress        Precautions: Fall         ASSESSMENT :  Based on the objective data described below, the patient presents with deficits with bed mobility, transfers, and gait. Pt seen in bed, with nasal cannula donned, IV connected, and tele monitor. Pt required a lot of motivation during session and appeared confused at times during the evaluation but was able to follow commands and Oriented x 4. Pt  Pt was able to perform bed mobility task supine<>sit SBA. Pt was able to perform transfer activity sit<>stand Mod A x 2, VC's needed for proper hand placement w/ RW to ensure pt safety. Pt was able to take side steps to [facility] but required Max x 2 for gait training and required constant VC's and TC's for proper gait sequencing. Pt was transferred back to bed, all needs met, call bell and tray in reach. Nurse notified after session. Patient will benefit from skilled intervention to address the above impairments.   Patients rehabilitation potential is considered to be Fair  Factors which may influence rehabilitation potential include:   [ ]         None noted  [X]         Mental ability/status  [X]         Medical condition  [ ]         Home/family situation and support systems  [X]         Safety awareness  [ ]         Pain tolerance/management  [ ]         Other:        PLAN :  Recommendations and Planned Interventions:  [X]           Bed Mobility Training             [ ]    Neuromuscular Re-Education  [X]           Transfer Training                   [ ]    Orthotic/Prosthetic Training  [X]           Gait Training                          [ ]    Modalities  [X]           Therapeutic Exercises          [ ]    Edema Management/Control  [X]           Therapeutic Activities            [ ]    Patient and Family Training/Education  [X]           Other (comment): Activity Tolerance  Frequency/Duration: Patient will be followed by physical therapy daily to address goals. Discharge Recommendations: Dwaine Hart  Further Equipment Recommendations for Discharge: rolling walker       SUBJECTIVE:   Patient stated \" I can dangle on the bed, but I am not standing.       OBJECTIVE DATA SUMMARY:       Past Medical History:   Diagnosis Date    Arthritis      Asthma      CAD (coronary artery disease)       angina, last episode 06/2012    Chronic bronchitis (HCC)      Chronic kidney disease       stage 3    Chronic obstructive pulmonary disease (HCC)      Chronic pain       cervical, lumbar, knees, ankles, elbows    Fibromyalgia      GERD (gastroesophageal reflux disease)      Hypertension 1993    Psychiatric disorder       anxiety, depression    Thyroid disease       Past Surgical History:   Procedure Laterality Date    HX ADENOIDECTOMY        HX APPENDECTOMY        HX CATARACT REMOVAL         OU    HX CERVICAL FUSION         anterior x 2    HX CERVICAL FUSION         posterior x 3    HX CERVICAL FUSION   1996     Removal of titanium plate and screws    HX CHOLECYSTECTOMY        HX HYSTERECTOMY        HX LITHOTRIPSY         x 4    HX OOPHORECTOMY         left    HX ORTHOPAEDIC         cervical  x5    HX SMALL BOWEL RESECTION        HX TONSILLECTOMY        HX UROLOGICAL    and      kidney stone surgery     Barriers to Learning/Limitations: yes;  cognitive and physical  Compensate with: Verbal Cues and Tactile Cues  Prior Level of Function/Home Situation:   Home Situation  Home Environment: [address] [place] [address] Name: Raul King  One/Two Story Residence: Other (Comment)  Living Alone: No  Support Systems: Skilled nursing facility  Patient Expects to be Discharged to[de-identified] Skilled nursing facility  Current DME Used/Available at Home: Gio Apgar, rollator, Wheelchair  Critical Behavior:  Neurologic State: Alert;Confused  Orientation Level: Oriented to person;Oriented to place;Oriented to situation;Oriented to time  Cognition: Decreased attention/concentration; Follows commands; Impaired decision making;Poor safety awareness  Safety/Judgement: Decreased awareness of environment;Decreased awareness of need for safety  Strength:    Strength: Grossly decreased, non-functional  Range Of Motion:  AROM: Generally decreased, functional  Functional Mobility:  Bed Mobility:  Supine to Sit: Stand-by asssistance  Sit to Supine: Stand-by asssistance  Scooting: Stand-by asssistance  Transfers:  Sit to Stand: Moderate assistance; Additional time;Assist x2  Stand to Sit: Moderate assistance; Additional time;Assist x2  Balance:   Sitting: Intact; Without support  Standing: Impaired; With support  Standing - Static: Poor  Standing - Dynamic : Poor  Ambulation/Gait Training:  Distance (ft): 4 Feet (ft)  Assistive Device: Gait belt;Walker, rolling  Ambulation - Level of Assistance: Maximum assistance;Assist x2  Gait Description (WDL): Exceptions to WDL  Gait Abnormalities: Decreased step clearance  Base of Support: Narrowed  Speed/Rozina: Slow  Step Length: Left shortened;Right shortened  Swing Pattern: Left asymmetrical;Right asymmetrical  Pain:  Pain Scale 1: Numeric (0 - 10)  Pain Intensity 1: 8  Pain Location 1: Knee  Pain Orientation 1: Anterior  Pain Description 1: Aching  Pain Intervention(s) 1: Medication (see MAR)  Activity Tolerance:   Fair to poor  Please refer to the flowsheet for vital signs taken during this treatment. After treatment:   [ ]         Patient left in no apparent distress sitting up in chair  [X]         Patient left in no apparent distress in bed  [X]         Call bell left within reach  [X]         Nursing notified  [ ]         Caregiver present  [X]         Bed alarm activated      COMMUNICATION/EDUCATION:   [ ]         Fall prevention education was provided and the patient/caregiver indicated understanding. [ ]         Patient/family have participated as able in goal setting and plan of care. [ ]         Patient/family agree to work toward stated goals and plan of care. [X]         Patient understands intent and goals of therapy, but is neutral about his/her participation. [ ]         Patient is unable to participate in goal setting and plan of care.      Thank you for this referral.  Gita Bonner, PT   Time Calculation: 17 mins

## 2017-05-19 NOTE — ROUTINE PROCESS
Bedside shift change report given to Malu Sweeney RN (oncoming nurse) by Francisco J Hernandez RN (offgoing nurse). Report included the following information SBAR, Kardex and MAR.

## 2017-05-19 NOTE — PROGRESS NOTES
NUTRITION update    ASSESSMENT/PLAN:     Current Diet: cardiac diet, fluid restriction 1500ml, snacks magic cup TID    Chart reviewed. Palliative care following patient, patient wishes for goals of comfort per MD note. Will continue to follow. But no aggressive nutrition interventions warranted at this time.       Briseyda Yates RD  Pager:  [phone]

## 2017-05-19 NOTE — CONSULTS
[address] [place] [address] Rd    Name:  Bre Corey  MR#:  [id number]  :  1945  Account #:  [de-identified]  Date of Adm:  2017  Date of Consultation:  2017      REASON FOR CONSULTATION: A 64-year-old female referred by Dr. Negrita Emery for further evaluation and management of MRSA bacteremia. RECOMMENDATIONS  1. Methicillin-resistant Staphylococcus aureus bacteremia: This patient  was admitted with 1 week progressive complaints of cough with  sputum production. She noted blood streaked sputum. CT imaging  demonstrated multifocal parenchymal infiltrates. Blood culture  demonstrated MRSA. Sputum demonstrated same. Whether this  syndrome represents manifestation of right-sided endocarditis with  septic pulmonary emboli is unclear. For transthoracic echocardiogram.  Repeat blood cultures obtained today. Would continue vancomycin. Given primary pulmonary complaints on admission, azithromycin was  added, which seems reasonable. Assuming followup blood cultures are  sterile, would continue parenteral vancomycin for anticipated 4-6 week  course of antibiotics. There is no other obvious source for the MRSA  bacteremia. No indwelling central venous catheter devices prior to  admission. 2. PENICILLIN ALLERGY: The patient reports a true ALLERGY WITH  FLUOROQUINOLONES consisting of hives. She reports  gastrointestinal upset with PENICILLIN. As such, I do not believe this  represents an absolute contraindication to either cephalosporins or  carbapenems, though at present, will avoid. Thank you for allowing me to participate in the care of your the patient. Will continue to follow with you. HISTORY OF PRESENT ILLNESS: The patient is a 64-year-old  female nursing home resident with chronic obstructive pulmonary  disease, coronary artery disease, hypertension, and reported chronic  anxiety.  The patient is a nursing home resident and presented to [facility] on [facility] 14, [facility], with several days of  progressive cough and gross hemoptysis. CT imaging was performed  for evidence of pulmonary embolus, though multifocal parenchymal  infiltrates were seen with some vascular congestion. Blood culture  demonstrated MRSA. The patient was started on broad-spectrum  antimicrobial therapy, narrowed to vancomycin and azithromycin. Followup blood cultures were drawn today. Since admission, the patient states she feels no better. She has  continued complaints of cough and some blood-tinged sputum. She  denies complaints of headache, diplopia, facial pain, mouth pain, neck  pain. She complains of chest pain related to coughing. There are no  complaints of palpitations, abdominal pain, nausea, vomiting, diarrhea. No skin rashes. She denies chronic fevers, chills or sweats. A full 12-  point review of systems was performed and unless otherwise  specifically stated above, was negative. PAST MEDICAL HISTORY: Hypertension, coronary artery disease,  chronic obstructive pulmonary disease. The patient has some  component of renal insufficiency. No known history of diabetes. ALLERGIES  1. LEVOFLOXACIN, CAUSING HIVES. 2. PENICILLIN CAUSING GASTROINTESTINAL UPSET. SOCIAL HISTORY: Nursing home resident. No history of tobacco  abuse. Denies alcohol abuse. Has a son locally, though is not in  attendance. FAMILY HISTORY: No reported MRSA. No tuberculosis. REVIEW OF SYSTEMS: As above. PHYSICAL EXAMINATION  GENERAL: Awake, alert on x2, in no acute distress, somewhat  uncomfortable appearing. VITAL SIGNS: Temperature 98.1, blood pressure 153/76, heart rate  78. HEENT: Normocephalic, atraumatic. No icterus. Poor dentition. No  thrush or ulcers seen. NECK: Supple, No JVD or adenopathy. CHEST: Scattered bronchial breath sounds with diffuse external  wheezes. No dullness to percussion. CARDIOVASCULAR: Normal S1, S2, grade 2/6 murmur left sternal  border. ABDOMEN: Obese, soft, nontender.  No rebound or guarding, no  hepatosplenomegaly, no masses. BACK: Normal contour. No CVA tenderness. GENITOURINARY: No inguinal adenopathy. No lesions. EXTREMITIES: Distal edema. No rash or petechiae. The patient has  false nails, and I am unable to determine whether splinter  hemorrhages are present. There are no Osler nodes or Janeway  lesions seen. LABORATORY DATA: Chest x-ray independently reviewed. White  blood cells 22,000, hemoglobin 10.8, platelet count 211,668. Urinalysis  is negative. BUN 40, creatinine 1.0. Liver enzymes on arrival were  normal. Followup blood cultures done today are sterile to date. CT  imaging reviewed. Chest x-ray independently reviewed.         MD Dangelo Santos / E[place]  D:  05/19/2017   16:48  T:  05/19/2017   17:38  Job #:  [id number]    Dr. Yessenia Watters

## 2017-05-19 NOTE — PROGRESS NOTES
Occupational Therapy Evaluation/Treatment Attempt    Chart reviewed. Attempted Occupational Therapy Evaluation/Treatment, however, patient unable to be seen due to:  []  Nausea/vomiting  []  Eating  []  Pain  [x]  Patient too lethargic at this time to actively participate  [x]  Off Unit for testing/procedure (ECHO 1319)  []  Dialysis treatment in progress   []  Telemetry Results  [x]  Other: Pt had previously declined participation in a.m. / PT    Will f/u later as schedule allows.    Thank you for this referral.  Veena Tineo

## 2017-05-19 NOTE — PROGRESS NOTES
PT    PT orders received and chart reviewed. Attempted PT evaluation at this time, pt refused PT for today stating \"I am just so tired, I can't do this today, I can try tomorrow. \"    Fede Morelos PT, DPT

## 2017-05-19 NOTE — ROUTINE PROCESS
Bedside and Verbal shift change report given to RIC Bianchi (oncoming nurse) by JESSIE Eddy RN (offgoing nurse). Report included the following information SBAR, Kardex, Intake/Output and MAR.

## 2017-05-19 NOTE — PROGRESS NOTES
PT    Attempted PT evaluation for second time today. Pt very drowsy in bed and unable to stay awake enough to participate with PT.       Ernestine Vaughn PT, DPT

## 2017-05-19 NOTE — PROGRESS NOTES
Hospitalist Progress Note-critical care note     Patient: Tomeka Ray MRN: [id number]  CSN: [id number]    YOB: 1945  Age: 80 y.o. Sex: female    DOA: 5/13/2017 LOS:  LOS: 5 days            Chief complaint: respiratroy distress with hypoxia, copd exacerbation, acute chf. bacteremia     Assessment/Plan         Patient Active Problem List   Diagnosis Code    Cataract H26.9    DDD (degenerative disc disease), cervical M50.30    H/O cervical spine surgery Z98.890    COPD (chronic obstructive pulmonary disease) ([facility] [address] J44.9    Acidosis E87.2    COPD exacerbation ([facility] [address] J44.1    Acute on chronic respiratory failure (HCC) J96.20    Obesity E66.9    Benign hypertension I10    GERD (gastroesophageal reflux disease) K21.9    Acute diastolic CHF (congestive heart failure) ([facility]) I50.31    Acute encephalopathy G93.40    Toxic metabolic encephalopathy M67    Acute renal failure (ARF) ([facility]) N17.9    Hyperkalemia E87.5    PNA (pneumonia) J18.9    Chest pain R07.9    Anxiety F41.9    Sinus tachycardia R00.0    Acute respiratory distress R06.00    Hypoxia R09.02    HCAP (healthcare-associated pneumonia) J18.9    Hypokalemia E87.6     1. Acute Respiratory Distress with Hypoxia;  Continue weaning off nc O2, on 4 L now   -ct chest: consolidation/edema/Multifocal groundglass opacities/consolidative alveolar opacities     2. Bacteremia- MRSA   Will repeat bx today, ID consult   TTE for possible endocarditis     3. . Acute Moderate Exacerbation of Mod Persistent COPD; On breathing tx and iv steroid, abx   Appreciated pulm input. 3. HCAP -MRSA   On vanc and azectam and azithromycin     4. Mild Acute Diastolic CHF exacerbation stage III  On diuretics,  Ef wnl   6. Hypokalemia   K replacement   7. Atypical Chest Pain; likely non cardiac   No chest pain now. Trop wnl.     8. GERD  ppi   9. HTN  Well controlled,  10. Obesity BMI >35  11. Cervical DDD  Continue home pain management   12.  Cataract 13. Nasal MRSA  Received the treatment. contact isolation   14 anxiety  Continue low dose  klonipin due to sleepiness     Subjective: cough with blood    Nurse: no acute issue         Review of systems:    General: No fevers or chills. Cardiovascular: No chest pain or pressure. No palpitations. Pulmonary: shortness of breath and cough better   Gastrointestinal: No nausea, vomiting. Vital signs/Intake and Output:  Visit Vitals    /63 (BP 1 Location: Right arm, BP Patient Position: At rest)    Pulse 91    Temp 98.5 °F (36.9 °C)    Resp 17    Ht 5' (1.524 m)    Wt 99 kg (218 lb 4.1 oz)    SpO2 93%    BMI 42.63 kg/m2     Current Shift:     Last three shifts:  05/17 1901 - 05/19 0700  In: 720 [P.O.:720]  Out: 2100 [Urine:2100]    Physical Exam:  General: WD, WN. Alert, cooperative, no acute distress    HEENT: NC, Atraumatic. PERRLA, anicteric sclerae. Lungs: Coarse BS and rhonchi   Heart:  Regular  rhythm,  No murmur, No Rubs, No Gallops  Abdomen: Soft, Non distended, Non tender.  +Bowel sounds,   Extremities: No c/c/e  Psych:   no Anxious,no  agitated. Neurologic:  No acute neurological deficit. Labs: Results:       Chemistry Recent Labs      05/19/17 0207 05/18/17 0145 05/17/17   0445   GLU  144*  161*  173*   NA  142  143  142   K  3.2*  3.6  3.9   CL  109*  106  105   CO2  27  32  32   BUN  40*  46*  49*   CREA  1.04  1.08  1.28   CA  8.5  8.3*  8.4*   AGAP  6  5  5   BUCR  38*  43*  38*      CBC w/Diff Recent Labs      05/19/17 0207 05/18/17   0145  05/17/17   0445   WBC  21.6*  16.2*  17.7*   RBC  3.45*  3.26*  3.30*   HGB  10.8*  10.1*  10.1*   HCT  31.7*  30.0*  30.5*   PLT  390  333  370      Cardiac Enzymes No results for input(s): CPK, CKND1, ADILSON in the last 72 hours. No lab exists for component: CKRMB, TROIP   Coagulation No results for input(s): PTP, INR, APTT in the last 72 hours.     No lab exists for component: INREXT, INREXT    Lipid Panel No results found for: CHOL, CHOLPOCT, CHOLX, CHLST, CHOLV, [id number], HDL, LDL, NLDLCT, DLDL, LDLC, DLDLP, 123691, VLDLC, VLDL, TGL, TGLX, TRIGL, [id number], TRIGP, TGLPOCT, [id number], CHHD, CHHDX   BNP No results for input(s): BNPP in the last 72 hours. Liver Enzymes No results for input(s): TP, ALB, TBIL, AP, SGOT, GPT in the last 72 hours.     No lab exists for component: DBIL   Thyroid Studies Lab Results   Component Value Date/Time    TSH 0.04 11/26/2016 05:39 AM        Procedures/imaging: see electronic medical records for all procedures/Xrays and details which were not copied into this note but were reviewed prior to creation of Stacia Arreola MD

## 2017-05-19 NOTE — PROGRESS NOTES
Assumed care of pt. Pt is alert no sign of distress. 1050: pt refused to work with PT. Pt stated they will try again. 1625: Pt agreed to work with PT. Pt worked with PT. Pt asked for anxiety medication. Pt had been sleeping appears calm. Pt has been sleep most of the day. Tried to get patient to more than just sleep patient refused. This RN did not give anxiety medication since pt appeared calm.

## 2017-05-20 LAB
ANION GAP BLD CALC-SCNC: 5 MMOL/L (ref 3–18)
BUN SERPL-MCNC: 33 MG/DL (ref 7–18)
BUN/CREAT SERPL: 38 (ref 12–20)
CALCIUM SERPL-MCNC: 8.4 MG/DL (ref 8.5–10.1)
CHLORIDE SERPL-SCNC: 107 MMOL/L (ref 100–108)
CO2 SERPL-SCNC: 29 MMOL/L (ref 21–32)
CREAT SERPL-MCNC: 0.87 MG/DL (ref 0.6–1.3)
ERYTHROCYTE [DISTWIDTH] IN BLOOD BY AUTOMATED COUNT: 12.7 % (ref 11.6–14.5)
GLUCOSE BLD STRIP.AUTO-MCNC: 136 MG/DL (ref 70–110)
GLUCOSE BLD STRIP.AUTO-MCNC: 170 MG/DL (ref 70–110)
GLUCOSE BLD STRIP.AUTO-MCNC: 171 MG/DL (ref 70–110)
GLUCOSE BLD STRIP.AUTO-MCNC: 251 MG/DL (ref 70–110)
GLUCOSE SERPL-MCNC: 138 MG/DL (ref 74–99)
HCT VFR BLD AUTO: 30.3 % (ref 35–45)
HGB BLD-MCNC: 10.3 G/DL (ref 12–16)
MAGNESIUM SERPL-MCNC: 2.3 MG/DL (ref 1.6–2.6)
MCH RBC QN AUTO: 30.7 PG (ref 24–34)
MCHC RBC AUTO-ENTMCNC: 34 G/DL (ref 31–37)
MCV RBC AUTO: 90.2 FL (ref 74–97)
PLATELET # BLD AUTO: 343 K/UL (ref 135–420)
PMV BLD AUTO: 10.1 FL (ref 9.2–11.8)
POTASSIUM SERPL-SCNC: 3.7 MMOL/L (ref 3.5–5.5)
RBC # BLD AUTO: 3.36 M/UL (ref 4.2–5.3)
SODIUM SERPL-SCNC: 141 MMOL/L (ref 136–145)
WBC # BLD AUTO: 14.1 K/UL (ref 4.6–13.2)

## 2017-05-20 PROCEDURE — 80048 BASIC METABOLIC PNL TOTAL CA: CPT | Performed by: INTERNAL MEDICINE

## 2017-05-20 PROCEDURE — 74011636637 HC RX REV CODE- 636/637: Performed by: INTERNAL MEDICINE

## 2017-05-20 PROCEDURE — 74011250636 HC RX REV CODE- 250/636: Performed by: EMERGENCY MEDICINE

## 2017-05-20 PROCEDURE — 83735 ASSAY OF MAGNESIUM: CPT | Performed by: INTERNAL MEDICINE

## 2017-05-20 PROCEDURE — 82962 GLUCOSE BLOOD TEST: CPT

## 2017-05-20 PROCEDURE — 85027 COMPLETE CBC AUTOMATED: CPT | Performed by: INTERNAL MEDICINE

## 2017-05-20 PROCEDURE — 77010033678 HC OXYGEN DAILY

## 2017-05-20 PROCEDURE — 74011250636 HC RX REV CODE- 250/636: Performed by: INTERNAL MEDICINE

## 2017-05-20 PROCEDURE — 74011250637 HC RX REV CODE- 250/637: Performed by: INTERNAL MEDICINE

## 2017-05-20 PROCEDURE — 74011000258 HC RX REV CODE- 258: Performed by: EMERGENCY MEDICINE

## 2017-05-20 PROCEDURE — 36415 COLL VENOUS BLD VENIPUNCTURE: CPT | Performed by: INTERNAL MEDICINE

## 2017-05-20 PROCEDURE — 65660000000 HC RM CCU STEPDOWN

## 2017-05-20 PROCEDURE — 74011000250 HC RX REV CODE- 250: Performed by: EMERGENCY MEDICINE

## 2017-05-20 RX ADMIN — Medication 10 ML: at 21:54

## 2017-05-20 RX ADMIN — PREGABALIN 100 MG: 100 CAPSULE ORAL at 08:55

## 2017-05-20 RX ADMIN — MULTIPLE VITAMINS W/ MINERALS TAB 1 TABLET: TAB at 08:54

## 2017-05-20 RX ADMIN — METHYLPREDNISOLONE SODIUM SUCCINATE 40 MG: 40 INJECTION, POWDER, FOR SOLUTION INTRAMUSCULAR; INTRAVENOUS at 01:43

## 2017-05-20 RX ADMIN — HEPARIN SODIUM 5000 UNITS: 5000 INJECTION, SOLUTION INTRAVENOUS; SUBCUTANEOUS at 16:28

## 2017-05-20 RX ADMIN — LEVOTHYROXINE SODIUM 225 MCG: 150 TABLET ORAL at 07:53

## 2017-05-20 RX ADMIN — HEPARIN SODIUM 5000 UNITS: 5000 INJECTION, SOLUTION INTRAVENOUS; SUBCUTANEOUS at 01:43

## 2017-05-20 RX ADMIN — GUAIFENESIN 600 MG: 600 TABLET, EXTENDED RELEASE ORAL at 21:53

## 2017-05-20 RX ADMIN — Medication 10 ML: at 01:51

## 2017-05-20 RX ADMIN — NYSTATIN: 100000 POWDER TOPICAL at 21:53

## 2017-05-20 RX ADMIN — OXYCODONE HYDROCHLORIDE AND ACETAMINOPHEN 1 TABLET: 5; 325 TABLET ORAL at 22:29

## 2017-05-20 RX ADMIN — MONTELUKAST SODIUM 10 MG: 10 TABLET, FILM COATED ORAL at 08:54

## 2017-05-20 RX ADMIN — LISINOPRIL 20 MG: 20 TABLET ORAL at 08:54

## 2017-05-20 RX ADMIN — DULOXETINE 60 MG: 60 CAPSULE, DELAYED RELEASE ORAL at 08:54

## 2017-05-20 RX ADMIN — DRONABINOL 2.5 MG: 2.5 CAPSULE ORAL at 11:14

## 2017-05-20 RX ADMIN — FLUTICASONE FUROATE AND VILANTEROL TRIFENATATE 1 PUFF: 200; 25 POWDER RESPIRATORY (INHALATION) at 08:57

## 2017-05-20 RX ADMIN — METHYLPREDNISOLONE SODIUM SUCCINATE 40 MG: 40 INJECTION, POWDER, FOR SOLUTION INTRAMUSCULAR; INTRAVENOUS at 08:55

## 2017-05-20 RX ADMIN — PREGABALIN 100 MG: 100 CAPSULE ORAL at 21:53

## 2017-05-20 RX ADMIN — OXYCODONE HYDROCHLORIDE AND ACETAMINOPHEN 1 TABLET: 5; 325 TABLET ORAL at 16:28

## 2017-05-20 RX ADMIN — HEPARIN SODIUM 5000 UNITS: 5000 INJECTION, SOLUTION INTRAVENOUS; SUBCUTANEOUS at 08:55

## 2017-05-20 RX ADMIN — PREGABALIN 100 MG: 100 CAPSULE ORAL at 16:28

## 2017-05-20 RX ADMIN — INSULIN LISPRO 2 UNITS: 100 INJECTION, SOLUTION INTRAVENOUS; SUBCUTANEOUS at 07:54

## 2017-05-20 RX ADMIN — FLUTICASONE PROPIONATE 1 SPRAY: 50 SPRAY, METERED NASAL at 08:57

## 2017-05-20 RX ADMIN — LORATADINE 10 MG: 10 TABLET ORAL at 08:54

## 2017-05-20 RX ADMIN — AZTREONAM 2 G: 2 INJECTION, POWDER, LYOPHILIZED, FOR SOLUTION INTRAMUSCULAR; INTRAVENOUS at 07:55

## 2017-05-20 RX ADMIN — DRONABINOL 2.5 MG: 2.5 CAPSULE ORAL at 17:07

## 2017-05-20 RX ADMIN — Medication 10 ML: at 07:55

## 2017-05-20 RX ADMIN — MUPIROCIN: 20 OINTMENT TOPICAL at 21:53

## 2017-05-20 RX ADMIN — NYSTATIN: 100000 POWDER TOPICAL at 08:56

## 2017-05-20 RX ADMIN — INSULIN LISPRO 6 UNITS: 100 INJECTION, SOLUTION INTRAVENOUS; SUBCUTANEOUS at 22:00

## 2017-05-20 RX ADMIN — MUPIROCIN: 20 OINTMENT TOPICAL at 08:56

## 2017-05-20 RX ADMIN — FUROSEMIDE 40 MG: 40 TABLET ORAL at 08:54

## 2017-05-20 RX ADMIN — VANCOMYCIN HYDROCHLORIDE 1250 MG: 10 INJECTION, POWDER, LYOPHILIZED, FOR SOLUTION INTRAVENOUS at 01:31

## 2017-05-20 RX ADMIN — GUAIFENESIN 600 MG: 600 TABLET, EXTENDED RELEASE ORAL at 08:54

## 2017-05-20 RX ADMIN — METHYLPREDNISOLONE SODIUM SUCCINATE 40 MG: 40 INJECTION, POWDER, FOR SOLUTION INTRAMUSCULAR; INTRAVENOUS at 16:28

## 2017-05-20 RX ADMIN — UMECLIDINIUM 1 PUFF: 62.5 AEROSOL, POWDER ORAL at 08:57

## 2017-05-20 RX ADMIN — ASPIRIN 81 MG: 81 TABLET, COATED ORAL at 08:55

## 2017-05-20 RX ADMIN — ATENOLOL 25 MG: 25 TABLET ORAL at 08:54

## 2017-05-20 RX ADMIN — OMEPRAZOLE 20 MG: 20 CAPSULE, DELAYED RELEASE ORAL at 08:54

## 2017-05-20 RX ADMIN — OXYCODONE HYDROCHLORIDE AND ACETAMINOPHEN 1 TABLET: 5; 325 TABLET ORAL at 08:55

## 2017-05-20 RX ADMIN — INSULIN LISPRO 2 UNITS: 100 INJECTION, SOLUTION INTRAVENOUS; SUBCUTANEOUS at 11:17

## 2017-05-20 RX ADMIN — Medication 10 ML: at 16:31

## 2017-05-20 NOTE — PROGRESS NOTES
ID Progress Note    Antibiotics:  Vancomycin  Aztreonam  Azithromycin    CC: bacteremia    S: Ms. Courtney Nolan is a 76y/o WF seen in f/u for MRSA bacteremia and PNA. Patient was admitted with 1 week h/o progressive cough with blood streaked sputum. CT with multifocal parenchymal infiltrates. Blood and sputum cultures positive for MRSA. Patient states she is feeling poorly today. She complains of fatigue as well as persistent cough. Also with bloody nasal discharge. She denies any fevers or chills. Denies any n/v/d. No skin rashes/lesions. Physical Exam:  VS reviewed, Tm=98.5  Gen: WD, obese WF, AAOX3, NAD  HEENT: no icterus, no thrush  Neck: supple  CV: RRR  Pulm: diffuse rhonchi  Abd: soft, NTND, +bs  Ext: tr edema, scattered ecchymoses    Labs/Rad reviewed  WBC=21.6-->14.1, Hb=10.3, Lew=990  Cr=0.87    5/18: vanc trough=15.7    5/13; blood: MRSA  5/14; sputum: MRSA  5/19: blood x 2: NGTD    CT chest with consolidative alveolar opacities in RUL, RLL, and periphery of LLL  TTE: no evidence of vegetation - pulmonic valve not well visualized    Assessment/Recommendations:  1. MRSA bacteremia secondary to MRSA PNA in a 70y/o WF  -patient with 1/1 cultures positive for MRSA on admission, sputum also positive  -repeat blood cultures from 5/19 NGTD  -TTE without evidence of vegetations  -currently on vancomycin, aztreonam, and azithromycin  ==>will continue vancomycin  ==>d/c aztreonam and azithromycin  ==>f/u on repeat blood cultures  ==>with persistent bacteremia would consider WILFREDO    2. Allergy to PCN and fluoroquinolones  -GI upset with PCN, hives with FQs    3.  Multiple medical problems  -include obesity, GERD, COPD, CHF

## 2017-05-20 NOTE — PROGRESS NOTES
Problem: Mobility Impaired (Adult and Pediatric)  Goal: *Acute Goals and Plan of Care (Insert Text)  Physical Therapy Goals  Initiated 5/19/2017 and to be accomplished within 7 day(s)  1. Patient will move from supine <> sit with Min A-CGA in prep for out of bed activity and change of position. 2. Patient will perform sit<> stand with Min A-CGA with rolling walker in prep for transfers/ambulation. 3. Patient will transfer from bed <> chair with CGA with rolling walker for time up in chair for completion of ADL activity. 4. Patient will ambulate 150 feet Min A with LRAD for increase functional mobility. Second attempt at PT treatment. Patient adamantly declined PT today. Patient refuses despite encouragement. Will follow up tomorrow for PT treatment should the patient agree to participate.      Tommy Nieves PT

## 2017-05-20 NOTE — PROGRESS NOTES
Hospitalist Progress Note-critical care note     Patient: Barbara De La Cruz MRN: [id number]  CSN: [id number]    YOB: 1945  Age: 80 y.o. Sex: female    DOA: 5/13/2017 LOS:  LOS: 6 days            Chief complaint: respiratroy distress with hypoxia, copd exacerbation, acute chf. bacteremia     Assessment/Plan         Patient Active Problem List   Diagnosis Code    Cataract H26.9    DDD (degenerative disc disease), cervical M50.30    H/O cervical spine surgery Z98.890    COPD (chronic obstructive pulmonary disease) ([facility] J44.9    Acidosis E87.2    COPD exacerbation ([address] J44.1    Acute on chronic respiratory failure ([facility]) J96.20    Obesity E66.9    Benign hypertension I10    GERD (gastroesophageal reflux disease) K21.9    Acute diastolic CHF (congestive heart failure) ([facility]) I50.31    Acute encephalopathy G93.40    Toxic metabolic encephalopathy Q95    Acute renal failure (ARF) ([facility]) N17.9    Hyperkalemia E87.5    PNA (pneumonia) J18.9    Chest pain R07.9    Anxiety F41.9    Sinus tachycardia R00.0    Acute respiratory distress R06.00    Hypoxia R09.02    HCAP (healthcare-associated pneumonia) J18.9    Hypokalemia E87.6    Bacteremia due to methicillin resistant Staphylococcus aureus R78.81     1. Acute Respiratory Distress with Hypoxia; Stable, Continue weaning off nc O2, on 4 L now   -ct chest: consolidation/edema/Multifocal groundglass opacities/consolidative alveolar opacities     2. Bacteremia- MRSA   Appreciated id input   Continue vanc  -TTE done and no vegetation or thrombus noted. 3. . Acute Moderate Exacerbation of Mod Persistent COPD; On breathing tx and iv steroid, abx   Appreciated pulm input. 3. HCAP -MRSA   On vanc now     4. Mild Acute Diastolic CHF exacerbation stage III  On diuretics,  Ef wnl   6. Hypokalemia   K replacement   7. Atypical Chest Pain; likely non cardiac   No chest pain now. Trop wnl.     8. GERD  ppi   9. HTN  Well controlled,  10. Obesity BMI >35  11. Cervical DDD  Continue home pain management   12. Cataract   13. Nasal MRSA  14 anxiety  xanax prn     Subjective: I will stay her for couple of days, rt?, nose bled when blow it   Nurse: no acute issue   Recommend pt do not blow nose over 24 hr-indicated a verbal understanding, no active bleeding now. Review of systems:    General: No fevers or chills. Cardiovascular: No chest pain or pressure. No palpitations. Pulmonary: shortness of breath and cough better   Gastrointestinal: No nausea, vomiting. Vital signs/Intake and Output:  Visit Vitals    /78    Pulse 65    Temp 97.7 °F (36.5 °C)    Resp 16    Ht 5' (1.524 m)    Wt 97.8 kg (215 lb 9.8 oz)    SpO2 96%    BMI 42.11 kg/m2     Current Shift:     Last three shifts:  05/18 1901 - 05/20 0700  In: 1200 [P.O.:1200]  Out: -     Physical Exam:  General: WD, WN. Alert, cooperative, no acute distress    HEENT: NC, Atraumatic. PERRLA, anicteric sclerae. Nose: no bleeding noted   Lungs: Coarse BS and rhonchi   Heart:  Regular  rhythm,  No murmur, No Rubs, No Gallops  Abdomen: Soft, Non distended, Non tender.  +Bowel sounds,   Extremities: No c/c/e  Psych:   no Anxious,no  agitated. Neurologic:  No acute neurological deficit. Labs: Results:       Chemistry Recent Labs      05/20/17 0254 05/19/17 0207 05/18/17 0145   GLU  138*  144*  161*   NA  141  142  143   K  3.7  3.2*  3.6   CL  107  109*  106   CO2  29  27  32   BUN  33*  40*  46*   CREA  0.87  1.04  1.08   CA  8.4*  8.5  8.3*   AGAP  5  6  5   BUCR  38*  38*  43*      CBC w/Diff Recent Labs      05/20/17 0254 05/19/17 0207 05/18/17   0145   WBC  14.1*  21.6*  16.2*   RBC  3.36*  3.45*  3.26*   HGB  10.3*  10.8*  10.1*   HCT  30.3*  31.7*  30.0*   PLT  343  390  333      Cardiac Enzymes No results for input(s): CPK, CKND1, ADILSON in the last 72 hours.     No lab exists for component: CKRMB, TROIP   Coagulation No results for input(s): PTP, INR, APTT in the last 72 hours. No lab exists for component: INREXT, INREXT    Lipid Panel No results found for: CHOL, CHOLPOCT, CHOLX, CHLST, CHOLV, [id number], HDL, LDL, NLDLCT, DLDL, LDLC, DLDLP, [id number], VLDLC, VLDL, TGL, TGLX, TRIGL, [id number], TRIGP, TGLPOCT, [id number], CHHD, CHHDX   BNP No results for input(s): BNPP in the last 72 hours. Liver Enzymes No results for input(s): TP, ALB, TBIL, AP, SGOT, GPT in the last 72 hours.     No lab exists for component: DBIL   Thyroid Studies Lab Results   Component Value Date/Time    TSH 0.04 11/26/2016 05:39 AM        Procedures/imaging: see electronic medical records for all procedures/Xrays and details which were not copied into this note but were reviewed prior to creation of Geeta Suazo MD

## 2017-05-20 NOTE — PROGRESS NOTES
Problem: Mobility Impaired (Adult and Pediatric)  Goal: *Acute Goals and Plan of Care (Insert Text)  Physical Therapy Goals  Initiated 5/19/2017 and to be accomplished within 7 day(s)  1. Patient will move from supine <> sit with Min A-CGA in prep for out of bed activity and change of position. 2. Patient will perform sit<> stand with Min A-CGA with rolling walker in prep for transfers/ambulation. 3. Patient will transfer from bed <> chair with CGA with rolling walker for time up in chair for completion of ADL activity. 4. Patient will ambulate 150 feet Min A with LRAD for increase functional mobility. Attempted PT treatment. Patient refused to participate at this time, appearing to refuse to wake up in order to participate. Will follow up later for PT treatment later today.      Christine Ralph PT

## 2017-05-20 NOTE — PROGRESS NOTES
6260: Bedside and Verbal shift change report given to Mari Aquino RN (oncoming nurse) by Elisa Tong RN   (offgoing nurse). Report included the following information SBAR, Kardex, Intake/Output, MAR, Recent Results and Cardiac Rhythm NSR.

## 2017-05-20 NOTE — PROGRESS NOTES
1937:Assumed care of patient alert and oriented x 4, drowsy. O2 Sat % on 4L nasal cannula. Vital signs within patients normal limits, cardiac rhythm NSR. Patient denies pain at this time. Nursing management continues. 8259: Shift summary: Patient had an uneventful shift. Left resting in bed no acute distress, bed in lowest position, call light with in reach.

## 2017-05-20 NOTE — PROGRESS NOTES
0800. In bed, c/o knee pain, medicated w/ ordered med, assessment completed at bedside. No sign of respiratory distress.   1100. Pt asleep.  1400. Resting in bed, visitor in room, no c/o pain. 1630. Called c/o pain, medicated w/ med ordered. BS =134, no coverage needed.   1800. Resting quietly in bed. 1915. Bedside and Verbal shift change report given to Ryan Mandel RN (oncoming nurse) by Pawan Smalls RN   (offgoing nurse). Report included the following information SBAR, ED Summary, Procedure Summary, MAR and Recent Results.

## 2017-05-21 PROBLEM — B37.0 ORAL THRUSH: Status: ACTIVE | Noted: 2017-05-21

## 2017-05-21 LAB
ANION GAP BLD CALC-SCNC: 7 MMOL/L (ref 3–18)
BUN SERPL-MCNC: 39 MG/DL (ref 7–18)
BUN/CREAT SERPL: 45 (ref 12–20)
CALCIUM SERPL-MCNC: 8.8 MG/DL (ref 8.5–10.1)
CHLORIDE SERPL-SCNC: 105 MMOL/L (ref 100–108)
CO2 SERPL-SCNC: 28 MMOL/L (ref 21–32)
CREAT SERPL-MCNC: 0.87 MG/DL (ref 0.6–1.3)
ERYTHROCYTE [DISTWIDTH] IN BLOOD BY AUTOMATED COUNT: 12.7 % (ref 11.6–14.5)
GLUCOSE BLD STRIP.AUTO-MCNC: 188 MG/DL (ref 70–110)
GLUCOSE BLD STRIP.AUTO-MCNC: 218 MG/DL (ref 70–110)
GLUCOSE BLD STRIP.AUTO-MCNC: 236 MG/DL (ref 70–110)
GLUCOSE BLD STRIP.AUTO-MCNC: 243 MG/DL (ref 70–110)
GLUCOSE SERPL-MCNC: 152 MG/DL (ref 74–99)
HCT VFR BLD AUTO: 32.8 % (ref 35–45)
HGB BLD-MCNC: 11.3 G/DL (ref 12–16)
MAGNESIUM SERPL-MCNC: 2.2 MG/DL (ref 1.6–2.6)
MCH RBC QN AUTO: 31 PG (ref 24–34)
MCHC RBC AUTO-ENTMCNC: 34.5 G/DL (ref 31–37)
MCV RBC AUTO: 89.9 FL (ref 74–97)
PLATELET # BLD AUTO: 326 K/UL (ref 135–420)
PMV BLD AUTO: 10.5 FL (ref 9.2–11.8)
POTASSIUM SERPL-SCNC: 3.9 MMOL/L (ref 3.5–5.5)
RBC # BLD AUTO: 3.65 M/UL (ref 4.2–5.3)
SODIUM SERPL-SCNC: 140 MMOL/L (ref 136–145)
WBC # BLD AUTO: 17.4 K/UL (ref 4.6–13.2)

## 2017-05-21 PROCEDURE — 80048 BASIC METABOLIC PNL TOTAL CA: CPT | Performed by: INTERNAL MEDICINE

## 2017-05-21 PROCEDURE — 74011250636 HC RX REV CODE- 250/636: Performed by: INTERNAL MEDICINE

## 2017-05-21 PROCEDURE — 97116 GAIT TRAINING THERAPY: CPT

## 2017-05-21 PROCEDURE — 74011250637 HC RX REV CODE- 250/637: Performed by: FAMILY MEDICINE

## 2017-05-21 PROCEDURE — 83735 ASSAY OF MAGNESIUM: CPT | Performed by: INTERNAL MEDICINE

## 2017-05-21 PROCEDURE — 74011000250 HC RX REV CODE- 250: Performed by: HOSPITALIST

## 2017-05-21 PROCEDURE — 77010033678 HC OXYGEN DAILY

## 2017-05-21 PROCEDURE — 74011250637 HC RX REV CODE- 250/637: Performed by: INTERNAL MEDICINE

## 2017-05-21 PROCEDURE — 85027 COMPLETE CBC AUTOMATED: CPT | Performed by: INTERNAL MEDICINE

## 2017-05-21 PROCEDURE — 74011250636 HC RX REV CODE- 250/636: Performed by: EMERGENCY MEDICINE

## 2017-05-21 PROCEDURE — 65660000000 HC RM CCU STEPDOWN

## 2017-05-21 PROCEDURE — 74011636637 HC RX REV CODE- 636/637: Performed by: INTERNAL MEDICINE

## 2017-05-21 PROCEDURE — 97530 THERAPEUTIC ACTIVITIES: CPT

## 2017-05-21 PROCEDURE — 94760 N-INVAS EAR/PLS OXIMETRY 1: CPT

## 2017-05-21 PROCEDURE — 36415 COLL VENOUS BLD VENIPUNCTURE: CPT | Performed by: INTERNAL MEDICINE

## 2017-05-21 PROCEDURE — 94640 AIRWAY INHALATION TREATMENT: CPT

## 2017-05-21 PROCEDURE — 82962 GLUCOSE BLOOD TEST: CPT

## 2017-05-21 PROCEDURE — 74011000250 HC RX REV CODE- 250: Performed by: INTERNAL MEDICINE

## 2017-05-21 RX ORDER — IPRATROPIUM BROMIDE AND ALBUTEROL SULFATE 2.5; .5 MG/3ML; MG/3ML
3 SOLUTION RESPIRATORY (INHALATION)
Status: DISCONTINUED | OUTPATIENT
Start: 2017-05-21 | End: 2017-05-25 | Stop reason: HOSPADM

## 2017-05-21 RX ADMIN — INSULIN LISPRO 4 UNITS: 100 INJECTION, SOLUTION INTRAVENOUS; SUBCUTANEOUS at 12:36

## 2017-05-21 RX ADMIN — FLUTICASONE PROPIONATE 1 SPRAY: 50 SPRAY, METERED NASAL at 08:34

## 2017-05-21 RX ADMIN — Medication 10 ML: at 21:21

## 2017-05-21 RX ADMIN — FLUTICASONE FUROATE AND VILANTEROL TRIFENATATE 1 PUFF: 200; 25 POWDER RESPIRATORY (INHALATION) at 08:34

## 2017-05-21 RX ADMIN — ATENOLOL 25 MG: 25 TABLET ORAL at 08:30

## 2017-05-21 RX ADMIN — OXYCODONE HYDROCHLORIDE AND ACETAMINOPHEN 1 TABLET: 5; 325 TABLET ORAL at 15:09

## 2017-05-21 RX ADMIN — OXYCODONE HYDROCHLORIDE AND ACETAMINOPHEN 1 TABLET: 5; 325 TABLET ORAL at 21:18

## 2017-05-21 RX ADMIN — ASPIRIN 81 MG: 81 TABLET, COATED ORAL at 08:30

## 2017-05-21 RX ADMIN — LISINOPRIL 20 MG: 20 TABLET ORAL at 08:30

## 2017-05-21 RX ADMIN — IPRATROPIUM BROMIDE AND ALBUTEROL SULFATE 3 ML: .5; 3 SOLUTION RESPIRATORY (INHALATION) at 19:21

## 2017-05-21 RX ADMIN — OMEPRAZOLE 20 MG: 20 CAPSULE, DELAYED RELEASE ORAL at 08:30

## 2017-05-21 RX ADMIN — METHYLPREDNISOLONE SODIUM SUCCINATE 40 MG: 40 INJECTION, POWDER, FOR SOLUTION INTRAMUSCULAR; INTRAVENOUS at 08:31

## 2017-05-21 RX ADMIN — Medication 10 ML: at 06:00

## 2017-05-21 RX ADMIN — INSULIN LISPRO 4 UNITS: 100 INJECTION, SOLUTION INTRAVENOUS; SUBCUTANEOUS at 23:12

## 2017-05-21 RX ADMIN — GUAIFENESIN 600 MG: 600 TABLET, EXTENDED RELEASE ORAL at 08:30

## 2017-05-21 RX ADMIN — NYSTATIN: 100000 POWDER TOPICAL at 08:34

## 2017-05-21 RX ADMIN — HEPARIN SODIUM 5000 UNITS: 5000 INJECTION, SOLUTION INTRAVENOUS; SUBCUTANEOUS at 17:38

## 2017-05-21 RX ADMIN — OXYCODONE HYDROCHLORIDE AND ACETAMINOPHEN 1 TABLET: 5; 325 TABLET ORAL at 06:58

## 2017-05-21 RX ADMIN — ZINC OXIDE: 200 OINTMENT TOPICAL at 08:35

## 2017-05-21 RX ADMIN — DULOXETINE 60 MG: 60 CAPSULE, DELAYED RELEASE ORAL at 08:30

## 2017-05-21 RX ADMIN — DRONABINOL 2.5 MG: 2.5 CAPSULE ORAL at 11:05

## 2017-05-21 RX ADMIN — BENZOCAINE AND MENTHOL 1 LOZENGE: 15; 3.6 LOZENGE ORAL at 17:10

## 2017-05-21 RX ADMIN — INSULIN LISPRO 4 UNITS: 100 INJECTION, SOLUTION INTRAVENOUS; SUBCUTANEOUS at 17:39

## 2017-05-21 RX ADMIN — GUAIFENESIN 600 MG: 600 TABLET, EXTENDED RELEASE ORAL at 21:18

## 2017-05-21 RX ADMIN — PREGABALIN 100 MG: 100 CAPSULE ORAL at 17:38

## 2017-05-21 RX ADMIN — MONTELUKAST SODIUM 10 MG: 10 TABLET, FILM COATED ORAL at 08:30

## 2017-05-21 RX ADMIN — MULTIPLE VITAMINS W/ MINERALS TAB 1 TABLET: TAB at 08:30

## 2017-05-21 RX ADMIN — IPRATROPIUM BROMIDE AND ALBUTEROL SULFATE 3 ML: .5; 3 SOLUTION RESPIRATORY (INHALATION) at 08:40

## 2017-05-21 RX ADMIN — VANCOMYCIN HYDROCHLORIDE 1250 MG: 10 INJECTION, POWDER, LYOPHILIZED, FOR SOLUTION INTRAVENOUS at 02:00

## 2017-05-21 RX ADMIN — FUROSEMIDE 40 MG: 40 TABLET ORAL at 08:30

## 2017-05-21 RX ADMIN — LEVOTHYROXINE SODIUM 225 MCG: 150 TABLET ORAL at 06:56

## 2017-05-21 RX ADMIN — IPRATROPIUM BROMIDE AND ALBUTEROL SULFATE 3 ML: .5; 3 SOLUTION RESPIRATORY (INHALATION) at 13:23

## 2017-05-21 RX ADMIN — METHYLPREDNISOLONE SODIUM SUCCINATE 40 MG: 40 INJECTION, POWDER, FOR SOLUTION INTRAMUSCULAR; INTRAVENOUS at 00:36

## 2017-05-21 RX ADMIN — INSULIN LISPRO 2 UNITS: 100 INJECTION, SOLUTION INTRAVENOUS; SUBCUTANEOUS at 06:56

## 2017-05-21 RX ADMIN — HEPARIN SODIUM 5000 UNITS: 5000 INJECTION, SOLUTION INTRAVENOUS; SUBCUTANEOUS at 00:36

## 2017-05-21 RX ADMIN — PREGABALIN 100 MG: 100 CAPSULE ORAL at 21:18

## 2017-05-21 RX ADMIN — DRONABINOL 2.5 MG: 2.5 CAPSULE ORAL at 17:37

## 2017-05-21 RX ADMIN — ZINC OXIDE: 200 OINTMENT TOPICAL at 21:22

## 2017-05-21 RX ADMIN — MUPIROCIN: 20 OINTMENT TOPICAL at 08:34

## 2017-05-21 RX ADMIN — HEPARIN SODIUM 5000 UNITS: 5000 INJECTION, SOLUTION INTRAVENOUS; SUBCUTANEOUS at 08:31

## 2017-05-21 RX ADMIN — UMECLIDINIUM 1 PUFF: 62.5 AEROSOL, POWDER ORAL at 08:34

## 2017-05-21 RX ADMIN — METHYLPREDNISOLONE SODIUM SUCCINATE 40 MG: 40 INJECTION, POWDER, FOR SOLUTION INTRAMUSCULAR; INTRAVENOUS at 17:38

## 2017-05-21 RX ADMIN — LORATADINE 10 MG: 10 TABLET ORAL at 08:30

## 2017-05-21 RX ADMIN — NYSTATIN: 100000 POWDER TOPICAL at 21:23

## 2017-05-21 RX ADMIN — PREGABALIN 100 MG: 100 CAPSULE ORAL at 08:30

## 2017-05-21 RX ADMIN — MUPIROCIN: 20 OINTMENT TOPICAL at 21:22

## 2017-05-21 NOTE — PROGRESS NOTES
Hospitalist Progress Note-critical care note     Patient: Herson Juárez MRN: [id number]  CSN: [id number]    YOB: 1945  Age: 80 y.o. Sex: female    DOA: 5/13/2017 LOS:  LOS: 7 days            Chief complaint: respiratroy distress with hypoxia, copd exacerbation, acute chf. bacteremia     Assessment/Plan         Patient Active Problem List   Diagnosis Code    Cataract H26.9    DDD (degenerative disc disease), cervical M50.30    H/O cervical spine surgery Z98.890    COPD (chronic obstructive pulmonary disease) ([facility] [address] J44.9    Acidosis E87.2    COPD exacerbation ([address] J44.1    Acute on chronic respiratory failure ([facility]) J96.20    Obesity E66.9    Benign hypertension I10    GERD (gastroesophageal reflux disease) K21.9    Acute diastolic CHF (congestive heart failure) ([facility]) I50.31    Acute encephalopathy G93.40    Toxic metabolic encephalopathy K47    Acute renal failure (ARF) ([facility]) N17.9    Hyperkalemia E87.5    PNA (pneumonia) J18.9    Chest pain R07.9    Anxiety F41.9    Sinus tachycardia R00.0    Acute respiratory distress R06.00    Hypoxia R09.02    HCAP (healthcare-associated pneumonia) J18.9    Hypokalemia E87.6    Bacteremia due to methicillin resistant Staphylococcus aureus R78.81     1. Acute Respiratory Distress with Hypoxia; Stable, used home O2, not sure the setting  - Continue weaning off nc O2, on 4 L now   -ct chest: consolidation/edema/Multifocal groundglass opacities/consolidative alveolar opacities     2. Bacteremia- MRSA   Appreciated id input   Continue vanc  -TTE done and no vegetation or thrombus noted. 3. . Acute Moderate Exacerbation of Mod Persistent COPD;   More wheezing today  -On breathing tx and iv steroid, abx . Will scheduled breathing tx-duo neb   Appreciated pulm input. 3. HCAP -MRSA   On vanc now     4. Mild Acute Diastolic CHF exacerbation stage III  On diuretics,  Ef wnl   6. Hypokalemia   K replacement   7.  Atypical Chest Pain; likely non cardiac   No chest pain now. Trop wnl.     8. GERD  ppi   9. HTN  Well controlled,  10. Obesity BMI >35  11. Cervical DDD  Continue home pain management   12. Cataract   13. Nasal MRSA  14 anxiety  xanax prn   15 oral thrush   Due to steroid, nystatin       Subjective: thrush in mouth and pain all over, not sleep very well last night , need more pain meds   Nurse: no acute issue          Review of systems:    General: No fevers or chills. Cardiovascular: No chest pain or pressure. No palpitations. Pulmonary: shortness of breath and cough better and wheezing   Gastrointestinal: No nausea, vomiting. Vital signs/Intake and Output:  Visit Vitals    /84 (BP 1 Location: Right arm, BP Patient Position: At rest)    Pulse 69    Temp 97.9 °F (36.6 °C)    Resp 18    Ht 5' (1.524 m)    Wt 95.3 kg (210 lb)    SpO2 99%    BMI 41.01 kg/m2     Current Shift:     Last three shifts:  05/19 1901 - 05/21 0700  In: 880 [P.O.:880]  Out: -     Physical Exam:  General: WD, WN. Alert, cooperative, no acute distress    HEENT: NC, Atraumatic. PERRLA, anicteric sclerae. Nose: oral thrush noted    Lungs: Coarse BS and rhonchi and wheezing   Heart:  Regular  rhythm,  No murmur, No Rubs, No Gallops  Abdomen: Soft, Non distended, Non tender.  +Bowel sounds,   Extremities: No c/c/e  Psych:   no Anxious,no  agitated. Neurologic:  No acute neurological deficit.              Labs: Results:       Chemistry Recent Labs      05/21/17 0213 05/20/17   0254  05/19/17   0207   GLU  152*  138*  144*   NA  140  141  142   K  3.9  3.7  3.2*   CL  105  107  109*   CO2  28  29  27   BUN  39*  33*  40*   CREA  0.87  0.87  1.04   CA  8.8  8.4*  8.5   AGAP  7  5  6   BUCR  45*  38*  38*      CBC w/Diff Recent Labs      05/21/17 0213 05/20/17   0254  05/19/17   0207   WBC  17.4*  14.1*  21.6*   RBC  3.65*  3.36*  3.45*   HGB  11.3*  10.3*  10.8*   HCT  32.8*  30.3*  31.7*   PLT  326  343  390      Cardiac Enzymes No results for input(s): CPK, CKND1, ADILSON in the last 72 hours. No lab exists for component: CKRMB, TROIP   Coagulation No results for input(s): PTP, INR, APTT in the last 72 hours. No lab exists for component: INREXT, INREXT    Lipid Panel No results found for: CHOL, CHOLPOCT, CHOLX, CHLST, CHOLV, [id number], HDL, LDL, NLDLCT, DLDL, LDLC, DLDLP, [id number], VLDLC, VLDL, TGL, TGLX, TRIGL, [id number], TRIGP, TGLPOCT, [id number], CHHD, CHHDX   BNP No results for input(s): BNPP in the last 72 hours. Liver Enzymes No results for input(s): TP, ALB, TBIL, AP, SGOT, GPT in the last 72 hours.     No lab exists for component: DBIL   Thyroid Studies Lab Results   Component Value Date/Time    TSH 0.04 11/26/2016 05:39 AM        Procedures/imaging: see electronic medical records for all procedures/Xrays and details which were not copied into this note but were reviewed prior to creation of Salty Redmond MD

## 2017-05-21 NOTE — PROGRESS NOTES
4024 - Bedside report received from Goyo Carreon RN. Patient in bed resting at this time. Pain 0/10. Plan of care for the day addressed with the patient. [place] for jasbir Donis respiratory for PRN breathing tx. Patient in bed resting. Eating breakfast at this time. IV to right forearm. Lungs course. Periodic productive cough with thick clear sputum. IS provided. Patient educated on proper usage. Tele #25. O2 4L via NC. Small slit to gluteal crease. Patient incontinent. Moisture barrier applied with angel care. Contact precautions continued. 0850 - O2 decreased to 3L via nc per RT.    1509 - Pain 7/10. PRN pain medication administered at this time. Patient has been educated on side effects. 1710 - Cepacol provided at this time for sore throat from coughing. 1955 - Bedside and Verbal shift change report given to Melissa Crane RN by Say Chaparro RN. Report included the following information SBAR, Kardex, OR Summary, Intake/Output and MAR.

## 2017-05-21 NOTE — ROUTINE PROCESS
Bedside and Verbal shift change report given to Pasquale Higginbotham RN (oncoming nurse) by Lisseth Troy RN (offgoing nurse).  Report included the following information SBAR, Kardex, Intake/Output, MAR, Recent Results and Cardiac Rhythm SR.

## 2017-05-21 NOTE — PROGRESS NOTES
Pharmacy Dosing Services: Vancomycin    Patient has been receiving Vancomycin for indication of MRSA Bacteremia  / MRSA pneumonia. Day of Therapy 8  Scr = 0.87   CrCl = 62 ml/min   WBC = 17.4        Current dose:  Vancomycin 1250 mg IV q24h  Vancomycin Trough is ordered for 5/22/17 at 01:30. Goal therapeutic trough of 15 - 20 mcg/mL. Previous Trough: 15.7 (5/18/17)    Pharmacy to follow daily and will make changes to dose and/or frequency based on clinical status.   Pharmacist Allie [facility], [facility]

## 2017-05-21 NOTE — PROGRESS NOTES
1942:  Assumed care. Pt awake, alert and oriented. Pt resting in bed with no acute signs of distress. Call bell and telephone within reach. White board updated. 2229:  Pt c/o pain 08/10 all over.   PRN percocet admin

## 2017-05-21 NOTE — PROGRESS NOTES
ID Progress Note     Antibiotics:  Vancomycin     CC: bacteremia     S: Ms. Tutu Mendiola is a 78y/o WF seen in f/u for MRSA bacteremia and PNA. Patient was admitted with 1 week h/o progressive cough with blood streaked sputum. CT with multifocal parenchymal infiltrates. Blood and sputum cultures positive for MRSA. Patient states she is feeling poorly today. She complains of fatigue as well as persistent cough. Also with bloody nasal discharge. She denies any fevers or chills. Denies any n/v/d. No skin rashes/lesions.      Physical Exam:  VS reviewed, Tm=98.6  Gen: WD, obese WF, AAOX3, NAD  HEENT: no icterus, no thrush  Neck: supple  CV: RRR  Pulm: diffuse rhonchi and wheezes  Abd: soft, NTND, +bs  Ext: tr edema, scattered ecchymoses     Labs/Rad reviewed  WBC=21.6-->14.1-->17.4, Hb=11.3, Akh=455  Cr=0.87     5/18: vanc trough=15.7     5/13; blood: MRSA  5/14; sputum: MRSA  5/19: blood x 2: NGTD     CT chest with consolidative alveolar opacities in RUL, RLL, and periphery of LLL  TTE: no evidence of vegetation - pulmonic valve not well visualized     Assessment/Recommendations:  1. MRSA bacteremia secondary to MRSA PNA in a 70y/o WF  -patient with 1/1 cultures positive for MRSA on admission, sputum also positive  -repeat blood cultures from 5/19 NGTD  -TTE without evidence of vegetations  -currently on vancomycin  ==>will continue vancomycin  ==>f/u on repeat blood cultures  ==>with persistent bacteremia would consider WILFREDO     2.  Allergy to PCN and fluoroquinolones  -GI upset with PCN, hives with FQs     3. Multiple medical problems  -include obesity, GERD, COPD, CHF

## 2017-05-21 NOTE — PROGRESS NOTES
Problem: Mobility Impaired (Adult and Pediatric)  Goal: *Acute Goals and Plan of Care (Insert Text)  Physical Therapy Goals  Initiated 5/19/2017 and to be accomplished within 7 day(s)  1. Patient will move from supine <> sit with Min A-CGA in prep for out of bed activity and change of position. 2. Patient will perform sit<> stand with Min A-CGA with rolling walker in prep for transfers/ambulation. 3. Patient will transfer from bed <> chair with CGA with rolling walker for time up in chair for completion of ADL activity. 4. Patient will ambulate 150 feet Min A with LRAD for increase functional mobility. Outcome: Progressing Towards Goal  PHYSICAL THERAPY TREATMENT     Patient: Jacqui Devries (16 y.o. female)  Date: 5/21/2017  Diagnosis: COPD exacerbation ([address]  Acute respiratory distress Acute respiratory distress       Precautions:     Chart, physical therapy assessment, plan of care and goals were reviewed. ASSESSMENT:  Patient initially resistant to PT session due to need to be cleaned up. Nursing was called to clean the patient up. After the patient was cleaned up, the patient participated in gait training. Patient ambulated 15 feet with CGA/minimal assistance. Patient's gait is slow and antalgic in appearance. Patient returned back to bed and participated in a small amount of exercises before returning back to bed. Will continue PT to further improve the patient's ambulatory mobility. Recommend SNF at discharge. Progression toward goals:  [ ]      Improving appropriately and progressing toward goals  [X]      Improving slowly and progressing toward goals  [ ]      Not making progress toward goals and plan of care will be adjusted       PLAN:  Patient continues to benefit from skilled intervention to address the above impairments. Continue treatment per established plan of care.   Discharge Recommendations:  Dwaine Hart  Further Equipment Recommendations for Discharge:  N/A SUBJECTIVE:   Patient stated My knees just hurt so bad.       OBJECTIVE DATA SUMMARY:   Critical Behavior:  Neurologic State: Alert  Orientation Level: Oriented X4  Cognition: Appropriate safety awareness  Safety/Judgement: Decreased awareness of environment, Decreased awareness of need for safety  Functional Mobility Training:  Bed Mobility:  Supine to Sit: Stand-by asssistance  Sit to Supine: Stand-by asssistance  Scooting: Stand-by asssistance    Transfers:  Sit to Stand: Contact guard assistance  Stand to Sit: Contact guard assistance  Balance:  Sitting: Intact  Standing: Impaired; With support  Standing - Static: Fair  Standing - Dynamic : Fair  Ambulation/Gait Training:  Distance (ft): 15 Feet (ft)  Assistive Device: Gait belt;Walker, rolling  Ambulation - Level of Assistance: Contact guard assistance;Minimal assistance  Gait Abnormalities: Decreased step clearance; Antalgic  Base of Support: Widened  Speed/Rozina: Slow  Step Length: Right shortened;Left shortened  Swing Pattern: Right asymmetrical;Left asymmetrical     Therapeutic Exercises:   Patient performed seated long arc quads 1x5 bilaterally. Pain:  Pain Scale 1: Numeric (0 - 10)  Pain Intensity 1: 0  Activity Tolerance:   poor  Please refer to the flowsheet for vital signs taken during this treatment.   After treatment:   [ ] Patient left in no apparent distress sitting up in chair  [X] Patient left in no apparent distress in bed  [X] Call bell left within reach  [X] Nursing notified  [ ] Caregiver present  [ ] Bed alarm activated      Grace Gtz   Time Calculation: 33 mins

## 2017-05-22 ENCOUNTER — APPOINTMENT (OUTPATIENT)
Dept: GENERAL RADIOLOGY | Age: 72
DRG: 190 | End: 2017-05-22
Attending: INTERNAL MEDICINE
Payer: MEDICARE

## 2017-05-22 LAB
ANION GAP BLD CALC-SCNC: 7 MMOL/L (ref 3–18)
BUN SERPL-MCNC: 35 MG/DL (ref 7–18)
BUN/CREAT SERPL: 40 (ref 12–20)
CALCIUM SERPL-MCNC: 8.8 MG/DL (ref 8.5–10.1)
CHLORIDE SERPL-SCNC: 103 MMOL/L (ref 100–108)
CO2 SERPL-SCNC: 29 MMOL/L (ref 21–32)
CREAT SERPL-MCNC: 0.87 MG/DL (ref 0.6–1.3)
DATE LAST DOSE: NORMAL
ERYTHROCYTE [DISTWIDTH] IN BLOOD BY AUTOMATED COUNT: 12.8 % (ref 11.6–14.5)
GLUCOSE BLD STRIP.AUTO-MCNC: 144 MG/DL (ref 70–110)
GLUCOSE BLD STRIP.AUTO-MCNC: 195 MG/DL (ref 70–110)
GLUCOSE BLD STRIP.AUTO-MCNC: 246 MG/DL (ref 70–110)
GLUCOSE BLD STRIP.AUTO-MCNC: 249 MG/DL (ref 70–110)
GLUCOSE SERPL-MCNC: 141 MG/DL (ref 74–99)
HCT VFR BLD AUTO: 33.2 % (ref 35–45)
HGB BLD-MCNC: 11.5 G/DL (ref 12–16)
MAGNESIUM SERPL-MCNC: 2 MG/DL (ref 1.6–2.6)
MCH RBC QN AUTO: 31.2 PG (ref 24–34)
MCHC RBC AUTO-ENTMCNC: 34.6 G/DL (ref 31–37)
MCV RBC AUTO: 90 FL (ref 74–97)
PLATELET # BLD AUTO: 292 K/UL (ref 135–420)
PMV BLD AUTO: 10.5 FL (ref 9.2–11.8)
POTASSIUM SERPL-SCNC: 4.3 MMOL/L (ref 3.5–5.5)
RBC # BLD AUTO: 3.69 M/UL (ref 4.2–5.3)
REPORTED DOSE,DOSE: NORMAL UNITS
REPORTED DOSE/TIME,TMG: 200
SODIUM SERPL-SCNC: 139 MMOL/L (ref 136–145)
VANCOMYCIN TROUGH SERPL-MCNC: 14.3 UG/ML (ref 10–20)
WBC # BLD AUTO: 16.3 K/UL (ref 4.6–13.2)

## 2017-05-22 PROCEDURE — 94640 AIRWAY INHALATION TREATMENT: CPT

## 2017-05-22 PROCEDURE — 77010033678 HC OXYGEN DAILY

## 2017-05-22 PROCEDURE — 36415 COLL VENOUS BLD VENIPUNCTURE: CPT | Performed by: INTERNAL MEDICINE

## 2017-05-22 PROCEDURE — 74011250637 HC RX REV CODE- 250/637: Performed by: INTERNAL MEDICINE

## 2017-05-22 PROCEDURE — 97535 SELF CARE MNGMENT TRAINING: CPT

## 2017-05-22 PROCEDURE — 97116 GAIT TRAINING THERAPY: CPT

## 2017-05-22 PROCEDURE — 83735 ASSAY OF MAGNESIUM: CPT | Performed by: INTERNAL MEDICINE

## 2017-05-22 PROCEDURE — 74011636637 HC RX REV CODE- 636/637: Performed by: INTERNAL MEDICINE

## 2017-05-22 PROCEDURE — 74011250636 HC RX REV CODE- 250/636: Performed by: INTERNAL MEDICINE

## 2017-05-22 PROCEDURE — 74011250636 HC RX REV CODE- 250/636: Performed by: EMERGENCY MEDICINE

## 2017-05-22 PROCEDURE — 80048 BASIC METABOLIC PNL TOTAL CA: CPT | Performed by: INTERNAL MEDICINE

## 2017-05-22 PROCEDURE — 74011000250 HC RX REV CODE- 250: Performed by: HOSPITALIST

## 2017-05-22 PROCEDURE — 65660000000 HC RM CCU STEPDOWN

## 2017-05-22 PROCEDURE — 80202 ASSAY OF VANCOMYCIN: CPT | Performed by: INTERNAL MEDICINE

## 2017-05-22 PROCEDURE — 71010 XR CHEST PORT: CPT

## 2017-05-22 PROCEDURE — 94760 N-INVAS EAR/PLS OXIMETRY 1: CPT

## 2017-05-22 PROCEDURE — 85027 COMPLETE CBC AUTOMATED: CPT | Performed by: INTERNAL MEDICINE

## 2017-05-22 PROCEDURE — 82962 GLUCOSE BLOOD TEST: CPT

## 2017-05-22 PROCEDURE — 97165 OT EVAL LOW COMPLEX 30 MIN: CPT

## 2017-05-22 RX ADMIN — METHYLPREDNISOLONE SODIUM SUCCINATE 40 MG: 40 INJECTION, POWDER, FOR SOLUTION INTRAMUSCULAR; INTRAVENOUS at 17:41

## 2017-05-22 RX ADMIN — ATENOLOL 25 MG: 25 TABLET ORAL at 10:33

## 2017-05-22 RX ADMIN — GUAIFENESIN 600 MG: 600 TABLET, EXTENDED RELEASE ORAL at 21:47

## 2017-05-22 RX ADMIN — OXYCODONE HYDROCHLORIDE AND ACETAMINOPHEN 1 TABLET: 5; 325 TABLET ORAL at 03:34

## 2017-05-22 RX ADMIN — IPRATROPIUM BROMIDE AND ALBUTEROL SULFATE 3 ML: .5; 3 SOLUTION RESPIRATORY (INHALATION) at 02:10

## 2017-05-22 RX ADMIN — UMECLIDINIUM 1 PUFF: 62.5 AEROSOL, POWDER ORAL at 10:34

## 2017-05-22 RX ADMIN — IPRATROPIUM BROMIDE AND ALBUTEROL SULFATE 3 ML: .5; 3 SOLUTION RESPIRATORY (INHALATION) at 19:44

## 2017-05-22 RX ADMIN — IPRATROPIUM BROMIDE AND ALBUTEROL SULFATE 3 ML: .5; 3 SOLUTION RESPIRATORY (INHALATION) at 07:36

## 2017-05-22 RX ADMIN — HEPARIN SODIUM 5000 UNITS: 5000 INJECTION, SOLUTION INTRAVENOUS; SUBCUTANEOUS at 02:24

## 2017-05-22 RX ADMIN — OXYCODONE HYDROCHLORIDE AND ACETAMINOPHEN 1 TABLET: 5; 325 TABLET ORAL at 19:06

## 2017-05-22 RX ADMIN — SODIUM CHLORIDE 1000 MG: 900 INJECTION, SOLUTION INTRAVENOUS at 10:00

## 2017-05-22 RX ADMIN — MULTIPLE VITAMINS W/ MINERALS TAB 1 TABLET: TAB at 10:32

## 2017-05-22 RX ADMIN — LISINOPRIL 20 MG: 20 TABLET ORAL at 10:33

## 2017-05-22 RX ADMIN — MONTELUKAST SODIUM 10 MG: 10 TABLET, FILM COATED ORAL at 10:33

## 2017-05-22 RX ADMIN — NYSTATIN: 100000 POWDER TOPICAL at 21:48

## 2017-05-22 RX ADMIN — DULOXETINE 60 MG: 60 CAPSULE, DELAYED RELEASE ORAL at 10:33

## 2017-05-22 RX ADMIN — MUPIROCIN: 20 OINTMENT TOPICAL at 10:39

## 2017-05-22 RX ADMIN — LEVOTHYROXINE SODIUM 225 MCG: 150 TABLET ORAL at 07:24

## 2017-05-22 RX ADMIN — FUROSEMIDE 40 MG: 40 TABLET ORAL at 10:33

## 2017-05-22 RX ADMIN — OXYCODONE HYDROCHLORIDE AND ACETAMINOPHEN 1 TABLET: 5; 325 TABLET ORAL at 12:42

## 2017-05-22 RX ADMIN — ZINC OXIDE: 200 OINTMENT TOPICAL at 10:39

## 2017-05-22 RX ADMIN — METHYLPREDNISOLONE SODIUM SUCCINATE 40 MG: 40 INJECTION, POWDER, FOR SOLUTION INTRAMUSCULAR; INTRAVENOUS at 10:32

## 2017-05-22 RX ADMIN — Medication 10 ML: at 14:00

## 2017-05-22 RX ADMIN — HEPARIN SODIUM 5000 UNITS: 5000 INJECTION, SOLUTION INTRAVENOUS; SUBCUTANEOUS at 17:41

## 2017-05-22 RX ADMIN — GUAIFENESIN 600 MG: 600 TABLET, EXTENDED RELEASE ORAL at 10:33

## 2017-05-22 RX ADMIN — FLUTICASONE PROPIONATE 1 SPRAY: 50 SPRAY, METERED NASAL at 10:35

## 2017-05-22 RX ADMIN — OMEPRAZOLE 20 MG: 20 CAPSULE, DELAYED RELEASE ORAL at 10:33

## 2017-05-22 RX ADMIN — LORATADINE 10 MG: 10 TABLET ORAL at 10:32

## 2017-05-22 RX ADMIN — ASPIRIN 81 MG: 81 TABLET, COATED ORAL at 10:33

## 2017-05-22 RX ADMIN — METHYLPREDNISOLONE SODIUM SUCCINATE 40 MG: 40 INJECTION, POWDER, FOR SOLUTION INTRAMUSCULAR; INTRAVENOUS at 02:24

## 2017-05-22 RX ADMIN — INSULIN LISPRO 4 UNITS: 100 INJECTION, SOLUTION INTRAVENOUS; SUBCUTANEOUS at 21:47

## 2017-05-22 RX ADMIN — Medication 10 ML: at 07:22

## 2017-05-22 RX ADMIN — INSULIN LISPRO 2 UNITS: 100 INJECTION, SOLUTION INTRAVENOUS; SUBCUTANEOUS at 11:43

## 2017-05-22 RX ADMIN — DRONABINOL 2.5 MG: 2.5 CAPSULE ORAL at 13:46

## 2017-05-22 RX ADMIN — PREGABALIN 100 MG: 100 CAPSULE ORAL at 21:47

## 2017-05-22 RX ADMIN — NYSTATIN: 100000 POWDER TOPICAL at 10:35

## 2017-05-22 RX ADMIN — VANCOMYCIN HYDROCHLORIDE 1250 MG: 10 INJECTION, POWDER, LYOPHILIZED, FOR SOLUTION INTRAVENOUS at 02:32

## 2017-05-22 RX ADMIN — INSULIN LISPRO 4 UNITS: 100 INJECTION, SOLUTION INTRAVENOUS; SUBCUTANEOUS at 07:21

## 2017-05-22 RX ADMIN — ZINC OXIDE: 200 OINTMENT TOPICAL at 21:48

## 2017-05-22 RX ADMIN — HEPARIN SODIUM 5000 UNITS: 5000 INJECTION, SOLUTION INTRAVENOUS; SUBCUTANEOUS at 10:32

## 2017-05-22 RX ADMIN — PREGABALIN 100 MG: 100 CAPSULE ORAL at 10:33

## 2017-05-22 RX ADMIN — IPRATROPIUM BROMIDE AND ALBUTEROL SULFATE 3 ML: .5; 3 SOLUTION RESPIRATORY (INHALATION) at 13:43

## 2017-05-22 RX ADMIN — PREGABALIN 100 MG: 100 CAPSULE ORAL at 17:41

## 2017-05-22 RX ADMIN — Medication 10 ML: at 21:49

## 2017-05-22 RX ADMIN — MUPIROCIN: 20 OINTMENT TOPICAL at 21:48

## 2017-05-22 RX ADMIN — FLUTICASONE FUROATE AND VILANTEROL TRIFENATATE 1 PUFF: 200; 25 POWDER RESPIRATORY (INHALATION) at 10:35

## 2017-05-22 NOTE — PROGRESS NOTES
1930: Assumed patient care, she was alert and oriented to person, place, time and situation. Respiratory status was stable on 3L via nasal cannula. Vital signs were stable. MEWS score was a one. Patient denied any nausea vomiting dizziness or anxiety. White board was updated and explained. Bed was locked and in lowest position. Call bell, water and personal belongings were within reach. Patient had no questions, comments or concerns after bedside shift report. 0700: Patient had an uneventful shift. Respiratory status, vital signs and MEWS score remained stable. Patient was resting quietly with no signs of distress noted. Bed locked and in lowest position. Call bell water and personal belongings were within reach. Patient had no questions, comments or concerns after bedside shift report. Bedside report given to MARIIA Beltrán R.N.

## 2017-05-22 NOTE — PROGRESS NOTES
Vancomycin trough = 14.3 @ 0130 05/21/2017  SrCr=  0.87 BUN = 35  WBC = 17.4 will order x1 dose of vancomycin 1 gm to bring tough up to therapeutic range.

## 2017-05-22 NOTE — PROGRESS NOTES
ID Progress Note      Antibiotics:  Vancomycin day 4 of 29      CC: bacteremia      S: Ms. Miki Pierce is a 76y/o WF seen in f/u for MRSA bacteremia and PNA. Patient was admitted with 1 week h/o progressive cough with blood streaked sputum. CT with multifocal parenchymal infiltrates. Blood and sputum cultures positive for MRSA. Pt seen lying in bed with no new c/o  Reports pleuritic chest discomfort. SOB persists and has cough productive of blood tinged sputum. No f/c/ns  No n/v/d  No dysuria. No rash    C/o mult bruises on BUE - no other unusual skin lesions or furunculosis. C/o thrush  .     Physical Exam:  VS reviewed, Tm=AF  Gen: WD, obese WF, AAOX3, NAD  HEENT: no icterus, OP thrush on tongue buccal mucosa  Neck: supple  CV: RRR  Pulm: diffuse rhonchi and wheezes and prolonged exhalation phase  Abd: soft, NTND, +bs  Ext: tr edema, scattered ecchymoses BUE      Labs/Rad reviewed  WBC=21.6-->14.1-->17.4->16.3  plt 292  Crt 0.87  Vancomycin trough 14.3      5/13; blood: MRSA  5/14; sputum: MRSA  5/19: blood x 2: NGTD      CT chest with consolidative alveolar opacities in RUL, RLL, and periphery of LLL  TTE: no evidence of vegetation - pulmonic valve not well visualized      Assessment/Recommendations:  1. MRSA bacteremia secondary to MRSA PNA in a 72y/o WF  -patient with 1/1 cultures positive for MRSA on admission, sputum also positive  -repeat blood cultures from 5/19 NGTD  -TTE without evidence of vegetations  -currently on vancomycin  ==>will continue vancomycin - anticipate 28 day course of therapy                 Will request picc line placement  ==>f/u on repeat blood cultures - remain no growth   ==>with persistent or recurrent bacteremia would consider WILFREDO  ==>  Will repeat pa and lat cxr      2. Allergy to PCN and fluoroquinolones  -GI upset with PCN, hives with FQs      3.  Multiple medical problems  -include obesity, GERD, COPD, CHF

## 2017-05-22 NOTE — WOUND CARE
Patient seen by wound care per consult for sacral wound, area measures 1.2 x 0.5 in the gluteal fold (tear).  Area was cleaned and proshield applied, patient was changed to dry brief and clean under pad, patient was also covered in bruises from blood thinning medicationWound care will continue to monitor as needed

## 2017-05-22 NOTE — PROGRESS NOTES
Hospitalist Progress Note-critical care note     Patient: Junior Priest MRN: [id number]  [facility]: [id number]    YOB: 1945  Age: 80 y.o. Sex: female    DOA: 5/13/2017 LOS:  LOS: 8 days            Chief complaint: respiratroy distress with hypoxia, copd exacerbation, acute chf. bacteremia     Assessment/Plan         Patient Active Problem List   Diagnosis Code    Cataract H26.9    DDD (degenerative disc disease), cervical M50.30    H/O cervical spine surgery Z98.890    COPD (chronic obstructive pulmonary disease) ([facility] [address] J44.9    Acidosis E87.2    COPD exacerbation ([facility] [facility] J44.1    Acute on chronic respiratory failure ([facility]) J96.20    Obesity E66.9    Benign hypertension I10    GERD (gastroesophageal reflux disease) K21.9    Acute diastolic CHF (congestive heart failure) ([facility]) I50.31    Acute encephalopathy G93.40    Toxic metabolic encephalopathy L16    Acute renal failure (ARF) ([facility]) N17.9    Hyperkalemia E87.5    PNA (pneumonia) J18.9    Chest pain R07.9    Anxiety F41.9    Sinus tachycardia R00.0    Acute respiratory distress R06.00    Hypoxia R09.02    HCAP (healthcare-associated pneumonia) J18.9    Hypokalemia E87.6    Bacteremia due to methicillin resistant Staphylococcus aureus R78.81    Oral thrush B37.0     1. Acute Respiratory Distress with Hypoxia; Stable, used home O2, not sure the setting  - Continue weaning off nc O2, on 4 L now   -ct chest: consolidation/edema/Multifocal groundglass opacities/consolidative alveolar opacities     2. Bacteremia- MRSA   Appreciated id input   No growth from  5/19   Continue vanc  -TTE done and no vegetation or thrombus noted. -need picc line     3. . Acute Moderate Exacerbation of Mod Persistent COPD; Improving very slowly   -On breathing tx and iv steroid, abx . Will scheduled breathing tx-duo neb   Appreciated pulm input. 3. HCAP -MRSA   On vanc now     4.  Mild Acute Diastolic CHF exacerbation stage III  On diuretics,  Ef wnl   6. Hypokalemia   K replacement   7. Atypical Chest Pain; likely non cardiac   No chest pain now. Trop wnl.     8. GERD  ppi   9. HTN  Well controlled,  10. Obesity BMI >35  11. Cervical DDD  Continue home pain management   12. Cataract   13. Nasal MRSA  14 anxiety  xanax prn   15 oral thrush   Due to steroid, nystatin       Subjective: did not sleep well - watching tv  Nurse: no acute issue          Review of systems:    General: No fevers or chills. Cardiovascular: No chest pain or pressure. No palpitations. Pulmonary: shortness of breath and cough better   Gastrointestinal: No nausea, vomiting. Vital signs/Intake and Output:  Visit Vitals    /77 (BP 1 Location: Left arm, BP Patient Position: At rest)    Pulse 92    Temp 98.3 °F (36.8 °C)    Resp 18    Ht 5' (1.524 m)    Wt 96 kg (211 lb 10.3 oz)    SpO2 98%    BMI 41.33 kg/m2     Current Shift:  05/22 0701 - 05/22 1900  In: 200 [P.O.:200]  Out: -   Last three shifts:  05/20 1901 - 05/22 0700  In: 1876 [P.O.:1876]  Out: 0     Physical Exam:  General: WD, WN. Alert, cooperative, no acute distress    HEENT: NC, Atraumatic. PERRLA, anicteric sclerae. Nose: oral thrush noted    Lungs: Coarse BS and rhonchi and wheezing   Heart:  Regular  rhythm,  No murmur, No Rubs, No Gallops  Abdomen: Soft, Non distended, Non tender.  +Bowel sounds,   Extremities: No c/c/e  Psych:   no Anxious,no  agitated. Neurologic:  No acute neurological deficit.              Labs: Results:       Chemistry Recent Labs      05/22/17 0130 05/21/17 0213  05/20/17   0254   GLU  141*  152*  138*   NA  139  140  141   K  4.3  3.9  3.7   CL  103  105  107   CO2  29  28  29   BUN  35*  39*  33*   CREA  0.87  0.87  0.87   CA  8.8  8.8  8.4*   AGAP  7  7  5   BUCR  40*  45*  38*      CBC w/Diff Recent Labs      05/22/17   0130 05/21/17   0213  05/20/17   0254   WBC  16.3*  17.4*  14.1*   RBC  3.69*  3.65*  3.36*   HGB  11.5*  11.3*  10.3*   HCT  33.2*  32.8*  30.3*   PLT 292  326  343      Cardiac Enzymes No results for input(s): CPK, CKND1, ADILSON in the last 72 hours. No lab exists for component: CKRMB, TROIP   Coagulation No results for input(s): PTP, INR, APTT in the last 72 hours. No lab exists for component: INREXT, INREXT    Lipid Panel No results found for: CHOL, CHOLPOCT, CHOLX, CHLST, CHOLV, [id number], HDL, LDL, NLDLCT, DLDL, LDLC, DLDLP, [id number], VLDLC, VLDL, TGL, TGLX, TRIGL, [id number], TRIGP, TGLPOCT, [id number], CHHD, CHHDX   BNP No results for input(s): BNPP in the last 72 hours. Liver Enzymes No results for input(s): TP, ALB, TBIL, AP, SGOT, GPT in the last 72 hours.     No lab exists for component: DBIL   Thyroid Studies Lab Results   Component Value Date/Time    TSH 0.04 11/26/2016 05:39 AM        Procedures/imaging: see electronic medical records for all procedures/Xrays and details which were not copied into this note but were reviewed prior to creation of Pako Tafoya MD

## 2017-05-22 NOTE — PROGRESS NOTES
800 Assumed care of patient from Roddy Malone RN    1000 Assessment completed, patient is alert and oriented x's 4, no c/o pain. NSR on the monitor with a HR in the 70's. Lung fields consists of rhonchi and expiratory wheezing throughout on 3L NC, 02 sat sustained at 100% with a productive cough. Patient is tolerating a cardiac diet without any N/V or gastric distention. Scheduled medications administered as ordered without any complications. Patient continue to rest in bed, no s/s of any distress, VSS, call bell and personal belongings within reach, will continue to monitor. 1130   IDR/SLIDR Summary          Patient: Hiren August MRN: [id number]    Age: 80 y.o. YOB: 1945 Room/Bed: [facility]/   Admit Diagnosis: COPD exacerbation (HCC)  Acute respiratory distress  Principal Diagnosis: Acute respiratory distress   Goals: PT/OT  Readmission: NO  Quality Measure: COPD  VTE Prophylaxis:  chemical  Influenza Vaccine screening completed? YES  Pneumococcal Vaccine screening completed? YES  Mobility needs: Yes   Nutrition plan:Yes  Consults:P.T, O.T., Respiratory and Case Management    Financial concerns:No  Escalated to CM? YES  RRAT Score:    Interventions:  Testing due for pt today? NO  LOS: 8 days Expected length of stay 2 days  Discharge plan: nursing home   PCP: Jens Reveles MD  Transportation needs: Yes    Days before discharge:two or more days before discharge   Discharge disposition: Nursing Home    Signed:     Henry Camp RN  5/22/2017  12:05 PM    [id number] BS obtained, covered per  protocol. Patient was assisted off the [facility] and back in bed, currently preparing for PT/OT, no s/s of any distress, will continue to monitor. 1243 PRN pain medication administered per patient's request without any complications. Patient continue to rest quietly in bed, no s/s of any distress, will continue to monitor. 1453 NAD, will continue to monitor.     1753 Scheduled medications administered as ordered without any complications. Patient was changed of urine incont with bed pad change. Patient is currently resting quietly in bed, no s/s of any distress, will continue to monitor. 1935 Bedside and Verbal shift change report given to Narciso Rick RN (oncoming nurse) by Nakul Mauro RN (offgoing nurse). Report included the following information SBAR.

## 2017-05-22 NOTE — PROGRESS NOTES
Problem: Mobility Impaired (Adult and Pediatric)  Goal: *Acute Goals and Plan of Care (Insert Text)  Physical Therapy Goals  Initiated 5/19/2017 and to be accomplished within 7 day(s)  1. Patient will move from supine <> sit with Min A-CGA in prep for out of bed activity and change of position. 2. Patient will perform sit<> stand with Min A-CGA with rolling walker in prep for transfers/ambulation. 3. Patient will transfer from bed <> chair with CGA with rolling walker for time up in chair for completion of ADL activity. 4. Patient will ambulate 150 feet Min A with LRAD for increase functional mobility. Outcome: Progressing Towards Goal  PHYSICAL THERAPY TREATMENT     Patient: Michelle Collado (89 y.o. female)  Date: 5/22/2017  Diagnosis: COPD exacerbation ([address]  Acute respiratory distress Acute respiratory distress  Precautions: Fall, Contact  Chart, physical therapy assessment, plan of care and goals were reviewed. ASSESSMENT:  Pt was sitting on the EOB upon PT arrival.  She was willing to get up and participate with gait training in her room with PT today. During gait training pt ambulated with CGA, RW, and GB from her bed to the door and back twice. Once she got back to her bed she was out of breath which she says is normal after she does any activity. She refused to do any there-ex and stated that the walking was all of her PT for the day. Pt was left supine in the bed w/ all needs within reach. Nurse aware. Recommend SNF after d/c. Progression toward goals:  [X]      Improving appropriately and progressing toward goals  [ ]      Improving slowly and progressing toward goals  [ ]      Not making progress toward goals and plan of care will be adjusted       PLAN:  Patient continues to benefit from skilled intervention to address the above impairments. Continue treatment per established plan of care.   Discharge Recommendations:  Dwaine Hart  Further Equipment Recommendations for Discharge:  N/A       SUBJECTIVE:   Patient stated I will walk in the room twice and that's it.       OBJECTIVE DATA SUMMARY:   Critical Behavior:  Neurologic State: Alert, Appropriate for age  Orientation Level: Oriented X4, Appropriate for age  Cognition: Appropriate for age attention/concentration  Safety/Judgement: Awareness of environment, Decreased awareness of need for safety  Functional Mobility Training:  Bed Mobility:   Supine to Sit: Stand-by asssistance  Sit to Supine: Stand-by asssistance  Scooting: Stand-by asssistance  Transfers:  Sit to Stand: Stand-by asssistance  Stand to Sit: Stand-by asssistance  Balance:  Sitting: Intact  Standing: Intact; With support  Standing - Static: Good  Standing - Dynamic : Fair              Range Of Motion:  Ambulation/Gait Training:  Distance (ft): 60 Feet (ft)  Assistive Device: Walker, rolling;Gait belt  Ambulation - Level of Assistance: Contact guard assistance  Gait Abnormalities: Decreased step clearance  Base of Support: Widened   Speed/Rozina: Slow  Step Length: Left shortened;Right shortened  Swing Pattern: Right asymmetrical;Left asymmetrical  Stairs:  Neuro Re-Education:  Therapeutic Exercises:   Pt refused any there-ex today. Pain:  Pain Scale 1: Numeric (0 - 10)  Pain Intensity 1: 8  Pain Location 1: Knee  Pain Orientation 1: Left  Pain Description 1: Constant; Aching  Pain Intervention(s) 1: Medication (see MAR)  Activity Tolerance:   fair  Please refer to the flowsheet for vital signs taken during this treatment.   After treatment:   [ ] Patient left in no apparent distress sitting up in chair  [X] Patient left in no apparent distress in bed  [X] Call bell left within reach  [X] Nursing notified  [ ] Caregiver present  [ ] Bed alarm activated      Jen Montalvo   Time Calculation: 21 mins

## 2017-05-22 NOTE — PROGRESS NOTES
Problem: Self Care Deficits Care Plan (Adult)  Goal: *Acute Goals and Plan of Care (Insert Text)  OCCUPATIONAL THERAPY EVALUATION/DISCHARGE     Patient: Kay Flores (2 y.o. female)  Date: 5/22/2017  Primary Diagnosis: COPD exacerbation ([facility]  Acute respiratory distress        Precautions:  Fall, Contact      ASSESSMENT AND RECOMMENDATIONS:  Based on the objective data described below, the patient presents with COPD exacerbation. Pt reported \" I don't need any OT, I can take care of myself and if I need help I get it. \" Pt completed ADLs at SBA level this session including LB dressing. CGA for toileting and needed assist with adjusting brief. Pt completed functional mobility in room using RW with CGA/SBA. Pt was short of breath after mobility but reported this was baseline. O2 stats 93 on oxygen. Pt functioning at or close of baseline for ADLs at this time. No further skilled acute OT needs indicated. Skilled occupational therapy is not indicated at this time.   Discharge Recommendations: Dwaine Hart  Further Equipment Recommendations for Discharge: N/A        SUBJECTIVE:   Patient stated I'm don't need OT right now, just PT.      OBJECTIVE DATA SUMMARY:       Past Medical History:   Diagnosis Date    Arthritis      Asthma      CAD (coronary artery disease)       angina, last episode 06/2012    Chronic bronchitis (HCC)      Chronic kidney disease       stage 3    Chronic obstructive pulmonary disease (HCC)      Chronic pain       cervical, lumbar, knees, ankles, elbows    Fibromyalgia      GERD (gastroesophageal reflux disease)      Hypertension 1993    Psychiatric disorder       anxiety, depression    Thyroid disease       Past Surgical History:   Procedure Laterality Date    HX ADENOIDECTOMY        HX APPENDECTOMY        HX CATARACT REMOVAL         OU    HX CERVICAL FUSION         anterior x 2    HX CERVICAL FUSION         posterior x 3    Ambrocio Cj     Removal of titanium plate and screws    HX CHOLECYSTECTOMY        HX HYSTERECTOMY        HX LITHOTRIPSY         x 4    HX OOPHORECTOMY         left    HX ORTHOPAEDIC         cervical  x5    HX SMALL BOWEL RESECTION        HX TONSILLECTOMY        HX UROLOGICAL   1984 and 1985     kidney stone surgery     Barriers to Learning/Limitations: yes;  physical  Compensate with: visual, verbal, tactile, kinesthetic cues/model  Prior Level of Function/Home Situation: Assist with ADLs prior to admission when needed  [address]: [address] [place] [address] Name: Dallas Gutierrez  One/Two Story Residence: Other (Comment)  Living Alone: No  Support Systems: Skilled nursing facility  Patient Expects to be Discharged to[de-identified] Skilled nursing facility  Current DME Used/Available at Home: Cameron Elders, rollator, Wheelchair  [ ]     Right hand dominant        [ ]     Left hand dominant  Cognitive/Behavioral Status:  Neurologic State: Alert; Appropriate for age  Orientation Level: Oriented X4;Appropriate for age  Cognition: Appropriate for age attention/concentration  Safety/Judgement: Awareness of environment;Decreased awareness of need for safety  Skin: intact  Edema: none noted  Vision/Perceptual:  N/A  Coordination:     Fine Motor Skills-Upper: Left Intact; Right Intact    Gross Motor Skills-Upper: Left Intact; Right Intact  Balance:  Sitting: Intact  Standing: Intact; With support  Standing - Static: Good  Standing - Dynamic : Fair  Strength:  Strength: Generally decreased, functional  Range of Motion:     AROM: Generally decreased, functional  Functional Mobility and Transfers for ADLs:  Bed Mobility:  Supine to Sit: Stand-by asssistance  Sit to Supine: Stand-by asssistance  Scooting: Stand-by asssistance  Transfers:  Sit to Stand: Stand-by asssistance  ADL Assessment:  Upper Body Dressing: Stand-by assistance  Lower Body Dressing: Stand-by assistance  Toileting: Contact guard assistance  ADL Intervention:  Cognitive Retraining  Safety/Judgement: Awareness of environment;Decreased awareness of need for safety     Pain:  Pain Scale 1: Numeric (0 - 10)  Pain Intensity 1: 8  Pain Location 1: Knee  Pain Orientation 1: Left  Pain Description 1: Constant; Aching  Pain Intervention(s) 1: Medication (see MAR)  Activity Tolerance:   Fair   Please refer to the flowsheet for vital signs taken during this treatment. After treatment:   [ ]  Patient left in no apparent distress sitting up in chair  [X]  Patient left in no apparent distress in bed  [X]  Call bell left within reach  [X]  Nursing notified  [ ]  Caregiver present  [ ]  Bed alarm activated      COMMUNICATION/EDUCATION:   Communication/Collaboration:  [X]      Home safety education was provided and the patient/caregiver indicated understanding. [X]      Patient/family have participated as able and agree with findings and recommendations. [ ]      Patient is unable to participate in plan of care at this time. Hardin Moritz, OTR/L  Time Calculation: 23 mins      Carry  Current  CJ= 20-39%    Goal  CJ= 20-39%   D/C  CJ= 20-39%. The severity rating is based on the Level of Assistance required for Functional Mobility and ADLs.

## 2017-05-22 NOTE — PROGRESS NOTES
NUTRITION FOLLOW-UP    RECOMMENDATIONS/PLAN:   -continue diet and supplements as ordered  -monitor appetite, diet tolerance  -monitor labs/lytes, fluid status, bowel function, skin integrity, weight  Will continue to follow and adjust reccs as needed    NUTRITION ASSESSMENT:   Client Update: 80 yrs old Female admit with respiratory distress. Palliative care following, DNR initiated, pt goals are comfort based per note. FOOD/NUTRITION INTAKE   Diet Order:  Cardiac FR 1500ml   Supplements: magic cup   Food Allergies: shellfish  Average PO Intake:      Patient Vitals for the past 100 hrs:   % Diet Eaten   05/22/17 0947 100 %   05/21/17 1930 100 %   05/21/17 1733 100 %   05/21/17 1456 100 %   05/21/17 0841 100 %   05/20/17 1721 50 %   05/19/17 2258 100 %   05/19/17 1846 0 %   05/19/17 1119 80 %   05/18/17 2201 100 %      Pertinent Medications:  [x] Reviewed      BIOCHEMICAL DATA & MEDICAL TESTS  Pertinent Labs:  [x] Reviewed  ANTHROPOMETRICS  Height: 5' (152.4 cm)       Weight: 96 kg (211 lb 10.3 oz)         BMI: 41.3 kg/m^2 morbidly obese (Greater than or = to 40% BMI)   Adm Weight: 219 lbs                Weight change: -8lbs  Adjusted Body Weight: wt fluctuation I/o +1.6L, will use admit weight to calculate nutrition needs     NUTRITION-FOCUSED PHYSICAL ASSESSMENT  Skin: skin intact per documentation      GI: 5/22    NUTRITION PRESCRIPTION  Calories: 1874 kcal/day based on miffilin x 1.3  Protein: 70-82 g/day based on 1.2-1.4 g/kg ABW  CHO: 234 g/day based on 50% of total energy  Fluid: doc ordered fluid restriction 1500ml daily      NUTRITION DIAGNOSES:   1. At risk for unintended weight loss related to extended LOS as evidenced by LOS of 8 days    NUTRITION INTERVENTIONS:   INTERVENTIONS:        GOALS:  1. Continue diet as ordered 1.  Continue to meet estimated needs throughout the next 5 days     LEARNING NEEDS (Diet, Supplementation, Food/Nutrient-Drug Interaction):   [x] None Identified   [] Education provided/documented      Identified and patient: [] Declined   [] Was not appropriate/indicated        NUTRITION MONITORING /EVALUATION:   Follow PO intake  Monitor wt  Monitor renal labs, electrolytes, fluid status     Previous Recommendations Implemented: yes        Previous Goals Met:  yes      [] Participated in Interdisciplinary Rounds    [x] Interdisciplinary Care Plan Reviewed  DISCHARGE NUTRITION RECOMMENDATIONS ADDRESSED:     [x] Yes- recommended cardiac diet    NUTRITION RISK:           [x] At risk                        [] Not currently at risk        Will follow-up per policy.   Baltazar Blanchard, [address] [place] [address]  PAGER:  [phone]

## 2017-05-22 NOTE — PALLIATIVE CARE
Palliative medicine follow up note. Visited patient earlier today but was with physician and then with physical therapy. Now she is lying quietly in bed. She stated she was \"trying to feel better\". She stated she had walked across the room twice with PT but had returned to bed immediately. \"I don't want to overdo it\". I encouraged her to continue to work with PT and she told me \"tomorrow I will try harder\". Patient reports that she is to get a PICC line, return to [facility] and receive 4 more weeks of antibiotics. \"I will be happy to get back to my home\". I asked her if her son had visited her yet. She stated that he had been here for 10 minutes on Saturday and left again. She states this is his usual routine. She pointed to her DNR bracelet and said Brittney Sanchez is going to have a fit when he finds out about this\". She admitted that she had not talked to him about her wishes for no resuscitation. \"I don't think he will agree with me though, I do not want to be on any machines\". I again stressed the importance of her son being aware of her wishes and offered assistance and support with that discussion. She stated she wanted to wait until she was back at [facility] and that she would ask the  there to help her. She stated her son was familiar with the  there and may \"take it better\". Her nurse and the respiratory therapist both arrived at that time so I offered support and left. Palliative medicine team will remain available for support.

## 2017-05-23 ENCOUNTER — APPOINTMENT (OUTPATIENT)
Dept: INTERVENTIONAL RADIOLOGY/VASCULAR | Age: 72
DRG: 190 | End: 2017-05-23
Attending: HOSPITALIST
Payer: MEDICARE

## 2017-05-23 LAB
ANION GAP BLD CALC-SCNC: 7 MMOL/L (ref 3–18)
BUN SERPL-MCNC: 34 MG/DL (ref 7–18)
BUN/CREAT SERPL: 39 (ref 12–20)
CALCIUM SERPL-MCNC: 8.8 MG/DL (ref 8.5–10.1)
CHLORIDE SERPL-SCNC: 102 MMOL/L (ref 100–108)
CO2 SERPL-SCNC: 27 MMOL/L (ref 21–32)
CREAT SERPL-MCNC: 0.87 MG/DL (ref 0.6–1.3)
ERYTHROCYTE [DISTWIDTH] IN BLOOD BY AUTOMATED COUNT: 13.2 % (ref 11.6–14.5)
GLUCOSE BLD STRIP.AUTO-MCNC: 170 MG/DL (ref 70–110)
GLUCOSE BLD STRIP.AUTO-MCNC: 186 MG/DL (ref 70–110)
GLUCOSE BLD STRIP.AUTO-MCNC: 192 MG/DL (ref 70–110)
GLUCOSE BLD STRIP.AUTO-MCNC: 228 MG/DL (ref 70–110)
GLUCOSE SERPL-MCNC: 200 MG/DL (ref 74–99)
HCT VFR BLD AUTO: 33.4 % (ref 35–45)
HGB BLD-MCNC: 11.1 G/DL (ref 12–16)
MAGNESIUM SERPL-MCNC: 2.3 MG/DL (ref 1.6–2.6)
MCH RBC QN AUTO: 30.4 PG (ref 24–34)
MCHC RBC AUTO-ENTMCNC: 33.2 G/DL (ref 31–37)
MCV RBC AUTO: 91.5 FL (ref 74–97)
PLATELET # BLD AUTO: 239 K/UL (ref 135–420)
PMV BLD AUTO: 10.9 FL (ref 9.2–11.8)
POTASSIUM SERPL-SCNC: 4.9 MMOL/L (ref 3.5–5.5)
RBC # BLD AUTO: 3.65 M/UL (ref 4.2–5.3)
SODIUM SERPL-SCNC: 136 MMOL/L (ref 136–145)
WBC # BLD AUTO: 11.7 K/UL (ref 4.6–13.2)

## 2017-05-23 PROCEDURE — 74011250636 HC RX REV CODE- 250/636: Performed by: EMERGENCY MEDICINE

## 2017-05-23 PROCEDURE — 74011636637 HC RX REV CODE- 636/637: Performed by: INTERNAL MEDICINE

## 2017-05-23 PROCEDURE — 74011250636 HC RX REV CODE- 250/636: Performed by: HOSPITALIST

## 2017-05-23 PROCEDURE — 83735 ASSAY OF MAGNESIUM: CPT | Performed by: INTERNAL MEDICINE

## 2017-05-23 PROCEDURE — 74011250637 HC RX REV CODE- 250/637: Performed by: INTERNAL MEDICINE

## 2017-05-23 PROCEDURE — 36415 COLL VENOUS BLD VENIPUNCTURE: CPT | Performed by: INTERNAL MEDICINE

## 2017-05-23 PROCEDURE — 74011000250 HC RX REV CODE- 250: Performed by: HOSPITALIST

## 2017-05-23 PROCEDURE — 82962 GLUCOSE BLOOD TEST: CPT

## 2017-05-23 PROCEDURE — 74011000250 HC RX REV CODE- 250: Performed by: RADIOLOGY

## 2017-05-23 PROCEDURE — 94760 N-INVAS EAR/PLS OXIMETRY 1: CPT

## 2017-05-23 PROCEDURE — 02HV33Z INSERTION OF INFUSION DEVICE INTO SUPERIOR VENA CAVA, PERCUTANEOUS APPROACH: ICD-10-PCS | Performed by: RADIOLOGY

## 2017-05-23 PROCEDURE — 85027 COMPLETE CBC AUTOMATED: CPT | Performed by: INTERNAL MEDICINE

## 2017-05-23 PROCEDURE — 65660000000 HC RM CCU STEPDOWN

## 2017-05-23 PROCEDURE — C1751 CATH, INF, PER/CENT/MIDLINE: HCPCS

## 2017-05-23 PROCEDURE — 74011250636 HC RX REV CODE- 250/636: Performed by: INTERNAL MEDICINE

## 2017-05-23 PROCEDURE — 77010033678 HC OXYGEN DAILY

## 2017-05-23 PROCEDURE — 94640 AIRWAY INHALATION TREATMENT: CPT

## 2017-05-23 PROCEDURE — 74011250636 HC RX REV CODE- 250/636: Performed by: RADIOLOGY

## 2017-05-23 PROCEDURE — 80048 BASIC METABOLIC PNL TOTAL CA: CPT | Performed by: INTERNAL MEDICINE

## 2017-05-23 RX ORDER — HEPARIN SODIUM (PORCINE) LOCK FLUSH IV SOLN 100 UNIT/ML 100 UNIT/ML
500 SOLUTION INTRAVENOUS
Status: COMPLETED | OUTPATIENT
Start: 2017-05-23 | End: 2017-05-23

## 2017-05-23 RX ORDER — LIDOCAINE HYDROCHLORIDE 10 MG/ML
1-20 INJECTION INFILTRATION; PERINEURAL
Status: COMPLETED | OUTPATIENT
Start: 2017-05-23 | End: 2017-05-23

## 2017-05-23 RX ORDER — HEPARIN SODIUM 200 [USP'U]/100ML
500 INJECTION, SOLUTION INTRAVENOUS
Status: COMPLETED | OUTPATIENT
Start: 2017-05-23 | End: 2017-05-24

## 2017-05-23 RX ORDER — NYSTATIN 100000 [USP'U]/ML
500000 SUSPENSION ORAL 4 TIMES DAILY
Status: DISCONTINUED | OUTPATIENT
Start: 2017-05-23 | End: 2017-05-25 | Stop reason: HOSPADM

## 2017-05-23 RX ORDER — HEPARIN SODIUM (PORCINE) LOCK FLUSH IV SOLN 100 UNIT/ML 100 UNIT/ML
300 SOLUTION INTRAVENOUS EVERY 8 HOURS
Status: DISCONTINUED | OUTPATIENT
Start: 2017-05-23 | End: 2017-05-25 | Stop reason: HOSPADM

## 2017-05-23 RX ADMIN — METHYLPREDNISOLONE SODIUM SUCCINATE 40 MG: 40 INJECTION, POWDER, FOR SOLUTION INTRAMUSCULAR; INTRAVENOUS at 08:54

## 2017-05-23 RX ADMIN — LORATADINE 10 MG: 10 TABLET ORAL at 08:55

## 2017-05-23 RX ADMIN — OXYCODONE HYDROCHLORIDE AND ACETAMINOPHEN 1 TABLET: 5; 325 TABLET ORAL at 12:16

## 2017-05-23 RX ADMIN — INSULIN LISPRO 2 UNITS: 100 INJECTION, SOLUTION INTRAVENOUS; SUBCUTANEOUS at 21:33

## 2017-05-23 RX ADMIN — MONTELUKAST SODIUM 10 MG: 10 TABLET, FILM COATED ORAL at 08:55

## 2017-05-23 RX ADMIN — NYSTATIN 500000 UNITS: 100000 SUSPENSION ORAL at 21:33

## 2017-05-23 RX ADMIN — ASPIRIN 81 MG: 81 TABLET, COATED ORAL at 08:55

## 2017-05-23 RX ADMIN — MUPIROCIN: 20 OINTMENT TOPICAL at 21:43

## 2017-05-23 RX ADMIN — VANCOMYCIN HYDROCHLORIDE 1250 MG: 10 INJECTION, POWDER, LYOPHILIZED, FOR SOLUTION INTRAVENOUS at 01:40

## 2017-05-23 RX ADMIN — OXYCODONE HYDROCHLORIDE AND ACETAMINOPHEN 1 TABLET: 5; 325 TABLET ORAL at 04:04

## 2017-05-23 RX ADMIN — MULTIPLE VITAMINS W/ MINERALS TAB 1 TABLET: TAB at 08:54

## 2017-05-23 RX ADMIN — GUAIFENESIN 600 MG: 600 TABLET, EXTENDED RELEASE ORAL at 08:54

## 2017-05-23 RX ADMIN — ALPRAZOLAM 1 MG: 0.5 TABLET ORAL at 23:51

## 2017-05-23 RX ADMIN — INSULIN LISPRO 2 UNITS: 100 INJECTION, SOLUTION INTRAVENOUS; SUBCUTANEOUS at 12:16

## 2017-05-23 RX ADMIN — HEPARIN SODIUM 5000 UNITS: 5000 INJECTION, SOLUTION INTRAVENOUS; SUBCUTANEOUS at 08:54

## 2017-05-23 RX ADMIN — HEPARIN SODIUM 1000 UNITS: 200 INJECTION, SOLUTION INTRAVENOUS at 11:23

## 2017-05-23 RX ADMIN — PREGABALIN 100 MG: 100 CAPSULE ORAL at 08:55

## 2017-05-23 RX ADMIN — HEPARIN SODIUM (PORCINE) LOCK FLUSH IV SOLN 100 UNIT/ML 500 UNITS: 100 SOLUTION at 11:25

## 2017-05-23 RX ADMIN — LIDOCAINE HYDROCHLORIDE 2 ML: 10 INJECTION, SOLUTION INFILTRATION; PERINEURAL at 11:24

## 2017-05-23 RX ADMIN — FUROSEMIDE 40 MG: 40 TABLET ORAL at 08:55

## 2017-05-23 RX ADMIN — Medication 10 ML: at 21:46

## 2017-05-23 RX ADMIN — VANCOMYCIN HYDROCHLORIDE 1500 MG: 10 INJECTION, POWDER, LYOPHILIZED, FOR SOLUTION INTRAVENOUS at 21:39

## 2017-05-23 RX ADMIN — INSULIN LISPRO 4 UNITS: 100 INJECTION, SOLUTION INTRAVENOUS; SUBCUTANEOUS at 16:57

## 2017-05-23 RX ADMIN — UMECLIDINIUM 1 PUFF: 62.5 AEROSOL, POWDER ORAL at 08:57

## 2017-05-23 RX ADMIN — IPRATROPIUM BROMIDE AND ALBUTEROL SULFATE 3 ML: .5; 3 SOLUTION RESPIRATORY (INHALATION) at 01:33

## 2017-05-23 RX ADMIN — ATENOLOL 25 MG: 25 TABLET ORAL at 08:55

## 2017-05-23 RX ADMIN — HEPARIN SODIUM 5000 UNITS: 5000 INJECTION, SOLUTION INTRAVENOUS; SUBCUTANEOUS at 01:28

## 2017-05-23 RX ADMIN — IPRATROPIUM BROMIDE AND ALBUTEROL SULFATE 3 ML: .5; 3 SOLUTION RESPIRATORY (INHALATION) at 19:52

## 2017-05-23 RX ADMIN — LEVOTHYROXINE SODIUM 225 MCG: 150 TABLET ORAL at 08:54

## 2017-05-23 RX ADMIN — GUAIFENESIN 600 MG: 600 TABLET, EXTENDED RELEASE ORAL at 21:33

## 2017-05-23 RX ADMIN — ALPRAZOLAM 1 MG: 0.5 TABLET ORAL at 12:16

## 2017-05-23 RX ADMIN — FLUTICASONE FUROATE AND VILANTEROL TRIFENATATE 1 PUFF: 200; 25 POWDER RESPIRATORY (INHALATION) at 08:57

## 2017-05-23 RX ADMIN — PREGABALIN 100 MG: 100 CAPSULE ORAL at 16:57

## 2017-05-23 RX ADMIN — IPRATROPIUM BROMIDE AND ALBUTEROL SULFATE 3 ML: .5; 3 SOLUTION RESPIRATORY (INHALATION) at 07:25

## 2017-05-23 RX ADMIN — ZINC OXIDE: 200 OINTMENT TOPICAL at 08:56

## 2017-05-23 RX ADMIN — ZINC OXIDE: 200 OINTMENT TOPICAL at 21:43

## 2017-05-23 RX ADMIN — HEPARIN SODIUM (PORCINE) LOCK FLUSH IV SOLN 100 UNIT/ML 300 UNITS: 100 SOLUTION at 16:59

## 2017-05-23 RX ADMIN — PREGABALIN 100 MG: 100 CAPSULE ORAL at 21:34

## 2017-05-23 RX ADMIN — INSULIN LISPRO 2 UNITS: 100 INJECTION, SOLUTION INTRAVENOUS; SUBCUTANEOUS at 06:50

## 2017-05-23 RX ADMIN — LISINOPRIL 20 MG: 20 TABLET ORAL at 08:55

## 2017-05-23 RX ADMIN — Medication 10 ML: at 17:06

## 2017-05-23 RX ADMIN — HEPARIN SODIUM (PORCINE) LOCK FLUSH IV SOLN 100 UNIT/ML 300 UNITS: 100 SOLUTION at 21:34

## 2017-05-23 RX ADMIN — METHYLPREDNISOLONE SODIUM SUCCINATE 40 MG: 40 INJECTION, POWDER, FOR SOLUTION INTRAMUSCULAR; INTRAVENOUS at 16:56

## 2017-05-23 RX ADMIN — OMEPRAZOLE 20 MG: 20 CAPSULE, DELAYED RELEASE ORAL at 08:55

## 2017-05-23 RX ADMIN — Medication 10 ML: at 06:49

## 2017-05-23 RX ADMIN — MUPIROCIN: 20 OINTMENT TOPICAL at 08:56

## 2017-05-23 RX ADMIN — ALPRAZOLAM 1 MG: 0.5 TABLET ORAL at 02:50

## 2017-05-23 RX ADMIN — IPRATROPIUM BROMIDE AND ALBUTEROL SULFATE 3 ML: .5; 3 SOLUTION RESPIRATORY (INHALATION) at 13:06

## 2017-05-23 RX ADMIN — HEPARIN SODIUM 5000 UNITS: 5000 INJECTION, SOLUTION INTRAVENOUS; SUBCUTANEOUS at 17:03

## 2017-05-23 RX ADMIN — NYSTATIN: 100000 POWDER TOPICAL at 21:44

## 2017-05-23 RX ADMIN — FLUTICASONE PROPIONATE 1 SPRAY: 50 SPRAY, METERED NASAL at 08:57

## 2017-05-23 RX ADMIN — DULOXETINE 60 MG: 60 CAPSULE, DELAYED RELEASE ORAL at 08:55

## 2017-05-23 RX ADMIN — METHYLPREDNISOLONE SODIUM SUCCINATE 40 MG: 40 INJECTION, POWDER, FOR SOLUTION INTRAMUSCULAR; INTRAVENOUS at 01:28

## 2017-05-23 NOTE — PROGRESS NOTES
Pharmacy Dosing Services: Vancomycin    Consult for Vancomycin Dosing by Pharmacy by Dr. Janet Schulz provided for this 80y.o. year old female , for indication of MRSA Bacteremia / MRSA pneumonia  Day of Therapy 10    Ht Readings from Last 1 Encounters:   05/21/17 152.4 cm (60\")        Wt Readings from Last 1 Encounters:   05/23/17 95.2 kg (209 lb 14.1 oz)        Previous Regimen Vancomycin 1250 mg IV q24h   Last Level 14.3 mcg/ml @0130 [id number]   Other Current Antibiotics None   Significant Cultures MRSA +/ Resp & BC   Serum Creatinine Lab Results   Component Value Date/Time    Creatinine 0.87 05/23/2017 05:59 AM      Creatinine Clearance Estimated Creatinine Clearance: 62.1 mL/min (based on Cr of 0.87). BUN Lab Results   Component Value Date/Time    BUN 34 05/23/2017 05:59 AM      WBC Lab Results   Component Value Date/Time    WBC 11.7 05/23/2017 05:59 AM      H/H Lab Results   Component Value Date/Time    HGB 11.1 05/23/2017 05:59 AM      Platelets Lab Results   Component Value Date/Time    PLATELET 753 98/76/4083 05:59 AM      Temp 98.6 °F (37 °C)     Estimated PK Parameters (based on Trough):  ---------------------------  New rate constant (arthur): 0.035 hr-1  New half-life: 19.80 Hours  New Vd from levels: 66.50  Liters  (0.7 L/kg)    Vancomycin dosed increased after consult with ID. One extra dose of Vancomycin 1 gm was administered @ ~10am on [id number]. Pt will now receive Vancomycin 1500 mg IV q24h, expected to produce a therapeutic Trough in the 17-18 mcg/ml range. Will continue to monitor. Huong Vega D.   Clinical Pharmacist  [phone]

## 2017-05-23 NOTE — PROGRESS NOTES
1940: Assumed care of patient alert and oriented x 4. O2 Sat 100% on 3L nasal cannula. Lung sounds coarse bilaterally. Vital signs within patients normal limits, cardiac rhythm NSR. Patient denies pain at this time. Nursing management continues. 0250: Patient verbalizes feelings of anxiety, Xanax 1 mg administered by mouth     0320: Patient observed laying in bed sleeping. No further complaints of feeling anxious noted at this time, medication effective     0404: Patient complaining of left knee and leg pain 7/10. Percocet 5-325 mg administered by mouth    0430: Patient observed laying in bed asleep, no further complaints of pain voiced at this time, medication effective     0722: Shift summary: Patient had periods of anxiety which where effectively treated with Xanax. Patient also had periods of pain which were treated with Percocet. Patient left now  resting in bed no acute distress, bed in its lowest position, call light with in reach.

## 2017-05-23 NOTE — PROGRESS NOTES
ID Progress Note      Antibiotics:  Vancomycin day 5 of 28      CC: bacteremia      S: Ms. Carlos A Salcedo is a 76y/o WF seen in f/u for MRSA bacteremia and PNA. Patient was admitted with 1 week h/o progressive cough with blood streaked sputum. CT with multifocal parenchymal infiltrates. Blood and sputum cultures positive for MRSA.     Pt seen lying in bed with no new c/o  Continues to have cough and congestion with SOB. Also c/o raw burning sensation on tongue. No rash  No f/c/ns No n/v/d No dysuria.        Physical Exam:  VS reviewed, Tm=AF  Gen: WD, obese WF, AAOX3, NAD  HEENT: no icterus, OP mild thrush on tongue buccal mucosa  Erythema on tongue  CV: RRR  Pulm: diffuse rhonchi and wheezes and prolonged exhalation phase  Abd: soft, NTND, +bs  Ext: tr edema, scattered ecchymoses BUE      Labs/Rad reviewed  WBC=21.6-->14.1-->17.4->16.3-> 11.7   Crt 0.87  Vancomycin trough 14.3      5/13; blood: MRSA  5/14; sputum: MRSA  5/19: blood x 2: NGTD      CT chest with consolidative alveolar opacities in RUL, RLL, and periphery of LLL  TTE: no evidence of vegetation - pulmonic valve not well visualized    5/22 CXR increase infiltrate RLL      Assessment/Recommendations:  1. MRSA bacteremia secondary to MRSA PNA in a 72y/o WF  -patient with 1/1 cultures positive for MRSA on admission, sputum also positive  -repeat blood cultures from 5/19 NGTD  -TTE without evidence of vegetations  -currently on vancomycin       ==>will continue vancomycin - anticipate 28 day course of therapy                       D/w pharmacy who is assisting with dosing                       Aim for trough 15-20 mcg/mL       ==>f/u on repeat blood cultures - remain no growth        ==>with persistent or recurrent bacteremia would consider WILFREDO       ==> follow serial cxr    2. Oropharyngeal thrush  Add nystatin swish and swallow or swish and spit      3. Allergy to PCN and fluoroquinolones  -GI upset with PCN, hives with FQs      4.  Multiple medical problems  -include obesity, GERD, COPD, CHF

## 2017-05-23 NOTE — PROGRESS NOTES
Attempted PT at this time for a 2nd time, Per Nurse Justin Ratliff, pt's PICC line is still bleeding out. Will attempt PT at another time, when pt is stabled.

## 2017-05-23 NOTE — PROGRESS NOTES
conducted an initial consultation and Spiritual Assessment for Edi Yap, who is a 80 y.o.,female. According to the patients chart [employer] Affiliation is: [employer]. The reason the Patient came to the hospital is:   Patient Active Problem List    Diagnosis Date Noted    Oral thrush 05/21/2017    Hypokalemia 05/19/2017    Bacteremia due to methicillin resistant Staphylococcus aureus 05/19/2017    Acute respiratory distress 05/13/2017    Hypoxia 05/13/2017    HCAP (healthcare-associated pneumonia) 05/13/2017    Chest pain 02/14/2017    Anxiety 02/14/2017    Sinus tachycardia 02/14/2017    PNA (pneumonia) 02/13/2017    Toxic metabolic encephalopathy 63/39/6459    Acute renal failure (ARF) ([address] 02/11/2017    Hyperkalemia 02/11/2017    Acute encephalopathy 02/10/2017    Acute diastolic CHF (congestive heart failure) ([address] 11/28/2016    COPD (chronic obstructive pulmonary disease) ([address] 11/25/2016    Acidosis 11/25/2016    COPD exacerbation ([address] 11/25/2016    Acute on chronic respiratory failure ([address] 11/25/2016    Obesity 11/25/2016    Benign hypertension 11/25/2016    GERD (gastroesophageal reflux disease) 11/25/2016    DDD (degenerative disc disease), cervical 08/27/2013    H/O cervical spine surgery 08/27/2013    Cataract 05/15/2013        Initiated a relationship of care and support. Provided information about Spiritual Care Services. Offered prayer and assurance of continued prayers on patients behalf. Plan:  Chaplains will continue to follow and will provide pastoral care as needed or requested.  recommends bedside caregivers page  on duty if patient shows signs of acute spiritual or emotional distress. Father CRISTI Lazarhver[address]athan [address] - Office

## 2017-05-23 NOTE — PROGRESS NOTES
Hospitalist Progress Note-critical care note     Patient: Jenniffer Wilkes MRN: [id number]  CSN: [id number]    YOB: 1945  Age: 80 y.o. Sex: female    DOA: 5/13/2017 LOS:  LOS: 9 days            Chief complaint: respiratroy distress with hypoxia, copd exacerbation, acute chf. bacteremia     Assessment/Plan         Patient Active Problem List   Diagnosis Code    Cataract H26.9    DDD (degenerative disc disease), cervical M50.30    H/O cervical spine surgery Z98.890    COPD (chronic obstructive pulmonary disease) ([facility] J44.9    Acidosis E87.2    COPD exacerbation ([address] J44.1    Acute on chronic respiratory failure ([facility]) J96.20    Obesity E66.9    Benign hypertension I10    GERD (gastroesophageal reflux disease) K21.9    Acute diastolic CHF (congestive heart failure) ([facility]) I50.31    Acute encephalopathy G93.40    Toxic metabolic encephalopathy X37    Acute renal failure (ARF) ([facility]) N17.9    Hyperkalemia E87.5    PNA (pneumonia) J18.9    Chest pain R07.9    Anxiety F41.9    Sinus tachycardia R00.0    Acute respiratory distress R06.00    Hypoxia R09.02    HCAP (healthcare-associated pneumonia) J18.9    Hypokalemia E87.6    Bacteremia due to methicillin resistant Staphylococcus aureus R78.81    Oral thrush B37.0     1. Acute Respiratory Distress with Hypoxia; Stable, used home O2, not sure the setting  - Continue weaning off nc O2, on 4 L and continue to wean off   -ct chest: consolidation/edema/Multifocal groundglass opacities/consolidative alveolar opacities     2. Bacteremia- MRSA   Will have PICC line today   Appreciated id input   No growth from  5/19   Continue vanc  -TTE done and no vegetation or thrombus noted. 3. . Acute Moderate Exacerbation of Mod Persistent COPD; Improving very slowly   -On breathing tx and iv steroid, abx . Will scheduled breathing tx-duo neb   Appreciated pulm input. 3. HCAP -MRSA   On vanc now     4.  Mild Acute Diastolic CHF exacerbation stage III  On diuretics,  Ef wnl   6. Hypokalemia   K replacement   7. Atypical Chest Pain; likely non cardiac   No chest pain now. Trop wnl.     8. GERD  ppi   9. HTN  Well controlled,  10. Obesity BMI >35  11. Cervical DDD  Continue home pain management   12. Cataract   13. Nasal MRSA  14 anxiety  xanax prn   15 oral thrush   Due to steroid, nystatin       Subjective: had a good night   Nurse: no acute issue          Review of systems:    General: No fevers or chills. Cardiovascular: No chest pain or pressure. No palpitations. Pulmonary: shortness of breath and cough better   Gastrointestinal: No nausea, vomiting. Vital signs/Intake and Output:  Visit Vitals    /80 (BP 1 Location: Left arm, BP Patient Position: At rest)    Pulse 85    Temp 98 °F (36.7 °C)    Resp 18    Ht 5' (1.524 m)    Wt 95.2 kg (209 lb 14.1 oz)    SpO2 100%    BMI 40.99 kg/m2     Current Shift:  05/23 0701 - 05/23 1900  In: -   Out: 600 [Urine:600]  Last three shifts:  05/21 1901 - 05/23 0700  In: 680 [P.O.:680]  Out: 0     Physical Exam:  General: WD, WN. Alert, cooperative, no acute distress    HEENT: NC, Atraumatic. PERRLA, anicteric sclerae. Nose: oral thrush noted    Lungs: Coarse BS and rhonchi, no  wheezing   Heart:  Regular  rhythm,  No murmur, No Rubs, No Gallops  Abdomen: Soft, Non distended, Non tender.  +Bowel sounds,   Extremities: No c/c/e  Psych:   no Anxious,no  agitated. Neurologic:  No acute neurological deficit.              Labs: Results:       Chemistry Recent Labs      05/23/17 0559 05/22/17   0130  05/21/17   0213   GLU  200*  141*  152*   NA  136  139  140   K  4.9  4.3  3.9   CL  102  103  105   CO2  27  29  28   BUN  34*  35*  39*   CREA  0.87  0.87  0.87   CA  8.8  8.8  8.8   AGAP  7  7  7   BUCR  39*  40*  45*      CBC w/Diff Recent Labs      05/23/17   0559  05/22/17   0130  05/21/17   0213   WBC  11.7  16.3*  17.4*   RBC  3.65*  3.69*  3.65*   HGB  11.1*  11.5*  11.3*   HCT  33.4*  33.2* 32.8*   PLT  239  292  326      Cardiac Enzymes No results for input(s): CPK, CKND1, ADILSON in the last 72 hours. No lab exists for component: CKRMB, TROIP   Coagulation No results for input(s): PTP, INR, APTT in the last 72 hours. No lab exists for component: INREXT, INREXT    Lipid Panel No results found for: CHOL, CHOLPOCT, CHOLX, CHLST, CHOLV, [id number], HDL, LDL, NLDLCT, DLDL, LDLC, DLDLP, [id number], VLDLC, VLDL, TGL, TGLX, TRIGL, [id number], TRIGP, TGLPOCT, [id number], CHHD, CHHDX   BNP No results for input(s): BNPP in the last 72 hours. Liver Enzymes No results for input(s): TP, ALB, TBIL, AP, SGOT, GPT in the last 72 hours.     No lab exists for component: DBIL   Thyroid Studies Lab Results   Component Value Date/Time    TSH 0.04 11/26/2016 05:39 AM        Procedures/imaging: see electronic medical records for all procedures/Xrays and details which were not copied into this note but were reviewed prior to creation of Shanique Turner MD

## 2017-05-23 NOTE — PROGRESS NOTES
Attempted PT at this time, Per Nurse Andrea Driver, pt's PICC Line is bleeding, will attempt PT treatment when schedule allows.

## 2017-05-23 NOTE — PROGRESS NOTES
Patient was visited by IRIS. Volunteer followed up with patient and/or family and reported no needs to this . Chaplains will continue to follow and will provide pastoral care as needed or requested.     [address] [place] [address] Shira N[address].  [phone] - Office

## 2017-05-23 NOTE — PROGRESS NOTES
1713 Skin assessment completed due to lavonne score of 18. Patient does have skin tear to the buttock fold, applied proshield and area will remain open to air.  Primary nurse notified of findings, will continue to monitor during this admission

## 2017-05-23 NOTE — PROGRESS NOTES
0800 Assumed Pt care. Pt alert and oriented. Denied any pain. 0930 Pt scheduled for PICC line, Angio called , will soon send for pt.    1100 pt picked up via bed for PICC line insertion, consent to be obtained in Angio. 1200 Pt back to room, PICC placed to [facility], same patent  and ready for use. 26 Pt requested percocet for knee and Ha pain 8/10 and xanax for anxiety, same adm as per MAR  1300 [pain and anxiety relieved. 1445 PICC site noted heavily bleeding, pressure applied ,angio called and came to see pt and dressing changed, lumen flushed and new dressing in place,. Bleeding stopped. 1600 PICC line noted re bleeding again, pressure applied. Angio called, no answer. Cath lab called and requested a quick clot hemostatic dressing, cath lab tech came and helped out , dressing changed and bleeding stopped. 1700 PICC line site remain dry and intact, hep flushed as scheduled. 1900 In bed resting, Spent fair evening. No acute events.

## 2017-05-23 NOTE — PROGRESS NOTES
Palliative Medicine Progress Note  [facility]: [phone]  [facility] [phone]   [facility]: [phone]    Patient Name: Narinder Crisostomo  YOB: 1945    Date of Initial Consult: 5/17/17  Reason for Consult: care decisions  Requesting Provider: Dr. Ashly Drew   Primary Care Physician: Mayuri Quintero MD      SUMMARY:   Narinder Crisostomo is a 80y.o. year old with a past history of advanced chronic lung disease, HTN, diastolic CHF, and chronic debilitating back and neck pain admitted for third time from [facility] since Nov 2016 for COPD exacerbation/CHF. Has been [facility] resident of [place] for about a year. Only son travels and combination of spinal and lung disease rendered her unable to care for self. Chair and bed bound, O2 dependent. Course this time has been protracted w/ transfer to ICU and HFNC on 5/15. Now back on 5L. 5/18/17: Resting comfortably, easily aroused. Pain improved and rested well. Has not spoken with son since meeting yesterday. 5/23/17: Feeling better. Getting a PICC before return to SNF. Some CP w/ cough, no SOB.      PALLIATIVE DIAGNOSES:     Patient Active Problem List   Diagnosis Code    Cataract H26.9    DDD (degenerative disc disease), cervical M50.30    H/O cervical spine surgery Z98.890    COPD (chronic obstructive pulmonary disease) ([facility] [address] J44.9    Acidosis E87.2    COPD exacerbation ([facility] [address] J44.1    Acute on chronic respiratory failure (HCC) J96.20    Obesity E66.9    Benign hypertension I10    GERD (gastroesophageal reflux disease) K21.9    Acute diastolic CHF (congestive heart failure) ([facility]) I50.31    Acute encephalopathy G93.40    Toxic metabolic encephalopathy F63    Acute renal failure (ARF) ([facility]) N17.9    Hyperkalemia E87.5    PNA (pneumonia) J18.9    Chest pain R07.9    Anxiety F41.9    Sinus tachycardia R00.0    Acute respiratory distress R06.00    Hypoxia R09.02    HCAP (healthcare-associated pneumonia) J18.9    Hypokalemia E87.6    Bacteremia due to methicillin resistant Staphylococcus aureus R78.81    Oral thrush B37.0     1. PLAN:   1. Met with patient who was alert, oriented and pleasant, very [place]. She conveyed her sense of progressive decline in function and QOL over the last year and that she has been thinking for some time that she would not want CPR and that her goals for care would be improved comfort and to not keep returning to the hospital every few months. She would not wish to be intubated. She has not discussed this with her son who she feels would want her to continue the most aggressive care including CPR. We discussed that these decisions are really hers and that his role is to ensure her care wishes are respected. She asked to complete a DDNR and [facility] AMD and these were executed. Briefly discussed developing a comfort care plan for return to NH rather than d/c to SNF, but recommended that we include her son in these discussion and allow him to hear her wishes first hand. A comfort care plan in the NH setting would need to include hospice. Questions answered. Agree with use of oxycodone/apap as breakthrough agent with fentanyl patch. Recommend bowel agent. 5/18/17: Left message with son w/o call back so far. Will try and arrange meeting including him to discuss care plan for d/c. Patient interested in plan focused on comfort with avoidance of rehospitalization. 5/23/17: Reviewed reasons she opted for DDNR. She confirms this and DNI status. 2. Initial consult note routed to primary continuity provider  3. Communicated plan of care with: Palliative IDT        GOALS OF CARE:   Comfort, no intubation, DNR. Discharge plan to be discussed w/ son.     Advance Care Planning 5/17/2017   Patient's Healthcare Decision Maker is: Named in scanned ACP document   Primary Decision Maker Name Lindsey Finn   Primary Decision Maker Phone Number [phone] Primary Decision Maker Relationship to Patient Adult child   Confirm Advance Directive Yes, on file   Does the patient have other document types Do Not Resuscitate           HISTORY:     History obtained from: patient    CHIEF COMPLAINT: see summary    HPI/SUBJECTIVE:    The patient is:   [x] Verbal and participatory  [] Non-participatory due to:        Clinical Pain Assessment (nonverbal scale for nonverbal patients): Pain: 2         FUNCTIONAL ASSESSMENT:     Palliative Performance Scale (PPS):  PPS: 40       PSYCHOSOCIAL/SPIRITUAL SCREENING:     Advance Care Planning:  Advance Care Planning 2017   Patient's Healthcare Decision Maker is: Named in scanned ACP document   Primary Decision Maker Name Tiesha Montalvo   Primary Decision Maker Phone Number [phone]   Primary Decision Maker Relationship to Patient Adult child   Confirm Advance Directive Yes, on file   Does the patient have other document types Do Not Resuscitate        Any spiritual / Congregational concerns:  [] Yes /  [x] No    Caregiver Burnout:  [] Yes /  [] No /  [x] No Caregiver Present      Anticipatory grief assessment:   [x] Normal  / [] Maladaptive       ESAS Anxiety: Anxiety: 6    ESAS Depression: Depression: 3        REVIEW OF SYSTEMS:     Positive and pertinent negative findings in ROS are noted above in HPI. The following systems were [x] reviewed / [] unable to be reviewed as noted in HPI  Other findings are noted below. Systems: constitutional, ears/nose/mouth/throat, respiratory, gastrointestinal, genitourinary, musculoskeletal, integumentary, neurologic, psychiatric, endocrine. Positive findings noted below.   Modified ESAS Completed by: provider   Fatigue: 9 Drowsiness: 9   Depression: 3 Pain: 2   Anxiety: 6 Nausea: 0   Anorexia: 0 Dyspnea: 8   Best Well-Bein Constipation: No   Other Problem (Comment): 0 Stool Occurrence(s): 1        PHYSICAL EXAM:     Wt Readings from Last 3 Encounters:   17 95.2 kg (209 lb 14.1 oz) 02/15/17 97.3 kg (214 lb 8 oz)   11/30/16 93.1 kg (205 lb 4.8 oz)     Blood pressure 157/82, pulse 76, temperature 97.7 °F (36.5 °C), resp. rate 18, height 5' (1.524 m), weight 95.2 kg (209 lb 14.1 oz), SpO2 97 %.   Pain:  Pain Scale 1: Numeric (0 - 10)  Pain Intensity 1: 0  Pain Onset 1: chronic  Pain Location 1: Knee, Leg  Pain Orientation 1: Left  Pain Description 1: Aching  Pain Intervention(s) 1: Medication (see MAR)  Last bowel movement:     Constitutional: cushingoid, NAD  Eyes: pupils equal, anicteric  ENMT: no nasal discharge, moist mucous membranes  Cardiovascular: regular rhythm, distal pulses intact  Respiratory: breathing mildly labored, symmetric diminished BS bilat  Gastrointestinal: soft non-tender, +bowel sounds  Musculoskeletal: no deformity, no tenderness to palpation  Skin: warm, dry  Neurologic: following commands, moving all extremities  Psychiatric: full affect, no hallucinations  Other:       HISTORY:     Principal Problem:    Acute respiratory distress (5/13/2017)    Active Problems:    Cataract (5/15/2013)      DDD (degenerative disc disease), cervical (8/27/2013)      H/O cervical spine surgery (8/27/2013)      COPD exacerbation (HCC) (11/25/2016)      Acute on chronic respiratory failure ([facility] [address] (11/25/2016)      Obesity (11/25/2016)      Benign hypertension (11/25/2016)      GERD (gastroesophageal reflux disease) (11/25/2016)      Acute diastolic CHF (congestive heart failure) ([facility] [address] (11/28/2016)      Chest pain (2/14/2017)      Hypoxia (5/13/2017)      HCAP (healthcare-associated pneumonia) (5/13/2017)      Hypokalemia (5/19/2017)      Bacteremia due to methicillin resistant Staphylococcus aureus (5/19/2017)      Oral thrush (5/21/2017)      Past Medical History:   Diagnosis Date    Arthritis     Asthma     CAD (coronary artery disease)     angina, last episode 06/2012    Chronic bronchitis (HCC)     Chronic kidney disease     stage 3    Chronic obstructive pulmonary disease ([facility] [address]  Chronic pain     cervical, lumbar, knees, ankles, elbows    Fibromyalgia     GERD (gastroesophageal reflux disease)     Hypertension 1993    Psychiatric disorder     anxiety, depression    Thyroid disease       Past Surgical History:   Procedure Laterality Date    HX ADENOIDECTOMY      HX APPENDECTOMY      HX CATARACT REMOVAL      OU    HX CERVICAL FUSION      anterior x 2    HX CERVICAL FUSION      posterior x 3    HX CERVICAL FUSION  1996    Removal of titanium plate and screws    HX CHOLECYSTECTOMY      HX HYSTERECTOMY      HX LITHOTRIPSY      x 4    HX OOPHORECTOMY      left    HX ORTHOPAEDIC      cervical  x5    HX SMALL BOWEL RESECTION      HX TONSILLECTOMY      HX UROLOGICAL  1984 and 1985    kidney stone surgery      History reviewed. No pertinent family history. History reviewed, no pertinent family history. Social History   Substance Use Topics    Smoking status: Never Smoker    Smokeless tobacco: Never Used    Alcohol use No     Allergies   Allergen Reactions    Ambien [Zolpidem] Other (comments)     Sleep drive    Aspirin Other (comments)     bruising    Codeine Hives    Darvon [Propoxyphene] Unknown (comments)    Iodinated Contrast Media - Oral And Iv Dye Hives     Iodine on skin is ok per pt.  Pcn [Penicillins] Nausea and Vomiting    Shellfish Derived Hives, Shortness of Breath and Swelling     Iodine on skin is ok per pt.     Zanaflex [Tizanidine] Other (comments)     disorientated    Levaquin [Levofloxacin] Hives     Red rash at IV site while infusing      Current Facility-Administered Medications   Medication Dose Route Frequency    albuterol-ipratropium (DUO-NEB) 2.5 MG-0.5 MG/3 ML  3 mL Nebulization Q6H RT    furosemide (LASIX) tablet 40 mg  40 mg Oral DAILY    dronabinol (MARINOL) capsule 2.5 mg  2.5 mg Oral ACB&D    Electrolyte Replacement Protocol - Potassium Renal Dosing  1 Each Other PRN    Electrolyte Replacement Protocol - Magnesium   1 Each Other PRN    umeclidinium (INCRUSE ELLIPTA) 62.5 mcg/actuation  1 Puff Inhalation DAILY    mupirocin (BACTROBAN) 2 % ointment   Both Nostrils BID    loratadine (CLARITIN) tablet 10 mg  10 mg Oral DAILY    montelukast (SINGULAIR) tablet 10 mg  10 mg Oral DAILY    DULoxetine (CYMBALTA) capsule 60 mg  60 mg Oral DAILY    fluticasone-vilanterol (BREO ELLIPTA) 200mcg-25mcg/puff  1 Puff Inhalation DAILY    nitroglycerin (NITROSTAT) tablet 0.4 mg  0.4 mg SubLINGual Q5MIN PRN    aspirin delayed-release tablet 81 mg  81 mg Oral DAILY    atenolol (TENORMIN) tablet 25 mg  25 mg Oral DAILY    guaiFENesin ER (MUCINEX) tablet 600 mg  600 mg Oral Q12H    levothyroxine (SYNTHROID) tablet 225 mcg  225 mcg Oral ACB    pregabalin (LYRICA) capsule 100 mg  100 mg Oral TID    fluticasone (FLONASE) 50 mcg/actuation nasal spray 1 Spray  1 Spray Both Nostrils DAILY    cyanocobalamin (VITAMIN B12) injection 1,000 mcg  1,000 mcg IntraMUSCular EVERY MONTH    acetaminophen (TYLENOL) tablet 650 mg  650 mg Oral Q6H PRN    cholestyramine (QUESTRAN) packet 4 g  4 g Oral DAILY PRN    nystatin (MYCOSTATIN) 100,000 unit/gram powder   Topical BID    omeprazole (PRILOSEC) capsule 20 mg  20 mg Oral DAILY    zinc oxide 20 % ointment   Topical BID    alum-mag hydroxide-simeth (MYLANTA) oral suspension 15 mL  15 mL Oral QID PRN    multivitamin, tx-iron-ca-min (THERA-M w/ IRON) tablet 1 Tab  1 Tab Oral DAILY    lisinopril (PRINIVIL, ZESTRIL) tablet 20 mg  20 mg Oral DAILY    fentaNYL (DURAGESIC) 25 mcg/hr patch 1 Patch  1 Patch TransDERmal Q72H    sodium chloride (NS) flush 5-10 mL  5-10 mL IntraVENous Q8H    sodium chloride (NS) flush 5-10 mL  5-10 mL IntraVENous PRN    heparin (porcine) injection 5,000 Units  5,000 Units SubCUTAneous Q8H    insulin lispro (HUMALOG) injection   SubCUTAneous AC&HS    glucose chewable tablet 16 g  16 g Oral PRN    glucagon (GLUCAGEN) injection 1 mg  1 mg IntraMUSCular PRN    dextrose (D50W) injection syrg 12.5-25 g  25-50 mL IntraVENous PRN    oxyCODONE-acetaminophen (PERCOCET) 5-325 mg per tablet 1 Tab  1 Tab Oral Q6H PRN    methylPREDNISolone (PF) (SOLU-MEDROL) injection 40 mg  40 mg IntraVENous Q8H    influenza vaccine 2016-17 (36mos+)(PF) (FLUZONE/FLUARIX/FLULAVAL QUAD) injection 0.5 mL  0.5 mL IntraMUSCular PRIOR TO DISCHARGE    diphenhydrAMINE (BENADRYL) 12.5 mg/5 mL oral elixir 12.5 mg  12.5 mg Oral Q6H PRN    guaiFENesin-codeine (ROBITUSSIN AC) 100-10 mg/5 mL solution 10 mL  10 mL Oral Q4H PRN    benzocaine-menthol (CEPACOL) lozenge 1 Lozenge  1 Lozenge Mucous Membrane PRN    ALPRAZolam (XANAX) tablet 1 mg  1 mg Oral TID PRN    Vancomycin Pharmacokinetic Dosing  1 Each Other Rx Dosing/Monitoring    vancomycin (VANCOCIN) 1,250 mg in 0.9% sodium chloride 250 mL IVPB  1,250 mg IntraVENous Q24H        LAB AND IMAGING FINDINGS:     Lab Results   Component Value Date/Time    WBC 11.7 05/23/2017 05:59 AM    HGB 11.1 05/23/2017 05:59 AM    PLATELET 321 81/53/3017 05:59 AM     Lab Results   Component Value Date/Time    Sodium 136 05/23/2017 05:59 AM    Potassium 4.9 05/23/2017 05:59 AM    Chloride 102 05/23/2017 05:59 AM    CO2 27 05/23/2017 05:59 AM    BUN 34 05/23/2017 05:59 AM    Creatinine 0.87 05/23/2017 05:59 AM    Calcium 8.8 05/23/2017 05:59 AM    Magnesium 2.3 05/23/2017 05:59 AM      Lab Results   Component Value Date/Time    AST (SGOT) 46 05/15/2017 05:50 AM    Alk.  phosphatase 58 05/15/2017 05:50 AM    Protein, total 7.2 05/15/2017 05:50 AM    Albumin 3.0 05/15/2017 05:50 AM    Globulin 4.2 05/15/2017 05:50 AM     Lab Results   Component Value Date/Time    INR 1.1 05/13/2017 08:00 PM    Prothrombin time 13.7 05/13/2017 08:00 PM    aPTT 29.9 05/13/2017 08:00 PM      No results found for: IRON, FE, TIBC, IBCT, PSAT, FERR   No results found for: PH, PCO2, PO2  No components found for: [place]   Lab Results   Component Value Date/Time     05/14/2017 07:40 AM    CK - MB 2.2 05/14/2017 07:40 AM Total time: 15 min  Counseling / coordination time, spent as noted above: 11 min  > 50% counseling / coordination        Rinku Perry MD  Palliative Medicine

## 2017-05-23 NOTE — ROUTINE PROCESS
Bedside and Verbal shift change report given to Eugenio Zuniga RN (oncoming nurse) by Joseph Pool RN  (offgoing nurse).  Report included the following informatiSRSBAR, Kardex, Intake/Output, MAR, Recent Results, Med Rec Status and Cardiac Rhythm SR.

## 2017-05-24 LAB
ANION GAP BLD CALC-SCNC: 6 MMOL/L (ref 3–18)
BUN SERPL-MCNC: 40 MG/DL (ref 7–18)
BUN/CREAT SERPL: 43 (ref 12–20)
CALCIUM SERPL-MCNC: 8.6 MG/DL (ref 8.5–10.1)
CHLORIDE SERPL-SCNC: 102 MMOL/L (ref 100–108)
CO2 SERPL-SCNC: 29 MMOL/L (ref 21–32)
CREAT SERPL-MCNC: 0.92 MG/DL (ref 0.6–1.3)
ERYTHROCYTE [DISTWIDTH] IN BLOOD BY AUTOMATED COUNT: 13.1 % (ref 11.6–14.5)
EST. AVERAGE GLUCOSE BLD GHB EST-MCNC: 128 MG/DL
GLUCOSE BLD STRIP.AUTO-MCNC: 123 MG/DL (ref 70–110)
GLUCOSE BLD STRIP.AUTO-MCNC: 156 MG/DL (ref 70–110)
GLUCOSE BLD STRIP.AUTO-MCNC: 167 MG/DL (ref 70–110)
GLUCOSE BLD STRIP.AUTO-MCNC: 229 MG/DL (ref 70–110)
GLUCOSE SERPL-MCNC: 180 MG/DL (ref 74–99)
HBA1C MFR BLD: 6.1 % (ref 4.5–5.6)
HCT VFR BLD AUTO: 32.5 % (ref 35–45)
HGB BLD-MCNC: 10.8 G/DL (ref 12–16)
MAGNESIUM SERPL-MCNC: 2.2 MG/DL (ref 1.6–2.6)
MCH RBC QN AUTO: 30.5 PG (ref 24–34)
MCHC RBC AUTO-ENTMCNC: 33.2 G/DL (ref 31–37)
MCV RBC AUTO: 91.8 FL (ref 74–97)
PLATELET # BLD AUTO: 251 K/UL (ref 135–420)
PMV BLD AUTO: 11 FL (ref 9.2–11.8)
POTASSIUM SERPL-SCNC: 4.7 MMOL/L (ref 3.5–5.5)
RBC # BLD AUTO: 3.54 M/UL (ref 4.2–5.3)
SODIUM SERPL-SCNC: 137 MMOL/L (ref 136–145)
WBC # BLD AUTO: 9.8 K/UL (ref 4.6–13.2)

## 2017-05-24 PROCEDURE — 74011636637 HC RX REV CODE- 636/637: Performed by: INTERNAL MEDICINE

## 2017-05-24 PROCEDURE — 74011250636 HC RX REV CODE- 250/636: Performed by: HOSPITALIST

## 2017-05-24 PROCEDURE — 97116 GAIT TRAINING THERAPY: CPT

## 2017-05-24 PROCEDURE — 77010033678 HC OXYGEN DAILY

## 2017-05-24 PROCEDURE — 74011000250 HC RX REV CODE- 250: Performed by: HOSPITALIST

## 2017-05-24 PROCEDURE — 85027 COMPLETE CBC AUTOMATED: CPT | Performed by: INTERNAL MEDICINE

## 2017-05-24 PROCEDURE — 94640 AIRWAY INHALATION TREATMENT: CPT

## 2017-05-24 PROCEDURE — 74011250637 HC RX REV CODE- 250/637: Performed by: INTERNAL MEDICINE

## 2017-05-24 PROCEDURE — 74011250636 HC RX REV CODE- 250/636: Performed by: INTERNAL MEDICINE

## 2017-05-24 PROCEDURE — 80048 BASIC METABOLIC PNL TOTAL CA: CPT | Performed by: INTERNAL MEDICINE

## 2017-05-24 PROCEDURE — 65660000000 HC RM CCU STEPDOWN

## 2017-05-24 PROCEDURE — 82962 GLUCOSE BLOOD TEST: CPT

## 2017-05-24 PROCEDURE — 83735 ASSAY OF MAGNESIUM: CPT | Performed by: INTERNAL MEDICINE

## 2017-05-24 PROCEDURE — 97530 THERAPEUTIC ACTIVITIES: CPT

## 2017-05-24 PROCEDURE — 94760 N-INVAS EAR/PLS OXIMETRY 1: CPT

## 2017-05-24 PROCEDURE — 83036 HEMOGLOBIN GLYCOSYLATED A1C: CPT | Performed by: INTERNAL MEDICINE

## 2017-05-24 RX ORDER — DRONABINOL 2.5 MG/1
2.5 CAPSULE ORAL
Qty: 60 CAP | Refills: 0 | Status: ON HOLD | OUTPATIENT
Start: 2017-05-24 | End: 2021-06-17 | Stop reason: ALTCHOICE

## 2017-05-24 RX ORDER — NYSTATIN 100000 [USP'U]/ML
500000 SUSPENSION ORAL 4 TIMES DAILY
Qty: 60 ML | Refills: 0 | Status: SHIPPED | OUTPATIENT
Start: 2017-05-24 | End: 2017-05-27

## 2017-05-24 RX ORDER — FENTANYL 25 UG/1
1 PATCH TRANSDERMAL
Qty: 5 PATCH | Refills: 0 | Status: ON HOLD | OUTPATIENT
Start: 2017-05-24 | End: 2021-06-17 | Stop reason: ALTCHOICE

## 2017-05-24 RX ADMIN — VANCOMYCIN HYDROCHLORIDE 1500 MG: 10 INJECTION, POWDER, LYOPHILIZED, FOR SOLUTION INTRAVENOUS at 21:33

## 2017-05-24 RX ADMIN — HEPARIN SODIUM 5000 UNITS: 5000 INJECTION, SOLUTION INTRAVENOUS; SUBCUTANEOUS at 15:57

## 2017-05-24 RX ADMIN — IPRATROPIUM BROMIDE AND ALBUTEROL SULFATE 3 ML: .5; 3 SOLUTION RESPIRATORY (INHALATION) at 19:55

## 2017-05-24 RX ADMIN — HEPARIN SODIUM 5000 UNITS: 5000 INJECTION, SOLUTION INTRAVENOUS; SUBCUTANEOUS at 08:52

## 2017-05-24 RX ADMIN — FLUTICASONE PROPIONATE 1 SPRAY: 50 SPRAY, METERED NASAL at 09:00

## 2017-05-24 RX ADMIN — IPRATROPIUM BROMIDE AND ALBUTEROL SULFATE 3 ML: .5; 3 SOLUTION RESPIRATORY (INHALATION) at 14:29

## 2017-05-24 RX ADMIN — LORATADINE 10 MG: 10 TABLET ORAL at 08:51

## 2017-05-24 RX ADMIN — LEVOTHYROXINE SODIUM 225 MCG: 150 TABLET ORAL at 06:34

## 2017-05-24 RX ADMIN — MUPIROCIN: 20 OINTMENT TOPICAL at 08:54

## 2017-05-24 RX ADMIN — NYSTATIN 500000 UNITS: 100000 SUSPENSION ORAL at 16:49

## 2017-05-24 RX ADMIN — INSULIN LISPRO 2 UNITS: 100 INJECTION, SOLUTION INTRAVENOUS; SUBCUTANEOUS at 06:35

## 2017-05-24 RX ADMIN — Medication 10 ML: at 06:35

## 2017-05-24 RX ADMIN — INSULIN LISPRO 4 UNITS: 100 INJECTION, SOLUTION INTRAVENOUS; SUBCUTANEOUS at 16:48

## 2017-05-24 RX ADMIN — DULOXETINE 60 MG: 60 CAPSULE, DELAYED RELEASE ORAL at 08:51

## 2017-05-24 RX ADMIN — GUAIFENESIN 600 MG: 600 TABLET, EXTENDED RELEASE ORAL at 08:51

## 2017-05-24 RX ADMIN — DRONABINOL 2.5 MG: 2.5 CAPSULE ORAL at 12:59

## 2017-05-24 RX ADMIN — HEPARIN SODIUM (PORCINE) LOCK FLUSH IV SOLN 100 UNIT/ML 300 UNITS: 100 SOLUTION at 15:57

## 2017-05-24 RX ADMIN — NYSTATIN 500000 UNITS: 100000 SUSPENSION ORAL at 21:32

## 2017-05-24 RX ADMIN — PREGABALIN 100 MG: 100 CAPSULE ORAL at 08:51

## 2017-05-24 RX ADMIN — ALUMINUM HYDROXIDE, MAGNESIUM HYDROXIDE, AND SIMETHICONE 15 ML: 200; 200; 20 SUSPENSION ORAL at 15:56

## 2017-05-24 RX ADMIN — ALPRAZOLAM 1 MG: 0.5 TABLET ORAL at 08:50

## 2017-05-24 RX ADMIN — NYSTATIN 500000 UNITS: 100000 SUSPENSION ORAL at 08:50

## 2017-05-24 RX ADMIN — METHYLPREDNISOLONE SODIUM SUCCINATE 40 MG: 40 INJECTION, POWDER, FOR SOLUTION INTRAMUSCULAR; INTRAVENOUS at 00:29

## 2017-05-24 RX ADMIN — METHYLPREDNISOLONE SODIUM SUCCINATE 40 MG: 40 INJECTION, POWDER, FOR SOLUTION INTRAMUSCULAR; INTRAVENOUS at 08:52

## 2017-05-24 RX ADMIN — HEPARIN SODIUM (PORCINE) LOCK FLUSH IV SOLN 100 UNIT/ML 300 UNITS: 100 SOLUTION at 21:34

## 2017-05-24 RX ADMIN — OMEPRAZOLE 20 MG: 20 CAPSULE, DELAYED RELEASE ORAL at 08:51

## 2017-05-24 RX ADMIN — PREGABALIN 100 MG: 100 CAPSULE ORAL at 15:56

## 2017-05-24 RX ADMIN — IPRATROPIUM BROMIDE AND ALBUTEROL SULFATE 3 ML: .5; 3 SOLUTION RESPIRATORY (INHALATION) at 00:31

## 2017-05-24 RX ADMIN — OXYCODONE HYDROCHLORIDE AND ACETAMINOPHEN 1 TABLET: 5; 325 TABLET ORAL at 16:36

## 2017-05-24 RX ADMIN — OXYCODONE HYDROCHLORIDE AND ACETAMINOPHEN 1 TABLET: 5; 325 TABLET ORAL at 08:50

## 2017-05-24 RX ADMIN — PREGABALIN 100 MG: 100 CAPSULE ORAL at 21:32

## 2017-05-24 RX ADMIN — HEPARIN SODIUM 5000 UNITS: 5000 INJECTION, SOLUTION INTRAVENOUS; SUBCUTANEOUS at 00:29

## 2017-05-24 RX ADMIN — NYSTATIN 500000 UNITS: 100000 SUSPENSION ORAL at 12:58

## 2017-05-24 RX ADMIN — ZINC OXIDE: 200 OINTMENT TOPICAL at 21:37

## 2017-05-24 RX ADMIN — FLUTICASONE FUROATE AND VILANTEROL TRIFENATATE 1 PUFF: 200; 25 POWDER RESPIRATORY (INHALATION) at 09:01

## 2017-05-24 RX ADMIN — MULTIPLE VITAMINS W/ MINERALS TAB 1 TABLET: TAB at 08:51

## 2017-05-24 RX ADMIN — LISINOPRIL 20 MG: 20 TABLET ORAL at 08:51

## 2017-05-24 RX ADMIN — ALPRAZOLAM 1 MG: 0.5 TABLET ORAL at 16:36

## 2017-05-24 RX ADMIN — ASPIRIN 81 MG: 81 TABLET, COATED ORAL at 08:51

## 2017-05-24 RX ADMIN — ZINC OXIDE: 200 OINTMENT TOPICAL at 08:53

## 2017-05-24 RX ADMIN — Medication 10 ML: at 21:36

## 2017-05-24 RX ADMIN — MUPIROCIN: 20 OINTMENT TOPICAL at 21:35

## 2017-05-24 RX ADMIN — ATENOLOL 25 MG: 25 TABLET ORAL at 08:51

## 2017-05-24 RX ADMIN — HEPARIN SODIUM (PORCINE) LOCK FLUSH IV SOLN 100 UNIT/ML 300 UNITS: 100 SOLUTION at 06:11

## 2017-05-24 RX ADMIN — GUAIFENESIN 600 MG: 600 TABLET, EXTENDED RELEASE ORAL at 21:32

## 2017-05-24 RX ADMIN — FUROSEMIDE 40 MG: 40 TABLET ORAL at 08:51

## 2017-05-24 RX ADMIN — UMECLIDINIUM 1 PUFF: 62.5 AEROSOL, POWDER ORAL at 08:52

## 2017-05-24 RX ADMIN — MONTELUKAST SODIUM 10 MG: 10 TABLET, FILM COATED ORAL at 08:51

## 2017-05-24 NOTE — PROGRESS NOTES
0700 Assumed pt care, pt is alert and oriented x4, SR on the monitor, lungs are coarse with expiratory wheezing on 3L nc, VSS. Pt is resting quietly in bed and denies any further needs at this time. 0900 Administered percocet po prn for chest tightness, and xanax po prn for anxiety. See MAR and doc flow sheet for additional information. 1000 Chest tightness resolved with percocet and xanax. Shift Summary- Pt experienced an uneventful shift. Awaiting to be transported to [facility]. tomorrow. Bedside and Verbal shift change report given to STEPHEN Leary (oncoming nurse) by Екатерина Weinstein   (offgoing nurse).  Report included the following information SBAR, Kardex, ED Summary, Procedure Summary, Intake/Output, MAR, Recent Results and Cardiac Rhythm SR.

## 2017-05-24 NOTE — PROGRESS NOTES
Daily Progress Note: 5/24/2017 12:45 PM   Admit Date: 5/13/2017    Patient seen in follow up for multiple medical problems as listed below:  Patient Active Problem List   Diagnosis Code    Cataract H26.9    DDD (degenerative disc disease), cervical M50.30    H/O cervical spine surgery Z98.890    COPD (chronic obstructive pulmonary disease) ([facility] [address] J44.9    Acidosis E87.2    COPD exacerbation ([facility] [address] J44.1    Acute on chronic respiratory failure ([facility] [address] J96.20    Obesity E66.9    Benign hypertension I10    GERD (gastroesophageal reflux disease) K21.9    Acute diastolic CHF (congestive heart failure) ([facility] I50.31    Acute encephalopathy G93.40    Toxic metabolic encephalopathy B73    Acute renal failure (ARF) ([facility]) N17.9    Hyperkalemia E87.5    PNA (pneumonia) J18.9    Chest pain R07.9    Anxiety F41.9    Sinus tachycardia R00.0    Acute respiratory distress R06.00    Hypoxia R09.02    HCAP (healthcare-associated pneumonia) J18.9    Hypokalemia E87.6    Bacteremia due to methicillin resistant Staphylococcus aureus R78.81    Oral thrush B37.0       Assesment     Acute Respiratory Distress with Hypoxia   Acute Moderate Exacerbation of Mod Persistent COPD; on home 3L O2  HCAP -MRSA  MRSA - bacteremia TTE no vegitations. Poor candidate for WILFREDO at this time  Acute Diastolic CHF exacerbation stage III  HypoMg  Atypical Chest Pain  GERD  HTN  Obesity BMI >35  Cervical DDD  Cataract    DVT Protocol Active: yes  Code Status:  DNR     Disposition: DC today when authorized/setup    Subjective:     CC: Respiratory Distress    Interval History: pt well. She is down to 4L normaly 3L at home.  She needs to get up and OOB and help with pulmonary toilet      Objective:     Visit Vitals    /68 (BP 1 Location: Left arm, BP Patient Position: At rest)    Pulse (!) 104    Temp 98 °F (36.7 °C)    Resp 18    Ht 5' (1.524 m)    Wt 99.4 kg (219 lb 2.2 oz)    SpO2 92%    BMI 42.8 kg/m2       Temp (24hrs), Av °F (36.7 °C), Min:97.3 °F (36.3 °C), Max:98.6 °F (37 °C)        Intake/Output Summary (Last 24 hours) at 17 1245  Last data filed at 17 0725   Gross per 24 hour   Intake              500 ml   Output             1250 ml   Net             -750 ml       Gen: AOx3, NAD  HEENT:  ALIDA, EOMI. Neck: No Bruits/JVD   Lungs:   Basilar course sounds, phlegm. Good respiratory effort  Heart:   RR S1 S2 without M/R/G  Abdomen: ND,NT, BSX4,   Extremities:   traceLE edema. No cyanosis.   Skin:  no jaundice/lesions      Data Review:     Meds/Labs/Tests reviewed    Current Shift:  701 - 1900  In: 500 [I.V.:500]  Out: -   Last three shifts:  1901 -  0700  In: 240 [P.O.:240]  Out: 1850 [Urine:1850]  Recent Labs      17   0630  17   0559  17   0130   WBC  9.8  11.7  16.3*   RBC  3.54*  3.65*  3.69*   HGB  10.8*  11.1*  11.5*   HCT  32.5*  33.4*  33.2*   PLT  251  239  292       Recent Labs      17   0630  17   0559  17   0130   BUN  40*  34*  35*   CREA  0.92  0.87  0.87   CA  8.6  8.8  8.8   K  4.7  4.9  4.3   NA  137  136  139   CL  102  102  103   CO2  29  27  29   GLU  180*  200*  141*        Lab Results   Component Value Date/Time    Glucose 180 2017 06:30 AM    Glucose 200 2017 05:59 AM    Glucose 141 2017 01:30 AM    Glucose 152 2017 02:13 AM    Glucose 138 2017 02:54 AM          Care coordination with Nursing/Consultants/staff: 10  Prior history, labs, and charting reviewed: 15    Procedures/Imaging:  No new    Total time spent with chart review, patient examination/education, discussion with staff on case,documentation and medication management / adjustment  :  30 Minutes      Dr Kaylie Rowan  Pager: [phone]

## 2017-05-24 NOTE — PROGRESS NOTES
Adrian Coker and Palliative MD Dr [facility] followed up with Ms Gardenia Montiel who stated how sleepy she is today.  offered compassion and listening support as patient woke up to share that her strong minoo and her son help her cope with her illness. 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 Ms Gardenia Montiel' face lit up as she added, \"seeing the twinkle in their eyes made me feel so good. \"    Adrian Coker offered words of assurance and prayer as Ms Gardenia Montiel looks forward to transitioning back to facility. Ms Gardenia Montiel expressed her gratitude for visit, support and prayer; no other needs were shared during this time. Chaplains will continue to follow up for support as needed/requested.     Tommy Mckenna, Palliative

## 2017-05-24 NOTE — PROGRESS NOTES
Palliative Medicine Progress Note  [facility]. [facility]: [phone] [id number]  [facility]: [phone]   [facility]: [phone]    Patient Name: Dwayne Negron  YOB: 1945    Date of Initial Consult: 5/17/17  Reason for Consult: care decisions  Requesting Provider: Dr. Rosas Innocent   Primary Care Physician: Judie Kraus MD      SUMMARY:   Dwayne Negron is a 80y.o. year old with a past history of advanced chronic lung disease, HTN, diastolic CHF, and chronic debilitating back and neck pain admitted for third time from [facility] since Nov 2016 for COPD exacerbation/CHF. Has been [facility] resident of [place] for about a year. Only son travels and combination of spinal and lung disease rendered her unable to care for self. Chair and bed bound, O2 dependent. Course this time has been protracted w/ transfer to ICU and [facility]NC on 5/15. Now back on 5L. 5/18/17: Resting comfortably, easily aroused. Pain improved and rested well. Has not spoken with son since meeting yesterday. 5/23/17: Feeling better. Getting a PICC before return to SNF. Some CP w/ cough, no SOB.    5/24/17: Congested cough but rested last night. Looks ready to return to [facility].       PALLIATIVE DIAGNOSES:     Patient Active Problem List   Diagnosis Code    Cataract H26.9    DDD (degenerative disc disease), cervical M50.30    H/O cervical spine surgery Z98.890    COPD (chronic obstructive pulmonary disease) ([facility] J44.9    Acidosis E87.2    COPD exacerbation ([facility] J44.1    Acute on chronic respiratory failure (HCC) J96.20    Obesity E66.9    Benign hypertension I10    GERD (gastroesophageal reflux disease) K21.9    Acute diastolic CHF (congestive heart failure) ([facility] I50.31    Acute encephalopathy G93.40    Toxic metabolic encephalopathy T44    Acute renal failure (ARF) ([facility]) N17.9    Hyperkalemia E87.5    PNA (pneumonia) J18.9    Chest pain R07.9    Anxiety F41.9    Sinus tachycardia R00.0  Acute respiratory distress R06.00    Hypoxia R09.02    HCAP (healthcare-associated pneumonia) J18.9    Hypokalemia E87.6    Bacteremia due to methicillin resistant Staphylococcus aureus R78.81    Oral thrush B37.0     1. PLAN:   1. Met with patient who was alert, oriented and pleasant, very [place]. She conveyed her sense of progressive decline in function and QOL over the last year and that she has been thinking for some time that she would not want CPR and that her goals for care would be improved comfort and to not keep returning to the hospital every few months. She would not wish to be intubated. She has not discussed this with her son who she feels would want her to continue the most aggressive care including CPR. We discussed that these decisions are really hers and that his role is to ensure her care wishes are respected. She asked to complete a DDNR and VA AMD and these were executed. Briefly discussed developing a comfort care plan for return to NH rather than d/c to SNF, but recommended that we include her son in these discussion and allow him to hear her wishes first hand. A comfort care plan in the NH setting would need to include hospice. Questions answered. Agree with use of oxycodone/apap as breakthrough agent with fentanyl patch. Recommend bowel agent. 5/18/17: Left message with son w/o call back so far. Will try and arrange meeting including him to discuss care plan for d/c. Patient interested in plan focused on comfort with avoidance of rehospitalization. 5/23/17: Reviewed reasons she opted for DDNR. She confirms this and DNI status. 5/24/17: maximized respiratory status. For extended course vanc for MERSA bacteremia. 2. Initial consult note routed to primary continuity provider  3. Communicated plan of care with: Palliative IDT        GOALS OF CARE:   Comfort, no intubation, DNR. Discharge plan to be discussed w/ son.     Advance Care Planning 5/17/2017   Patient's Healthcare Decision Maker is: Named in scanned ACP document   Primary Decision Maker Name Lacey Cadena   Primary Decision Maker Phone Number [phone]   Primary Decision Maker Relationship to Patient Adult child   Confirm Advance Directive Yes, on file   Does the patient have other document types Do Not Resuscitate           HISTORY:     History obtained from: patient    CHIEF COMPLAINT: see summary    HPI/SUBJECTIVE:    The patient is:   [x] Verbal and participatory  [] Non-participatory due to:        Clinical Pain Assessment (nonverbal scale for nonverbal patients): Pain: 2         FUNCTIONAL ASSESSMENT:     Palliative Performance Scale (PPS):  PPS: 40       PSYCHOSOCIAL/SPIRITUAL SCREENING:     Advance Care Planning:  Advance Care Planning 2017   Patient's Healthcare Decision Maker is: Named in scanned ACP document   Primary Decision Maker Name Lacey Cadena   Primary Decision Maker Phone Number [phone]   Primary Decision Maker Relationship to Patient Adult child   Confirm Advance Directive Yes, on file   Does the patient have other document types Do Not Resuscitate        Any spiritual / Catholic concerns:  [] Yes /  [x] No    Caregiver Burnout:  [] Yes /  [] No /  [x] No Caregiver Present      Anticipatory grief assessment:   [x] Normal  / [] Maladaptive       ESAS Anxiety: Anxiety: 6    ESAS Depression: Depression: 3        REVIEW OF SYSTEMS:     Positive and pertinent negative findings in ROS are noted above in HPI. The following systems were [x] reviewed / [] unable to be reviewed as noted in HPI  Other findings are noted below. Systems: constitutional, ears/nose/mouth/throat, respiratory, gastrointestinal, genitourinary, musculoskeletal, integumentary, neurologic, psychiatric, endocrine. Positive findings noted below.   Modified ESAS Completed by: provider   Fatigue: 9 Drowsiness: 9   Depression: 3 Pain: 2   Anxiety: 6 Nausea: 0   Anorexia: 0 Dyspnea: 8   Best Well-Bein Constipation: No   Other Problem (Comment): 0 Stool Occurrence(s): 1 (extra large formed )        PHYSICAL EXAM:     Wt Readings from Last 3 Encounters:   05/24/17 99.4 kg (219 lb 2.2 oz)   02/15/17 97.3 kg (214 lb 8 oz)   11/30/16 93.1 kg (205 lb 4.8 oz)     Blood pressure 124/51, pulse 71, temperature 98 °F (36.7 °C), resp. rate 18, height 5' (1.524 m), weight 99.4 kg (219 lb 2.2 oz), SpO2 98 %.   Pain:  Pain Scale 1: Numeric (0 - 10)  Pain Intensity 1: 8  Pain Onset 1: chronic  Pain Location 1: Chest  Pain Orientation 1: Left  Pain Description 1: Tightness  Pain Intervention(s) 1: Medication (see MAR)  Last bowel movement:     Constitutional: cushingoid, NAD  Eyes: pupils equal, anicteric  ENMT: no nasal discharge, moist mucous membranes  Cardiovascular: regular rhythm, distal pulses intact  Respiratory: breathing mildly labored, symmetric diminished BS bilat  Gastrointestinal: soft non-tender, +bowel sounds  Musculoskeletal: no deformity, no tenderness to palpation  Skin: warm, dry  Neurologic: following commands, moving all extremities  Psychiatric: full affect, no hallucinations  Other:       HISTORY:     Principal Problem:    Acute respiratory distress (5/13/2017)    Active Problems:    Cataract (5/15/2013)      DDD (degenerative disc disease), cervical (8/27/2013)      H/O cervical spine surgery (8/27/2013)      COPD exacerbation (HCC) (11/25/2016)      Acute on chronic respiratory failure ([facility] (11/25/2016)      Obesity (11/25/2016)      Benign hypertension (11/25/2016)      GERD (gastroesophageal reflux disease) (11/25/2016)      Acute diastolic CHF (congestive heart failure) ([facility] (11/28/2016)      Chest pain (2/14/2017)      Hypoxia (5/13/2017)      HCAP (healthcare-associated pneumonia) (5/13/2017)      Hypokalemia (5/19/2017)      Bacteremia due to methicillin resistant Staphylococcus aureus (5/19/2017)      Oral thrush (5/21/2017)      Past Medical History:   Diagnosis Date    Arthritis     Asthma  CAD (coronary artery disease)     angina, last episode 06/2012    Chronic bronchitis (HCC)     Chronic kidney disease     stage 3    Chronic obstructive pulmonary disease (HCC)     Chronic pain     cervical, lumbar, knees, ankles, elbows    Fibromyalgia     GERD (gastroesophageal reflux disease)     Hypertension 1993    Psychiatric disorder     anxiety, depression    Thyroid disease       Past Surgical History:   Procedure Laterality Date    HX ADENOIDECTOMY      HX APPENDECTOMY      HX CATARACT REMOVAL      OU    HX CERVICAL FUSION      anterior x 2    HX CERVICAL FUSION      posterior x 3    HX CERVICAL FUSION  1996    Removal of titanium plate and screws    HX CHOLECYSTECTOMY      HX HYSTERECTOMY      HX LITHOTRIPSY      x 4    HX OOPHORECTOMY      left    HX ORTHOPAEDIC      cervical  x5    HX SMALL BOWEL RESECTION      HX TONSILLECTOMY      HX UROLOGICAL  1984 and 1985    kidney stone surgery      History reviewed. No pertinent family history. History reviewed, no pertinent family history. Social History   Substance Use Topics    Smoking status: Never Smoker    Smokeless tobacco: Never Used    Alcohol use No     Allergies   Allergen Reactions    Ambien [Zolpidem] Other (comments)     Sleep drive    Aspirin Other (comments)     bruising    Codeine Hives    Darvon [Propoxyphene] Unknown (comments)    Iodinated Contrast Media - Oral And Iv Dye Hives     Iodine on skin is ok per pt.  Pcn [Penicillins] Nausea and Vomiting    Shellfish Derived Hives, Shortness of Breath and Swelling     Iodine on skin is ok per pt.     Zanaflex [Tizanidine] Other (comments)     disorientated    Levaquin [Levofloxacin] Hives     Red rash at IV site while infusing      Current Facility-Administered Medications   Medication Dose Route Frequency    heparin (porcine) 100 unit/mL injection 300 Units  300 Units InterCATHeter Q8H    nystatin (MYCOSTATIN) 100,000 unit/mL oral suspension 500,000 Units  500,000 Units Oral QID    vancomycin (VANCOCIN) 1,500 mg in 0.9% sodium chloride 500 mL IVPB  1,500 mg IntraVENous Q24H    albuterol-ipratropium (DUO-NEB) 2.5 MG-0.5 MG/3 ML  3 mL Nebulization Q6H RT    furosemide (LASIX) tablet 40 mg  40 mg Oral DAILY    dronabinol (MARINOL) capsule 2.5 mg  2.5 mg Oral ACB&D    Electrolyte Replacement Protocol - Potassium Renal Dosing  1 Each Other PRN    Electrolyte Replacement Protocol - Magnesium   1 Each Other PRN    umeclidinium (INCRUSE ELLIPTA) 62.5 mcg/actuation  1 Puff Inhalation DAILY    mupirocin (BACTROBAN) 2 % ointment   Both Nostrils BID    loratadine (CLARITIN) tablet 10 mg  10 mg Oral DAILY    montelukast (SINGULAIR) tablet 10 mg  10 mg Oral DAILY    DULoxetine (CYMBALTA) capsule 60 mg  60 mg Oral DAILY    fluticasone-vilanterol (BREO ELLIPTA) 200mcg-25mcg/puff  1 Puff Inhalation DAILY    nitroglycerin (NITROSTAT) tablet 0.4 mg  0.4 mg SubLINGual Q5MIN PRN    aspirin delayed-release tablet 81 mg  81 mg Oral DAILY    atenolol (TENORMIN) tablet 25 mg  25 mg Oral DAILY    guaiFENesin ER (MUCINEX) tablet 600 mg  600 mg Oral Q12H    levothyroxine (SYNTHROID) tablet 225 mcg  225 mcg Oral ACB    pregabalin (LYRICA) capsule 100 mg  100 mg Oral TID    fluticasone (FLONASE) 50 mcg/actuation nasal spray 1 Spray  1 Spray Both Nostrils DAILY    cyanocobalamin (VITAMIN B12) injection 1,000 mcg  1,000 mcg IntraMUSCular EVERY MONTH    acetaminophen (TYLENOL) tablet 650 mg  650 mg Oral Q6H PRN    cholestyramine (QUESTRAN) packet 4 g  4 g Oral DAILY PRN    nystatin (MYCOSTATIN) 100,000 unit/gram powder   Topical BID    omeprazole (PRILOSEC) capsule 20 mg  20 mg Oral DAILY    zinc oxide 20 % ointment   Topical BID    alum-mag hydroxide-simeth (MYLANTA) oral suspension 15 mL  15 mL Oral QID PRN    multivitamin, tx-iron-ca-min (THERA-M w/ IRON) tablet 1 Tab  1 Tab Oral DAILY    lisinopril (PRINIVIL, ZESTRIL) tablet 20 mg  20 mg Oral DAILY    fentaNYL (DURAGESIC) 25 mcg/hr patch 1 Patch  1 Patch TransDERmal Q72H    sodium chloride (NS) flush 5-10 mL  5-10 mL IntraVENous Q8H    sodium chloride (NS) flush 5-10 mL  5-10 mL IntraVENous PRN    heparin (porcine) injection 5,000 Units  5,000 Units SubCUTAneous Q8H    insulin lispro (HUMALOG) injection   SubCUTAneous AC&HS    glucose chewable tablet 16 g  16 g Oral PRN    glucagon (GLUCAGEN) injection 1 mg  1 mg IntraMUSCular PRN    dextrose (D50W) injection syrg 12.5-25 g  25-50 mL IntraVENous PRN    oxyCODONE-acetaminophen (PERCOCET) 5-325 mg per tablet 1 Tab  1 Tab Oral Q6H PRN    methylPREDNISolone (PF) (SOLU-MEDROL) injection 40 mg  40 mg IntraVENous Q8H    influenza vaccine 2016-17 (36mos+)(PF) (FLUZONE/FLUARIX/FLULAVAL QUAD) injection 0.5 mL  0.5 mL IntraMUSCular PRIOR TO DISCHARGE    diphenhydrAMINE (BENADRYL) 12.5 mg/5 mL oral elixir 12.5 mg  12.5 mg Oral Q6H PRN    guaiFENesin-codeine (ROBITUSSIN AC) 100-10 mg/5 mL solution 10 mL  10 mL Oral Q4H PRN    benzocaine-menthol (CEPACOL) lozenge 1 Lozenge  1 Lozenge Mucous Membrane PRN    ALPRAZolam (XANAX) tablet 1 mg  1 mg Oral TID PRN    Vancomycin Pharmacokinetic Dosing  1 Each Other Rx Dosing/Monitoring        LAB AND IMAGING FINDINGS:     Lab Results   Component Value Date/Time    WBC 9.8 05/24/2017 06:30 AM    HGB 10.8 05/24/2017 06:30 AM    PLATELET 819 95/25/8453 06:30 AM     Lab Results   Component Value Date/Time    Sodium 137 05/24/2017 06:30 AM    Potassium 4.7 05/24/2017 06:30 AM    Chloride 102 05/24/2017 06:30 AM    CO2 29 05/24/2017 06:30 AM    BUN 40 05/24/2017 06:30 AM    Creatinine 0.92 05/24/2017 06:30 AM    Calcium 8.6 05/24/2017 06:30 AM    Magnesium 2.2 05/24/2017 06:30 AM      Lab Results   Component Value Date/Time    AST (SGOT) 46 05/15/2017 05:50 AM    Alk.  phosphatase 58 05/15/2017 05:50 AM    Protein, total 7.2 05/15/2017 05:50 AM    Albumin 3.0 05/15/2017 05:50 AM    Globulin 4.2 05/15/2017 05:50 AM     Lab Results Component Value Date/Time    INR 1.1 05/13/2017 08:00 PM    Prothrombin time 13.7 05/13/2017 08:00 PM    aPTT 29.9 05/13/2017 08:00 PM      No results found for: IRON, FE, TIBC, IBCT, PSAT, FERR   No results found for: PH, PCO2, PO2  No components found for: [place]   Lab Results   Component Value Date/Time     05/14/2017 07:40 AM    CK - MB 2.2 05/14/2017 07:40 AM              Total time: 15 min  Counseling / coordination time, spent as noted above: 11 min  > 50% counseling / coordination        Ariana Malhotra MD  Palliative Medicine

## 2017-05-24 NOTE — PROGRESS NOTES
Problem: Mobility Impaired (Adult and Pediatric)  Goal: *Acute Goals and Plan of Care (Insert Text)  Physical Therapy Goals  Initiated 5/19/2017 and to be accomplished within 7 day(s)  1. Patient will move from supine <> sit with Min A-CGA in prep for out of bed activity and change of position. 2. Patient will perform sit<> stand with Min A-CGA with rolling walker in prep for transfers/ambulation. 3. Patient will transfer from bed <> chair with CGA with rolling walker for time up in chair for completion of ADL activity. 4. Patient will ambulate 150 feet Min A with LRAD for increase functional mobility. Outcome: Not Progressing Towards Goal  PHYSICAL THERAPY TREATMENT     Patient: Barbara De La Cruz (5 y.o. female)  Date: 5/24/2017  Diagnosis: COPD exacerbation (HCC)  Acute respiratory distress Acute respiratory distress       Precautions: Fall, Contact   Chart, physical therapy assessment, plan of care and goals were reviewed. ASSESSMENT:  Pt was in bed with nasal cannula in place prior to treatment. Pt at times appears irritated and impulsive during treatment. Pt reported that she is willing to participate in treatment but did not want to participate in gait training outside of the room. Pt was able to perform bed mobility task supine<>sit w/ S. Once pt was at the EOB, pt soiled her self. Pt reported that she wanted to use the toilet in the bathroom and was able to perform transfer activity sit<>stand w/ SBA. Pt was able to ambulate CGA to toilet. Once pt was finished, pt transferred back to bed where her, sheets and gown was changed and new adult diaper was placed on the pt. Pt refused to do therex activity and was transferred back to bed. All needs are met, call bell in placed. Nurse notified after session.    Progression toward goals:  [ ]      Improving appropriately and progressing toward goals  [X]      Improving slowly and progressing toward goals  [ ]      Not making progress toward goals and plan of care will be adjusted       PLAN:  Patient continues to benefit from skilled intervention to address the above impairments. Continue treatment per established plan of care. Discharge Recommendations:  Skilled Nursing Facility     Further Equipment Recommendations for Discharge:  N/A       SUBJECTIVE:   Patient stated I am not doing that much today.       OBJECTIVE DATA SUMMARY:   Critical Behavior:  Neurologic State: Alert, Appropriate for age  Orientation Level: Oriented X4  Cognition: Appropriate decision making, Appropriate for age attention/concentration, Appropriate safety awareness  Safety/Judgement: Awareness of environment, Decreased awareness of need for safety  Functional Mobility Training:  Bed Mobility:  Supine to Sit: Supervision  Sit to Supine: Supervision  Scooting: Supervision  Transfers:  Sit to Stand: Stand-by asssistance  Stand to Sit: Stand-by asssistance  Balance:  Sitting: Intact  Standing: Intact; With support  Standing - Static: Constant support;Good  Standing - Dynamic : Fair  Ambulation/Gait Training:  Distance (ft): 15 Feet (ft)  Assistive Device: Gait belt;Walker, rolling  Ambulation - Level of Assistance: Contact guard assistance  Gait Abnormalities: Decreased step clearance  Base of Support: Widened  Speed/Rozina: Slow  Step Length: Left shortened;Right shortened  Swing Pattern: Left asymmetrical;Right asymmetrical  Pain:  Pain Scale 1: Numeric (0 - 10)  Pain Intensity 1: 8  Pain Location 1: Knee; Foot  Pain Orientation 1: Left  Pain Description 1: Tightness  Pain Intervention(s) 1: Medication (see MAR)  Activity Tolerance:   Fair  Please refer to the flowsheet for vital signs taken during this treatment.   After treatment:   [ ] Patient left in no apparent distress sitting up in chair  [X] Patient left in no apparent distress in bed  [X] Call bell left within reach  [X] Nursing notified  [ ] Caregiver present  [ ] Bed alarm activated      Charlanne Cable, PT   Time Calculation: 41 mins

## 2017-05-24 NOTE — DISCHARGE SUMMARY
2 [facility]  Hospitalist Division    Discharge Summary      Patient: Barbara De La Cruz MRN: [id number]  CSN: [id number]    YOB: 1945  Age: 80 y.o.   Sex: female    DOA: 5/13/2017 LOS:  LOS: 11 days   Discharge Date: 05/25/17     PCP:  Maira Andre MD    Chief Complaint:    Chief Complaint   Patient presents with    Respiratory Distress     Acute respiratory distress    Admission Diagnosis:   Hospital Problems as of 5/25/2017  Date Reviewed: 5/13/2017          Codes Class Noted - Resolved POA    Oral thrush ICD-10-CM: B37.0  ICD-9-CM: 112.0  5/21/2017 - Present Unknown        Hypokalemia ICD-10-CM: E87.6  ICD-9-CM: 276.8  5/19/2017 - Present Unknown        Bacteremia due to methicillin resistant Staphylococcus aureus ICD-10-CM: R78.81  ICD-9-CM: 790.7, 041.12  5/19/2017 - Present Unknown        * (Principal)Acute respiratory distress ICD-10-CM: R06.00  ICD-9-CM: 518.82  5/13/2017 - Present Unknown        Hypoxia ICD-10-CM: R09.02  ICD-9-CM: 799.02  5/13/2017 - Present Unknown        HCAP (healthcare-associated pneumonia) ICD-10-CM: J18.9  ICD-9-CM: 843  5/13/2017 - Present Unknown        Chest pain ICD-10-CM: R07.9  ICD-9-CM: 786.50  2/14/2017 - Present Yes        Acute diastolic CHF (congestive heart failure) ([facility] ICD-10-CM: I50.31  ICD-9-CM: 428.31, 428.0  11/28/2016 - Present Yes        COPD exacerbation ([facility] ICD-10-CM: J44.1  ICD-9-CM: 491.21  11/25/2016 - Present Unknown        Acute on chronic respiratory failure ([facility] ICD-10-CM: J96.20  ICD-9-CM: 518.84  11/25/2016 - Present Yes        Obesity ICD-10-CM: E66.9  ICD-9-CM: 278.00  11/25/2016 - Present Yes        Benign hypertension ICD-10-CM: I10  ICD-9-CM: 401.1  11/25/2016 - Present Yes        GERD (gastroesophageal reflux disease) ICD-10-CM: K21.9  ICD-9-CM: 530.81  11/25/2016 - Present Yes        DDD (degenerative disc disease), cervical ICD-10-CM: M50.30  ICD-9-CM: 722.4  8/27/2013 - Present Yes H/O cervical spine surgery ICD-10-CM: Z98.890  ICD-9-CM: V45.89  8/27/2013 - Present Yes        Cataract ICD-10-CM: H26.9  ICD-9-CM: 366.9  5/15/2013 - Present Yes        RESOLVED: Hypomagnesemia ICD-10-CM: E83.42  ICD-9-CM: 275.2  2/14/2017 - 5/17/2017 Yes              Discharge Diagnoses:    Acute Respiratory Distress with Hypoxia   Acute Moderate Exacerbation of Mod Persistent COPD; on home 3L O2  HCAP -MRSA  MRSA - bacteremia TTE no vegitations. Poor candidate for WILFREDO at this time  Acute Diastolic CHF exacerbation stage III  HypoMg  Atypical Chest Pain  GERD  HTN  Obesity BMI >35  Cervical DDD  Cataract    Hospital Course:   69-year-old female nursing home resident with chronic obstructive pulmonary disease, coronary artery disease, hypertension, and reported chronic anxiety. The patient is a nursing home resident and presented to [facility] on May 14, 2017, with several days of progressive cough and gross hemoptysis. CT imaging showing multifocal parenchymal infiltrates  with some vascular congestion. Blood culture demonstrated MRSA. The patient was started on broad-spectrum antimicrobial therapy, narrowed to vancomycin and azithromycin. TTE showed no vegitations. Sputum culture would later also show MRSA. Infectious disease consulting, recommending 28 days total vancomycin to complete to 6/12 currently at 1.5g q24hrs, also nystatin for oral thrush. Palliative care consulted and has added marinol for improved appetite and nausea. Fentanyl patch for pain. Oxygen weaned down to 3L @ 99% 02 sat. Patient will need continued physical therapy due to debilitation. PICC placed 5/23. Needs pulmonary and ID follow-up. DNR. If bacteremia recurs will need WILFREDO. [facility]    Significant Diagnostic Studies:  CT chest 5/15  TTE 7/35 -Systolic function was normal. Ejection fraction was  estimated in the range of 55 % to 60 %. There were no regional wall motion  abnormalities.  There was mild concentric hypertrophy. Doppler parameters  were consistent with abnormal left ventricular relaxation (grade 1  diastolic dysfunction). Operative Procedures:  PICC 5/23  Consults:  ID - Harsh/[place]  Palliative care - Tommye Cheadle - [place]    Diet:  Cardiac regular  Activity:  See PT notes. Up with assist  Discharge Condition:   Good. 3L @ 99%  Equipment needed  NA  Wound Care:   NA        Discharge Medications:    Current Discharge Medication List      START taking these medications    Details   dronabinol (MARINOL) 2.5 mg capsule Take 1 Cap by mouth Before breakfast and dinner. Max Daily Amount: 5 mg. Qty: 60 Cap, Refills: 0      vancomycin 10 gram 1,500 mg IVPB 1,500 mg by IntraVENous route every twenty-four (24) hours for 19 days. Indications: Pharmacy to dose. Last dose 6/12  Qty: 19 Dose, Refills: 0      nystatin (MYCOSTATIN) 100,000 unit/mL suspension Take 5 mL by mouth four (4) times daily for 3 days. swish and spit  Qty: 60 mL, Refills: 0         CONTINUE these medications which have CHANGED    Details   fentaNYL (DURAGESIC) 25 mcg/hr PATCH 1 Patch by TransDERmal route every seventy-two (72) hours. Max Daily Amount: 1 Patch. Qty: 5 Patch, Refills: 0         CONTINUE these medications which have NOT CHANGED    Details   lisinopril (PRINIVIL, ZESTRIL) 20 mg tablet Take 1 Tab by mouth daily. Qty: 30 Tab, Refills: 0      bumetanide (BUMEX) 1 mg tablet Take 1 Tab by mouth daily. Qty: 30 Tab, Refills: 0      Menthol-Zinc Oxide (RISAMINE) 0.44-20.6 % oint Apply  to affected area two (2) times a day. Indications: Apply to sacrum to maintain skin integrity      alum-mag hydroxide-simeth (MYLANTA) 200-200-20 mg/5 mL susp Take 15 mL by mouth four (4) times daily as needed. tiotropium (SPIRIVA WITH HANDIHALER) 18 mcg inhalation capsule Take 1 Cap by inhalation daily. multivitamin, tx-iron-ca-min (THERA-M W/ IRON) 9 mg iron-400 mcg tab tablet Take 1 Tab by mouth daily. Oxygen continuous. 3L/min NC      omeprazole (PRILOSEC) 20 mg capsule Take 20 mg by mouth daily. nystatin (MYCOSTATIN) powder Apply  to affected area two (2) times a day. Qty: 1 Bottle, Refills: 0      levothyroxine (SYNTHROID) 200 mcg tablet Take 225 mcg by mouth Daily (before breakfast). Takes a total of 225 mcg. pregabalin (LYRICA) 100 mg capsule Take 100 mg by mouth three (3) times daily. fluticasone (FLONASE) 50 mcg/actuation nasal spray 1 [place] by Both Nostrils route daily. cyanocobalamin (VITAMIN B12) 1,000 mcg/mL injection 1,000 mcg by IntraMUSCular route every month. acetaminophen (TYLENOL) 325 mg tablet Take 650 mg by mouth every six (6) hours as needed for Pain. diclofenac (VOLTAREN) 1 % gel Apply 2 g to affected area three (3) times daily. Apply to both shoulders and both knees. cholestyramine-sucrose (QUESTRAN) 4 gram powder Take  by mouth daily as needed (diarrhea). albuterol (PROVENTIL VENTOLIN) 2.5 mg /3 mL (0.083 %) nebulizer solution 2.5 mg by Nebulization route every four (4) hours as needed. fluticasone-salmeterol (ADVAIR DISKUS) 500-50 mcg/dose diskus inhaler Take 1 Puff by inhalation every twelve (12) hours. May use inhaler on DOS      nitroglycerin (NITROSTAT) 0.4 mg SL tablet 0.4 mg by SubLINGual route every five (5) minutes as needed (Give one tab sublingual every 5 minutes x3 doses as needed for chest pain). aspirin 81 mg tablet Take 81 mg by mouth daily. atenolol (TENORMIN) 25 mg tablet Take 25 mg by mouth daily. As needed for palpatations       guaiFENesin SR (MUCINEX) 600 mg SR tablet Take 600 mg by mouth two (2) times a day. DULoxetine (CYMBALTA) 60 mg capsule Take 60 mg by mouth daily. Indications: CHRONIC MUSCULOSKELETAL PAIN      Cetirizine (ZYRTEC) 10 mg cap Take  by mouth daily. montelukast (SINGULAIR) 10 mg tablet Take 10 mg by mouth daily. Follow-Up And Discharge Instructions:    Follow-up Information     Follow up With Details Comments Contact Info    [facility]  The SNF is responsible for making arrangements for the PCP visit while in house.  [address]  [address], [place]   [address] [place] [address]      Jono Beltre MD Call in 2 weeks Infectious disease [address]  [phone]      Pulmonology Schedule an appointment as soon as possible for a visit COPD follow-up with PFTs needed                 Dr Kaylie Rowan        Time Spent:  40m    Cc: Kavya Albright MD

## 2017-05-24 NOTE — PROGRESS NOTES
Patient was visited by IRIS. Volunteer followed up with patient and/or family and reported no needs to this . Chaplains will continue to follow and will provide pastoral care as needed or requested.     [address] [place] [address] Shira NÚÑEZ [address].  [phone] - Office

## 2017-05-24 NOTE — PROGRESS NOTES
Chart reviewed, noted plan for pt to be discharged to SNF today; noted plan for pt to return to [facility]. T/C Dimas FINNEY / Peggy re: pt to return to them today. Peggy reported that they would contact pt's [employer] to begin the request for auth. Met with pt to advise her of above. Will cont to follow and await ins auth.

## 2017-05-24 NOTE — ROUTINE PROCESS
0800: Bedside and Verbal shift change report given to Melisa Fair RN (oncoming nurse) by Cydney Gagnon RN   (offgoing nurse). Report included the following information SBAR, Kardex, Intake/Output, MAR, Recent Results and Cardiac Rhythm NSR.

## 2017-05-24 NOTE — PROGRESS NOTES
1941: Assumed care of patient alert and oriented x 4. O2 Sat 99% on 3L nasal cannula. Cardiac rhythm NSR. Patient denies pain at this time. Nursing management continues. 1151: Patient complains of feeling anxious, Xanax 1mg admninistered by mouth     0021: Patient observed laying in bed sleeping quietly, no further anxiety noted at this time. Medication effective     [id number]: The documentation for this period is being entered following the guidelines as defined in the [facility] downtime policy by Pasquale Brewster. 0800: Shift summary: Patient had an uneventful shift. Expressed feelings of anxiety which was relieved with Xanax. Patient left resting in bed no acute distress, bed in lowest position, call light with in reach.

## 2017-05-24 NOTE — PROGRESS NOTES
vanco #6/[id number]  Rec: 1. Bacteremia    mrsa    Associated pulm infection    F/u blood culture sterile    No evidence endocarditis      ==> complete 4 weeks iv abx    ==> repeat blood culture after course complete  `      ==> restart bactrim prophylaxis after iv abx complete - h/o cerical spine/ left knee pji   Stable from ID perspective for transfer to rehab    Subjective: \"pretty good\"    No n/v/d    little cough/sputum    No abd pain     No rash    PE:   Visit Vitals    /68 (BP 1 Location: Left arm, BP Patient Position: At rest)    Pulse (!) 104    Temp 98 °F (36.7 °C)    Resp 18    Ht 5' (1.524 m)    Wt 99.4 kg (219 lb 2.2 oz)    SpO2 92%    BMI 42.8 kg/m2     Awake/ alert - oriented x 3   Heent: O/c clear. No icterus. No thrush No ulcer.    Neck: Supple   Chest: CTA Bilat No exp wheeze   CV: Nl s1s2 No murmurs   Abd: Soft NTND no reboun No masses   Ext:    Tr edema No rash    Lab:   cxr indepedntly reviewed  Recent Results (from the past 24 hour(s))   GLUCOSE, POC    Collection Time: 05/23/17  4:37 PM   Result Value Ref Range    Glucose (POC) 228 (H) 70 - 110 mg/dL   GLUCOSE, POC    Collection Time: 05/23/17  9:19 PM   Result Value Ref Range    Glucose (POC) 170 (H) 70 - 110 mg/dL   GLUCOSE, POC    Collection Time: 05/24/17  6:00 AM   Result Value Ref Range    Glucose (POC) 167 (H) 70 - 110 mg/dL   CBC W/O DIFF    Collection Time: 05/24/17  6:30 AM   Result Value Ref Range    WBC 9.8 4.6 - 13.2 K/uL    RBC 3.54 (L) 4.20 - 5.30 M/uL    HGB 10.8 (L) 12.0 - 16.0 g/dL    HCT 32.5 (L) 35.0 - 45.0 %    MCV 91.8 74.0 - 97.0 FL    MCH 30.5 24.0 - 34.0 PG    MCHC 33.2 31.0 - 37.0 g/dL    RDW 13.1 11.6 - 14.5 %    PLATELET 396 439 - 069 K/uL    MPV 11.0 9.2 - 59.8 FL   METABOLIC PANEL, BASIC    Collection Time: 05/24/17  6:30 AM   Result Value Ref Range    Sodium 137 136 - 145 mmol/L    Potassium 4.7 3.5 - 5.5 mmol/L    Chloride 102 100 - 108 mmol/L    CO2 29 21 - 32 mmol/L    Anion gap 6 3.0 - 18 mmol/L Glucose 180 (H) 74 - 99 mg/dL    BUN 40 (H) 7.0 - 18 MG/DL    Creatinine 0.92 0.6 - 1.3 MG/DL    BUN/Creatinine ratio 43 (H) 12 - 20      GFR est AA >60 >60 ml/min/1.73m2    GFR est non-AA >60 >60 ml/min/1.73m2    Calcium 8.6 8.5 - 10.1 MG/DL   MAGNESIUM    Collection Time: 05/24/17  6:30 AM   Result Value Ref Range    Magnesium 2.2 1.6 - 2.6 mg/dL   HEMOGLOBIN A1C WITH EAG    Collection Time: 05/24/17 10:26 AM   Result Value Ref Range    Hemoglobin A1c 6.1 (H) 4.5 - 5.6 %    Est. average glucose 128 mg/dL   GLUCOSE, POC    Collection Time: 05/24/17 11:34 AM   Result Value Ref Range    Glucose (POC) 156 (H) 70 - 110 mg/dL       Meds:     Current Facility-Administered Medications   Medication Dose Route Frequency Provider Last Rate Last Dose    heparin (porcine) 100 unit/mL injection 300 Units  300 Units InterCATHeter Q8H Irene Herrera MD   300 Units at 05/24/17 1199    nystatin (MYCOSTATIN) 100,000 unit/mL oral suspension 500,000 Units  500,000 Units Oral QID Joce Mcnulty MD   500,000 Units at 05/24/17 1258    vancomycin (VANCOCIN) 1,500 mg in 0.9% sodium chloride 500 mL IVPB  1,500 mg IntraVENous Q24H Joce Mcnulty  mL/hr at 05/23/17 2139 1,500 mg at 05/23/17 2139    albuterol-ipratropium (DUO-NEB) 2.5 MG-0.5 MG/3 ML  3 mL Nebulization Q6H RT Irene Herrera MD   3 mL at 05/24/17 1429    furosemide (LASIX) tablet 40 mg  40 mg Oral DAILY Koko Kumar MD   40 mg at 05/24/17 3258    dronabinol (MARINOL) capsule 2.5 mg  2.5 mg Oral ACB&D Koko Kumar MD   2.5 mg at 05/24/17 1259    Electrolyte Replacement Protocol - Potassium Renal Dosing  1 Each Other PRN Hedy Gomez MD        Electrolyte Replacement Protocol - Magnesium   1 Each Other PRN Hedy Gomez MD        umeclidinium (INCRUSE ELLIPTA) 62.5 mcg/actuation  1 Puff Inhalation DAILY Koko Kumar MD   1 Puff at 05/24/17 7622    mupirocin (BACTROBAN) 2 % ointment   Both Nostrils BID Irene Herrera MD        loratadine (CLARITIN) tablet 10 mg  10 mg Oral Lizz Seo MD   10 mg at 05/24/17 0851    montelukast (SINGULAIR) tablet 10 mg  10 mg Oral DAILY Riknu Cassidy MD   10 mg at 05/24/17 0851    DULoxetine (CYMBALTA) capsule 60 mg  60 mg Oral DAILY Rinku Cassidy MD   60 mg at 05/24/17 0851    fluticasone-vilanterol (BREO ELLIPTA) 200mcg-25mcg/puff  1 Puff Inhalation DAILY Rinku Cassidy MD   1 Puff at 05/24/17 0901    nitroglycerin (NITROSTAT) tablet 0.4 mg  0.4 mg SubLINGual Q5MIN PRN Rinku Cassidy MD        aspirin delayed-release tablet 81 mg  81 mg Oral DAILY Emanuel Martínez MD   81 mg at 05/24/17 0851    atenolol (TENORMIN) tablet 25 mg  25 mg Oral DAILY Emanuel Martínez MD   25 mg at 05/24/17 0851    guaiFENesin ER (MUCINEX) tablet 600 mg  600 mg Oral Q12H Emanuel Martínez MD   600 mg at 05/24/17 0851    levothyroxine (SYNTHROID) tablet 225 mcg  225 mcg Oral ACB Emanuel Martínez MD   225 mcg at 05/24/17 0644    pregabalin (LYRICA) capsule 100 mg  100 mg Oral TID Rinku Cassidy MD   100 mg at 05/24/17 0851    fluticasone (FLONASE) 50 mcg/actuation nasal spray 1 Spray  1 Spray Both Nostrils DAILY Emanuel Martínez MD   1 Spray at 05/24/17 0900    cyanocobalamin (VITAMIN B12) injection 1,000 mcg  1,000 mcg IntraMUSCular EVERY Dyer MD Serjio   1,000 mcg at 05/15/17 1713    acetaminophen (TYLENOL) tablet 650 mg  650 mg Oral Q6H PRN Rinku Cassidy MD        cholestyramine (QUESTRAN) packet 4 g  4 g Oral DAILY PRN Rinku Cassidy MD        nystatin (MYCOSTATIN) 100,000 unit/gram powder   Topical BID Emanuel Martínez MD        omeprazole (PRILOSEC) capsule 20 mg  20 mg Oral DAILY Emanuel Manning MD   20 mg at 05/24/17 0851    zinc oxide 20 % ointment   Topical BID Emanuel Martínez MD        alum-mag hydroxide-simeth (MYLANTA) oral suspension 15 mL  15 mL Oral QID PRN Rinku Cassidy MD   15 mL at 05/14/17 0834    multivitamin, tx-iron-ca-min (THERA-M w/ IRON) tablet 1 Tab  1 Tab Oral DAILY Rinku Cassidy MD   1 Tab at 05/24/17 0851    lisinopril (PRINIVIL, ZESTRIL) tablet 20 mg  20 mg Oral DAILY Emanuel Leticia Ch MD   20 mg at 05/24/17 0851    fentaNYL (DURAGESIC) 25 mcg/hr patch 1 Patch  1 Patch TransDERmal Q72H Gia Staples MD   1 Patch at 05/23/17 0124    sodium chloride (NS) flush 5-10 mL  5-10 mL IntraVENous Q8H Emanuel Leticia Ch MD   10 mL at 05/24/17 0635    sodium chloride (NS) flush 5-10 mL  5-10 mL IntraVENous PRN Gia Staples MD        heparin (porcine) injection 5,000 Units  5,000 Units SubCUTAneous Q8H Emanuel Leticia Ch MD   5,000 Units at 05/24/17 0852    insulin lispro (HUMALOG) injection   SubCUTAneous AC&HS Gia Staples MD   Stopped at 05/24/17 1130    glucose chewable tablet 16 g  16 g Oral PRN Gia Staples MD        glucagon (GLUCAGEN) injection 1 mg  1 mg IntraMUSCular PRN Gia Staples MD        dextrose (D50W) injection syrg 12.5-25 g  25-50 mL IntraVENous PRN Gia Staples MD        oxyCODONE-acetaminophen (PERCOCET) 5-325 mg per tablet 1 Tab  1 Tab Oral Q6H PRN Gia Staples MD   1 Tab at 05/24/17 0850    influenza vaccine 2016-17 (36mos+)(PF) (FLUZONE/FLUARIX/FLULAVAL QUAD) injection 0.5 mL  0.5 mL IntraMUSCular PRIOR TO DISCHARGE Gia Staples MD        diphenhydrAMINE (BENADRYL) 12.5 mg/5 mL oral elixir 12.5 mg  12.5 mg Oral Q6H PRN Emanuel Ch MD   12.5 mg at 05/14/17 2119    guaiFENesin-codeine (ROBITUSSIN AC) 100-10 mg/5 mL solution 10 mL  10 mL Oral Q4H PRN Emanuel Manning MD   10 mL at 05/16/17 2115    benzocaine-menthol (CEPACOL) lozenge 1 Lozenge  1 Lozenge Mucous Membrane PRN Pat Cruz MD   1 Lozenge at 05/21/17 1710    ALPRAZolam Rosalia Fees) tablet 1 mg  1 mg Oral TID PRN Gia Staples MD   1 mg at 05/24/17 0850    Vancomycin Pharmacokinetic Dosing  1 Each Other Rx Dosing/Monitoring Yuliana Officer, MD Beatriz Rowan  [phone](GS)

## 2017-05-25 VITALS
DIASTOLIC BLOOD PRESSURE: 65 MMHG | HEIGHT: 60 IN | WEIGHT: 218.7 LBS | HEART RATE: 89 BPM | BODY MASS INDEX: 42.94 KG/M2 | TEMPERATURE: 97.7 F | RESPIRATION RATE: 18 BRPM | SYSTOLIC BLOOD PRESSURE: 115 MMHG | OXYGEN SATURATION: 99 %

## 2017-05-25 LAB
ANION GAP BLD CALC-SCNC: 4 MMOL/L (ref 3–18)
BACTERIA SPEC CULT: NORMAL
BACTERIA SPEC CULT: NORMAL
BUN SERPL-MCNC: 47 MG/DL (ref 7–18)
BUN/CREAT SERPL: 46 (ref 12–20)
CALCIUM SERPL-MCNC: 8.5 MG/DL (ref 8.5–10.1)
CHLORIDE SERPL-SCNC: 104 MMOL/L (ref 100–108)
CO2 SERPL-SCNC: 30 MMOL/L (ref 21–32)
CREAT SERPL-MCNC: 1.03 MG/DL (ref 0.6–1.3)
ERYTHROCYTE [DISTWIDTH] IN BLOOD BY AUTOMATED COUNT: 13.5 % (ref 11.6–14.5)
GLUCOSE BLD STRIP.AUTO-MCNC: 138 MG/DL (ref 70–110)
GLUCOSE SERPL-MCNC: 138 MG/DL (ref 74–99)
HCT VFR BLD AUTO: 31.4 % (ref 35–45)
HGB BLD-MCNC: 10.5 G/DL (ref 12–16)
MAGNESIUM SERPL-MCNC: 2 MG/DL (ref 1.6–2.6)
MCH RBC QN AUTO: 31.2 PG (ref 24–34)
MCHC RBC AUTO-ENTMCNC: 33.4 G/DL (ref 31–37)
MCV RBC AUTO: 93.2 FL (ref 74–97)
PLATELET # BLD AUTO: 273 K/UL (ref 135–420)
PMV BLD AUTO: 11.3 FL (ref 9.2–11.8)
POTASSIUM SERPL-SCNC: 4.3 MMOL/L (ref 3.5–5.5)
RBC # BLD AUTO: 3.37 M/UL (ref 4.2–5.3)
SERVICE CMNT-IMP: NORMAL
SERVICE CMNT-IMP: NORMAL
SODIUM SERPL-SCNC: 138 MMOL/L (ref 136–145)
WBC # BLD AUTO: 11.8 K/UL (ref 4.6–13.2)

## 2017-05-25 PROCEDURE — 85027 COMPLETE CBC AUTOMATED: CPT | Performed by: INTERNAL MEDICINE

## 2017-05-25 PROCEDURE — 83735 ASSAY OF MAGNESIUM: CPT | Performed by: INTERNAL MEDICINE

## 2017-05-25 PROCEDURE — 82962 GLUCOSE BLOOD TEST: CPT

## 2017-05-25 PROCEDURE — 74011250636 HC RX REV CODE- 250/636: Performed by: HOSPITALIST

## 2017-05-25 PROCEDURE — 74011250636 HC RX REV CODE- 250/636: Performed by: INTERNAL MEDICINE

## 2017-05-25 PROCEDURE — 74011000250 HC RX REV CODE- 250: Performed by: HOSPITALIST

## 2017-05-25 PROCEDURE — 77010033678 HC OXYGEN DAILY

## 2017-05-25 PROCEDURE — 74011250637 HC RX REV CODE- 250/637: Performed by: INTERNAL MEDICINE

## 2017-05-25 PROCEDURE — 80048 BASIC METABOLIC PNL TOTAL CA: CPT | Performed by: INTERNAL MEDICINE

## 2017-05-25 PROCEDURE — 94640 AIRWAY INHALATION TREATMENT: CPT

## 2017-05-25 PROCEDURE — 94760 N-INVAS EAR/PLS OXIMETRY 1: CPT

## 2017-05-25 RX ADMIN — OXYCODONE HYDROCHLORIDE AND ACETAMINOPHEN 1 TABLET: 5; 325 TABLET ORAL at 00:11

## 2017-05-25 RX ADMIN — IPRATROPIUM BROMIDE AND ALBUTEROL SULFATE 3 ML: .5; 3 SOLUTION RESPIRATORY (INHALATION) at 07:27

## 2017-05-25 RX ADMIN — ATENOLOL 25 MG: 25 TABLET ORAL at 09:11

## 2017-05-25 RX ADMIN — LEVOTHYROXINE SODIUM 225 MCG: 150 TABLET ORAL at 06:41

## 2017-05-25 RX ADMIN — FUROSEMIDE 40 MG: 40 TABLET ORAL at 09:11

## 2017-05-25 RX ADMIN — PREGABALIN 100 MG: 100 CAPSULE ORAL at 09:11

## 2017-05-25 RX ADMIN — Medication 10 ML: at 06:42

## 2017-05-25 RX ADMIN — OMEPRAZOLE 20 MG: 20 CAPSULE, DELAYED RELEASE ORAL at 09:11

## 2017-05-25 RX ADMIN — HEPARIN SODIUM (PORCINE) LOCK FLUSH IV SOLN 100 UNIT/ML 300 UNITS: 100 SOLUTION at 06:42

## 2017-05-25 RX ADMIN — OXYCODONE HYDROCHLORIDE AND ACETAMINOPHEN 1 TABLET: 5; 325 TABLET ORAL at 09:12

## 2017-05-25 RX ADMIN — ASPIRIN 81 MG: 81 TABLET, COATED ORAL at 09:11

## 2017-05-25 RX ADMIN — HEPARIN SODIUM 5000 UNITS: 5000 INJECTION, SOLUTION INTRAVENOUS; SUBCUTANEOUS at 09:12

## 2017-05-25 RX ADMIN — NYSTATIN 500000 UNITS: 100000 SUSPENSION ORAL at 09:12

## 2017-05-25 RX ADMIN — GUAIFENESIN 600 MG: 600 TABLET, EXTENDED RELEASE ORAL at 09:11

## 2017-05-25 RX ADMIN — DULOXETINE 60 MG: 60 CAPSULE, DELAYED RELEASE ORAL at 09:12

## 2017-05-25 RX ADMIN — FLUTICASONE FUROATE AND VILANTEROL TRIFENATATE 1 PUFF: 200; 25 POWDER RESPIRATORY (INHALATION) at 09:13

## 2017-05-25 RX ADMIN — LORATADINE 10 MG: 10 TABLET ORAL at 09:12

## 2017-05-25 RX ADMIN — ALPRAZOLAM 1 MG: 0.5 TABLET ORAL at 00:17

## 2017-05-25 RX ADMIN — LISINOPRIL 20 MG: 20 TABLET ORAL at 09:12

## 2017-05-25 RX ADMIN — MUPIROCIN: 20 OINTMENT TOPICAL at 09:13

## 2017-05-25 RX ADMIN — MULTIPLE VITAMINS W/ MINERALS TAB 1 TABLET: TAB at 09:12

## 2017-05-25 RX ADMIN — FLUTICASONE PROPIONATE 1 SPRAY: 50 SPRAY, METERED NASAL at 09:13

## 2017-05-25 RX ADMIN — HEPARIN SODIUM 5000 UNITS: 5000 INJECTION, SOLUTION INTRAVENOUS; SUBCUTANEOUS at 00:11

## 2017-05-25 RX ADMIN — ZINC OXIDE: 200 OINTMENT TOPICAL at 09:14

## 2017-05-25 RX ADMIN — ALPRAZOLAM 1 MG: 0.5 TABLET ORAL at 09:11

## 2017-05-25 RX ADMIN — UMECLIDINIUM 1 PUFF: 62.5 AEROSOL, POWDER ORAL at 09:13

## 2017-05-25 RX ADMIN — MONTELUKAST SODIUM 10 MG: 10 TABLET, FILM COATED ORAL at 09:11

## 2017-05-25 NOTE — ROUTINE PROCESS
TRANSFER - OUT REPORT:    Verbal report given to GABRIELA Osman(name) on [address]  being transferred to [facility](unit) for routine progression of care       Report consisted of patients Situation, Background, Assessment and   Recommendations(SBAR). Information from the following report(s) SBAR, Kardex, ED Summary, Procedure Summary, Intake/Output, MAR, Recent Results and Cardiac Rhythm SR was reviewed with the receiving nurse. Lines:   PICC Double Lumen 47/05/04 Right;Basilic (Active)   Central Line Being Utilized Yes 5/25/2017  9:14 AM   Criteria for Appropriate Use Long term IV/antibiotic administration 5/25/2017  9:14 AM   Site Assessment Clean, dry, & intact 5/25/2017  9:14 AM   Phlebitis Assessment 0 5/25/2017  9:14 AM   Infiltration Assessment 0 5/25/2017  9:14 AM   Date of Last Dressing Change 05/23/17 5/25/2017  9:14 AM   Dressing Status Clean, dry, & intact 5/25/2017  9:14 AM   Action Taken Open ports on tubing capped 5/25/2017  9:14 AM   Dressing Type Disk with Chlorhexadine gluconate (CHG) 5/25/2017  9:14 AM   Hub Color/Line Status Red;Capped 5/25/2017  9:14 AM   Positive Blood Return (Site #1) Yes 5/25/2017  9:14 AM   Hub Color/Line Status Patent; Flushed 5/25/2017  9:14 AM   Positive Blood Return (Site #2) Yes 5/25/2017  9:14 AM   Alcohol Cap Used Yes 5/25/2017  9:14 AM        Opportunity for questions and clarification was provided.       Patient transported with:   Monitor  O2 @ 3L liters

## 2017-05-25 NOTE — DISCHARGE INSTRUCTIONS
Learning About Chronic Bronchitis  What is chronic bronchitis? Chronic bronchitis is long-term swelling and the buildup of mucus in the airways of your lungs. The airways (bronchial tubes) get inflamed and make a lot of mucus. This can narrow or block the airways, making it hard for you to breathe. It is a form of COPD (chronic obstructive pulmonary disease). Chronic bronchitis is usually caused by smoking. But chemical fumes, dust, or air pollution also can cause it over time. What can you expect when you have chronic bronchitis? Chronic bronchitis gets worse over time. You cannot undo the damage to your lungs. Over time, you may find that:  · You get short of breath even when you do simple things like get dressed or fix a meal.  · It is hard to eat or exercise. · You lose weight and feel weaker. Over many years, the swelling and mucus from chronic bronchitis make it more likely that you will get lung infections. But there are things you can do to prevent more damage and feel better. What are the symptoms? The main symptoms of chronic bronchitis are:  · A cough that will not go away. · Mucus that comes up when you cough. · Shortness of breath that gets worse when you exercise. At times, your symptoms may suddenly flare up and get much worse. This is a called an exacerbation (say \"egg-ZASHAHLA-er-BAY-krysta\"). When this happens, your usual symptoms quickly get worse and stay bad. This can be dangerous. You may have to go to the hospital.  How can you keep chronic bronchitis from getting worse? Don't smoke. That is the best way to keep chronic bronchitis from getting worse. If you already smoke, it is never too late to stop. If you need help quitting, talk to your doctor about stop-smoking programs and medicines. These can increase your chances of quitting for good. You can do other things to keep chronic bronchitis from getting worse:  · Avoid bad air.  Air pollution, chemical fumes, and dust also can make chronic bronchitis worse. · Get a flu shot every year. A shot may keep the flu from turning into something more serious, like pneumonia. A flu shot also may lower your chances of having a flare-up. · Get a pneumococcal shot. A shot can prevent some of the serious complications of pneumonia. Ask your doctor how often you should get this shot. How is chronic bronchitis treated? Chronic bronchitis is treated with medicines and oxygen. You also can take steps at home to stay healthy and keep your condition from getting worse. Medicines and oxygen therapy  · You may be taking medicines such as:  ¨ Bronchodilators. These help open your airways and make breathing easier. Bronchodilators are either short-acting (work for 6 to 9 hours) or long-acting (work for 24 hours). You inhale most bronchodilators, so they start to act quickly. Always carry your quick-relief inhaler with you in case you need it while you are away from home. ¨ Corticosteroids. These reduce airway inflammation. They come in pill or inhaled form. You must take these medicines every day for them to work well. ¨ Antibiotics. These medicines are used when you have a bacterial lung infection. · Take your medicines exactly as prescribed. Call your doctor if you think you are having a problem with your medicine. · Oxygen therapy boosts the amount of oxygen in your blood and helps you breathe easier. Use the flow rate your doctor has recommended, and do not change it without talking to your doctor first.  Other care at home  · If your doctor recommends it, get more exercise. Walking is a good choice. Bit by bit, increase the amount you walk every day. Try for at least 30 minutes on most days of the week. · Learn breathing methods--such as breathing through pursed lips--to help you become less short of breath. · If your doctor has not set you up with a pulmonary rehabilitation program, talk to him or her about whether rehab is right for you.  Rehab includes exercise programs, education about your disease and how to manage it, help with diet and other changes, and emotional support. · Eat regular, healthy meals. Use bronchodilators about 1 hour before you eat to make it easier to eat. Eat several small meals instead of three large ones. Drink beverages at the end of the meal. Avoid foods that are hard to chew. Follow-up care is a key part of your treatment and safety. Be sure to make and go to all appointments, and call your doctor if you are having problems. It's also a good idea to know your test results and keep a list of the medicines you take. Where can you learn more? Go to http://pilyConspiretony.info/. Enter A950 in the search box to learn more about \"Learning About Chronic Bronchitis. \"  Current as of: May 23, 2016  Content Version: 11.2  © 9328-7049 [employer]. Care instructions adapted under license by [employer] (which disclaims liability or warranty for this information). If you have questions about a medical condition or this instruction, always ask your healthcare professional. [place] [address] any warranty or liability for your use of this information. Cardiac Rehabilitation: Care Instructions  Your Care Instructions    Cardiac rehabilitation is a program for people who have a heart problem, such as a heart attack, heart failure, or a heart valve disease. The program includes exercise, lifestyle changes, education, and emotional support. Cardiac rehab can help you improve the quality of your life through better overall health. It can help you lose weight and feel better about yourself. On your cardiac rehab team, you may have your doctor, a nurse specialist, a dietitian, and a physical therapist. They will design your cardiac rehab program specifically for you.  You will learn how to reduce your risk for heart problems, how to manage stress, and how to eat a heart-healthy diet. By the end of the program, you will be ready to maintain a healthier lifestyle on your own. Follow-up care is a key part of your treatment and safety. Be sure to make and go to all appointments, and call your doctor if you are having problems. It's also a good idea to know your test results and keep a list of the medicines you take. How can you care for yourself at home? · Take your medicines exactly as prescribed. Call your doctor if you think you are having a problem with your medicine. You will get more details on the specific medicines your doctor prescribes. · Weigh yourself every day if your doctor tells you to. Watch for sudden weight gain. Weigh yourself on the same scale with the same amount of clothing at the same time of day. · Plan your meals so that you are eating heart-healthy foods. ¨ Eat a variety of foods daily. Fresh fruits and vegetables and whole-grains are good choices. ¨ Limit your fat intake, especially saturated and trans fat. ¨ Limit salt (sodium). ¨ Increase fiber in your diet. ¨ Limit alcohol. · Learn how to take your pulse so that you can track your heart rate during exercise. · Always check with your doctor before you begin a new exercise program.  · Warm up before you exercise and cool down afterward for at least 15 minutes each. This will help your heart gradually prepare for and recover from exercise and avoid pushing your heart too hard. · Stop exercising if you have any unusual discomfort, such as chest pain. · Do not smoke. Smoking can make heart problems worse. If you need help quitting, talk to your doctor about stop-smoking programs and medicines. These can increase your chances of quitting for good. When should you call for help? Call 911 anytime you think you may need emergency care. For example, call if:  · You have severe trouble breathing. · You cough up pink, foamy mucus and you have trouble breathing. · You have symptoms of a heart attack.  These may include:  ¨ Chest pain or pressure, or a strange feeling in the chest.  ¨ Sweating. ¨ Shortness of breath. ¨ Nausea or vomiting. ¨ Pain, pressure, or a strange feeling in the back, neck, jaw, or upper belly or in one or both shoulders or arms. ¨ Lightheadedness or sudden weakness. ¨ A fast or irregular heartbeat. After you call 911, the  may tell you to chew 1 adult-strength or 2 to 4 low-dose aspirin. Wait for an ambulance. Do not try to drive yourself. · You have angina symptoms (such as chest pain or pressure) that do not go away with rest or are not getting better within 5 minutes after you take a dose of nitroglycerin. · You have symptoms of a stroke. These may include:  ¨ Sudden numbness, tingling, weakness, or loss of movement in your face, arm, or leg, especially on only one side of your body. ¨ Sudden vision changes. ¨ Sudden trouble speaking. ¨ Sudden confusion or trouble understanding simple statements. ¨ Sudden problems with walking or balance. ¨ A sudden, severe headache that is different from past headaches. · You passed out (lost consciousness). Call your doctor now or seek immediate medical care if:  · You have new or increased shortness of breath. · You are dizzy or lightheaded, or you feel like you may faint. · You gain weight suddenly, such as 3 pounds or more in 2 to 3 days. (Your doctor may suggest a different range of weight gain.)  · You have increased swelling in your legs, ankles, or feet. Watch closely for changes in your health, and be sure to contact your doctor if you have any problems. Where can you learn more? Go to http://pily-tony.info/. Enter F407 in the search box to learn more about \"Cardiac Rehabilitation: Care Instructions. \"  Current as of: May 5, 2016  Content Version: 11.2  © 5924-0195 [employer].  Care instructions adapted under license by [employer] (which disclaims liability or warranty for this information). If you have questions about a medical condition or this instruction, always ask your healthcare professional. [address] any warranty or liability for your use of this information. Chronic Obstructive Pulmonary Disease (COPD): Care Instructions  Your Care Instructions    Chronic obstructive pulmonary disease (COPD) is a general term for a group of lung diseases, including emphysema and chronic bronchitis. People with COPD have decreased airflow in and out of the lungs, which makes it hard to breathe. The airways also can get clogged with thick mucus. Cigarette smoking is a major cause of COPD. Although there is no cure for COPD, you can slow its progress. Following your treatment plan and taking care of yourself can help you feel better and live longer. Follow-up care is a key part of your treatment and safety. Be sure to make and go to all appointments, and call your doctor if you are having problems. It's also a good idea to know your test results and keep a list of the medicines you take. How can you care for yourself at home? Staying healthy  · Do not smoke. This is the most important step you can take to prevent more damage to your lungs. If you need help quitting, talk to your doctor about stop-smoking programs and medicines. These can increase your chances of quitting for good. · Avoid colds and flu. Get a pneumococcal vaccine shot. If you have had one before, ask your doctor whether you need a second dose. Get the flu vaccine every fall. If you must be around people with colds or the flu, wash your hands often. · Avoid secondhand smoke, air pollution, and high altitudes. Also avoid cold, dry air and hot, humid air. Stay at home with your windows closed when air pollution is bad. Medicines and oxygen therapy  · Take your medicines exactly as prescribed. Call your doctor if you think you are having a problem with your medicine.   · You may be taking medicines such as:  ¨ Bronchodilators. These help open your airways and make breathing easier. Bronchodilators are either short-acting (work for 6 to 9 hours) or long-acting (work for 24 hours). You inhale most bronchodilators, so they start to act quickly. Always carry your quick-relief inhaler with you in case you need it while you are away from home. ¨ Corticosteroids (prednisone, budesonide). These reduce airway inflammation. They come in pill or inhaled form. You must take these medicines every day for them to work well. · A spacer may help you get more inhaled medicine to your lungs. Ask your doctor or pharmacist if a spacer is right for you. If it is, ask how to use it properly. · Do not take any vitamins, over-the-counter medicine, or herbal products without talking to your doctor first.  · If your doctor prescribed antibiotics, take them as directed. Do not stop taking them just because you feel better. You need to take the full course of antibiotics. · Oxygen therapy boosts the amount of oxygen in your blood and helps you breathe easier. Use the flow rate your doctor has recommended, and do not change it without talking to your doctor first.  Activity  · Get regular exercise. Walking is an easy way to get exercise. Start out slowly, and walk a little more each day. · Pay attention to your breathing. You are exercising too hard if you cannot talk while you are exercising. · Take short rest breaks when doing household chores and other activities. · Learn breathing methods--such as breathing through pursed lips--to help you become less short of breath. · If your doctor has not set you up with a pulmonary rehabilitation program, talk to him or her about whether rehab is right for you. Rehab includes exercise programs, education about your disease and how to manage it, help with diet and other changes, and emotional support. Diet  · Eat regular, healthy meals.  Use bronchodilators about 1 hour before you eat to make it easier to eat. Eat several small meals instead of three large ones. Drink beverages at the end of the meal. Avoid foods that are hard to chew. · Eat foods that contain protein so that you do not lose muscle mass. Mental health  · Talk to your family, friends, or a therapist about your feelings. It is normal to feel frightened, angry, hopeless, helpless, and even guilty. Talking openly about bad feelings can help you cope. If these feelings last, talk to your doctor. When should you call for help? Call 911 anytime you think you may need emergency care. For example, call if:  · You have severe trouble breathing. Call your doctor now or seek immediate medical care if:  · You have new or worse trouble breathing. · You cough up blood. · You have a fever. Watch closely for changes in your health, and be sure to contact your doctor if:  · You cough more deeply or more often, especially if you notice more mucus or a change in the color of your mucus. · You have new or worse swelling in your legs or belly. · You are not getting better as expected. Where can you learn more? Go to http://pily-tony.info/. Khalif Lund in the search box to learn more about \"Chronic Obstructive Pulmonary Disease (COPD): Care Instructions. \"  Current as of: May 23, 2016  Content Version: 11.2  © 3630-7333 [employer]. Care instructions adapted under license by [employer] (which disclaims liability or warranty for this information). If you have questions about a medical condition or this instruction, always ask your healthcare professional. [place] [address] any warranty or liability for your use of this information. Heart Failure: Care Instructions  Your Care Instructions    Heart failure occurs when your heart does not pump as much blood as the body needs.  Failure does not mean that the heart has stopped pumping but rather that it is not pumping as well as it should. Over time, this causes fluid buildup in your lungs and other parts of your body. Fluid buildup can cause shortness of breath, fatigue, swollen ankles, and other problems. By taking medicines regularly, reducing sodium (salt) in your diet, checking your weight every day, and making lifestyle changes, you can feel better and live longer. Follow-up care is a key part of your treatment and safety. Be sure to make and go to all appointments, and call your doctor if you are having problems. It's also a good idea to know your test results and keep a list of the medicines you take. How can you care for yourself at home? Medicines  · Be safe with medicines. Take your medicines exactly as prescribed. Call your doctor if you think you are having a problem with your medicine. · Do not take any vitamins, over-the-counter medicine, or herbal products without talking to your doctor first. Slyvester Alcaraztes not take ibuprofen (Advil or Motrin) and naproxen (Aleve) without talking to your doctor first. They could make your heart failure worse. · You may be taking some of the following medicine. ¨ Beta-blockers can slow heart rate, decrease blood pressure, and improve your condition. Taking a beta-blocker may lower your chance of needing to be hospitalized. ¨ Angiotensin-converting enzyme inhibitors (ACEIs) reduce the heart's workload, lower blood pressure, and reduce swelling. Taking an ACEI may lower your chance of needing to be hospitalized again. ¨ Angiotensin II receptor blockers (ARBs) work like ACEIs. Your doctor may prescribe them instead of ACEIs. ¨ Diuretics, also called water pills, reduce swelling. ¨ Potassium supplements replace this important mineral, which is sometimes lost with diuretics. ¨ Aspirin and other blood thinners prevent blood clots, which can cause a stroke or heart attack. You will get more details on the specific medicines your doctor prescribes.   Diet  · Your doctor may suggest that you limit sodium to 2,000 milligrams (mg) a day or less. That is less than 1 teaspoon of salt a day, including all the salt you eat in cooking or in packaged foods. People get most of their sodium from processed foods. Fast food and restaurant meals also tend to be very high in sodium. · Ask your doctor how much liquid you can drink each day. You may have to limit liquids. Weight  · Weigh yourself without clothing at the same time each day. Record your weight. Call your doctor if you gain more than 3 pounds in 2 to 3 days. A sudden weight gain may mean that your heart failure is getting worse. Activity level  · Start light exercise (if your doctor says it is okay). Even if you can only do a small amount, exercise will help you get stronger, have more energy, and manage your weight and your stress. Walking is an easy way to get exercise. Start out by walking a little more than you did before. Bit by bit, increase the amount you walk. · When you exercise, watch for signs that your heart is working too hard. You are pushing yourself too hard if you cannot talk while you are exercising. If you become short of breath or dizzy or have chest pain, stop, sit down, and rest.  · If you feel \"wiped out\" the day after you exercise, walk slower or for a shorter distance until you can work up to a better pace. · Get enough rest at night. Sleeping with 1 or 2 pillows under your upper body and head may help you breathe easier. Lifestyle changes  · Do not smoke. Smoking can make a heart condition worse. If you need help quitting, talk to your doctor about stop-smoking programs and medicines. These can increase your chances of quitting for good. Quitting smoking may be the most important step you can take to protect your heart. · Limit alcohol to 2 drinks a day for men and 1 drink a day for women. Too much alcohol can cause health problems. · Avoid getting sick from colds and the flu. Get a pneumococcal vaccine shot.  If you have had one before, ask your doctor whether you need another dose. Get a flu shot each year. If you must be around people with colds or the flu, wash your hands often. When should you call for help? Call 911 if you have symptoms of sudden heart failure such as:  · You have severe trouble breathing. · You cough up pink, foamy mucus. · You have a new irregular or rapid heartbeat. Call your doctor now or seek immediate medical care if:  · You have new or increased shortness of breath. · You are dizzy or lightheaded, or you feel like you may faint. · You have sudden weight gain, such as 3 pounds or more in 2 to 3 days. · You have increased swelling in your legs, ankles, or feet. · You are suddenly so tired or weak that you cannot do your usual activities. Watch closely for changes in your health, and be sure to contact your doctor if:  · You develop new symptoms. Where can you learn more? Go to http://pily-tony.info/. Enter E009 in the search box to learn more about \"Heart Failure: Care Instructions. \"  Current as of: January 27, 2016  Content Version: 11.2  © 3364-3735 [employer]. Care instructions adapted under license by [employer] (which disclaims liability or warranty for this information). If you have questions about a medical condition or this instruction, always ask your healthcare professional. [place] [address] any warranty or liability for your use of this information.

## 2017-05-25 NOTE — PROGRESS NOTES
[facility] reports they have rec'd ins auth and can admit pt today. Met with patient who was agreeable to going to [place] [address] today via stretcher ambulance. Pt was made aware she may incur an amb bill. Pt declined CMgr to contact family re: her going to [place]. Pt stated she would take care of that herself. Plan: Discharge to [facility], [address], [place] via stretcher ambulance. Pt will need her Discharge Summary, Med Rec, and scripts for controlled meds to accompany her. Pt's nurse to call report to [facility] at: [phone].

## 2017-05-25 NOTE — PROGRESS NOTES
0700 Assumed pt care, pt is alert and oriented x4, SR on the monitor, lungs are coarse with expiratory wheezing on 3L nc. VSS, pt is resting quietly in bed with call bell within reach. 0915 Administered xanax and percocet po prn for muscle spasms and pain. See doc flow sheet and MAR for additional information.

## 2017-05-25 NOTE — ROUTINE PROCESS
Bedside and Verbal shift change report given to RAYMON Chavez (oncoming nurse) by Elly Gardner RN (offgoing nurse). Report included the following information SBAR, Kardex and MAR. Patient had no acute events overnight. Last medicated for pain at 0011. Currently resting in NAD. No express needs reported at this time.

## 2017-05-25 NOTE — PROGRESS NOTES
Palliative Medicine Progress Note  [facility]: [phone] [id number]  [facility]: [phone]   [facility]: [phone]    Patient Name: Candido James  YOB: 1945    Date of Initial Consult: 5/17/17  Reason for Consult: care decisions  Requesting Provider: Dr. Clarisa Najjar   Primary Care Physician: Frankey Gobble, MD      SUMMARY:   Candido James is a 80y.o. year old with a past history of advanced chronic lung disease, HTN, diastolic CHF, and chronic debilitating back and neck pain admitted for third time from [facility] since Nov 2016 for COPD exacerbation/CHF. Has been [facility] resident of [facility] for about a year. Only son travels and combination of spinal and lung disease rendered her unable to care for self. Chair and bed bound, O2 dependent. Course this time has been protracted w/ transfer to ICU and [facility]NC on 5/15. Now back on 5L. 5/18/17: Resting comfortably, easily aroused. Pain improved and rested well. Has not spoken with son since meeting yesterday. 5/23/17: Feeling better. Getting a PICC before return to SNF. Some CP w/ cough, no SOB.    5/24/17: Congested cough but rested last night. Looks ready to return to [facility].     5/25/17: Ardyce Lobstein well except tight chest. Ready for d/c.      PALLIATIVE DIAGNOSES:     Patient Active Problem List   Diagnosis Code    Cataract H26.9    DDD (degenerative disc disease), cervical M50.30    H/O cervical spine surgery Z98.890    COPD (chronic obstructive pulmonary disease) ([facility] [address] J44.9    Acidosis E87.2    COPD exacerbation ([facility] [address] J44.1    Acute on chronic respiratory failure (HCC) J96.20    Obesity E66.9    Benign hypertension I10    GERD (gastroesophageal reflux disease) K21.9    Acute diastolic CHF (congestive heart failure) ([facility]) I50.31    Acute encephalopathy G93.40    Toxic metabolic encephalopathy T76    Acute renal failure (ARF) ([facility]) N17.9    Hyperkalemia E87.5    PNA (pneumonia) J18.9    Chest pain R07.9    Anxiety F41.9    Sinus tachycardia R00.0    Acute respiratory distress R06.00    Hypoxia R09.02    HCAP (healthcare-associated pneumonia) J18.9    Hypokalemia E87.6    Bacteremia due to methicillin resistant Staphylococcus aureus R78.81    Oral thrush B37.0     1. PLAN:   1. Met with patient who was alert, oriented and pleasant, very [place]. She conveyed her sense of progressive decline in function and QOL over the last year and that she has been thinking for some time that she would not want CPR and that her goals for care would be improved comfort and to not keep returning to the hospital every few months. She would not wish to be intubated. She has not discussed this with her son who she feels would want her to continue the most aggressive care including CPR. We discussed that these decisions are really hers and that his role is to ensure her care wishes are respected. She asked to complete a DDNR and VA AMD and these were executed. Briefly discussed developing a comfort care plan for return to NH rather than d/c to SNF, but recommended that we include her son in these discussion and allow him to hear her wishes first hand. A comfort care plan in the NH setting would need to include hospice. Questions answered. Agree with use of oxycodone/apap as breakthrough agent with fentanyl patch. Recommend bowel agent. 5/18/17: Left message with son w/o call back so far. Will try and arrange meeting including him to discuss care plan for d/c. Patient interested in plan focused on comfort with avoidance of rehospitalization. 5/23/17: Reviewed reasons she opted for DDNR. She confirms this and DNI status. 5/24/17: maximized respiratory status. For extended course vanc for MERSA bacteremia. 5/25/17: reviewed goals of care previously discussed. She knows she needs to share her wishes regarding focus on comfort and her DNR/DNI status w/ her son. 2. Initial consult note routed to primary continuity provider  3. Communicated plan of care with: Palliative IDT        GOALS OF CARE:   Comfort, no intubation, DNR. Discharge plan to be discussed w/ son.     Advance Care Planning 5/17/2017   Patient's Healthcare Decision Maker is: Named in scanned ACP document   Primary Decision Maker Name Evelyn Trimble   Primary Decision Maker Phone Number [phone]   Primary Decision Maker Relationship to Patient Adult child   Confirm Advance Directive Yes, on file   Does the patient have other document types Do Not Resuscitate           HISTORY:     History obtained from: patient    CHIEF COMPLAINT: see summary    HPI/SUBJECTIVE:    The patient is:   [x] Verbal and participatory  [] Non-participatory due to:        Clinical Pain Assessment (nonverbal scale for nonverbal patients): Pain: 2         FUNCTIONAL ASSESSMENT:     Palliative Performance Scale (PPS):  PPS: 40       PSYCHOSOCIAL/SPIRITUAL SCREENING:     Advance Care Planning:  Advance Care Planning 5/17/2017   Patient's Healthcare Decision Maker is: Named in scanned ACP document   Primary Decision Maker Name Evelyn Trimble   Primary Decision Maker Phone Number [phone]   Primary Decision Maker Relationship to Patient Adult child   Confirm Advance Directive Yes, on file   Does the patient have other document types Do Not Resuscitate        Any spiritual / Adventism concerns:  [] Yes /  [x] No    Caregiver Burnout:  [] Yes /  [] No /  [x] No Caregiver Present      Anticipatory grief assessment:   [x] Normal  / [] Maladaptive       ESAS Anxiety: Anxiety: 6    ESAS Depression: Depression: 3        REVIEW OF SYSTEMS:     Positive and pertinent negative findings in ROS are noted above in HPI. The following systems were [x] reviewed / [] unable to be reviewed as noted in HPI  Other findings are noted below. Systems: constitutional, ears/nose/mouth/throat, respiratory, gastrointestinal, genitourinary, musculoskeletal, integumentary, neurologic, psychiatric, endocrine. Positive findings noted below. Modified ESAS Completed by: provider   Fatigue: 9 Drowsiness: 9   Depression: 3 Pain: 2   Anxiety: 6 Nausea: 0   Anorexia: 0 Dyspnea: 8   Best Well-Bein Constipation: No   Other Problem (Comment): 0 Stool Occurrence(s): 1        PHYSICAL EXAM:     Wt Readings from Last 3 Encounters:   17 99.2 kg (218 lb 11.1 oz)   02/15/17 97.3 kg (214 lb 8 oz)   16 93.1 kg (205 lb 4.8 oz)     Blood pressure 115/65, pulse 89, temperature 97.7 °F (36.5 °C), resp. rate 18, height 5' (1.524 m), weight 99.2 kg (218 lb 11.1 oz), SpO2 99 %.   Pain:  Pain Scale 1: Numeric (0 - 10)  Pain Intensity 1: 8  Pain Onset 1: chronic  Pain Location 1: Back, Chest, Knee, Leg  Pain Orientation 1: Left  Pain Description 1: Aching  Pain Intervention(s) 1: Medication (see MAR)  Last bowel movement:     Constitutional: cushingoid, NAD  Eyes: pupils equal, anicteric  ENMT: no nasal discharge, moist mucous membranes  Cardiovascular: regular rhythm, distal pulses intact  Respiratory: breathing mildly labored, symmetric diminished BS bilat  Gastrointestinal: soft non-tender, +bowel sounds  Musculoskeletal: no deformity, no tenderness to palpation  Skin: warm, dry  Neurologic: following commands, moving all extremities  Psychiatric: full affect, no hallucinations  Other:       HISTORY:     Principal Problem:    Acute respiratory distress (5/13/2017)    Active Problems:    Cataract (5/15/2013)      DDD (degenerative disc disease), cervical (8/27/2013)      H/O cervical spine surgery (8/27/2013)      COPD exacerbation (HCC) (11/25/2016)      Acute on chronic respiratory failure ([facility] [address] (11/25/2016)      Obesity (11/25/2016)      Benign hypertension (11/25/2016)      GERD (gastroesophageal reflux disease) (11/25/2016)      Acute diastolic CHF (congestive heart failure) ([facility] [address] (11/28/2016)      Chest pain (2/14/2017)      Hypoxia (5/13/2017)      HCAP (healthcare-associated pneumonia) (5/13/2017)      Hypokalemia (5/19/2017)      Bacteremia due to methicillin resistant Staphylococcus aureus (5/19/2017)      Oral thrush (5/21/2017)      Past Medical History:   Diagnosis Date    Arthritis     Asthma     CAD (coronary artery disease)     angina, last episode 06/2012    Chronic bronchitis (HCC)     Chronic kidney disease     stage 3    Chronic obstructive pulmonary disease (HCC)     Chronic pain     cervical, lumbar, knees, ankles, elbows    Fibromyalgia     GERD (gastroesophageal reflux disease)     Hypertension 1993    Psychiatric disorder     anxiety, depression    Thyroid disease       Past Surgical History:   Procedure Laterality Date    HX ADENOIDECTOMY      HX APPENDECTOMY      HX CATARACT REMOVAL      OU    HX CERVICAL FUSION      anterior x 2    HX CERVICAL FUSION      posterior x 3    HX CERVICAL FUSION  1996    Removal of titanium plate and screws    HX CHOLECYSTECTOMY      HX HYSTERECTOMY      HX LITHOTRIPSY      x 4    HX OOPHORECTOMY      left    HX ORTHOPAEDIC      cervical  x5    HX SMALL BOWEL RESECTION      HX TONSILLECTOMY      HX UROLOGICAL  1984 and 1985    kidney stone surgery      History reviewed. No pertinent family history. History reviewed, no pertinent family history.   Social History   Substance Use Topics    Smoking status: Never Smoker    Smokeless tobacco: Never Used    Alcohol use No     Allergies   Allergen Reactions    Ambien [Zolpidem] Other (comments)     Sleep drive    Aspirin Other (comments)     bruising    Codeine Hives    Darvon [Propoxyphene] Unknown (comments)    Iodinated Contrast Media - Oral And Iv Dye Hives     Iodine on skin is ok per pt.  Pcn [Penicillins] Nausea and Vomiting    Shellfish Derived Hives, Shortness of Breath and Swelling     Iodine on skin is ok per pt.     Zanaflex [Tizanidine] Other (comments)     disorientated    Levaquin [Levofloxacin] Hives     Red rash at IV site while infusing      Current Facility-Administered Medications   Medication Dose Route Frequency    heparin (porcine) 100 unit/mL injection 300 Units  300 Units InterCATHeter Q8H    nystatin (MYCOSTATIN) 100,000 unit/mL oral suspension 500,000 Units  500,000 Units Oral QID    vancomycin (VANCOCIN) 1,500 mg in 0.9% sodium chloride 500 mL IVPB  1,500 mg IntraVENous Q24H    albuterol-ipratropium (DUO-NEB) 2.5 MG-0.5 MG/3 ML  3 mL Nebulization Q6H RT    furosemide (LASIX) tablet 40 mg  40 mg Oral DAILY    dronabinol (MARINOL) capsule 2.5 mg  2.5 mg Oral ACB&D    Electrolyte Replacement Protocol - Potassium Renal Dosing  1 Each Other PRN    Electrolyte Replacement Protocol - Magnesium   1 Each Other PRN    umeclidinium (INCRUSE ELLIPTA) 62.5 mcg/actuation  1 Puff Inhalation DAILY    mupirocin (BACTROBAN) 2 % ointment   Both Nostrils BID    loratadine (CLARITIN) tablet 10 mg  10 mg Oral DAILY    montelukast (SINGULAIR) tablet 10 mg  10 mg Oral DAILY    DULoxetine (CYMBALTA) capsule 60 mg  60 mg Oral DAILY    fluticasone-vilanterol (BREO ELLIPTA) 200mcg-25mcg/puff  1 Puff Inhalation DAILY    nitroglycerin (NITROSTAT) tablet 0.4 mg  0.4 mg SubLINGual Q5MIN PRN    aspirin delayed-release tablet 81 mg  81 mg Oral DAILY    atenolol (TENORMIN) tablet 25 mg  25 mg Oral DAILY    guaiFENesin ER (MUCINEX) tablet 600 mg  600 mg Oral Q12H    levothyroxine (SYNTHROID) tablet 225 mcg  225 mcg Oral ACB    pregabalin (LYRICA) capsule 100 mg  100 mg Oral TID    fluticasone (FLONASE) 50 mcg/actuation nasal spray 1 Spray  1 Spray Both Nostrils DAILY    cyanocobalamin (VITAMIN B12) injection 1,000 mcg  1,000 mcg IntraMUSCular EVERY MONTH    acetaminophen (TYLENOL) tablet 650 mg  650 mg Oral Q6H PRN    cholestyramine (QUESTRAN) packet 4 g  4 g Oral DAILY PRN    nystatin (MYCOSTATIN) 100,000 unit/gram powder   Topical BID    omeprazole (PRILOSEC) capsule 20 mg  20 mg Oral DAILY    zinc oxide 20 % ointment   Topical BID    alum-mag hydroxide-simeth (MYLANTA) oral suspension 15 mL  15 mL Oral QID PRN    multivitamin, tx-iron-ca-min (THERA-M w/ IRON) tablet 1 Tab  1 Tab Oral DAILY    lisinopril (PRINIVIL, ZESTRIL) tablet 20 mg  20 mg Oral DAILY    fentaNYL (DURAGESIC) 25 mcg/hr patch 1 Patch  1 Patch TransDERmal Q72H    sodium chloride (NS) flush 5-10 mL  5-10 mL IntraVENous Q8H    sodium chloride (NS) flush 5-10 mL  5-10 mL IntraVENous PRN    heparin (porcine) injection 5,000 Units  5,000 Units SubCUTAneous Q8H    insulin lispro (HUMALOG) injection   SubCUTAneous AC&HS    glucose chewable tablet 16 g  16 g Oral PRN    glucagon (GLUCAGEN) injection 1 mg  1 mg IntraMUSCular PRN    dextrose (D50W) injection syrg 12.5-25 g  25-50 mL IntraVENous PRN    oxyCODONE-acetaminophen (PERCOCET) 5-325 mg per tablet 1 Tab  1 Tab Oral Q6H PRN    influenza vaccine 2016-17 (36mos+)(PF) (FLUZONE/FLUARIX/FLULAVAL QUAD) injection 0.5 mL  0.5 mL IntraMUSCular PRIOR TO DISCHARGE    diphenhydrAMINE (BENADRYL) 12.5 mg/5 mL oral elixir 12.5 mg  12.5 mg Oral Q6H PRN    guaiFENesin-codeine (ROBITUSSIN AC) 100-10 mg/5 mL solution 10 mL  10 mL Oral Q4H PRN    benzocaine-menthol (CEPACOL) lozenge 1 Lozenge  1 Lozenge Mucous Membrane PRN    ALPRAZolam (XANAX) tablet 1 mg  1 mg Oral TID PRN    Vancomycin Pharmacokinetic Dosing  1 Each Other Rx Dosing/Monitoring        LAB AND IMAGING FINDINGS:     Lab Results   Component Value Date/Time    WBC 11.8 05/25/2017 03:05 AM    HGB 10.5 05/25/2017 03:05 AM    PLATELET 424 09/62/0791 03:05 AM     Lab Results   Component Value Date/Time    Sodium 138 05/25/2017 03:05 AM    Potassium 4.3 05/25/2017 03:05 AM    Chloride 104 05/25/2017 03:05 AM    CO2 30 05/25/2017 03:05 AM    BUN 47 05/25/2017 03:05 AM    Creatinine 1.03 05/25/2017 03:05 AM    Calcium 8.5 05/25/2017 03:05 AM    Magnesium 2.0 05/25/2017 03:05 AM      Lab Results   Component Value Date/Time    AST (SGOT) 46 05/15/2017 05:50 AM    Alk.  phosphatase 58 05/15/2017 05:50 AM    Protein, total 7.2 05/15/2017 05:50 AM    Albumin 3.0 05/15/2017 05:50 AM    Globulin 4.2 05/15/2017 05:50 AM     Lab Results   Component Value Date/Time    INR 1.1 05/13/2017 08:00 PM    Prothrombin time 13.7 05/13/2017 08:00 PM    aPTT 29.9 05/13/2017 08:00 PM      No results found for: IRON, FE, TIBC, IBCT, PSAT, FERR   No results found for: PH, PCO2, PO2  No components found for: [place]   Lab Results   Component Value Date/Time     05/14/2017 07:40 AM    CK - MB 2.2 05/14/2017 07:40 AM              Total time: 15 min  Counseling / coordination time, spent as noted above: 11 min  > 50% counseling / coordination        Ana Campos MD  Palliative Medicine

## 2021-06-16 ENCOUNTER — HOSPITAL ENCOUNTER (INPATIENT)
Age: 76
LOS: 6 days | Discharge: NURSING, REHAB OR LONG-TERM CARE | DRG: 202 | End: 2021-06-22
Attending: EMERGENCY MEDICINE | Admitting: HOSPITALIST
Payer: MEDICARE

## 2021-06-16 ENCOUNTER — APPOINTMENT (OUTPATIENT)
Dept: GENERAL RADIOLOGY | Age: 76
DRG: 202 | End: 2021-06-16
Attending: EMERGENCY MEDICINE
Payer: MEDICARE

## 2021-06-16 DIAGNOSIS — R07.89 ATYPICAL CHEST PAIN: ICD-10-CM

## 2021-06-16 DIAGNOSIS — J44.1 ACUTE EXACERBATION OF CHRONIC OBSTRUCTIVE PULMONARY DISEASE (COPD) (HCC): Primary | ICD-10-CM

## 2021-06-16 DIAGNOSIS — G43.809 OTHER MIGRAINE WITHOUT STATUS MIGRAINOSUS, NOT INTRACTABLE: ICD-10-CM

## 2021-06-16 DIAGNOSIS — Z98.890 H/O CERVICAL SPINE SURGERY: ICD-10-CM

## 2021-06-16 PROBLEM — Z74.01 BEDRIDDEN: Status: ACTIVE | Noted: 2021-06-16

## 2021-06-16 LAB
ANION GAP SERPL CALC-SCNC: 5 MMOL/L (ref 3–18)
BASOPHILS # BLD: 0 K/UL (ref 0–0.1)
BASOPHILS NFR BLD: 0 % (ref 0–2)
BUN SERPL-MCNC: 18 MG/DL (ref 7–18)
BUN/CREAT SERPL: 21 (ref 12–20)
CALCIUM SERPL-MCNC: 8.7 MG/DL (ref 8.5–10.1)
CHLORIDE SERPL-SCNC: 105 MMOL/L (ref 100–111)
CK MB CFR SERPL CALC: NORMAL % (ref 0–4)
CK MB CFR SERPL CALC: NORMAL % (ref 0–4)
CK MB SERPL-MCNC: <1 NG/ML (ref 5–25)
CK MB SERPL-MCNC: <1 NG/ML (ref 5–25)
CK SERPL-CCNC: 47 U/L (ref 26–192)
CK SERPL-CCNC: 48 U/L (ref 26–192)
CO2 SERPL-SCNC: 31 MMOL/L (ref 21–32)
COVID-19 RAPID TEST, COVR: NOT DETECTED
CREAT SERPL-MCNC: 0.85 MG/DL (ref 0.6–1.3)
D DIMER PPP FEU-MCNC: 0.48 UG/ML(FEU)
DIFFERENTIAL METHOD BLD: ABNORMAL
EOSINOPHIL # BLD: 0.4 K/UL (ref 0–0.4)
EOSINOPHIL NFR BLD: 5 % (ref 0–5)
ERYTHROCYTE [DISTWIDTH] IN BLOOD BY AUTOMATED COUNT: 13 % (ref 11.6–14.5)
EST. AVERAGE GLUCOSE BLD GHB EST-MCNC: 111 MG/DL
GLUCOSE BLD STRIP.AUTO-MCNC: 193 MG/DL (ref 70–110)
GLUCOSE SERPL-MCNC: 91 MG/DL (ref 74–99)
HBA1C MFR BLD: 5.5 % (ref 4.2–5.6)
HCT VFR BLD AUTO: 35.8 % (ref 35–45)
HGB BLD-MCNC: 11.5 G/DL (ref 12–16)
LYMPHOCYTES # BLD: 3.1 K/UL (ref 0.9–3.6)
LYMPHOCYTES NFR BLD: 41 % (ref 21–52)
MCH RBC QN AUTO: 30.2 PG (ref 24–34)
MCHC RBC AUTO-ENTMCNC: 32.1 G/DL (ref 31–37)
MCV RBC AUTO: 94 FL (ref 74–97)
MONOCYTES # BLD: 0.6 K/UL (ref 0.05–1.2)
MONOCYTES NFR BLD: 8 % (ref 3–10)
NEUTS SEG # BLD: 3.4 K/UL (ref 1.8–8)
NEUTS SEG NFR BLD: 45 % (ref 40–73)
PLATELET # BLD AUTO: 282 K/UL (ref 135–420)
PMV BLD AUTO: 11.4 FL (ref 9.2–11.8)
POTASSIUM SERPL-SCNC: 4 MMOL/L (ref 3.5–5.5)
RBC # BLD AUTO: 3.81 M/UL (ref 4.2–5.3)
SODIUM SERPL-SCNC: 141 MMOL/L (ref 136–145)
SOURCE, COVRS: NORMAL
TROPONIN I SERPL-MCNC: <0.02 NG/ML (ref 0–0.04)
WBC # BLD AUTO: 7.4 K/UL (ref 4.6–13.2)

## 2021-06-16 PROCEDURE — 94640 AIRWAY INHALATION TREATMENT: CPT

## 2021-06-16 PROCEDURE — 82553 CREATINE MB FRACTION: CPT

## 2021-06-16 PROCEDURE — 74011250636 HC RX REV CODE- 250/636: Performed by: HOSPITALIST

## 2021-06-16 PROCEDURE — 93005 ELECTROCARDIOGRAM TRACING: CPT

## 2021-06-16 PROCEDURE — 36415 COLL VENOUS BLD VENIPUNCTURE: CPT

## 2021-06-16 PROCEDURE — 65660000000 HC RM CCU STEPDOWN

## 2021-06-16 PROCEDURE — 87635 SARS-COV-2 COVID-19 AMP PRB: CPT

## 2021-06-16 PROCEDURE — 96374 THER/PROPH/DIAG INJ IV PUSH: CPT

## 2021-06-16 PROCEDURE — 83036 HEMOGLOBIN GLYCOSYLATED A1C: CPT

## 2021-06-16 PROCEDURE — 82962 GLUCOSE BLOOD TEST: CPT

## 2021-06-16 PROCEDURE — 85025 COMPLETE CBC W/AUTO DIFF WBC: CPT

## 2021-06-16 PROCEDURE — 80048 BASIC METABOLIC PNL TOTAL CA: CPT

## 2021-06-16 PROCEDURE — 74011000250 HC RX REV CODE- 250: Performed by: EMERGENCY MEDICINE

## 2021-06-16 PROCEDURE — 71045 X-RAY EXAM CHEST 1 VIEW: CPT

## 2021-06-16 PROCEDURE — 74011250637 HC RX REV CODE- 250/637: Performed by: HOSPITALIST

## 2021-06-16 PROCEDURE — 74011250636 HC RX REV CODE- 250/636: Performed by: EMERGENCY MEDICINE

## 2021-06-16 PROCEDURE — 77030013140 HC MSK NEB VYRM -A

## 2021-06-16 PROCEDURE — 99285 EMERGENCY DEPT VISIT HI MDM: CPT

## 2021-06-16 PROCEDURE — 74011000250 HC RX REV CODE- 250: Performed by: HOSPITALIST

## 2021-06-16 PROCEDURE — 74011636637 HC RX REV CODE- 636/637: Performed by: HOSPITALIST

## 2021-06-16 PROCEDURE — 85379 FIBRIN DEGRADATION QUANT: CPT

## 2021-06-16 RX ORDER — IPRATROPIUM BROMIDE AND ALBUTEROL SULFATE 2.5; .5 MG/3ML; MG/3ML
3 SOLUTION RESPIRATORY (INHALATION)
Status: DISCONTINUED | OUTPATIENT
Start: 2021-06-16 | End: 2021-06-22 | Stop reason: HOSPADM

## 2021-06-16 RX ORDER — LEVOTHYROXINE SODIUM 75 UG/1
225 TABLET ORAL
Status: DISCONTINUED | OUTPATIENT
Start: 2021-06-17 | End: 2021-06-17

## 2021-06-16 RX ORDER — ACETAMINOPHEN 325 MG/1
650 TABLET ORAL
Status: DISCONTINUED | OUTPATIENT
Start: 2021-06-16 | End: 2021-06-22 | Stop reason: HOSPADM

## 2021-06-16 RX ORDER — MAGNESIUM SULFATE HEPTAHYDRATE 40 MG/ML
2 INJECTION, SOLUTION INTRAVENOUS ONCE
Status: COMPLETED | OUTPATIENT
Start: 2021-06-16 | End: 2021-06-16

## 2021-06-16 RX ORDER — MONTELUKAST SODIUM 10 MG/1
10 TABLET ORAL DAILY
Status: DISCONTINUED | OUTPATIENT
Start: 2021-06-17 | End: 2021-06-22 | Stop reason: HOSPADM

## 2021-06-16 RX ORDER — BUDESONIDE 0.5 MG/2ML
500 INHALANT ORAL
Status: DISCONTINUED | OUTPATIENT
Start: 2021-06-16 | End: 2021-06-22 | Stop reason: HOSPADM

## 2021-06-16 RX ORDER — DEXTROSE 50 % IN WATER (D50W) INTRAVENOUS SYRINGE
25-50 AS NEEDED
Status: DISCONTINUED | OUTPATIENT
Start: 2021-06-16 | End: 2021-06-22 | Stop reason: HOSPADM

## 2021-06-16 RX ORDER — FACIAL-BODY WIPES
10 EACH TOPICAL DAILY PRN
Status: DISCONTINUED | OUTPATIENT
Start: 2021-06-16 | End: 2021-06-22 | Stop reason: HOSPADM

## 2021-06-16 RX ORDER — SODIUM CHLORIDE 0.9 % (FLUSH) 0.9 %
5-40 SYRINGE (ML) INJECTION EVERY 8 HOURS
Status: DISCONTINUED | OUTPATIENT
Start: 2021-06-16 | End: 2021-06-22 | Stop reason: HOSPADM

## 2021-06-16 RX ORDER — INSULIN LISPRO 100 [IU]/ML
INJECTION, SOLUTION INTRAVENOUS; SUBCUTANEOUS
Status: DISCONTINUED | OUTPATIENT
Start: 2021-06-16 | End: 2021-06-22 | Stop reason: HOSPADM

## 2021-06-16 RX ORDER — ACETAMINOPHEN 650 MG/1
650 SUPPOSITORY RECTAL
Status: DISCONTINUED | OUTPATIENT
Start: 2021-06-16 | End: 2021-06-22 | Stop reason: HOSPADM

## 2021-06-16 RX ORDER — PANTOPRAZOLE SODIUM 40 MG/1
40 TABLET, DELAYED RELEASE ORAL
Status: DISCONTINUED | OUTPATIENT
Start: 2021-06-17 | End: 2021-06-22 | Stop reason: HOSPADM

## 2021-06-16 RX ORDER — IPRATROPIUM BROMIDE AND ALBUTEROL SULFATE 2.5; .5 MG/3ML; MG/3ML
3 SOLUTION RESPIRATORY (INHALATION)
Status: COMPLETED | OUTPATIENT
Start: 2021-06-16 | End: 2021-06-16

## 2021-06-16 RX ORDER — MAGNESIUM SULFATE 100 %
4 CRYSTALS MISCELLANEOUS AS NEEDED
Status: DISCONTINUED | OUTPATIENT
Start: 2021-06-16 | End: 2021-06-22 | Stop reason: HOSPADM

## 2021-06-16 RX ORDER — ENOXAPARIN SODIUM 100 MG/ML
40 INJECTION SUBCUTANEOUS DAILY
Status: DISCONTINUED | OUTPATIENT
Start: 2021-06-17 | End: 2021-06-22 | Stop reason: HOSPADM

## 2021-06-16 RX ORDER — PROMETHAZINE HYDROCHLORIDE 25 MG/1
12.5 TABLET ORAL
Status: DISCONTINUED | OUTPATIENT
Start: 2021-06-16 | End: 2021-06-22 | Stop reason: HOSPADM

## 2021-06-16 RX ORDER — PREGABALIN 100 MG/1
100 CAPSULE ORAL 3 TIMES DAILY
Status: DISCONTINUED | OUTPATIENT
Start: 2021-06-16 | End: 2021-06-22 | Stop reason: HOSPADM

## 2021-06-16 RX ORDER — ONDANSETRON 2 MG/ML
4 INJECTION INTRAMUSCULAR; INTRAVENOUS
Status: DISCONTINUED | OUTPATIENT
Start: 2021-06-16 | End: 2021-06-22 | Stop reason: HOSPADM

## 2021-06-16 RX ORDER — ARFORMOTEROL TARTRATE 15 UG/2ML
15 SOLUTION RESPIRATORY (INHALATION)
Status: DISCONTINUED | OUTPATIENT
Start: 2021-06-16 | End: 2021-06-22 | Stop reason: HOSPADM

## 2021-06-16 RX ORDER — SODIUM CHLORIDE 0.9 % (FLUSH) 0.9 %
5-40 SYRINGE (ML) INJECTION AS NEEDED
Status: DISCONTINUED | OUTPATIENT
Start: 2021-06-16 | End: 2021-06-22 | Stop reason: HOSPADM

## 2021-06-16 RX ADMIN — BUDESONIDE 500 MCG: 0.5 INHALANT RESPIRATORY (INHALATION) at 21:09

## 2021-06-16 RX ADMIN — METHYLPREDNISOLONE SODIUM SUCCINATE 60 MG: 125 INJECTION, POWDER, FOR SOLUTION INTRAMUSCULAR; INTRAVENOUS at 23:08

## 2021-06-16 RX ADMIN — ACETAMINOPHEN 650 MG: 325 TABLET ORAL at 21:09

## 2021-06-16 RX ADMIN — IPRATROPIUM BROMIDE AND ALBUTEROL SULFATE 3 ML: .5; 3 SOLUTION RESPIRATORY (INHALATION) at 14:50

## 2021-06-16 RX ADMIN — MAGNESIUM SULFATE HEPTAHYDRATE 2 G: 40 INJECTION, SOLUTION INTRAVENOUS at 18:49

## 2021-06-16 RX ADMIN — IPRATROPIUM BROMIDE AND ALBUTEROL SULFATE 3 ML: .5; 3 SOLUTION RESPIRATORY (INHALATION) at 18:48

## 2021-06-16 RX ADMIN — Medication 10 ML: at 22:14

## 2021-06-16 RX ADMIN — ARFORMOTEROL TARTRATE 15 MCG: 15 SOLUTION RESPIRATORY (INHALATION) at 21:09

## 2021-06-16 RX ADMIN — PREGABALIN 100 MG: 100 CAPSULE ORAL at 22:12

## 2021-06-16 RX ADMIN — IPRATROPIUM BROMIDE AND ALBUTEROL SULFATE 3 ML: .5; 3 SOLUTION RESPIRATORY (INHALATION) at 21:09

## 2021-06-16 RX ADMIN — METHYLPREDNISOLONE SODIUM SUCCINATE 125 MG: 125 INJECTION, POWDER, FOR SOLUTION INTRAMUSCULAR; INTRAVENOUS at 13:26

## 2021-06-16 RX ADMIN — IPRATROPIUM BROMIDE AND ALBUTEROL SULFATE 3 ML: .5; 3 SOLUTION RESPIRATORY (INHALATION) at 13:27

## 2021-06-16 RX ADMIN — IPRATROPIUM BROMIDE AND ALBUTEROL SULFATE 3 ML: .5; 3 SOLUTION RESPIRATORY (INHALATION) at 23:36

## 2021-06-16 NOTE — ED PROVIDER NOTES
EMERGENCY DEPARTMENT HISTORY AND PHYSICAL EXAM    12:47 PM      Date: 6/16/2021  Patient Name: Chelsey Low    History of Presenting Illness     Chief Complaint   Patient presents with    Chest Pain         History Provided By: Patient    Additional History (Context): Chelsey Low is a 77 y.o. female with Past medical history of asthma, CAD, chronic kidney disease, COPD, GERD, hypertension who presents with chief complaint of nonradiating chest tightness and pressure 45 minutes prior to arrival.  She states that she was given aspirin and nitro prior to arrival to the emergency department. Patient arrived from a nursing home. Associated symptoms are cough, wheezing, shortness of breath. She states that she feels fatigued. She reports that the nitro and aspirin only helped a little. Patient states that she has gotten her complete COVID-19 vaccination. She denies any fever, leg swelling or leg pain, nausea, vomiting, dizziness, abdominal pain, flank pain, dysuria, and no other complaint.     PCP: Bari Espino MD        Past History     Past Medical History:  Past Medical History:   Diagnosis Date    Arthritis     Asthma     CAD (coronary artery disease)     angina, last episode 06/2012    Chronic bronchitis (HCC)     Chronic kidney disease     stage 3    Chronic obstructive pulmonary disease (HCC)     Chronic pain     cervical, lumbar, knees, ankles, elbows    Fibromyalgia     GERD (gastroesophageal reflux disease)     Hypertension 1993    Psychiatric disorder     anxiety, depression    Thyroid disease        Past Surgical History:  Past Surgical History:   Procedure Laterality Date    HX ADENOIDECTOMY      HX APPENDECTOMY      HX CATARACT REMOVAL      OU    HX CERVICAL FUSION      anterior x 2    HX CERVICAL FUSION      posterior x 3    HX CERVICAL FUSION  1996    Removal of titanium plate and screws    HX CHOLECYSTECTOMY      HX HYSTERECTOMY      HX LITHOTRIPSY      x 4    HX OOPHORECTOMY      left    HX ORTHOPAEDIC      cervical  x5    HX SMALL BOWEL RESECTION      HX TONSILLECTOMY      HX UROLOGICAL  1984 and 1985    kidney stone surgery       Family History:  History reviewed. No pertinent family history. Social History:  Social History     Tobacco Use    Smoking status: Never Smoker    Smokeless tobacco: Never Used   Substance Use Topics    Alcohol use: No    Drug use: No       Allergies: Allergies   Allergen Reactions    Ambien [Zolpidem] Other (comments)     Sleep drive    Aspirin Other (comments)     bruising    Codeine Hives    Darvon [Propoxyphene] Unknown (comments)    Iodinated Contrast Media Hives     Iodine on skin is ok per pt.  Pcn [Penicillins] Nausea and Vomiting    Shellfish Derived Hives, Shortness of Breath and Swelling     Iodine on skin is ok per pt.  Zanaflex [Tizanidine] Other (comments)     disorientated    Levaquin [Levofloxacin] Hives     Red rash at IV site while infusing         Review of Systems       Review of Systems   Constitutional: Negative for chills and fever. HENT: Positive for congestion. Negative for rhinorrhea, sore throat and trouble swallowing. Eyes: Negative for visual disturbance. Respiratory: Positive for cough, chest tightness, shortness of breath and wheezing. Cardiovascular: Positive for chest pain. Gastrointestinal: Negative for abdominal pain, diarrhea, nausea and vomiting. Endocrine: Negative for polyuria. Genitourinary: Negative for dysuria and flank pain. Musculoskeletal: Negative for arthralgias, back pain and neck stiffness. Skin: Negative for pallor and rash. Neurological: Negative for dizziness, weakness, numbness and headaches. Hematological: Does not bruise/bleed easily. Psychiatric/Behavioral: Negative for confusion and dysphoric mood. All other systems reviewed and are negative.         Physical Exam     Visit Vitals  BP (!) 135/50   Pulse 75   Temp 98.2 °F (36.8 °C)   Resp 13   Ht 4' 11\" (1.499 m)   Wt 104.1 kg (229 lb 8 oz)   SpO2 100%   BMI 46.35 kg/m²         Physical Exam  Vitals and nursing note reviewed. Constitutional:       General: She is not in acute distress. Appearance: She is well-developed. She is not toxic-appearing or diaphoretic. HENT:      Head: Normocephalic and atraumatic. Eyes:      General: No scleral icterus. Conjunctiva/sclera: Conjunctivae normal.      Pupils: Pupils are equal, round, and reactive to light. Neck:      Vascular: No hepatojugular reflux. Cardiovascular:      Rate and Rhythm: Normal rate. Heart sounds: Normal heart sounds. Heart sounds not distant. No gallop. Comments: Capillary refill < 3 seconds  Pulmonary:      Effort: Pulmonary effort is normal. No respiratory distress. Breath sounds: No stridor. Examination of the right-upper field reveals wheezing. Examination of the left-upper field reveals wheezing. Examination of the right-middle field reveals decreased breath sounds and wheezing. Examination of the left-middle field reveals decreased breath sounds and wheezing. Examination of the right-lower field reveals decreased breath sounds and wheezing. Examination of the left-lower field reveals decreased breath sounds and wheezing. Decreased breath sounds and wheezing present. No rales. Abdominal:      General: Bowel sounds are normal. There is no distension or abdominal bruit. Palpations: Abdomen is soft. Tenderness: There is no abdominal tenderness. Musculoskeletal:         General: No tenderness. Normal range of motion. Cervical back: Normal range of motion and neck supple. Right lower leg: No edema. Left lower leg: No edema. Lymphadenopathy:      Cervical: No cervical adenopathy. Skin:     General: Skin is warm and dry. Coloration: Skin is not cyanotic or pale. Neurological:      General: No focal deficit present.       Mental Status: She is alert and oriented to person, place, and time. Cranial Nerves: No cranial nerve deficit. Motor: No weakness. Coordination: Coordination normal.   Psychiatric:         Thought Content: Thought content normal.           Diagnostic Study Results     Labs -  Recent Results (from the past 12 hour(s))   CBC WITH AUTOMATED DIFF    Collection Time: 06/16/21 12:35 PM   Result Value Ref Range    WBC 7.4 4.6 - 13.2 K/uL    RBC 3.81 (L) 4.20 - 5.30 M/uL    HGB 11.5 (L) 12.0 - 16.0 g/dL    HCT 35.8 35.0 - 45.0 %    MCV 94.0 74.0 - 97.0 FL    MCH 30.2 24.0 - 34.0 PG    MCHC 32.1 31.0 - 37.0 g/dL    RDW 13.0 11.6 - 14.5 %    PLATELET 739 582 - 163 K/uL    MPV 11.4 9.2 - 11.8 FL    NEUTROPHILS 45 40 - 73 %    LYMPHOCYTES 41 21 - 52 %    MONOCYTES 8 3 - 10 %    EOSINOPHILS 5 0 - 5 %    BASOPHILS 0 0 - 2 %    ABS. NEUTROPHILS 3.4 1.8 - 8.0 K/UL    ABS. LYMPHOCYTES 3.1 0.9 - 3.6 K/UL    ABS. MONOCYTES 0.6 0.05 - 1.2 K/UL    ABS. EOSINOPHILS 0.4 0.0 - 0.4 K/UL    ABS.  BASOPHILS 0.0 0.0 - 0.1 K/UL    DF AUTOMATED     METABOLIC PANEL, BASIC    Collection Time: 06/16/21 12:35 PM   Result Value Ref Range    Sodium 141 136 - 145 mmol/L    Potassium 4.0 3.5 - 5.5 mmol/L    Chloride 105 100 - 111 mmol/L    CO2 31 21 - 32 mmol/L    Anion gap 5 3.0 - 18 mmol/L    Glucose 91 74 - 99 mg/dL    BUN 18 7.0 - 18 MG/DL    Creatinine 0.85 0.6 - 1.3 MG/DL    BUN/Creatinine ratio 21 (H) 12 - 20      GFR est AA >60 >60 ml/min/1.73m2    GFR est non-AA >60 >60 ml/min/1.73m2    Calcium 8.7 8.5 - 10.1 MG/DL   CARDIAC PANEL,(CK, CKMB & TROPONIN)    Collection Time: 06/16/21 12:35 PM   Result Value Ref Range    CK - MB <1.0 <3.6 ng/ml    CK-MB Index  0.0 - 4.0 %     CALCULATION NOT PERFORMED WHEN RESULT IS BELOW LINEAR LIMIT    CK 47 26 - 192 U/L    Troponin-I, QT <0.02 0.0 - 0.045 NG/ML   D DIMER    Collection Time: 06/16/21 12:35 PM   Result Value Ref Range    D DIMER 0.48 (H) <0.46 ug/ml(FEU)   COVID-19 RAPID TEST    Collection Time: 06/16/21  2:43 PM   Result Value Ref Range    Specimen source Nasopharyngeal      COVID-19 rapid test Not detected NOTD     TROPONIN I    Collection Time: 06/16/21  3:25 PM   Result Value Ref Range    Troponin-I, QT <0.02 0.0 - 0.045 NG/ML       Radiologic Studies -   XR CHEST PORT   Final Result      No acute pulmonary process identified. Medical Decision Making   I am the first provider for this patient. I reviewed the vital signs, available nursing notes, past medical history, past surgical history, family history and social history. Vital Signs-Reviewed the patient's vital signs. Pulse Oximetry Analysis -  100% on room air (Interpretation) normal    Cardiac Monitor:  Rate: 81  Rhythm:  Normal Sinus Rhythm     EKG: Interpreted by the EP Dr. Vivek Phelps.    Time Interpreted: 12:40 PM   Rate: 73   Rhythm: Normal Sinus Rhythm    Interpretation: Normal QRS duration, normal axis, no ST elevation, no ST depression, no STEMI       Records Reviewed: Nursing Notes and Old Medical Records (Time of Review: 12:47 PM)    Provider Notes (Medical Decision Making): DDx: Cardiac, pneumonia, COVID-19, URI, bronchitis, asthma attack, PE, metabolic    Will check labs, check for COVID-19, EKG, chest x-ray, give duo nebs, steroids    MDM    Medications   albuterol-ipratropium (DUO-NEB) 2.5 MG-0.5 MG/3 ML (has no administration in time range)   magnesium sulfate 2 g/50 ml IVPB (premix or compounded) (has no administration in time range)   albuterol-ipratropium (DUO-NEB) 2.5 MG-0.5 MG/3 ML (3 mL Nebulization Given 6/16/21 1327)   methylPREDNISolone (PF) (Solu-MEDROL) injection 125 mg (125 mg IntraVENous Given 6/16/21 1326)   albuterol-ipratropium (DUO-NEB) 2.5 MG-0.5 MG/3 ML (3 mL Nebulization Given 6/16/21 1450)         ED Course: Progress Notes, Reevaluation, and Consults:    WBC within normal limits  Creatinine within normal limits  Initial troponin within normal limits  Age-adjusted D-dimer 0.48, unlikely thrombosis    Patient still with coughing and wheezing, gave another DuoNeb. Despite multiple nebs and steroids patient still with significant wheezing and shortness of breath. Added magnesium IV and another DuoNeb. Negative COVID-19    Repeat troponin within normal limits as initial    Will consult with hospitalist for admission    Consult:  Discussed care with Dr Diya Boyce, Specialty: Hospitalist, standard discussion; including history of patients chief complaint, available diagnostic results, and treatment course. She accepts admission  5:20  PM, 6/16/2021     I have discussed diagnosis, results and plan with patient and her son at the bedside. She agrees with admission. Diagnosis     Clinical Impression:   1. Acute exacerbation of chronic obstructive pulmonary disease (COPD) ([address]    2. Atypical chest pain        Disposition: Admitted    Follow-up Information    None          Patient's Medications   Start Taking    No medications on file   Continue Taking    ACETAMINOPHEN (TYLENOL) 325 MG TABLET    Take 650 mg by mouth every six (6) hours as needed for Pain. ALBUTEROL (PROVENTIL VENTOLIN) 2.5 MG /3 ML (0.083 %) NEBULIZER SOLUTION    2.5 mg by Nebulization route every four (4) hours as needed. ALUM-MAG HYDROXIDE-SIMETH (MYLANTA) 200-200-20 MG/5 ML SUSP    Take 15 mL by mouth four (4) times daily as needed. ASPIRIN 81 MG TABLET    Take 81 mg by mouth daily. ATENOLOL (TENORMIN) 25 MG TABLET    Take 25 mg by mouth daily. As needed for palpatations     BUMETANIDE (BUMEX) 1 MG TABLET    Take 1 Tab by mouth daily. CETIRIZINE (ZYRTEC) 10 MG CAP    Take  by mouth daily. CHOLESTYRAMINE-SUCROSE (QUESTRAN) 4 GRAM POWDER    Take  by mouth daily as needed (diarrhea). CYANOCOBALAMIN (VITAMIN B12) 1,000 MCG/ML INJECTION    1,000 mcg by IntraMUSCular route every month. DICLOFENAC (VOLTAREN) 1 % GEL    Apply 2 g to affected area three (3) times daily. Apply to both shoulders and both knees.     DRONABINOL (MARINOL) 2.5 MG CAPSULE Take 1 Cap by mouth Before breakfast and dinner. Max Daily Amount: 5 mg. DULOXETINE (CYMBALTA) 60 MG CAPSULE    Take 60 mg by mouth daily. Indications: CHRONIC MUSCULOSKELETAL PAIN    FENTANYL (DURAGESIC) 25 MCG/HR PATCH    1 Patch by TransDERmal route every seventy-two (72) hours. Max Daily Amount: 1 Patch. FLUTICASONE (FLONASE) 50 MCG/ACTUATION NASAL SPRAY    1 [place] by Both Nostrils route daily. FLUTICASONE-SALMETEROL (ADVAIR DISKUS) 500-50 MCG/DOSE DISKUS INHALER    Take 1 Puff by inhalation every twelve (12) hours. May use inhaler on DOS    GUAIFENESIN SR (MUCINEX) 600 MG SR TABLET    Take 600 mg by mouth two (2) times a day. LEVOTHYROXINE (SYNTHROID) 200 MCG TABLET    Take 225 mcg by mouth Daily (before breakfast). Takes a total of 225 mcg. LISINOPRIL (PRINIVIL, ZESTRIL) 20 MG TABLET    Take 1 Tab by mouth daily. MENTHOL-ZINC OXIDE (RISAMINE) 0.44-20.6 % OINT    Apply  to affected area two (2) times a day. Indications: Apply to sacrum to maintain skin integrity    MONTELUKAST (SINGULAIR) 10 MG TABLET    Take 10 mg by mouth daily. MULTIVITAMIN, TX-IRON-CA-MIN (THERA-M W/ IRON) 9 MG IRON-400 MCG TAB TABLET    Take 1 Tab by mouth daily. NITROGLYCERIN (NITROSTAT) 0.4 MG SL TABLET    0.4 mg by SubLINGual route every five (5) minutes as needed (Give one tab sublingual every 5 minutes x3 doses as needed for chest pain). NYSTATIN (MYCOSTATIN) POWDER    Apply  to affected area two (2) times a day. OMEPRAZOLE (PRILOSEC) 20 MG CAPSULE    Take 20 mg by mouth daily. OXYGEN    continuous. 3L/min NC    PREGABALIN (LYRICA) 100 MG CAPSULE    Take 100 mg by mouth three (3) times daily. TIOTROPIUM (SPIRIVA WITH HANDIHALER) 18 MCG INHALATION CAPSULE    Take 1 Cap by inhalation daily. These Medications have changed    No medications on file   Stop Taking    No medications on file         DO Sridevi Cantor medical dictation software was used for portions of this report.    Unintended transcription errors may occur. My signature above authenticates this document and my orders, the final    diagnosis (es), discharge prescription (s), and instructions in the Epic    record.

## 2021-06-16 NOTE — H&P
History & Physical    Patient: Mandeep Maynard MRN: [id number]  CSN: [id number]    YOB: 1945  Age: 77 y.o. Sex: female      DOA: 6/16/2021  Primary Care Provider:  Lilia Bolivar MD      Assessment/Plan     Hospital Problems  Date Reviewed: 5/13/2017        Codes Class Noted POA    * (Principal) Acute exacerbation of chronic obstructive pulmonary disease (COPD) ([facility] [address] ICD-10-CM: J44.1  ICD-9-CM: 491.21  6/16/2021 Unknown        Atypical chest pain ICD-10-CM: R07.89  ICD-9-CM: 786.59  6/16/2021 Unknown        Bedridden ICD-10-CM: Z74.01  ICD-9-CM: V49.84  6/16/2021 Unknown        Chronic pain ICD-10-CM: G89.29  ICD-9-CM: 338.29  Unknown Unknown    Overview Signed 6/16/2021  6:23 PM by Thompson Pettit MD     cervical, lumbar, knees, ankles, elbows             Psychiatric disorder ICD-10-CM: H77  ICD-9-CM: 298.9  Unknown Unknown    Overview Signed 6/16/2021  6:24 PM by Thompson Pettit MD     anxiety, depression             Thyroid disease ICD-10-CM: E07.9  ICD-9-CM: 246. 9  Unknown Unknown                Admit to tele     Chest pain   ce negative for 2   Continue monitoring   Due to elevated D-dimer, bedridden, high risk for PE  Will check CTA rule out PE  Echo, cardiac monitor    COPD exacerbation  Iv steroid, breathing treatment with bronchodilator   symptom treatment   Will start empiric iv abx   ssi for glucose control   Educate pt        HTN, accelerated  Continue home medication. Chronic pain:  On fentanyl patch 100 MCG, was put on 6/15, will continue home pain management   She does not remember other pain medication name     Psychiatric disorder   Continue home regimen   Need medication list from snf     hypothyroidism   Continue synthyroid     Bedridden   Fall precaution    AC:  I have had a discussion with patient  regarding code status.  Particularly, described potential options in event of cardiac or respiratory arrest. I have explained what being full code entails, including cardiorespiratory resuscitation attempts with chest compressions, potential cariodioversion/ \" shocks\" as well as intubation. I have also explained Do not resuscitate which would mean we allow a natural death with out aggressive interventions. Full code AC 32 min       Estimate  length of stay : 2-3 day/TBD     DVT : lovenox   CC: chest pain , wheezing        HPI:     Rajni Oliver is a 77 y.o. female with asthma, COPD, GERD,  chronic pain on fentanyl patch presented to ER due to chest pain and cough wheezing. She reported she has chest pressure started this morning, she was giving aspirin and nitro prior to arrival ER. Also reported coughs, wheezing on and off for a week. Cardiac enzymes x2 , EKG no elevated ST segment. She received steroid, magnesium, breathing treatment in ER. Still wheezing. Hospitalist is called for admission. . She reported she has a chronic back pain, received fentanyl patch 100 MCG, also after pain medication twice daily. She completed COVID-19 vaccination. Her D-dimer 0.48, she reported she is bedridden, and constipation.  She concerns about her pain medication     Visit Vitals  BP (!) 135/50   Pulse 75   Temp 98.2 °F (36.8 °C)   Resp 13   Ht 4' 11\" (1.499 m)   Wt 104.1 kg (229 lb 8 oz)   SpO2 100%   BMI 46.35 kg/m²    O2 Flow Rate (L/min): 3 l/min O2 Device: Nasal cannula      Past Medical History:   Diagnosis Date    Arthritis     Asthma     CAD (coronary artery disease)     angina, last episode 06/2012    Chronic bronchitis (HCC)     Chronic kidney disease     stage 3    Chronic obstructive pulmonary disease (HCC)     Chronic pain     cervical, lumbar, knees, ankles, elbows    Fibromyalgia     GERD (gastroesophageal reflux disease)     Hypertension 1993    Psychiatric disorder     anxiety, depression    Thyroid disease        Past Surgical History:   Procedure Laterality Date    HX ADENOIDECTOMY      HX APPENDECTOMY      HX CATARACT REMOVAL      OU    HX CERVICAL FUSION anterior x 2    HX CERVICAL FUSION      posterior x 3    HX CERVICAL FUSION  1996    Removal of titanium plate and screws    HX CHOLECYSTECTOMY      HX HYSTERECTOMY      HX LITHOTRIPSY      x 4    HX OOPHORECTOMY      left    HX ORTHOPAEDIC      cervical  x5    HX SMALL BOWEL RESECTION      HX TONSILLECTOMY      HX UROLOGICAL  1984 and 1985    kidney stone surgery     fhx : htn mother     Social History     Socioeconomic History    Marital status: SINGLE     Spouse name: Not on file    Number of children: Not on file    Years of education: Not on file    Highest education level: Not on file   Tobacco Use    Smoking status: Never Smoker    Smokeless tobacco: Never Used   Substance and Sexual Activity    Alcohol use: No    Drug use: No    Sexual activity: Yes     Birth control/protection: I.U.D., None     Social Determinants of Health     Financial Resource Strain:     Difficulty of Paying Living Expenses:    Food Insecurity:     Worried About Running Out of Food in the Last Year:     Ran Out of Food in the Last Year:    Transportation Needs:     Lack of Transportation (Medical):  Lack of Transportation (Non-Medical):    Physical Activity:     Days of Exercise per Week:     Minutes of Exercise per Session:    Stress:     Feeling of Stress :    Social Connections:     Frequency of Communication with Friends and Family:     Frequency of Social Gatherings with Friends and Family:     Attends Taoist Services:     Active Member of Clubs or Organizations:     Attends Club or Organization Meetings:     Marital Status:        Prior to Admission medications    Medication Sig Start Date End Date Taking? Authorizing Provider   fentaNYL (DURAGESIC) 25 mcg/hr PATCH 1 Patch by TransDERmal route every seventy-two (72) hours. Max Daily Amount: 1 Patch. 5/24/17   Brain Bell DO   dronabinol (MARINOL) 2.5 mg capsule Take 1 Cap by mouth Before breakfast and dinner.  Max Daily Amount: 5 mg. 5/24/17   [place] Book, DO   lisinopril (PRINIVIL, ZESTRIL) 20 mg tablet Take 1 Tab by mouth daily. 2/16/17   Zachary Park MD   bumetanide (BUMEX) 1 mg tablet Take 1 Tab by mouth daily. 2/16/17   Zachary Park MD   Menthol-Zinc Oxide (RISAMINE) 0.44-20.6 % oint Apply  to affected area two (2) times a day. Indications: Apply to sacrum to maintain skin integrity    Provider, Historical   alum-mag hydroxide-simeth (MYLANTA) 200-200-20 mg/5 mL susp Take 15 mL by mouth four (4) times daily as needed. Provider, Historical   tiotropium (SPIRIVA WITH HANDIHALER) 18 mcg inhalation capsule Take 1 Cap by inhalation daily. Provider, Historical   multivitamin, tx-iron-ca-min (THERA-M W/ IRON) 9 mg iron-400 mcg tab tablet Take 1 Tab by mouth daily. Provider, Historical   Oxygen continuous. 3L/min NC    Provider, Historical   omeprazole (PRILOSEC) 20 mg capsule Take 20 mg by mouth daily. Other, MD Irene   nystatin (MYCOSTATIN) powder Apply  to affected area two (2) times a day. 12/1/16   Luis Jaime MD   levothyroxine (SYNTHROID) 200 mcg tablet Take 225 mcg by mouth Daily (before breakfast). Takes a total of 225 mcg. Provider, Historical   pregabalin (LYRICA) 100 mg capsule Take 100 mg by mouth three (3) times daily. Provider, Historical   fluticasone (FLONASE) 50 mcg/actuation nasal spray 1 [place] by Both Nostrils route daily. Provider, Historical   cyanocobalamin (VITAMIN B12) 1,000 mcg/mL injection 1,000 mcg by IntraMUSCular route every month. Provider, Historical   acetaminophen (TYLENOL) 325 mg tablet Take 650 mg by mouth every six (6) hours as needed for Pain. Provider, Historical   diclofenac (VOLTAREN) 1 % gel Apply 2 g to affected area three (3) times daily. Apply to both shoulders and both knees. Provider, Historical   cholestyramine-sucrose (QUESTRAN) 4 gram powder Take  by mouth daily as needed (diarrhea).     Provider, Historical   albuterol (PROVENTIL VENTOLIN) 2.5 mg /3 mL (0.083 %) nebulizer solution 2.5 mg by Nebulization route every four (4) hours as needed. Provider, Historical   fluticasone-salmeterol (ADVAIR DISKUS) 500-50 mcg/dose diskus inhaler Take 1 Puff by inhalation every twelve (12) hours. May use inhaler on DOS    Provider, Historical   nitroglycerin (NITROSTAT) 0.4 mg SL tablet 0.4 mg by SubLINGual route every five (5) minutes as needed (Give one tab sublingual every 5 minutes x3 doses as needed for chest pain). Provider, Historical   aspirin 81 mg tablet Take 81 mg by mouth daily. Provider, Historical   atenolol (TENORMIN) 25 mg tablet Take 25 mg by mouth daily. As needed for palpatations     Provider, Historical   guaiFENesin SR (MUCINEX) 600 mg SR tablet Take 600 mg by mouth two (2) times a day. Provider, Historical   DULoxetine (CYMBALTA) 60 mg capsule Take 60 mg by mouth daily. Indications: CHRONIC MUSCULOSKELETAL PAIN    Provider, Historical   Cetirizine (ZYRTEC) 10 mg cap Take  by mouth daily. Provider, Historical   montelukast (SINGULAIR) 10 mg tablet Take 10 mg by mouth daily. Provider, Historical       Allergies   Allergen Reactions    Ambien [Zolpidem] Other (comments)     Sleep drive    Aspirin Other (comments)     bruising    Codeine Hives    Darvon [Propoxyphene] Unknown (comments)    Iodinated Contrast Media Hives     Iodine on skin is ok per pt.  Pcn [Penicillins] Nausea and Vomiting    Shellfish Derived Hives, Shortness of Breath and Swelling     Iodine on skin is ok per pt.  Zanaflex [Tizanidine] Other (comments)     disorientated    Levaquin [Levofloxacin] Hives     Red rash at IV site while infusing       Review of Systems  Gen: No fever, chills, malaise, weight loss/gain. Heent: No headache, rhinorrhea, epistaxis, ear pain, hearing loss, sinus pain, neck pain/stiffness, sore throat. Heart: +chest pain,  No palpitations, SCANLON, pnd, or orthopnea. Resp: +cough,  No hemoptysis, +wheezing and shortness of breath.    GI: No nausea, vomiting, diarrhea, constipation, melena or hematochezia. : No urinary obstruction, dysuria or hematuria. Derm: No rash, new skin lesion or pruritis. Musc/skeletal: pain all over , back pain   Vasc: No edema, cyanosis or claudication. Endo: No heat/cold intolerance, no polyuria,polydipsia or polyphagia. Neuro: No unilateral weakness, numbness, tingling. No seizures. Heme: No easy bruising or bleeding. Physical Exam:     Physical Exam:  Visit Vitals  BP (!) 135/50   Pulse 75   Temp 98.2 °F (36.8 °C)   Resp 13   Ht 4' 11\" (1.499 m)   Wt 104.1 kg (229 lb 8 oz)   SpO2 100%   BMI 46.35 kg/m²    O2 Flow Rate (L/min): 3 l/min O2 Device: Nasal cannula    Temp (24hrs), Av.2 °F (36.8 °C), Min:98.2 °F (36.8 °C), Max:98.2 °F (36.8 °C)    No intake/output data recorded. No intake/output data recorded. General:  Awake, cooperative, no distress. Head:  Normocephalic, without obvious abnormality, atraumatic. Eyes:  Conjunctivae/corneas clear, sclera anicteric, PERRL, EOMs intact. Nose: Nares normal. No drainage or sinus tenderness. Throat: Lips, mucosa, and tongue normal. .   Neck: Supple, symmetrical, trachea midline, no adenopathy. Lungs:   Bilateral wheezing        Heart:  Regular rate and rhythm, S1, S2 normal, no murmur, click, rub or gallop. Abdomen: Soft, non-tender. Bowel sounds normal. No masses,  No organomegaly. Extremities: Extremities normal, atraumatic, no cyanosis or edema. Pulses: 2+ and symmetric all extremities. Skin: Skin color-pink, texture, turgor normal. No rashes or lesions. Capillary refill normal    Neurologic: CNII-XII intact. No focal motor or sensory deficit.        Labs Reviewed:    BMP:   Lab Results   Component Value Date/Time     2021 12:35 PM    K 4.0 2021 12:35 PM     2021 12:35 PM    CO2 31 2021 12:35 PM    AGAP 5 2021 12:35 PM    GLU 91 2021 12:35 PM    BUN 18 2021 12:35 PM    CREA 0.85 06/16/2021 12:35 PM    GFRAA >60 06/16/2021 12:35 PM    GFRNA >60 06/16/2021 12:35 PM     CMP:   Lab Results   Component Value Date/Time     06/16/2021 12:35 PM    K 4.0 06/16/2021 12:35 PM     06/16/2021 12:35 PM    CO2 31 06/16/2021 12:35 PM    AGAP 5 06/16/2021 12:35 PM    GLU 91 06/16/2021 12:35 PM    BUN 18 06/16/2021 12:35 PM    CREA 0.85 06/16/2021 12:35 PM    GFRAA >60 06/16/2021 12:35 PM    GFRNA >60 06/16/2021 12:35 PM    CA 8.7 06/16/2021 12:35 PM     CBC:   Lab Results   Component Value Date/Time    WBC 7.4 06/16/2021 12:35 PM    HGB 11.5 (L) 06/16/2021 12:35 PM    HCT 35.8 06/16/2021 12:35 PM     06/16/2021 12:35 PM     All Cardiac Markers in the last 24 hours:   Lab Results   Component Value Date/Time    CPK 47 06/16/2021 12:35 PM    CKMB <1.0 06/16/2021 12:35 PM    CKND1  06/16/2021 12:35 PM     CALCULATION NOT PERFORMED WHEN RESULT IS BELOW LINEAR LIMIT    Gill Licking <0.02 06/16/2021 03:25 PM    TROIQ <0.02 06/16/2021 12:35 PM     Recent Glucose Results:   Lab Results   Component Value Date/Time    GLU 91 06/16/2021 12:35 PM     ABG: No results found for: PH, PHI, PCO2, PCO2I, PO2, PO2I, HCO3, HCO3I, FIO2, FIO2I  COAGS: No results found for: APTT, PTP, INR, INREXT, INREXT  Liver Panel: No results found for: ALB, CBIL, TBIL, TP, GLOB, AGRAT, ASTPOC, ALTPOC, ALT, AP  Pancreatic Markers: No results found for: AMYLPOCT, AML, LIPPOCT, LPSE    XR CHEST PORT    Result Date: 6/16/2021  EXAM: One-view chest CLINICAL HISTORY: Chest pain , COMPARISON: None FINDINGS: Frontal view of the chest demonstrate clear lungs. Cardiac silhouette is normal in size and contour. No acute bony or soft tissue abnormality. No acute pulmonary process identified.      Procedures/imaging: see electronic medical records for all procedures/Xrays and details which were not copied into this note but were reviewed prior to creation of Denis Olivier MD, Internal Medicine     CC: Wil Cuellar MD

## 2021-06-16 NOTE — ED NOTES
Bedside report given to [facility].

## 2021-06-16 NOTE — ED TRIAGE NOTES
Patient with complaints of mid sternal chest pain that started 45 mins prior to arrival.  Patient was transported via EMS and given 325mg of ASA and one nitro en route.

## 2021-06-16 NOTE — ED NOTES
TRANSFER - OUT REPORT:    Verbal report given to arabella cook on [address]  being transferred to Telemetry  for routine progression of care       Report consisted of patients Situation, Background, Assessment and   Recommendations(SBAR). Information from the following report(s) SBAR, ED Summary and MAR was reviewed with the receiving nurse. Lines:   Peripheral IV 06/16/21 Right Antecubital (Active)   Site Assessment Clean, dry, & intact 06/16/21 1259   Phlebitis Assessment 0 06/16/21 1259   Infiltration Assessment 0 06/16/21 1259   Dressing Status Clean, dry, & intact 06/16/21 1259   Dressing Type 4 X 4 06/16/21 1259   Hub Color/Line Status Pink 06/16/21 1259   Alcohol Cap Used Yes 06/16/21 1259        Opportunity for questions and clarification was provided.       Patient transported with:   Monitor  O2 @ 3 liters  Registered Nurse

## 2021-06-17 ENCOUNTER — APPOINTMENT (OUTPATIENT)
Dept: NUCLEAR MEDICINE | Age: 76
DRG: 202 | End: 2021-06-17
Attending: HOSPITALIST
Payer: MEDICARE

## 2021-06-17 ENCOUNTER — APPOINTMENT (OUTPATIENT)
Dept: NON INVASIVE DIAGNOSTICS | Age: 76
DRG: 202 | End: 2021-06-17
Attending: HOSPITALIST
Payer: MEDICARE

## 2021-06-17 LAB
ANION GAP SERPL CALC-SCNC: 9 MMOL/L (ref 3–18)
ATRIAL RATE: 73 BPM
AV R PG: 63.16 MMHG
BASOPHILS # BLD: 0 K/UL (ref 0–0.1)
BASOPHILS NFR BLD: 0 % (ref 0–2)
BUN SERPL-MCNC: 17 MG/DL (ref 7–18)
BUN/CREAT SERPL: 16 (ref 12–20)
CALCIUM SERPL-MCNC: 8.4 MG/DL (ref 8.5–10.1)
CALCULATED P AXIS, ECG09: 34 DEGREES
CALCULATED R AXIS, ECG10: 29 DEGREES
CALCULATED T AXIS, ECG11: 47 DEGREES
CHLORIDE SERPL-SCNC: 103 MMOL/L (ref 100–111)
CK MB CFR SERPL CALC: 3.1 % (ref 0–4)
CK MB CFR SERPL CALC: NORMAL % (ref 0–4)
CK MB SERPL-MCNC: 1.5 NG/ML (ref 5–25)
CK MB SERPL-MCNC: <1 NG/ML (ref 5–25)
CK SERPL-CCNC: 43 U/L (ref 26–192)
CK SERPL-CCNC: 49 U/L (ref 26–192)
CO2 SERPL-SCNC: 27 MMOL/L (ref 21–32)
CREAT SERPL-MCNC: 1.06 MG/DL (ref 0.6–1.3)
DIAGNOSIS, 93000: NORMAL
DIFFERENTIAL METHOD BLD: ABNORMAL
ECHO AO ROOT DIAM: 3.49 CM
ECHO AR MAX VEL PISA: 397.38 CM/S
ECHO AV AREA PEAK VELOCITY: 3.66 CM2
ECHO AV AREA VTI: 4.24 CM2
ECHO AV AREA/BSA PEAK VELOCITY: 1.9 CM2/M2
ECHO AV AREA/BSA VTI: 2.2 CM2/M2
ECHO AV MEAN GRADIENT: 10.98 MMHG
ECHO AV PEAK GRADIENT: 18.92 MMHG
ECHO AV PEAK VELOCITY: 217.46 CM/S
ECHO AV REGURGITANT PHT: 522.7 MS
ECHO AV VTI: 42.75 CM
ECHO IVC PROX: 1.79 CM
ECHO LA MAJOR AXIS: 3.49 CM
ECHO LA MINOR AXIS: 1.79 CM
ECHO LA VOL 2C: 30.01 ML (ref 22–52)
ECHO LA VOL 4C: 54.34 ML (ref 22–52)
ECHO LA VOL BP: 45.63 ML (ref 22–52)
ECHO LA VOL/BSA BIPLANE: 23.4 ML/M2 (ref 16–28)
ECHO LA VOLUME INDEX A2C: 15.39 ML/M2 (ref 16–28)
ECHO LA VOLUME INDEX A4C: 27.87 ML/M2 (ref 16–28)
ECHO LV E' LATERAL VELOCITY: 12 CM/S
ECHO LV E' SEPTAL VELOCITY: 11 CM/S
ECHO LV EDV A2C: 69.96 ML
ECHO LV EDV A4C: 143 ML
ECHO LV EDV BP: 99.93 ML (ref 56–104)
ECHO LV EDV INDEX A4C: 73.3 ML/M2
ECHO LV EDV INDEX BP: 51.2 ML/M2
ECHO LV EDV NDEX A2C: 35.9 ML/M2
ECHO LV EJECTION FRACTION A2C: 54 PERCENT
ECHO LV EJECTION FRACTION A4C: 65 PERCENT
ECHO LV EJECTION FRACTION BIPLANE: 59.7 PERCENT (ref 55–100)
ECHO LV ESV A2C: 32.31 ML
ECHO LV ESV A4C: 50.53 ML
ECHO LV ESV BP: 40.28 ML (ref 19–49)
ECHO LV ESV INDEX A2C: 16.6 ML/M2
ECHO LV ESV INDEX A4C: 25.9 ML/M2
ECHO LV ESV INDEX BP: 20.7 ML/M2
ECHO LV INTERNAL DIMENSION DIASTOLIC: 4.7 CM (ref 3.9–5.3)
ECHO LV INTERNAL DIMENSION SYSTOLIC: 3.43 CM
ECHO LV IVSD: 1.59 CM (ref 0.6–0.9)
ECHO LV MASS 2D: 241.9 G (ref 67–162)
ECHO LV MASS INDEX 2D: 124 G/M2 (ref 43–95)
ECHO LV POSTERIOR WALL DIASTOLIC: 1.04 CM (ref 0.6–0.9)
ECHO LVOT DIAM: 2.55 CM
ECHO LVOT PEAK GRADIENT: 9.74 MMHG
ECHO LVOT PEAK VELOCITY: 156.04 CM/S
ECHO LVOT SV: 181.1 ML
ECHO LVOT VTI: 35.52 CM
ECHO MV A VELOCITY: 118.35 CM/S
ECHO MV AREA PHT: 8.32 CM2
ECHO MV E DECELERATION TIME (DT): 91.13 MS
ECHO MV E VELOCITY: 77.06 CM/S
ECHO MV E/A RATIO: 0.65
ECHO MV E/E' LATERAL: 6.42
ECHO MV E/E' RATIO (AVERAGED): 6.71
ECHO MV E/E' SEPTAL: 7.01
ECHO MV PRESSURE HALF TIME (PHT): 26.43 MS
ECHO RA AREA 4C: 11.28 CM2
ECHO RV INTERNAL DIMENSION: 2 CM
ECHO RV TAPSE: 2.4 CM (ref 1.5–2)
EOSINOPHIL # BLD: 0 K/UL (ref 0–0.4)
EOSINOPHIL NFR BLD: 0 % (ref 0–5)
ERYTHROCYTE [DISTWIDTH] IN BLOOD BY AUTOMATED COUNT: 13.1 % (ref 11.6–14.5)
GLUCOSE BLD STRIP.AUTO-MCNC: 134 MG/DL (ref 70–110)
GLUCOSE BLD STRIP.AUTO-MCNC: 147 MG/DL (ref 70–110)
GLUCOSE BLD STRIP.AUTO-MCNC: 170 MG/DL (ref 70–110)
GLUCOSE BLD STRIP.AUTO-MCNC: 195 MG/DL (ref 70–110)
GLUCOSE SERPL-MCNC: 144 MG/DL (ref 74–99)
HCT VFR BLD AUTO: 34.5 % (ref 35–45)
HGB BLD-MCNC: 11.1 G/DL (ref 12–16)
LVOT MG: 6.18 MMHG
LYMPHOCYTES # BLD: 0.7 K/UL (ref 0.9–3.6)
LYMPHOCYTES NFR BLD: 7 % (ref 21–52)
MCH RBC QN AUTO: 30.3 PG (ref 24–34)
MCHC RBC AUTO-ENTMCNC: 32.2 G/DL (ref 31–37)
MCV RBC AUTO: 94.3 FL (ref 74–97)
MONOCYTES # BLD: 0.2 K/UL (ref 0.05–1.2)
MONOCYTES NFR BLD: 2 % (ref 3–10)
NEUTS SEG # BLD: 8.9 K/UL (ref 1.8–8)
NEUTS SEG NFR BLD: 91 % (ref 40–73)
P-R INTERVAL, ECG05: 166 MS
PLATELET # BLD AUTO: 257 K/UL (ref 135–420)
PMV BLD AUTO: 11.1 FL (ref 9.2–11.8)
POTASSIUM SERPL-SCNC: 3.5 MMOL/L (ref 3.5–5.5)
Q-T INTERVAL, ECG07: 434 MS
QRS DURATION, ECG06: 84 MS
QTC CALCULATION (BEZET), ECG08: 478 MS
RBC # BLD AUTO: 3.66 M/UL (ref 4.2–5.3)
SODIUM SERPL-SCNC: 139 MMOL/L (ref 136–145)
TROPONIN I SERPL-MCNC: <0.02 NG/ML (ref 0–0.04)
TROPONIN I SERPL-MCNC: <0.02 NG/ML (ref 0–0.04)
VENTRICULAR RATE, ECG03: 73 BPM
WBC # BLD AUTO: 9.9 K/UL (ref 4.6–13.2)

## 2021-06-17 PROCEDURE — 74011636637 HC RX REV CODE- 636/637: Performed by: HOSPITALIST

## 2021-06-17 PROCEDURE — 85025 COMPLETE CBC W/AUTO DIFF WBC: CPT

## 2021-06-17 PROCEDURE — 65660000000 HC RM CCU STEPDOWN

## 2021-06-17 PROCEDURE — 94640 AIRWAY INHALATION TREATMENT: CPT

## 2021-06-17 PROCEDURE — 74011000250 HC RX REV CODE- 250: Performed by: HOSPITALIST

## 2021-06-17 PROCEDURE — 82962 GLUCOSE BLOOD TEST: CPT

## 2021-06-17 PROCEDURE — 74011250636 HC RX REV CODE- 250/636: Performed by: HOSPITALIST

## 2021-06-17 PROCEDURE — 93306 TTE W/DOPPLER COMPLETE: CPT

## 2021-06-17 PROCEDURE — 74011000258 HC RX REV CODE- 258: Performed by: HOSPITALIST

## 2021-06-17 PROCEDURE — 74011250637 HC RX REV CODE- 250/637: Performed by: HOSPITALIST

## 2021-06-17 PROCEDURE — 77010033678 HC OXYGEN DAILY

## 2021-06-17 PROCEDURE — 82553 CREATINE MB FRACTION: CPT

## 2021-06-17 PROCEDURE — 36415 COLL VENOUS BLD VENIPUNCTURE: CPT

## 2021-06-17 PROCEDURE — 80048 BASIC METABOLIC PNL TOTAL CA: CPT

## 2021-06-17 RX ORDER — BUTALBITAL, ACETAMINOPHEN AND CAFFEINE 50; 325; 40 MG/1; MG/1; MG/1
1 TABLET ORAL
Status: DISCONTINUED | OUTPATIENT
Start: 2021-06-17 | End: 2021-06-17

## 2021-06-17 RX ORDER — DICLOFENAC SODIUM 10 MG/G
2 GEL TOPICAL
COMMUNITY

## 2021-06-17 RX ORDER — OXYCODONE HCL 10 MG/1
30 TABLET, FILM COATED, EXTENDED RELEASE ORAL EVERY 12 HOURS
Status: ON HOLD | COMMUNITY
End: 2021-06-22 | Stop reason: SDUPTHER

## 2021-06-17 RX ORDER — NITROGLYCERIN 0.4 MG/1
0.4 TABLET SUBLINGUAL AS NEEDED
Status: DISCONTINUED | OUTPATIENT
Start: 2021-06-17 | End: 2021-06-22 | Stop reason: HOSPADM

## 2021-06-17 RX ORDER — BUTALBITAL, ACETAMINOPHEN AND CAFFEINE 50; 325; 40 MG/1; MG/1; MG/1
2 TABLET ORAL
Status: DISCONTINUED | OUTPATIENT
Start: 2021-06-17 | End: 2021-06-22 | Stop reason: HOSPADM

## 2021-06-17 RX ORDER — FENTANYL 100 UG/H
1 PATCH TRANSDERMAL
Status: ON HOLD | COMMUNITY
End: 2021-06-22 | Stop reason: SDUPTHER

## 2021-06-17 RX ORDER — FENTANYL 100 UG/H
1 PATCH TRANSDERMAL
Status: DISCONTINUED | OUTPATIENT
Start: 2021-06-18 | End: 2021-06-22 | Stop reason: HOSPADM

## 2021-06-17 RX ORDER — LEVOTHYROXINE SODIUM 125 UG/1
125 TABLET ORAL
COMMUNITY

## 2021-06-17 RX ORDER — ASPIRIN 81 MG/1
81 TABLET ORAL DAILY
Status: DISCONTINUED | OUTPATIENT
Start: 2021-06-18 | End: 2021-06-22 | Stop reason: HOSPADM

## 2021-06-17 RX ORDER — DICLOFENAC SODIUM 10 MG/G
2 GEL TOPICAL
Status: DISCONTINUED | OUTPATIENT
Start: 2021-06-17 | End: 2021-06-22 | Stop reason: HOSPADM

## 2021-06-17 RX ORDER — DULOXETIN HYDROCHLORIDE 60 MG/1
60 CAPSULE, DELAYED RELEASE ORAL DAILY
Status: DISCONTINUED | OUTPATIENT
Start: 2021-06-18 | End: 2021-06-22 | Stop reason: HOSPADM

## 2021-06-17 RX ORDER — GUAIFENESIN 600 MG/1
600 TABLET, EXTENDED RELEASE ORAL EVERY 12 HOURS
Status: DISCONTINUED | OUTPATIENT
Start: 2021-06-17 | End: 2021-06-22 | Stop reason: HOSPADM

## 2021-06-17 RX ORDER — BUTALBITAL, ASPIRIN, AND CAFFEINE 325; 50; 40 MG/1; MG/1; MG/1
2 CAPSULE ORAL
Status: ON HOLD | COMMUNITY
End: 2021-06-22 | Stop reason: SDUPTHER

## 2021-06-17 RX ADMIN — DOXYCYCLINE 100 MG: 100 INJECTION, POWDER, LYOPHILIZED, FOR SOLUTION INTRAVENOUS at 05:01

## 2021-06-17 RX ADMIN — IPRATROPIUM BROMIDE AND ALBUTEROL SULFATE 3 ML: .5; 3 SOLUTION RESPIRATORY (INHALATION) at 07:21

## 2021-06-17 RX ADMIN — IPRATROPIUM BROMIDE AND ALBUTEROL SULFATE 3 ML: .5; 3 SOLUTION RESPIRATORY (INHALATION) at 03:45

## 2021-06-17 RX ADMIN — GUAIFENESIN 600 MG: 600 TABLET, EXTENDED RELEASE ORAL at 15:25

## 2021-06-17 RX ADMIN — PREGABALIN 100 MG: 100 CAPSULE ORAL at 15:26

## 2021-06-17 RX ADMIN — MONTELUKAST 10 MG: 10 TABLET, FILM COATED ORAL at 09:21

## 2021-06-17 RX ADMIN — INSULIN LISPRO 2 UNITS: 100 INJECTION, SOLUTION INTRAVENOUS; SUBCUTANEOUS at 12:49

## 2021-06-17 RX ADMIN — LEVOTHYROXINE SODIUM 225 MCG: 0.07 TABLET ORAL at 09:21

## 2021-06-17 RX ADMIN — PANTOPRAZOLE SODIUM 40 MG: 40 TABLET, DELAYED RELEASE ORAL at 09:21

## 2021-06-17 RX ADMIN — PREGABALIN 100 MG: 100 CAPSULE ORAL at 09:21

## 2021-06-17 RX ADMIN — Medication 10 ML: at 15:28

## 2021-06-17 RX ADMIN — IPRATROPIUM BROMIDE AND ALBUTEROL SULFATE 3 ML: .5; 3 SOLUTION RESPIRATORY (INHALATION) at 19:29

## 2021-06-17 RX ADMIN — ACETAMINOPHEN 650 MG: 325 TABLET ORAL at 05:26

## 2021-06-17 RX ADMIN — ACETAMINOPHEN 650 MG: 325 TABLET ORAL at 15:33

## 2021-06-17 RX ADMIN — Medication 10 ML: at 05:01

## 2021-06-17 RX ADMIN — METHYLPREDNISOLONE SODIUM SUCCINATE 60 MG: 125 INJECTION, POWDER, FOR SOLUTION INTRAMUSCULAR; INTRAVENOUS at 18:09

## 2021-06-17 RX ADMIN — ACETAMINOPHEN 650 MG: 325 TABLET ORAL at 09:28

## 2021-06-17 RX ADMIN — IPRATROPIUM BROMIDE AND ALBUTEROL SULFATE 3 ML: .5; 3 SOLUTION RESPIRATORY (INHALATION) at 15:20

## 2021-06-17 RX ADMIN — BUTALBITAL, ACETAMINOPHEN, AND CAFFEINE 1 TABLET: 50; 325; 40 TABLET ORAL at 10:57

## 2021-06-17 RX ADMIN — PREGABALIN 100 MG: 100 CAPSULE ORAL at 21:39

## 2021-06-17 RX ADMIN — DOXYCYCLINE 100 MG: 100 INJECTION, POWDER, LYOPHILIZED, FOR SOLUTION INTRAVENOUS at 18:09

## 2021-06-17 RX ADMIN — BUTALBITAL, ACETAMINOPHEN, AND CAFFEINE 2 TABLET: 50; 325; 40 TABLET ORAL at 21:39

## 2021-06-17 RX ADMIN — ENOXAPARIN SODIUM 40 MG: 40 INJECTION SUBCUTANEOUS at 09:20

## 2021-06-17 RX ADMIN — Medication 10 ML: at 21:39

## 2021-06-17 RX ADMIN — PROMETHAZINE HYDROCHLORIDE 12.5 MG: 25 TABLET ORAL at 22:01

## 2021-06-17 RX ADMIN — ARFORMOTEROL TARTRATE 15 MCG: 15 SOLUTION RESPIRATORY (INHALATION) at 07:21

## 2021-06-17 RX ADMIN — ARFORMOTEROL TARTRATE 15 MCG: 15 SOLUTION RESPIRATORY (INHALATION) at 19:29

## 2021-06-17 RX ADMIN — ONDANSETRON 4 MG: 2 INJECTION INTRAMUSCULAR; INTRAVENOUS at 09:28

## 2021-06-17 RX ADMIN — METHYLPREDNISOLONE SODIUM SUCCINATE 60 MG: 125 INJECTION, POWDER, FOR SOLUTION INTRAMUSCULAR; INTRAVENOUS at 11:14

## 2021-06-17 RX ADMIN — OXYCODONE HYDROCHLORIDE 30 MG: 20 TABLET, FILM COATED, EXTENDED RELEASE ORAL at 21:39

## 2021-06-17 RX ADMIN — METHYLPREDNISOLONE SODIUM SUCCINATE 60 MG: 125 INJECTION, POWDER, FOR SOLUTION INTRAMUSCULAR; INTRAVENOUS at 05:00

## 2021-06-17 RX ADMIN — BUDESONIDE 500 MCG: 0.5 INHALANT RESPIRATORY (INHALATION) at 19:29

## 2021-06-17 RX ADMIN — OXYCODONE HYDROCHLORIDE 30 MG: 20 TABLET, FILM COATED, EXTENDED RELEASE ORAL at 12:58

## 2021-06-17 RX ADMIN — IPRATROPIUM BROMIDE AND ALBUTEROL SULFATE 3 ML: .5; 3 SOLUTION RESPIRATORY (INHALATION) at 11:41

## 2021-06-17 RX ADMIN — BUDESONIDE 500 MCG: 0.5 INHALANT RESPIRATORY (INHALATION) at 07:21

## 2021-06-17 RX ADMIN — GUAIFENESIN 600 MG: 600 TABLET, EXTENDED RELEASE ORAL at 21:39

## 2021-06-17 RX ADMIN — INSULIN LISPRO 2 UNITS: 100 INJECTION, SOLUTION INTRAVENOUS; SUBCUTANEOUS at 21:39

## 2021-06-17 NOTE — PROGRESS NOTES
Reason for Admission:  chest pain , wheezing                      RUR Score:     Low; 19%                Plan for utilizing home health:  N/A       PCP: First and Last name:  Janny Gonzalez MD     Name of Practice:    Are you a current patient: Yes/No:    Approximate date of last visit:    Can you participate in a virtual visit with your PCP:                     Current Advanced Directive/Advance Care Plan: Full Code      Healthcare Decision Maker:   Click here to complete [place] including selection of the Healthcare Decision Maker Relationship (ie \"Primary\")             Primary Decision Maker: Shella Canavan - Child - [phone]                  Transition of Care Plan:     Return to [facility]                 Chart reviewed. Per H&P \"Yuliya Mckenna is a 77 y.o. female with asthma, COPD, GERD,  chronic pain on fentanyl patch presented to ER due to chest pain and cough wheezing. She reported she has chest pressure started this morning, she was giving aspirin and nitro prior to arrival ER. Also reported coughs, wheezing on and off for a week. Cardiac enzymes x2 , EKG no elevated ST segment. She received steroid, magnesium, breathing treatment in ER. Still wheezing. Hospitalist is called for admission. . She reported she has a chronic back pain, received fentanyl patch 100 MCG, also after pain medication twice daily. She completed COVID-19 vaccination. Her D-dimer 0.48, she reported she is bedridden, and constipation. She concerns about her pain medication. \"      Noted pt is a LT resident at [place] [address]. CM contacted pt's son, Sheldon Duffy ([phone]), and he has confirmed plan for pt to return to [place] [address] when medically stable. Pt currently with therapy consults pending. Anticipate pt will return to [facility] when medically stable.   CM to continue to follow and assist.    Care Management Interventions  Mode of Transport at Discharge: BLS  Transition of Care Consult (CM Consult): Discharge Planning, Long Term Care (from [place] [phone])  Health Maintenance Reviewed: Yes  Physical Therapy Consult: Yes  Occupational Therapy Consult: Yes  Current Support Network: [address] [place] [address]  The Plan for Transition of Care is Related to the Following Treatment Goals : Return to [facility]  The Patient and/or Patient Representative was Provided with a Choice of Provider and Agrees with the Discharge Plan?: Yes  Name of the Patient Representative Who was Provided with a Choice of Provider and Agrees with the Discharge Plan: pt/family  Freedom of Choice List was Provided with Basic Dialogue that Supports the Patient's Individualized Plan of Care/Goals, Treatment Preferences and Shares the Quality Data Associated with the Providers?: Yes  Discharge Location  Discharge Placement: Skilled nursing facility ([facility])

## 2021-06-17 NOTE — PROGRESS NOTES
Discussed with RN several times today to verify medication from snf: current list-Lyrica Cymbalta fentanyl(100 mcg on back noted), Isaac, MS Contin.-Need to be verified, RN indicated verbal understanding.

## 2021-06-17 NOTE — PROGRESS NOTES
06/16/2021 @ 1923> TRANSFER - IN REPORT:    Verbal report received from JOE Franz(name) on [address]  being received from ED(unit) for routine progression of care      Report consisted of patients Situation, Background, Assessment and   Recommendations(SBAR). Information from the following report(s) SBAR, Kardex, Intake/Output, MAR and Cardiac Rhythm NSR was reviewed with the receiving nurse. Opportunity for questions and clarification was provided. 2110> Patient arrived in the unit per stretcher accompanied by RN. Placed comfortably on bed, patient is on 3 L O2 via nasal cannula, Bilateral Upper lungs very coarse, dyspnea with movement noted. Will Continue to monitor. Assessment completed upon patients arrival to unit and care assumed. 2212> Patient's BG is 193, patient refused insulin despite education. 0715 >Paged and talked to [facility] from [facility] of the STAT Lung perfusion study order for patient. Tech will be in in the morning. Bedside and Verbal shift change report given to Jean Loredo RN (oncoming nurse) by Sylwia Vale RN (offgoing nurse). Report included the following information SBAR, Kardex, Intake/Output, MAR, Recent Results and Cardiac Rhythm NSR.

## 2021-06-17 NOTE — PROGRESS NOTES
Hospitalist Progress Note-critical care note     Patient: Bobby Rock MRN: [id number]  CSN: [id number]    YOB: 1945  Age: 77 y.o. Sex: female    DOA: 6/16/2021 LOS:  LOS: 1 day            Chief complaint: copd exacerbation, chest pain, chronic pain , psychiatric disorder     Assessment/Plan         Hospital Problems  Date Reviewed: 5/13/2017        Codes Class Noted POA    * (Principal) Acute exacerbation of chronic obstructive pulmonary disease (COPD) ([facility] [address] ICD-10-CM: J44.1  ICD-9-CM: 491.21  6/16/2021 Unknown        Atypical chest pain ICD-10-CM: R07.89  ICD-9-CM: 786.59  6/16/2021 Unknown        Bedridden ICD-10-CM: Z74.01  ICD-9-CM: V49.84  6/16/2021 Unknown        Chronic pain ICD-10-CM: G89.29  ICD-9-CM: 338.29  Unknown Unknown    Overview Signed 6/16/2021  6:23 PM by Stefan Beltran MD     cervical, lumbar, knees, ankles, elbows             Psychiatric disorder ICD-10-CM: D49  ICD-9-CM: 298.9  Unknown Unknown    Overview Signed 6/16/2021  6:24 PM by Stefan Beltran MD     anxiety, depression             Thyroid disease ICD-10-CM: E07.9  ICD-9-CM: 246. 9  Unknown Unknown               Chest pain -resolved   ce negative for 3 negative   Continue monitoring   V/q scan no PE   Echo done -results pending      COPD exacerbation  Iv steroid, breathing treatment with bronchodilator   symptom treatment   Continue  empiric iv abx   ssi for glucose control   Add mucinex          Chronic pain:  On fentanyl patch 100 MCG, was put on 6/15,   Ms contain      Psychiatric disorder   On lyrica and cymbata at home      hypothyroidism   Continue synthyroid -recommend to verify home dose      Bedridden   Fall precaution     Subjective: I want my pain meds and  mirgarin meds, coughs      Disposition :tbd,   Review of systems:    General: No fevers or chills. Cardiovascular: No chest pain or pressure. No palpitations. Pulmonary: No shortness of breath. + coughs   Gastrointestinal: No nausea, vomiting.      Vital signs/Intake and Output:  Visit Vitals  BP (!) 135/50   Pulse 94   Temp 98 °F (36.7 °C)   Resp 20   Ht 4' 11\" (1.499 m)   Wt 103.9 kg (229 lb)   SpO2 100%   BMI 46.25 kg/m²     Current Shift:  No intake/output data recorded. Last three shifts:  No intake/output data recorded. Physical Exam:  General: WD, WN. Alert, cooperative, no acute distress    HEENT: NC, Atraumatic. PERRLA, anicteric sclerae. Lungs: +Wheezing   Heart:  Regular  rhythm,  No murmur, No Rubs, No Gallops  Abdomen: Soft, Non distended, Non tender. +Bowel sounds,   Extremities: No c/c/e  Psych:   Not anxious or agitated. Neurologic:  No acute neurological deficit. Labs: Results:       Chemistry Recent Labs     06/17/21  0100 06/16/21  1235   * 91    141   K 3.5 4.0    105   CO2 27 31   BUN 17 18   CREA 1.06 0.85   CA 8.4* 8.7   AGAP 9 5   BUCR 16 21*      CBC w/Diff Recent Labs     06/17/21  0100 06/16/21  1235   WBC 9.9 7.4   RBC 3.66* 3.81*   HGB 11.1* 11.5*   HCT 34.5* 35.8    282   GRANS 91* 45   LYMPH 7* 41   EOS 0 5      Cardiac Enzymes Recent Labs     06/17/21  0701 06/17/21  0100   CPK 49 43   CKND1 3.1 CALCULATION NOT PERFORMED WHEN RESULT IS BELOW LINEAR LIMIT      Coagulation No results for input(s): PTP, INR, APTT, INREXT in the last 72 hours. Lipid Panel No results found for: CHOL, CHOLPOCT, CHOLX, CHLST, CHOLV, 843196, HDL, HDLP, LDL, LDLC, DLDLP, 441602, VLDLC, VLDL, TGLX, TRIGL, TRIGP, TGLPOCT, CHHD, CHHDX   BNP No results for input(s): BNPP in the last 72 hours. Liver Enzymes No results for input(s): TP, ALB, TBIL, AP in the last 72 hours.     No lab exists for component: SGOT, GPT, DBIL   Thyroid Studies Lab Results   Component Value Date/Time    TSH 0.04 (L) 11/26/2016 05:39 AM        Procedures/imaging: see electronic medical records for all procedures/Xrays and details which were not copied into this note but were reviewed prior to creation of Plan    NM LUNG SCAN PERF    Result Date: 6/17/2021  EXAM: NUCLEAR MEDICINE PULMONARY PERFUSION SCAN CLINICAL INDICATION/HISTORY: Chest pain. COMPARISON: None Available TECHNIQUE: No aerosolized 99mTc-DTPA ventilatory images of the lungs were obtained. After intravenous administration of 7.1 mCi 99mTc-MAA, perfusion images of the lungs were obtained in multiple projections. Images are correlated with portable chest dated 06/16/2021.    > Injection site:  Right antecubital fossa ________________________________ FINDINGS: Perfusion images show symmetric radiotracer distribution to both lungs, with no segmental perfusion defects to suggest the presence of pulmonary embolism. ________________________________     No pulmonary embolism. Perfusion only scintigraphy was performed and correlated with radiographic evaluation of the chest. This exam is reported using a perfusion only modified PIOPED II interpretive criteria. XR CHEST PORT    Result Date: 6/16/2021  EXAM: One-view chest CLINICAL HISTORY: Chest pain , COMPARISON: None FINDINGS: Frontal view of the chest demonstrate clear lungs. Cardiac silhouette is normal in size and contour. No acute bony or soft tissue abnormality. No acute pulmonary process identified.        Candace Shetty MD

## 2021-06-17 NOTE — PROGRESS NOTES
Occupational Therapy Evaluation Attempt     OT eval orders received. Chart reviewed. Attempted Occupational Therapy Evaluation, however, patient unable to be seen due to:  []  Nausea/vomiting  []  Eating  []  Pain  []  Patient too lethargic  []  Off Unit for testing/procedure  []  Dialysis treatment in progress  []  Telemetry Results  [x]  Other: pt refusing participation with therapy at all. States \"I will be skipping therapy. Please do not come back later. \"      Second attempt for OT eval: Confirmed at the  [facility]. Pt dependent for all ADLs except self feeding, and transfers with marlena lift. Pt adamant about not participating with therapy at this time.      Will attempt one more time tomorrow before d/cing orders for OT eval.     Jovita Pillai OTLIANNE/L

## 2021-06-17 NOTE — PROGRESS NOTES
Problem: Falls - Risk of  Goal: *Absence of Falls  Description: Document Chinedu Sites Fall Risk and appropriate interventions in the flowsheet.   Outcome: Progressing Towards Goal  Note: Fall Risk Interventions:  Mobility Interventions: Bed/chair exit alarm, Communicate number of staff needed for ambulation/transfer         Medication Interventions: Bed/chair exit alarm    Elimination Interventions: Bed/chair exit alarm, Call light in reach              Problem: Pain  Goal: *Control of Pain  Outcome: Progressing Towards Goal     Problem: Patient Education: Go to Patient Education Activity  Goal: Patient/Family Education  Outcome: Progressing Towards Goal

## 2021-06-18 LAB
ANION GAP SERPL CALC-SCNC: 7 MMOL/L (ref 3–18)
BASOPHILS # BLD: 0 K/UL (ref 0–0.1)
BASOPHILS NFR BLD: 0 % (ref 0–2)
BUN SERPL-MCNC: 16 MG/DL (ref 7–18)
BUN/CREAT SERPL: 18 (ref 12–20)
CALCIUM SERPL-MCNC: 8.6 MG/DL (ref 8.5–10.1)
CHLORIDE SERPL-SCNC: 104 MMOL/L (ref 100–111)
CO2 SERPL-SCNC: 31 MMOL/L (ref 21–32)
CREAT SERPL-MCNC: 0.87 MG/DL (ref 0.6–1.3)
DIFFERENTIAL METHOD BLD: ABNORMAL
EOSINOPHIL # BLD: 0 K/UL (ref 0–0.4)
EOSINOPHIL NFR BLD: 0 % (ref 0–5)
ERYTHROCYTE [DISTWIDTH] IN BLOOD BY AUTOMATED COUNT: 13.2 % (ref 11.6–14.5)
GLUCOSE BLD STRIP.AUTO-MCNC: 139 MG/DL (ref 70–110)
GLUCOSE BLD STRIP.AUTO-MCNC: 144 MG/DL (ref 70–110)
GLUCOSE BLD STRIP.AUTO-MCNC: 159 MG/DL (ref 70–110)
GLUCOSE BLD STRIP.AUTO-MCNC: 196 MG/DL (ref 70–110)
GLUCOSE BLD STRIP.AUTO-MCNC: 201 MG/DL (ref 70–110)
GLUCOSE SERPL-MCNC: 125 MG/DL (ref 74–99)
HCT VFR BLD AUTO: 35.8 % (ref 35–45)
HGB BLD-MCNC: 11.3 G/DL (ref 12–16)
LYMPHOCYTES # BLD: 1.2 K/UL (ref 0.9–3.6)
LYMPHOCYTES NFR BLD: 9 % (ref 21–52)
MCH RBC QN AUTO: 30 PG (ref 24–34)
MCHC RBC AUTO-ENTMCNC: 31.6 G/DL (ref 31–37)
MCV RBC AUTO: 95 FL (ref 74–97)
MONOCYTES # BLD: 0.4 K/UL (ref 0.05–1.2)
MONOCYTES NFR BLD: 3 % (ref 3–10)
NEUTS SEG # BLD: 12.3 K/UL (ref 1.8–8)
NEUTS SEG NFR BLD: 87 % (ref 40–73)
PLATELET # BLD AUTO: 292 K/UL (ref 135–420)
PMV BLD AUTO: 11.5 FL (ref 9.2–11.8)
POTASSIUM SERPL-SCNC: 3.7 MMOL/L (ref 3.5–5.5)
RBC # BLD AUTO: 3.77 M/UL (ref 4.2–5.3)
SODIUM SERPL-SCNC: 142 MMOL/L (ref 136–145)
T3FREE SERPL-MCNC: 1.5 PG/ML (ref 2.18–3.98)
WBC # BLD AUTO: 14.1 K/UL (ref 4.6–13.2)

## 2021-06-18 PROCEDURE — 77010033678 HC OXYGEN DAILY

## 2021-06-18 PROCEDURE — 77030027138 HC INCENT SPIROMETER -A

## 2021-06-18 PROCEDURE — 84481 FREE ASSAY (FT-3): CPT

## 2021-06-18 PROCEDURE — 82962 GLUCOSE BLOOD TEST: CPT

## 2021-06-18 PROCEDURE — 94760 N-INVAS EAR/PLS OXIMETRY 1: CPT

## 2021-06-18 PROCEDURE — 74011000258 HC RX REV CODE- 258: Performed by: HOSPITALIST

## 2021-06-18 PROCEDURE — 74011636637 HC RX REV CODE- 636/637: Performed by: HOSPITALIST

## 2021-06-18 PROCEDURE — 74011000250 HC RX REV CODE- 250: Performed by: HOSPITALIST

## 2021-06-18 PROCEDURE — 94640 AIRWAY INHALATION TREATMENT: CPT

## 2021-06-18 PROCEDURE — 74011250636 HC RX REV CODE- 250/636: Performed by: HOSPITALIST

## 2021-06-18 PROCEDURE — 80048 BASIC METABOLIC PNL TOTAL CA: CPT

## 2021-06-18 PROCEDURE — 74011250637 HC RX REV CODE- 250/637: Performed by: HOSPITALIST

## 2021-06-18 PROCEDURE — 85025 COMPLETE CBC W/AUTO DIFF WBC: CPT

## 2021-06-18 PROCEDURE — 65660000000 HC RM CCU STEPDOWN

## 2021-06-18 PROCEDURE — 36415 COLL VENOUS BLD VENIPUNCTURE: CPT

## 2021-06-18 RX ORDER — SULFAMETHOXAZOLE AND TRIMETHOPRIM 800; 160 MG/1; MG/1
1 TABLET ORAL
COMMUNITY

## 2021-06-18 RX ORDER — CARBOXYMETHYLCELLULOSE SODIUM 5 MG/ML
2 SOLUTION/ DROPS OPHTHALMIC AS NEEDED
Status: DISCONTINUED | OUTPATIENT
Start: 2021-06-18 | End: 2021-06-22 | Stop reason: HOSPADM

## 2021-06-18 RX ORDER — SULFAMETHOXAZOLE AND TRIMETHOPRIM 800; 160 MG/1; MG/1
1 TABLET ORAL
Status: DISCONTINUED | OUTPATIENT
Start: 2021-06-20 | End: 2021-06-22 | Stop reason: HOSPADM

## 2021-06-18 RX ORDER — POLYETHYLENE GLYCOL 3350 17 G/17G
17 POWDER, FOR SOLUTION ORAL DAILY
Status: DISCONTINUED | OUTPATIENT
Start: 2021-06-18 | End: 2021-06-22 | Stop reason: HOSPADM

## 2021-06-18 RX ORDER — CARBOXYMETHYLCELLULOSE SODIUM 5 MG/ML
2 SOLUTION/ DROPS OPHTHALMIC AS NEEDED
COMMUNITY

## 2021-06-18 RX ORDER — HYDROXYZINE 50 MG/1
50 TABLET, FILM COATED ORAL
COMMUNITY

## 2021-06-18 RX ORDER — HYDROXYZINE 25 MG/1
50 TABLET, FILM COATED ORAL
Status: DISCONTINUED | OUTPATIENT
Start: 2021-06-18 | End: 2021-06-22 | Stop reason: HOSPADM

## 2021-06-18 RX ADMIN — HYDROXYZINE HYDROCHLORIDE 50 MG: 25 TABLET, FILM COATED ORAL at 11:58

## 2021-06-18 RX ADMIN — BUTALBITAL, ACETAMINOPHEN, AND CAFFEINE 2 TABLET: 50; 325; 40 TABLET ORAL at 07:10

## 2021-06-18 RX ADMIN — DICLOFENAC SODIUM 2 G: 10 GEL TOPICAL at 08:52

## 2021-06-18 RX ADMIN — PREGABALIN 100 MG: 100 CAPSULE ORAL at 11:07

## 2021-06-18 RX ADMIN — METHYLPREDNISOLONE SODIUM SUCCINATE 60 MG: 125 INJECTION, POWDER, FOR SOLUTION INTRAMUSCULAR; INTRAVENOUS at 21:20

## 2021-06-18 RX ADMIN — BUDESONIDE 500 MCG: 0.5 INHALANT RESPIRATORY (INHALATION) at 07:24

## 2021-06-18 RX ADMIN — IPRATROPIUM BROMIDE AND ALBUTEROL SULFATE 3 ML: .5; 3 SOLUTION RESPIRATORY (INHALATION) at 23:45

## 2021-06-18 RX ADMIN — IPRATROPIUM BROMIDE AND ALBUTEROL SULFATE 3 ML: .5; 3 SOLUTION RESPIRATORY (INHALATION) at 20:14

## 2021-06-18 RX ADMIN — DOXYCYCLINE 100 MG: 100 INJECTION, POWDER, LYOPHILIZED, FOR SOLUTION INTRAVENOUS at 07:11

## 2021-06-18 RX ADMIN — OXYCODONE HYDROCHLORIDE 30 MG: 20 TABLET, FILM COATED, EXTENDED RELEASE ORAL at 21:08

## 2021-06-18 RX ADMIN — POLYETHYLENE GLYCOL 3350 17 G: 17 POWDER, FOR SOLUTION ORAL at 11:08

## 2021-06-18 RX ADMIN — ASPIRIN 81 MG: 81 TABLET, COATED ORAL at 11:08

## 2021-06-18 RX ADMIN — METHYLPREDNISOLONE SODIUM SUCCINATE 60 MG: 125 INJECTION, POWDER, FOR SOLUTION INTRAMUSCULAR; INTRAVENOUS at 14:09

## 2021-06-18 RX ADMIN — DULOXETINE HYDROCHLORIDE 60 MG: 60 CAPSULE, DELAYED RELEASE ORAL at 11:07

## 2021-06-18 RX ADMIN — INSULIN LISPRO 4 UNITS: 100 INJECTION, SOLUTION INTRAVENOUS; SUBCUTANEOUS at 11:59

## 2021-06-18 RX ADMIN — Medication 2 DROP: at 19:24

## 2021-06-18 RX ADMIN — IPRATROPIUM BROMIDE AND ALBUTEROL SULFATE 3 ML: .5; 3 SOLUTION RESPIRATORY (INHALATION) at 07:24

## 2021-06-18 RX ADMIN — IPRATROPIUM BROMIDE AND ALBUTEROL SULFATE 3 ML: .5; 3 SOLUTION RESPIRATORY (INHALATION) at 04:03

## 2021-06-18 RX ADMIN — ACETAMINOPHEN 650 MG: 325 TABLET ORAL at 19:24

## 2021-06-18 RX ADMIN — GUAIFENESIN 600 MG: 600 TABLET, EXTENDED RELEASE ORAL at 21:08

## 2021-06-18 RX ADMIN — INSULIN LISPRO 2 UNITS: 100 INJECTION, SOLUTION INTRAVENOUS; SUBCUTANEOUS at 21:20

## 2021-06-18 RX ADMIN — GUAIFENESIN 600 MG: 600 TABLET, EXTENDED RELEASE ORAL at 11:07

## 2021-06-18 RX ADMIN — ARFORMOTEROL TARTRATE 15 MCG: 15 SOLUTION RESPIRATORY (INHALATION) at 20:15

## 2021-06-18 RX ADMIN — ARFORMOTEROL TARTRATE 15 MCG: 15 SOLUTION RESPIRATORY (INHALATION) at 07:24

## 2021-06-18 RX ADMIN — MONTELUKAST 10 MG: 10 TABLET, FILM COATED ORAL at 11:07

## 2021-06-18 RX ADMIN — IPRATROPIUM BROMIDE AND ALBUTEROL SULFATE 3 ML: .5; 3 SOLUTION RESPIRATORY (INHALATION) at 15:48

## 2021-06-18 RX ADMIN — DICLOFENAC SODIUM 2 G: 10 GEL TOPICAL at 01:14

## 2021-06-18 RX ADMIN — BUDESONIDE 500 MCG: 0.5 INHALANT RESPIRATORY (INHALATION) at 20:15

## 2021-06-18 RX ADMIN — Medication 10 ML: at 21:09

## 2021-06-18 RX ADMIN — PREGABALIN 100 MG: 100 CAPSULE ORAL at 21:08

## 2021-06-18 RX ADMIN — METHYLPREDNISOLONE SODIUM SUCCINATE 60 MG: 125 INJECTION, POWDER, FOR SOLUTION INTRAMUSCULAR; INTRAVENOUS at 07:11

## 2021-06-18 RX ADMIN — IPRATROPIUM BROMIDE AND ALBUTEROL SULFATE 3 ML: .5; 3 SOLUTION RESPIRATORY (INHALATION) at 11:21

## 2021-06-18 RX ADMIN — LEVOTHYROXINE SODIUM 125 MCG: 0.03 TABLET ORAL at 07:10

## 2021-06-18 RX ADMIN — METHYLPREDNISOLONE SODIUM SUCCINATE 60 MG: 125 INJECTION, POWDER, FOR SOLUTION INTRAMUSCULAR; INTRAVENOUS at 01:14

## 2021-06-18 RX ADMIN — PANTOPRAZOLE SODIUM 40 MG: 40 TABLET, DELAYED RELEASE ORAL at 07:10

## 2021-06-18 RX ADMIN — DOXYCYCLINE 100 MG: 100 INJECTION, POWDER, LYOPHILIZED, FOR SOLUTION INTRAVENOUS at 19:23

## 2021-06-18 RX ADMIN — IPRATROPIUM BROMIDE AND ALBUTEROL SULFATE 3 ML: .5; 3 SOLUTION RESPIRATORY (INHALATION) at 00:37

## 2021-06-18 RX ADMIN — PREGABALIN 100 MG: 100 CAPSULE ORAL at 16:09

## 2021-06-18 RX ADMIN — Medication 10 ML: at 07:11

## 2021-06-18 RX ADMIN — ENOXAPARIN SODIUM 40 MG: 40 INJECTION SUBCUTANEOUS at 11:06

## 2021-06-18 RX ADMIN — OXYCODONE HYDROCHLORIDE 30 MG: 20 TABLET, FILM COATED, EXTENDED RELEASE ORAL at 11:08

## 2021-06-18 RX ADMIN — Medication 10 ML: at 14:09

## 2021-06-18 NOTE — PROGRESS NOTES
Problem: Falls - Risk of  Goal: *Absence of Falls  Description: Document Justen Benavides Fall Risk and appropriate interventions in the flowsheet.   Outcome: Progressing Towards Goal  Note: Fall Risk Interventions:  Mobility Interventions: Bed/chair exit alarm, Patient to call before getting OOB     Medication Interventions: Bed/chair exit alarm, Patient to call before getting OOB    Elimination Interventions: Call light in reach, Patient to call for help with toileting needs    History of Falls Interventions: Door open when patient unattended, Bed/chair exit alarm         Problem: Pain  Goal: *Control of Pain  Outcome: Progressing Towards Goal

## 2021-06-18 NOTE — PROGRESS NOTES
Bedside and verbal report given to GABRIELA Serrano RN (oncoming nurse) by Antonella Corley RN  (off going nurse). Report included the following information SBAR, Kardex, OR Summary, Intake/Output, and MAR.    2139  PRN Fioricet given for headache  2201  PRN Phenergan given for nausea  0114  PRN Voltaren given for pain  0710  PRN Fioricet given for headache  No acute changes    Bedside and verbal report given by (off going nurse) GABRIELA Serrano RN to (oncoming nurse) Juhi Richey RN. Report included the following information SBAR, Kardex, OR Summary, Intake/Output, and MAR.

## 2021-06-18 NOTE — PROGRESS NOTES
Hospitalist Progress Note-critical care note     Patient: Mariajose Lopez MRN: [id number]  CSN: [id number]    YOB: 1945  Age: 77 y.o. Sex: female    DOA: 6/16/2021 LOS:  LOS: 2 days            Chief complaint: copd exacerbation, chest pain, chronic pain , psychiatric disorder     Assessment/Plan         Hospital Problems  Date Reviewed: 5/13/2017        Codes Class Noted POA    * (Principal) Acute exacerbation of chronic obstructive pulmonary disease (COPD) ([facility] [address] ICD-10-CM: J44.1  ICD-9-CM: 491.21  6/16/2021 Unknown        Atypical chest pain ICD-10-CM: R07.89  ICD-9-CM: 786.59  6/16/2021 Unknown        Bedridden ICD-10-CM: Z74.01  ICD-9-CM: V49.84  6/16/2021 Unknown        Chronic pain ICD-10-CM: G89.29  ICD-9-CM: 338.29  Unknown Unknown    Overview Signed 6/16/2021  6:23 PM by Zachary Park MD     cervical, lumbar, knees, ankles, elbows             Psychiatric disorder ICD-10-CM: T29  ICD-9-CM: 298.9  Unknown Unknown    Overview Signed 6/16/2021  6:24 PM by Zachary Park MD     anxiety, depression             Thyroid disease ICD-10-CM: E07.9  ICD-9-CM: 246. 9  Unknown Unknown               Chest pain -resolved   ce negative for 3 negative, no acs chest discomfort due to copd exacerbation    Continue monitoring   V/q scan no PE   Echo done The left ventricular wall motion is normal.ef wnl      COPD exacerbation  Improving   Iv steroid, breathing treatment with bronchodilator -will wean steroid   symptom treatment   Continue  empiric iv abx   ssi for glucose control   mucinex          Chronic pain:  On fentanyl patch 100 MCG,Ms contain      Psychiatric disorder   On lyrica and cymbata before admission-will continue      hypothyroidism   Continue synthyroid      Bedridden   Fall precaution     Subjective: I want ensure, atarax, and eye drop. I took bactrim for my neck-not sure the dose     Disposition :tbd,   Review of systems:    General: No fevers or chills. Cardiovascular: No chest pain or pressure.  No palpitations. Pulmonary: No shortness of breath. Gastrointestinal: No nausea, vomiting. Vital signs/Intake and Output:  Visit Vitals  /69   Pulse 80   Temp 97.9 °F (36.6 °C)   Resp 16   Ht 4' 11\" (1.499 m)   Wt 103.9 kg (229 lb)   SpO2 98%   BMI 46.25 kg/m²     Current Shift:  No intake/output data recorded. Last three shifts:  06/16 1901 - 06/18 0700  In: -   Out: 1400 [Urine:1400]    Physical Exam:  General: WD, WN. Alert, cooperative, no acute distress    HEENT: NC, Atraumatic. PERRLA, anicteric sclerae. Lungs: Mild Wheezing, no rales   Heart:  Regular  rhythm,  No murmur, No Rubs, No Gallops  Abdomen: Soft, Non distended, Non tender. +Bowel sounds,   Extremities: No c/c/e  Psych:   Not anxious or agitated. Neurologic:  No acute neurological deficit. Labs: Results:       Chemistry Recent Labs     06/18/21  0005 06/17/21  0100 06/16/21  1235   * 144* 91    139 141   K 3.7 3.5 4.0    103 105   CO2 31 27 31   BUN 16 17 18   CREA 0.87 1.06 0.85   CA 8.6 8.4* 8.7   AGAP 7 9 5   BUCR 18 16 21*      CBC w/Diff Recent Labs     06/18/21  0005 06/17/21  0100 06/16/21  1235   WBC 14.1* 9.9 7.4   RBC 3.77* 3.66* 3.81*   HGB 11.3* 11.1* 11.5*   HCT 35.8 34.5* 35.8    257 282   GRANS 87* 91* 45   LYMPH 9* 7* 41   EOS 0 0 5      Cardiac Enzymes Recent Labs     06/17/21  0701 06/17/21  0100   CPK 49 43   CKND1 3.1 CALCULATION NOT PERFORMED WHEN RESULT IS BELOW LINEAR LIMIT      Coagulation No results for input(s): PTP, INR, APTT, INREXT, INREXT in the last 72 hours. Lipid Panel No results found for: CHOL, CHOLPOCT, CHOLX, CHLST, CHOLV, 113438, HDL, HDLP, LDL, LDLC, DLDLP, 675176, VLDLC, VLDL, TGLX, TRIGL, TRIGP, TGLPOCT, CHHD, CHHDX   BNP No results for input(s): BNPP in the last 72 hours. Liver Enzymes No results for input(s): TP, ALB, TBIL, AP in the last 72 hours.     No lab exists for component: SGOT, GPT, DBIL   Thyroid Studies Lab Results   Component Value Date/Time    TSH 0.04 (L) 11/26/2016 05:39 AM        Procedures/imaging: see electronic medical records for all procedures/Xrays and details which were not copied into this note but were reviewed prior to creation of Plan    NM LUNG SCAN PERF    Result Date: 6/17/2021  EXAM: NUCLEAR MEDICINE PULMONARY PERFUSION SCAN CLINICAL INDICATION/HISTORY: Chest pain. COMPARISON: None Available TECHNIQUE: No aerosolized 99mTc-DTPA ventilatory images of the lungs were obtained. After intravenous administration of 7.1 mCi 99mTc-MAA, perfusion images of the lungs were obtained in multiple projections. Images are correlated with portable chest dated 06/16/2021.    > Injection site:  Right antecubital fossa ________________________________ FINDINGS: Perfusion images show symmetric radiotracer distribution to both lungs, with no segmental perfusion defects to suggest the presence of pulmonary embolism. ________________________________     No pulmonary embolism. Perfusion only scintigraphy was performed and correlated with radiographic evaluation of the chest. This exam is reported using a perfusion only modified PIOPED II interpretive criteria. XR CHEST PORT    Result Date: 6/16/2021  EXAM: One-view chest CLINICAL HISTORY: Chest pain , COMPARISON: None FINDINGS: Frontal view of the chest demonstrate clear lungs. Cardiac silhouette is normal in size and contour. No acute bony or soft tissue abnormality. No acute pulmonary process identified.        Emily Blackmon MD

## 2021-06-18 NOTE — PROGRESS NOTES
Problem: Falls - Risk of  Goal: *Absence of Falls  Description: Document Fadia Walters Fall Risk and appropriate interventions in the flowsheet.   Outcome: Progressing Towards Goal  Note: Fall Risk Interventions:  Mobility Interventions: Patient to call before getting OOB         Medication Interventions: Patient to call before getting OOB    Elimination Interventions: Patient to call for help with toileting needs, Toileting schedule/hourly rounds, Call light in reach              Problem: Pain  Goal: *Control of Pain  Outcome: Progressing Towards Goal

## 2021-06-18 NOTE — ROUTINE PROCESS
Bedside and Verbal shift change report given to [address] [place] [address] (oncoming nurse) by Lance Nickerson RN (offgoing nurse). Report included the following information SBAR, Kardex, Intake/Output, MAR and Recent Results.

## 2021-06-18 NOTE — PROGRESS NOTES
Occupational Therapy Evaluation Attempt     OT eval orders received. Chart reviewed. Attempted Occupational Therapy Evaluation, however, patient unable to be seen due to:  []  Nausea/vomiting  []  Eating  []  Pain  []  Patient too lethargic  []  Off Unit for testing/procedure  []  Dialysis treatment in progress  []  Telemetry Results  [x]  Other: Pt adamantly refusing participation with therapy. Pt bed bound and dependent for ADLs and transfers PTA. Will d/c current OT orders.         Staci Cisneros OTR/L

## 2021-06-19 LAB
ANION GAP SERPL CALC-SCNC: 7 MMOL/L (ref 3–18)
BASOPHILS # BLD: 0 K/UL (ref 0–0.1)
BASOPHILS NFR BLD: 0 % (ref 0–2)
BUN SERPL-MCNC: 20 MG/DL (ref 7–18)
BUN/CREAT SERPL: 23 (ref 12–20)
CALCIUM SERPL-MCNC: 9.4 MG/DL (ref 8.5–10.1)
CHLORIDE SERPL-SCNC: 103 MMOL/L (ref 100–111)
CO2 SERPL-SCNC: 29 MMOL/L (ref 21–32)
CREAT SERPL-MCNC: 0.87 MG/DL (ref 0.6–1.3)
DIFFERENTIAL METHOD BLD: ABNORMAL
EOSINOPHIL # BLD: 0 K/UL (ref 0–0.4)
EOSINOPHIL NFR BLD: 0 % (ref 0–5)
ERYTHROCYTE [DISTWIDTH] IN BLOOD BY AUTOMATED COUNT: 13.3 % (ref 11.6–14.5)
GLUCOSE BLD STRIP.AUTO-MCNC: 141 MG/DL (ref 70–110)
GLUCOSE BLD STRIP.AUTO-MCNC: 167 MG/DL (ref 70–110)
GLUCOSE BLD STRIP.AUTO-MCNC: 177 MG/DL (ref 70–110)
GLUCOSE BLD STRIP.AUTO-MCNC: 202 MG/DL (ref 70–110)
GLUCOSE SERPL-MCNC: 137 MG/DL (ref 74–99)
HCT VFR BLD AUTO: 38.1 % (ref 35–45)
HGB BLD-MCNC: 12.3 G/DL (ref 12–16)
LYMPHOCYTES # BLD: 1.8 K/UL (ref 0.9–3.6)
LYMPHOCYTES NFR BLD: 14 % (ref 21–52)
MCH RBC QN AUTO: 30.4 PG (ref 24–34)
MCHC RBC AUTO-ENTMCNC: 32.3 G/DL (ref 31–37)
MCV RBC AUTO: 94.3 FL (ref 74–97)
MONOCYTES # BLD: 0.3 K/UL (ref 0.05–1.2)
MONOCYTES NFR BLD: 2 % (ref 3–10)
NEUTS BAND NFR BLD MANUAL: 1 % (ref 0–5)
NEUTS SEG # BLD: 10.5 K/UL (ref 1.8–8)
NEUTS SEG NFR BLD: 83 % (ref 40–73)
PLATELET # BLD AUTO: 286 K/UL (ref 135–420)
PLATELET COMMENTS,PCOM: ABNORMAL
PMV BLD AUTO: 11.6 FL (ref 9.2–11.8)
POTASSIUM SERPL-SCNC: 4.3 MMOL/L (ref 3.5–5.5)
RBC # BLD AUTO: 4.04 M/UL (ref 4.2–5.3)
RBC MORPH BLD: ABNORMAL
SODIUM SERPL-SCNC: 139 MMOL/L (ref 136–145)
WBC # BLD AUTO: 12.6 K/UL (ref 4.6–13.2)

## 2021-06-19 PROCEDURE — 77030040361 HC SLV COMPR DVT MDII -B

## 2021-06-19 PROCEDURE — 74011250636 HC RX REV CODE- 250/636: Performed by: HOSPITALIST

## 2021-06-19 PROCEDURE — 85025 COMPLETE CBC W/AUTO DIFF WBC: CPT

## 2021-06-19 PROCEDURE — 92610 EVALUATE SWALLOWING FUNCTION: CPT

## 2021-06-19 PROCEDURE — 65660000000 HC RM CCU STEPDOWN

## 2021-06-19 PROCEDURE — 94640 AIRWAY INHALATION TREATMENT: CPT

## 2021-06-19 PROCEDURE — 36415 COLL VENOUS BLD VENIPUNCTURE: CPT

## 2021-06-19 PROCEDURE — 74011000258 HC RX REV CODE- 258: Performed by: HOSPITALIST

## 2021-06-19 PROCEDURE — 82962 GLUCOSE BLOOD TEST: CPT

## 2021-06-19 PROCEDURE — 77010033678 HC OXYGEN DAILY

## 2021-06-19 PROCEDURE — 74011000250 HC RX REV CODE- 250: Performed by: HOSPITALIST

## 2021-06-19 PROCEDURE — 74011636637 HC RX REV CODE- 636/637: Performed by: HOSPITALIST

## 2021-06-19 PROCEDURE — 74011250637 HC RX REV CODE- 250/637: Performed by: HOSPITALIST

## 2021-06-19 PROCEDURE — 80048 BASIC METABOLIC PNL TOTAL CA: CPT

## 2021-06-19 RX ADMIN — IPRATROPIUM BROMIDE AND ALBUTEROL SULFATE 3 ML: .5; 3 SOLUTION RESPIRATORY (INHALATION) at 15:44

## 2021-06-19 RX ADMIN — INSULIN LISPRO 2 UNITS: 100 INJECTION, SOLUTION INTRAVENOUS; SUBCUTANEOUS at 21:58

## 2021-06-19 RX ADMIN — Medication 10 ML: at 22:00

## 2021-06-19 RX ADMIN — PREGABALIN 100 MG: 100 CAPSULE ORAL at 08:21

## 2021-06-19 RX ADMIN — METHYLPREDNISOLONE SODIUM SUCCINATE 40 MG: 125 INJECTION, POWDER, FOR SOLUTION INTRAMUSCULAR; INTRAVENOUS at 21:59

## 2021-06-19 RX ADMIN — ASPIRIN 81 MG: 81 TABLET, COATED ORAL at 08:20

## 2021-06-19 RX ADMIN — BUDESONIDE 500 MCG: 0.5 INHALANT RESPIRATORY (INHALATION) at 19:42

## 2021-06-19 RX ADMIN — PREGABALIN 100 MG: 100 CAPSULE ORAL at 15:11

## 2021-06-19 RX ADMIN — HYDROXYZINE HYDROCHLORIDE 50 MG: 25 TABLET, FILM COATED ORAL at 08:20

## 2021-06-19 RX ADMIN — INSULIN LISPRO 4 UNITS: 100 INJECTION, SOLUTION INTRAVENOUS; SUBCUTANEOUS at 12:25

## 2021-06-19 RX ADMIN — ACETAMINOPHEN 650 MG: 325 TABLET ORAL at 06:25

## 2021-06-19 RX ADMIN — DOXYCYCLINE 100 MG: 100 INJECTION, POWDER, LYOPHILIZED, FOR SOLUTION INTRAVENOUS at 17:15

## 2021-06-19 RX ADMIN — METHYLPREDNISOLONE SODIUM SUCCINATE 60 MG: 125 INJECTION, POWDER, FOR SOLUTION INTRAMUSCULAR; INTRAVENOUS at 06:26

## 2021-06-19 RX ADMIN — IPRATROPIUM BROMIDE AND ALBUTEROL SULFATE 3 ML: .5; 3 SOLUTION RESPIRATORY (INHALATION) at 23:45

## 2021-06-19 RX ADMIN — DICLOFENAC SODIUM 2 G: 10 GEL TOPICAL at 06:27

## 2021-06-19 RX ADMIN — IPRATROPIUM BROMIDE AND ALBUTEROL SULFATE 3 ML: .5; 3 SOLUTION RESPIRATORY (INHALATION) at 19:42

## 2021-06-19 RX ADMIN — PROMETHAZINE HYDROCHLORIDE 12.5 MG: 25 TABLET ORAL at 08:21

## 2021-06-19 RX ADMIN — OXYCODONE HYDROCHLORIDE 30 MG: 20 TABLET, FILM COATED, EXTENDED RELEASE ORAL at 08:20

## 2021-06-19 RX ADMIN — MONTELUKAST 10 MG: 10 TABLET, FILM COATED ORAL at 08:21

## 2021-06-19 RX ADMIN — INSULIN LISPRO 2 UNITS: 100 INJECTION, SOLUTION INTRAVENOUS; SUBCUTANEOUS at 17:14

## 2021-06-19 RX ADMIN — ARFORMOTEROL TARTRATE 15 MCG: 15 SOLUTION RESPIRATORY (INHALATION) at 19:42

## 2021-06-19 RX ADMIN — Medication 10 ML: at 15:12

## 2021-06-19 RX ADMIN — BUTALBITAL, ACETAMINOPHEN, AND CAFFEINE 2 TABLET: 50; 325; 40 TABLET ORAL at 08:30

## 2021-06-19 RX ADMIN — POLYETHYLENE GLYCOL 3350 17 G: 17 POWDER, FOR SOLUTION ORAL at 08:20

## 2021-06-19 RX ADMIN — IPRATROPIUM BROMIDE AND ALBUTEROL SULFATE 3 ML: .5; 3 SOLUTION RESPIRATORY (INHALATION) at 04:05

## 2021-06-19 RX ADMIN — OXYCODONE HYDROCHLORIDE 30 MG: 20 TABLET, FILM COATED, EXTENDED RELEASE ORAL at 20:55

## 2021-06-19 RX ADMIN — LEVOTHYROXINE SODIUM 125 MCG: 0.03 TABLET ORAL at 06:32

## 2021-06-19 RX ADMIN — IPRATROPIUM BROMIDE AND ALBUTEROL SULFATE 3 ML: .5; 3 SOLUTION RESPIRATORY (INHALATION) at 08:35

## 2021-06-19 RX ADMIN — IPRATROPIUM BROMIDE AND ALBUTEROL SULFATE 3 ML: .5; 3 SOLUTION RESPIRATORY (INHALATION) at 12:14

## 2021-06-19 RX ADMIN — ACETAMINOPHEN 650 MG: 325 TABLET ORAL at 15:10

## 2021-06-19 RX ADMIN — BUDESONIDE 500 MCG: 0.5 INHALANT RESPIRATORY (INHALATION) at 08:35

## 2021-06-19 RX ADMIN — GUAIFENESIN 600 MG: 600 TABLET, EXTENDED RELEASE ORAL at 20:55

## 2021-06-19 RX ADMIN — DULOXETINE HYDROCHLORIDE 60 MG: 60 CAPSULE, DELAYED RELEASE ORAL at 08:20

## 2021-06-19 RX ADMIN — ACETAMINOPHEN 650 MG: 325 TABLET ORAL at 20:55

## 2021-06-19 RX ADMIN — DOXYCYCLINE 100 MG: 100 INJECTION, POWDER, LYOPHILIZED, FOR SOLUTION INTRAVENOUS at 06:34

## 2021-06-19 RX ADMIN — ARFORMOTEROL TARTRATE 15 MCG: 15 SOLUTION RESPIRATORY (INHALATION) at 08:35

## 2021-06-19 RX ADMIN — PANTOPRAZOLE SODIUM 40 MG: 40 TABLET, DELAYED RELEASE ORAL at 06:32

## 2021-06-19 RX ADMIN — ENOXAPARIN SODIUM 40 MG: 40 INJECTION SUBCUTANEOUS at 08:20

## 2021-06-19 RX ADMIN — PREGABALIN 100 MG: 100 CAPSULE ORAL at 21:58

## 2021-06-19 RX ADMIN — Medication 10 ML: at 06:34

## 2021-06-19 RX ADMIN — METHYLPREDNISOLONE SODIUM SUCCINATE 40 MG: 125 INJECTION, POWDER, FOR SOLUTION INTRAMUSCULAR; INTRAVENOUS at 15:10

## 2021-06-19 RX ADMIN — GUAIFENESIN 600 MG: 600 TABLET, EXTENDED RELEASE ORAL at 08:20

## 2021-06-19 NOTE — PROGRESS NOTES
3505: report given to this nurse from Lakeisha Redmond RN    2364: MD Sebastian Cloud and this nurse communicate pt condition, MD instructs this nurse to continue to wean O2 and contact case management in reference to pt d/c to [facility], instructed to contact ST for diet eval post n/o, matters being addressed  Stef Huerta RN    [phone]: pt and son request communication with doctor, MD Sebastian Cloud made aware and calls son  Stef Huerta RN

## 2021-06-19 NOTE — PROGRESS NOTES
Problem: Dysphagia (Adult)  Goal: *Acute Goals and Plan of Care (Insert Text)  Description: Patient will:  1. Tolerate PO trials with 0 s/s overt distress in 4/5 trials-met  2. Participate in training and education related to continued aspiration risk, diet recs and compensatory strategies-met     Recommend:   Regular diet with thin liquids  Meds per pt preference   Aspiration precautions  HOB >45 degrees during all intake and for at least 30 min after po   Small bites/sips, alternating bites/sips   Outcome: Resolved/Met    Speech LAnguage Pathology bedside swallow evaluation AND DISCHARGE    Patient: Melvin Gramajo (42 y.o. female)  Date: 6/19/2021  Primary Diagnosis: Acute exacerbation of chronic obstructive pulmonary disease (COPD) ([facility]) [J44.1]  Atypical chest pain [R07.89]  Precautions: Aspiration        ASSESSMENT :  Clinical beside swallow eval completed per MD orders. Pt A&Ox4. Speech/voice within functional limits. Oral mech examination revealed edentulous status;otherwise, structures functional for speech and deglutition. Pt reporting consuming regular/thin liquid diet at baseline despite edentulous status. Pt observed with thin liquids +/- straw via single sips and successive swallows with timely swallow initiation, adequate laryngeal elevation to palpation and no overt s/sx aspiration. Pt demo mildly increased but thorough oral prep phase with solids with independent use of liquid wash to clear scant residuals. Pt safe for regular diet with thin liquids, meds as tolerated with general safe swallow precautions. Pt educated with regard to s/sx aspiration, aspiration risk, diet recs and role of SLP. Pt able to verbalize understanding. Will sign off. Please re-consult as indicated. D/w RN. PLAN :  Recommendations and Planned Interventions:  No formal ST needs ID'd for dysphagia. Eval only.    Discharge Recommendations: Do not anticipate further SLP needs upon discharge      SUBJECTIVE:   Patient stated you are a blessing    OBJECTIVE:     Past Medical History:   Diagnosis Date    Arthritis     Asthma     CAD (coronary artery disease)     angina, last episode 06/2012    Chronic bronchitis (HCC)     Chronic kidney disease     stage 3    Chronic obstructive pulmonary disease (HCC)     Chronic pain     cervical, lumbar, knees, ankles, elbows    Fibromyalgia     GERD (gastroesophageal reflux disease)     Hypertension 1993    Psychiatric disorder     anxiety, depression    Thyroid disease      Past Surgical History:   Procedure Laterality Date    HX ADENOIDECTOMY      HX APPENDECTOMY      HX CATARACT REMOVAL      OU    HX CERVICAL FUSION      anterior x 2    HX CERVICAL FUSION      posterior x 3    HX CERVICAL FUSION  1996    Removal of titanium plate and screws    HX CHOLECYSTECTOMY      HX HYSTERECTOMY      HX LITHOTRIPSY      x 4    HX OOPHORECTOMY      left    HX ORTHOPAEDIC      cervical  x5    HX SMALL BOWEL RESECTION      HX TONSILLECTOMY      HX UROLOGICAL  1984 and 1985    kidney stone surgery     Prior Level of Function/Home Situation: nursing home  Diet prior to admission: regular/thin liquids   Current Diet:  minced and moist/thin liquids; recommend regular/thin liquids    Cognitive and Communication Status:  Neurologic State: Alert  Orientation Level: Oriented X4  Cognition: Follows commands, Appropriate safety awareness  Oral Assessment:  Oral Assessment  Labial: No impairment  Dentition: Edentulous  Oral Hygiene: Good  Lingual: No impairment  Velum: No impairment  Mandible: No impairment  P.O. Trials:  Patient Position: 46 at [facility]  Vocal quality prior to P.O.: Hoarse  Consistency Presented: Solid; Thin liquid  How Presented: Self-fed/presented;Cup/sip;Spoon;Straw;Successive swallows  Bolus Acceptance: No impairment  Bolus Formation/Control: Impaired  Type of Impairment: Mastication;Delayed  Propulsion: No impairment  Oral Residue: None  Initiation of Swallow: No impairment  Laryngeal Elevation: Functional  Aspiration Signs/Symptoms: None  Pharyngeal Phase Characteristics: No impairment, issues, or problems   Effective Modifications: Alternate liquids/solids  Cues for Modifications: None  Oral Phase Severity: Minimal  Pharyngeal Phase Severity : No impairment    The severity rating is based on the following outcomes:    Clinical judgment    Pain:  Not reported prior to or following evaluation     After treatment:   []            Patient left in no apparent distress sitting up in chair  [x]            Patient left in no apparent distress in bed  [x]            Call bell left within reach  [x]            Nursing notified  []            Caregiver present  []            Bed alarm activated    COMMUNICATION/EDUCATION:   [x]            SLP educated pt with regard to compensatory swallow strategies and       aspiration/reflux precautions including: small bites/sips,       alternate liquids/solids, decrease feeding rate, HOB > 45 with all po, and       upright in bed at 30 degrees after po for at least 45       minutes. [x]            Patient/family have participated as able in goal setting and plan of care. []            Patient/family agree to work toward stated goals and plan of care. []            Patient understands intent and goals of therapy; neutral about participation. []            Patient is unable to participate in goal setting and plan of care.     Thank you for this referral,   Pebbles Lennon M.S., [address] [facility]  Speech-Language Pathologist

## 2021-06-19 NOTE — PROGRESS NOTES
Bedside and verbal report given to GABRIELA Webster RN (oncoming nurse) by Kelly Reardon RN  (off going nurse). Report included the following information SBAR, Kardex, OR Summary, Intake/Output, and MAR. PRN Tylenol and Voltaren given for pain  Uneventful shift; no acute changes    Bedside and verbal report given by (off going nurse) GBARIELA Webster RN to (oncoming nurse) Angelique Arenas RN. Report included the following information SBAR, Kardex, OR Summary, Intake/Output, and MAR.

## 2021-06-19 NOTE — PROGRESS NOTES
Problem: Falls - Risk of  Goal: *Absence of Falls  Description: Document Abimael Davies Fall Risk and appropriate interventions in the flowsheet. Outcome: Progressing Towards Goal  Note: Fall Risk Interventions:  Mobility Interventions: Bed/chair exit alarm, Communicate number of staff needed for ambulation/transfer, Mechanical lift, OT consult for ADLs, Patient to call before getting OOB, PT Consult for mobility concerns, PT Consult for assist device competence, Strengthening exercises (ROM-active/passive)         Medication Interventions: Bed/chair exit alarm, Evaluate medications/consider consulting pharmacy, Patient to call before getting OOB, Teach patient to arise slowly    Elimination Interventions: Bed/chair exit alarm, Call light in reach, Elevated toilet seat, Patient to call for help with toileting needs, Stay With Me (per policy), Toilet paper/wipes in reach, Toileting schedule/hourly rounds (pure wick)    History of Falls Interventions: Bed/chair exit alarm, Consult care management for discharge planning, Door open when patient unattended, Evaluate medications/consider consulting pharmacy, Investigate reason for fall, Assess for delayed presentation/identification of injury for 48 hrs (comment for end date), Vital signs minimum Q4HRs X 24 hrs (comment for end date)         Problem: Patient Education: Go to Patient Education Activity  Goal: Patient/Family Education  Outcome: Progressing Towards Goal     Problem: Pain  Goal: *Control of Pain  Outcome: Progressing Towards Goal     Problem: Pain  Goal: *Control of Pain  Outcome: Progressing Towards Goal     Problem: Patient Education: Go to Patient Education Activity  Goal: Patient/Family Education  Outcome: Progressing Towards Goal     Problem: Pressure Injury - Risk of  Goal: *Prevention of pressure injury  Description: Document Forrest Scale and appropriate interventions in the flowsheet.   Outcome: Progressing Towards Goal  Note: Pressure Injury Interventions:       Moisture Interventions: Absorbent underpads, Assess need for specialty bed, Check for incontinence Q2 hours and as needed, Internal/External urinary devices, Limit adult briefs, Maintain skin hydration (lotion/cream), Minimize layers, Offer toileting Q_hr    Activity Interventions: Assess need for specialty bed, Increase time out of bed, Chair cushion, Pressure redistribution bed/mattress(bed type), PT/OT evaluation    Mobility Interventions: Assess need for specialty bed, Chair cushion, Float heels, Pressure redistribution bed/mattress (bed type), PT/OT evaluation, Turn and reposition approx.  every two hours(pillow and wedges)    Nutrition Interventions: Document food/fluid/supplement intake, Discuss nutritional consult with provider, Offer support with meals,snacks and hydration    Friction and Shear Interventions: Feet elevated on foot rest, Foam dressings/transparent film/skin sealants, Lift sheet, Lift team/patient mobility team, Minimize layers, Transferring/repositioning devices                Problem: Patient Education: Go to Patient Education Activity  Goal: Patient/Family Education  Outcome: Progressing Towards Goal

## 2021-06-19 NOTE — PROGRESS NOTES
Hospitalist Progress Note-critical care note     Patient: Sharita Melchor MRN: [id number]  CSN: [id number]    YOB: 1945  Age: 77 y.o. Sex: female    DOA: 6/16/2021 LOS:  LOS: 3 days            Chief complaint: copd exacerbation, chest pain, chronic pain , psychiatric disorder     Assessment/Plan         Hospital Problems  Date Reviewed: 5/13/2017        Codes Class Noted POA    * (Principal) Acute exacerbation of chronic obstructive pulmonary disease (COPD) ([facility] [address] ICD-10-CM: J44.1  ICD-9-CM: 491.21  6/16/2021 Unknown        Atypical chest pain ICD-10-CM: R07.89  ICD-9-CM: 786.59  6/16/2021 Unknown        Bedridden ICD-10-CM: Z74.01  ICD-9-CM: V49.84  6/16/2021 Unknown        Chronic pain ICD-10-CM: G89.29  ICD-9-CM: 338.29  Unknown Unknown    Overview Signed 6/16/2021  6:23 PM by David Wilson MD     cervical, lumbar, knees, ankles, elbows             Psychiatric disorder ICD-10-CM: J96  ICD-9-CM: 298.9  Unknown Unknown    Overview Signed 6/16/2021  6:24 PM by David Wilson MD     anxiety, depression             Thyroid disease ICD-10-CM: E07.9  ICD-9-CM: 246. 9  Unknown Unknown               Chest pain -resolved -no chest pain -no chest discomfort   ce negative for 3 negative, no acs chest discomfort due to copd exacerbation    Continue monitoring   V/q scan no PE   Echo done The left ventricular wall motion is normal.ef wnl      COPD exacerbation-oxygen down to 1 L-will wean off   Improving   Continue weaning steroid, breathing treatment with bronchodilator   symptom treatment   Continue  empiric iv abx   ssi for glucose control   mucinex          Chronic pain:  On fentanyl patch 100 MCG,Ms contain      Psychiatric disorder   On lyrica and cymbata before admission-will continue      hypothyroidism   Continue synthyroid      Bedridden   Fall precaution     Talked with    Aam Neena Child [phone]   All questions have been answered.  35 total min's spent on patient care including >50% on counseling/coordinating care. Discussed the above assessments. also discussed labs, medications and hospital course      Subjective: I want two ensure at bedtime. I do not like the current diet . Will I loose the spot if not going back my place     Will have speech reevaluate pt to see if can upgrade diet. Rn: on 1 L nc O2, son is at the bedside   Disposition :Monday   Review of systems:    General: No fevers or chills. Cardiovascular: No chest pain or pressure. No palpitations. Pulmonary: No shortness of breath. Gastrointestinal: No nausea, vomiting. Vital signs/Intake and Output:  Visit Vitals  BP (!) 151/65 (BP 1 Location: Right arm, BP Patient Position: At rest)   Pulse 92   Temp 98.1 °F (36.7 °C)   Resp 18   Ht 4' 11\" (1.499 m)   Wt 101.8 kg (224 lb 6.4 oz)   SpO2 96%   BMI 45.32 kg/m²     Current Shift:  06/19 0701 - 06/19 1900  In: 590 [P.O.:490; I.V.:100]  Out: 750 [Urine:750]  Last three shifts:  06/17 1901 - 06/19 0700  In: 400 [P.O.:400]  Out: 1300 [Urine:1300]    Physical Exam:  General: WD, WN. Alert, cooperative, no acute distress    HEENT: NC, Atraumatic. PERRLA, anicteric sclerae. Lungs: Mild Wheezing, no rales   Heart:  Regular  rhythm,  No murmur, No Rubs, No Gallops  Abdomen: Soft, Non distended, Non tender. +Bowel sounds,   Extremities: No c/c/e  Psych:   +anxious, no  agitated. Neurologic:  No acute neurological deficit.              Labs: Results:       Chemistry Recent Labs     06/19/21  0545 06/18/21  0005 06/17/21  0100   * 125* 144*    142 139   K 4.3 3.7 3.5    104 103   CO2 29 31 27   BUN 20* 16 17   CREA 0.87 0.87 1.06   CA 9.4 8.6 8.4*   AGAP 7 7 9   BUCR 23* 18 16      CBC w/Diff Recent Labs     06/19/21  0741 06/18/21  0005 06/17/21  0100   WBC 12.6 14.1* 9.9   RBC 4.04* 3.77* 3.66*   HGB 12.3 11.3* 11.1*   HCT 38.1 35.8 34.5*    292 257   GRANS 83* 87* 91*   LYMPH 14* 9* 7*   EOS 0 0 0      Cardiac Enzymes Recent Labs     06/17/21  0701 06/17/21  0100   CPK 49 43   CKND1 3.1 CALCULATION NOT PERFORMED WHEN RESULT IS BELOW LINEAR LIMIT      Coagulation No results for input(s): PTP, INR, APTT, INREXT, INREXT in the last 72 hours. Lipid Panel No results found for: CHOL, CHOLPOCT, CHOLX, CHLST, CHOLV, 888327, HDL, HDLP, LDL, LDLC, DLDLP, 612281, VLDLC, VLDL, TGLX, TRIGL, TRIGP, TGLPOCT, CHHD, CHHDX   BNP No results for input(s): BNPP in the last 72 hours. Liver Enzymes No results for input(s): TP, ALB, TBIL, AP in the last 72 hours. No lab exists for component: SGOT, GPT, DBIL   Thyroid Studies Lab Results   Component Value Date/Time    TSH 0.04 (L) 11/26/2016 05:39 AM        Procedures/imaging: see electronic medical records for all procedures/Xrays and details which were not copied into this note but were reviewed prior to creation of Plan    NM LUNG SCAN PERF    Result Date: 6/17/2021  EXAM: NUCLEAR MEDICINE PULMONARY PERFUSION SCAN CLINICAL INDICATION/HISTORY: Chest pain. COMPARISON: None Available TECHNIQUE: No aerosolized 99mTc-DTPA ventilatory images of the lungs were obtained. After intravenous administration of 7.1 mCi 99mTc-MAA, perfusion images of the lungs were obtained in multiple projections. Images are correlated with portable chest dated 06/16/2021.    > Injection site:  Right antecubital fossa ________________________________ FINDINGS: Perfusion images show symmetric radiotracer distribution to both lungs, with no segmental perfusion defects to suggest the presence of pulmonary embolism. ________________________________     No pulmonary embolism. Perfusion only scintigraphy was performed and correlated with radiographic evaluation of the chest. This exam is reported using a perfusion only modified PIOPED II interpretive criteria. XR CHEST PORT    Result Date: 6/16/2021  EXAM: One-view chest CLINICAL HISTORY: Chest pain , COMPARISON: None FINDINGS: Frontal view of the chest demonstrate clear lungs.  Cardiac silhouette is normal in size and contour. No acute bony or soft tissue abnormality. No acute pulmonary process identified.        Shahram Escobar MD

## 2021-06-19 NOTE — PROGRESS NOTES
Problem: Falls - Risk of  Goal: *Absence of Falls  Description: Document Chinedu Sites Fall Risk and appropriate interventions in the flowsheet.   Outcome: Progressing Towards Goal  Note: Fall Risk Interventions:  Mobility Interventions: Communicate number of staff needed for ambulation/transfer         Medication Interventions: Patient to call before getting OOB    Elimination Interventions: Call light in reach, Patient to call for help with toileting needs    History of Falls Interventions: Consult care management for discharge planning, Investigate reason for fall         Problem: Pain  Goal: *Control of Pain  Outcome: Progressing Towards Goal

## 2021-06-19 NOTE — PROGRESS NOTES
Care manager placed call to [facility] to verify bed status and when they could take patient back to LTC; spoke with nursing supervisor for LTC. They do not do Sunday admissions but if stable on Monday they can accept patient.     Care Management Interventions  Mode of Transport at Discharge: BLS  Transition of Care Consult (CM Consult): Discharge Planning, [address] [facility] (from [place] [address])  Health Maintenance Reviewed: Yes  Physical Therapy Consult: Yes  Occupational Therapy Consult: Yes  Current Support Network: [address] [place] [address]  The Plan for Transition of Care is Related to the Following Treatment Goals : Return to [facility]  The Patient and/or Patient Representative was Provided with a Choice of Provider and Agrees with the Discharge Plan?: Yes  Name of the Patient Representative Who was Provided with a Choice of Provider and Agrees with the Discharge Plan: pt/family  Freedom of Choice List was Provided with Basic Dialogue that Supports the Patient's Individualized Plan of Care/Goals, Treatment Preferences and Shares the Quality Data Associated with the Providers?: Yes  Discharge Location  Discharge Placement: Skilled nursing facility ([facility])

## 2021-06-20 LAB
ANION GAP SERPL CALC-SCNC: 8 MMOL/L (ref 3–18)
BASOPHILS # BLD: 0 K/UL (ref 0–0.1)
BASOPHILS NFR BLD: 0 % (ref 0–2)
BUN SERPL-MCNC: 21 MG/DL (ref 7–18)
BUN/CREAT SERPL: 22 (ref 12–20)
CALCIUM SERPL-MCNC: 8.8 MG/DL (ref 8.5–10.1)
CHLORIDE SERPL-SCNC: 104 MMOL/L (ref 100–111)
CO2 SERPL-SCNC: 28 MMOL/L (ref 21–32)
CREAT SERPL-MCNC: 0.96 MG/DL (ref 0.6–1.3)
DIFFERENTIAL METHOD BLD: ABNORMAL
EOSINOPHIL # BLD: 0 K/UL (ref 0–0.4)
EOSINOPHIL NFR BLD: 0 % (ref 0–5)
ERYTHROCYTE [DISTWIDTH] IN BLOOD BY AUTOMATED COUNT: 13.7 % (ref 11.6–14.5)
GLUCOSE BLD STRIP.AUTO-MCNC: 121 MG/DL (ref 70–110)
GLUCOSE BLD STRIP.AUTO-MCNC: 141 MG/DL (ref 70–110)
GLUCOSE BLD STRIP.AUTO-MCNC: 164 MG/DL (ref 70–110)
GLUCOSE BLD STRIP.AUTO-MCNC: 181 MG/DL (ref 70–110)
GLUCOSE SERPL-MCNC: 190 MG/DL (ref 74–99)
HCT VFR BLD AUTO: 40.1 % (ref 35–45)
HGB BLD-MCNC: 12.5 G/DL (ref 12–16)
LYMPHOCYTES # BLD: 1.8 K/UL (ref 0.9–3.6)
LYMPHOCYTES NFR BLD: 18 % (ref 21–52)
MCH RBC QN AUTO: 30.3 PG (ref 24–34)
MCHC RBC AUTO-ENTMCNC: 31.2 G/DL (ref 31–37)
MCV RBC AUTO: 97.3 FL (ref 74–97)
MONOCYTES # BLD: 0.3 K/UL (ref 0.05–1.2)
MONOCYTES NFR BLD: 3 % (ref 3–10)
NEUTS BAND NFR BLD MANUAL: 1 % (ref 0–5)
NEUTS SEG # BLD: 8.1 K/UL (ref 1.8–8)
NEUTS SEG NFR BLD: 78 % (ref 40–73)
PLATELET # BLD AUTO: 263 K/UL (ref 135–420)
PLATELET COMMENTS,PCOM: ABNORMAL
PMV BLD AUTO: 12.1 FL (ref 9.2–11.8)
POTASSIUM SERPL-SCNC: 4.5 MMOL/L (ref 3.5–5.5)
RBC # BLD AUTO: 4.12 M/UL (ref 4.2–5.3)
RBC MORPH BLD: ABNORMAL
SODIUM SERPL-SCNC: 140 MMOL/L (ref 136–145)
WBC # BLD AUTO: 10.2 K/UL (ref 4.6–13.2)

## 2021-06-20 PROCEDURE — 80048 BASIC METABOLIC PNL TOTAL CA: CPT

## 2021-06-20 PROCEDURE — 74011250636 HC RX REV CODE- 250/636: Performed by: HOSPITALIST

## 2021-06-20 PROCEDURE — 74011000250 HC RX REV CODE- 250: Performed by: HOSPITALIST

## 2021-06-20 PROCEDURE — 74011636637 HC RX REV CODE- 636/637: Performed by: HOSPITALIST

## 2021-06-20 PROCEDURE — 74011250637 HC RX REV CODE- 250/637: Performed by: HOSPITALIST

## 2021-06-20 PROCEDURE — 74011000258 HC RX REV CODE- 258: Performed by: HOSPITALIST

## 2021-06-20 PROCEDURE — 85025 COMPLETE CBC W/AUTO DIFF WBC: CPT

## 2021-06-20 PROCEDURE — 65660000000 HC RM CCU STEPDOWN

## 2021-06-20 PROCEDURE — 77010033678 HC OXYGEN DAILY

## 2021-06-20 PROCEDURE — 94640 AIRWAY INHALATION TREATMENT: CPT

## 2021-06-20 PROCEDURE — 82962 GLUCOSE BLOOD TEST: CPT

## 2021-06-20 PROCEDURE — 36415 COLL VENOUS BLD VENIPUNCTURE: CPT

## 2021-06-20 RX ADMIN — IPRATROPIUM BROMIDE AND ALBUTEROL SULFATE 3 ML: .5; 3 SOLUTION RESPIRATORY (INHALATION) at 07:50

## 2021-06-20 RX ADMIN — MONTELUKAST 10 MG: 10 TABLET, FILM COATED ORAL at 08:40

## 2021-06-20 RX ADMIN — HYDROXYZINE HYDROCHLORIDE 50 MG: 25 TABLET, FILM COATED ORAL at 08:39

## 2021-06-20 RX ADMIN — ACETAMINOPHEN 650 MG: 325 TABLET ORAL at 16:48

## 2021-06-20 RX ADMIN — METHYLPREDNISOLONE SODIUM SUCCINATE 40 MG: 125 INJECTION, POWDER, FOR SOLUTION INTRAMUSCULAR; INTRAVENOUS at 21:27

## 2021-06-20 RX ADMIN — LEVOTHYROXINE SODIUM 125 MCG: 0.03 TABLET ORAL at 06:30

## 2021-06-20 RX ADMIN — DOXYCYCLINE 100 MG: 100 INJECTION, POWDER, LYOPHILIZED, FOR SOLUTION INTRAVENOUS at 18:30

## 2021-06-20 RX ADMIN — ARFORMOTEROL TARTRATE 15 MCG: 15 SOLUTION RESPIRATORY (INHALATION) at 20:03

## 2021-06-20 RX ADMIN — IPRATROPIUM BROMIDE AND ALBUTEROL SULFATE 3 ML: .5; 3 SOLUTION RESPIRATORY (INHALATION) at 12:09

## 2021-06-20 RX ADMIN — Medication 10 ML: at 21:49

## 2021-06-20 RX ADMIN — Medication 10 ML: at 13:51

## 2021-06-20 RX ADMIN — PREGABALIN 100 MG: 100 CAPSULE ORAL at 21:28

## 2021-06-20 RX ADMIN — ENOXAPARIN SODIUM 40 MG: 40 INJECTION SUBCUTANEOUS at 08:39

## 2021-06-20 RX ADMIN — SULFAMETHOXAZOLE AND TRIMETHOPRIM 1 TABLET: 800; 160 TABLET ORAL at 16:48

## 2021-06-20 RX ADMIN — PANTOPRAZOLE SODIUM 40 MG: 40 TABLET, DELAYED RELEASE ORAL at 06:30

## 2021-06-20 RX ADMIN — PROMETHAZINE HYDROCHLORIDE 12.5 MG: 25 TABLET ORAL at 08:39

## 2021-06-20 RX ADMIN — PREGABALIN 100 MG: 100 CAPSULE ORAL at 16:48

## 2021-06-20 RX ADMIN — INSULIN LISPRO 2 UNITS: 100 INJECTION, SOLUTION INTRAVENOUS; SUBCUTANEOUS at 21:26

## 2021-06-20 RX ADMIN — ACETAMINOPHEN 650 MG: 325 TABLET ORAL at 05:04

## 2021-06-20 RX ADMIN — IPRATROPIUM BROMIDE AND ALBUTEROL SULFATE 3 ML: .5; 3 SOLUTION RESPIRATORY (INHALATION) at 23:36

## 2021-06-20 RX ADMIN — IPRATROPIUM BROMIDE AND ALBUTEROL SULFATE 3 ML: .5; 3 SOLUTION RESPIRATORY (INHALATION) at 20:03

## 2021-06-20 RX ADMIN — PREGABALIN 100 MG: 100 CAPSULE ORAL at 08:39

## 2021-06-20 RX ADMIN — METHYLPREDNISOLONE SODIUM SUCCINATE 40 MG: 125 INJECTION, POWDER, FOR SOLUTION INTRAMUSCULAR; INTRAVENOUS at 13:50

## 2021-06-20 RX ADMIN — GUAIFENESIN 600 MG: 600 TABLET, EXTENDED RELEASE ORAL at 08:40

## 2021-06-20 RX ADMIN — INSULIN LISPRO 2 UNITS: 100 INJECTION, SOLUTION INTRAVENOUS; SUBCUTANEOUS at 16:49

## 2021-06-20 RX ADMIN — OXYCODONE HYDROCHLORIDE 30 MG: 20 TABLET, FILM COATED, EXTENDED RELEASE ORAL at 20:28

## 2021-06-20 RX ADMIN — GUAIFENESIN 600 MG: 600 TABLET, EXTENDED RELEASE ORAL at 20:28

## 2021-06-20 RX ADMIN — DICLOFENAC SODIUM 2 G: 10 GEL TOPICAL at 15:14

## 2021-06-20 RX ADMIN — DULOXETINE HYDROCHLORIDE 60 MG: 60 CAPSULE, DELAYED RELEASE ORAL at 08:39

## 2021-06-20 RX ADMIN — ACETAMINOPHEN 650 MG: 325 TABLET ORAL at 21:28

## 2021-06-20 RX ADMIN — METHYLPREDNISOLONE SODIUM SUCCINATE 40 MG: 125 INJECTION, POWDER, FOR SOLUTION INTRAMUSCULAR; INTRAVENOUS at 05:05

## 2021-06-20 RX ADMIN — DICLOFENAC SODIUM 2 G: 10 GEL TOPICAL at 21:28

## 2021-06-20 RX ADMIN — DICLOFENAC SODIUM 2 G: 10 GEL TOPICAL at 09:00

## 2021-06-20 RX ADMIN — DOXYCYCLINE 100 MG: 100 INJECTION, POWDER, LYOPHILIZED, FOR SOLUTION INTRAVENOUS at 05:05

## 2021-06-20 RX ADMIN — POLYETHYLENE GLYCOL 3350 17 G: 17 POWDER, FOR SOLUTION ORAL at 08:40

## 2021-06-20 RX ADMIN — OXYCODONE HYDROCHLORIDE 30 MG: 20 TABLET, FILM COATED, EXTENDED RELEASE ORAL at 08:39

## 2021-06-20 RX ADMIN — BUTALBITAL, ACETAMINOPHEN, AND CAFFEINE 2 TABLET: 50; 325; 40 TABLET ORAL at 08:40

## 2021-06-20 RX ADMIN — ARFORMOTEROL TARTRATE 15 MCG: 15 SOLUTION RESPIRATORY (INHALATION) at 07:50

## 2021-06-20 RX ADMIN — ASPIRIN 81 MG: 81 TABLET, COATED ORAL at 08:39

## 2021-06-20 RX ADMIN — IPRATROPIUM BROMIDE AND ALBUTEROL SULFATE 3 ML: .5; 3 SOLUTION RESPIRATORY (INHALATION) at 03:50

## 2021-06-20 RX ADMIN — BUDESONIDE 500 MCG: 0.5 INHALANT RESPIRATORY (INHALATION) at 07:50

## 2021-06-20 RX ADMIN — BUDESONIDE 500 MCG: 0.5 INHALANT RESPIRATORY (INHALATION) at 20:04

## 2021-06-20 RX ADMIN — IPRATROPIUM BROMIDE AND ALBUTEROL SULFATE 3 ML: .5; 3 SOLUTION RESPIRATORY (INHALATION) at 16:20

## 2021-06-20 NOTE — PROGRESS NOTES
2326 -  Head to toe assessment performed at this time. Pt denies any chest pain or SOB. Pt denies any numbness or tingling to extremities. Pt encouraged to manage pain and to use the incentive spirometer. Pt educated on the side effects of medications taken. Pt left with call light within reach and bed in low position. Will continue to monitor. Shift summary - Pt had an uneventful night. Pt left in bed with no signs of distress.

## 2021-06-20 NOTE — PROGRESS NOTES
Hospitalist Progress Note-critical care note     Patient: Tamar Boas MRN: [id number]  CSN: [id number]    YOB: 1945  Age: 77 y.o. Sex: female    DOA: 6/16/2021 LOS:  LOS: 4 days            Chief complaint: copd exacerbation, chest pain, chronic pain , psychiatric disorder     Assessment/Plan         Hospital Problems  Date Reviewed: 5/13/2017        Codes Class Noted POA    * (Principal) Acute exacerbation of chronic obstructive pulmonary disease (COPD) ([facility] [address] ICD-10-CM: J44.1  ICD-9-CM: 491.21  6/16/2021 Unknown        Atypical chest pain ICD-10-CM: R07.89  ICD-9-CM: 786.59  6/16/2021 Unknown        Bedridden ICD-10-CM: Z74.01  ICD-9-CM: V49.84  6/16/2021 Unknown        Chronic pain ICD-10-CM: G89.29  ICD-9-CM: 338.29  Unknown Unknown    Overview Signed 6/16/2021  6:23 PM by Matilda Rodriguez MD     cervical, lumbar, knees, ankles, elbows             Psychiatric disorder ICD-10-CM: J47  ICD-9-CM: 298.9  Unknown Unknown    Overview Signed 6/16/2021  6:24 PM by Matilda Rodriguez MD     anxiety, depression             Thyroid disease ICD-10-CM: E07.9  ICD-9-CM: 246. 9  Unknown Unknown               Chest pain -resolved   ce negative for 3 negative, no acs chest discomfort due to copd exacerbation    Continue monitoring   V/q scan no PE   Echo done The left ventricular wall motion is normal.ef wnl      COPD exacerbation-oxygen down to 1 L-ok to remain O2 at 92 % due to copd   Will repeat cxr fir interval change    Continue weaning steroid, breathing treatment with bronchodilator   symptom treatment   Continue  empiric iv abx   ssi for glucose control   mucinex          Chronic pain:  On fentanyl patch 100 MCG,Ms contain      Psychiatric disorder   On lyrica and cymbata before admission-will continue      hypothyroidism   Continue synthyroid      Bedridden   Fall precaution          Subjective: chest congestion      Rn: drink a lot water and lung sounds wet     Disposition :tbd   Review of systems:    General: No fevers or chills. Cardiovascular: No chest pain or pressure. No palpitations. Pulmonary: No shortness of breath. Coughs   Gastrointestinal: No nausea, vomiting. Vital signs/Intake and Output:  Visit Vitals  /72   Pulse (!) 112   Temp 97.9 °F (36.6 °C)   Resp 19   Ht 4' 11\" (1.499 m)   Wt 95.8 kg (211 lb 1.6 oz)   SpO2 96%   BMI 42.64 kg/m²     Current Shift:  06/20 0701 - 06/20 1900  In: -   Out: 150 [Urine:150]  Last three shifts:  06/18 1901 - 06/20 0700  In: 1050 [P.O.:850; I.V.:200]  Out: 2450 [Urine:2000]    Physical Exam:  General: WD, WN. Alert, cooperative, no acute distress    HEENT: NC, Atraumatic. PERRLA, anicteric sclerae. Lungs: Coarse bs bilaterally   Heart:  Regular  rhythm,  No murmur, No Rubs, No Gallops  Abdomen: Soft, Non distended, Non tender. +Bowel sounds,   Extremities: No c/c/e  Psych:   +anxious, no  agitated. Neurologic:  No acute neurological deficit. Labs: Results:       Chemistry Recent Labs     06/20/21  0140 06/19/21  0545 06/18/21  0005   * 137* 125*    139 142   K 4.5 4.3 3.7    103 104   CO2 28 29 31   BUN 21* 20* 16   CREA 0.96 0.87 0.87   CA 8.8 9.4 8.6   AGAP 8 7 7   BUCR 22* 23* 18      CBC w/Diff Recent Labs     06/20/21  0140 06/19/21  0741 06/18/21  0005   WBC 10.2 12.6 14.1*   RBC 4.12* 4.04* 3.77*   HGB 12.5 12.3 11.3*   HCT 40.1 38.1 35.8    286 292   GRANS 78* 83* 87*   LYMPH 18* 14* 9*   EOS 0 0 0      Cardiac Enzymes No results for input(s): CPK, CKND1, ADILSON in the last 72 hours. No lab exists for component: CKRMB, TROIP   Coagulation No results for input(s): PTP, INR, APTT, INREXT, INREXT in the last 72 hours. Lipid Panel No results found for: CHOL, CHOLPOCT, CHOLX, CHLST, CHOLV, 698535, HDL, HDLP, LDL, LDLC, DLDLP, 559013, VLDLC, VLDL, TGLX, TRIGL, TRIGP, TGLPOCT, CHHD, CHHDX   BNP No results for input(s): BNPP in the last 72 hours.    Liver Enzymes No results for input(s): TP, ALB, TBIL, AP in the last 72 hours.    No lab exists for component: SGOT, GPT, DBIL   Thyroid Studies Lab Results   Component Value Date/Time    TSH 0.04 (L) 11/26/2016 05:39 AM        Procedures/imaging: see electronic medical records for all procedures/Xrays and details which were not copied into this note but were reviewed prior to creation of Plan    NM LUNG SCAN PERF    Result Date: 6/17/2021  EXAM: NUCLEAR MEDICINE PULMONARY PERFUSION SCAN CLINICAL INDICATION/HISTORY: Chest pain. COMPARISON: None Available TECHNIQUE: No aerosolized 99mTc-DTPA ventilatory images of the lungs were obtained. After intravenous administration of 7.1 mCi 99mTc-MAA, perfusion images of the lungs were obtained in multiple projections. Images are correlated with portable chest dated 06/16/2021.    > Injection site:  Right antecubital fossa ________________________________ FINDINGS: Perfusion images show symmetric radiotracer distribution to both lungs, with no segmental perfusion defects to suggest the presence of pulmonary embolism. ________________________________     No pulmonary embolism. Perfusion only scintigraphy was performed and correlated with radiographic evaluation of the chest. This exam is reported using a perfusion only modified PIOPED II interpretive criteria. XR CHEST PORT    Result Date: 6/16/2021  EXAM: One-view chest CLINICAL HISTORY: Chest pain , COMPARISON: None FINDINGS: Frontal view of the chest demonstrate clear lungs. Cardiac silhouette is normal in size and contour. No acute bony or soft tissue abnormality. No acute pulmonary process identified.        Jake Esquivel MD

## 2021-06-20 NOTE — PROGRESS NOTES
Physician Progress Note      Misha Izaguirre  [facility] #:                  [id number]  :                       1945  ADMIT DATE:       2021 12:29 PM  [address] [place] DATE:  RESPONDING  PROVIDER #:        See SANCHEZ MD        QUERY TEXT:    Stage of Chronic Kidney Disease: Please provide further specificity, if known. Clinical indicators include: chronic kidney disease, bun  Options provided:  -- Chronic kidney disease stage 1  -- Chronic kidney disease stage 2  -- Chronic kidney disease stage 3  -- Chronic kidney disease stage 3a  -- Chronic kidney disease stage 3b  -- Chronic kidney disease stage 4  -- Chronic kidney disease stage 5  -- Chronic kidney disease stage 5, requiring dialysis  -- End stage renal disease  -- Other - I will add my own diagnosis  -- Disagree - Not applicable / Not valid  -- Disagree - Clinically Unable to determine / Unknown        PROVIDER RESPONSE TEXT:    Provider was unable to determine a response for this query. QUERY TEXT:    Dear Hospitalist,    Pt admitted with COPD exacerbation. Pt noted to have Atypical chest pain. If possible, please document in progress notes and discharge summary if you are evaluating and/or treating any of the following: The medical record reflects the following:    Risk Factors: PMH GERD, COPD, Asthma, HTN    Clinical Indicators:  > Chest tightness, chest pain, cough, wheezing, shortness of breath, fatigue  > Decreased breath sounds  > Lung scan - No PE  > EKG - NSR  > Echo pending  > Chest pain -resolved noted     Treatment: Receiving/Received ASA and nitro prior to arrival, ASA, steroid, magnesium, neb treatments, Protonix po, fentanyl, Mucinex, EKG, Echo    Thank you,  Bob Gtz, RN, BSN, [place], [address] [facility]  Options provided:  -- Chest pain due to COPD with exacerbation  -- Chest pain due to GERD  -- Chest pain due to (please specify), please specify.   -- Other - I will add my own diagnosis  -- Disagree - Not applicable / Not valid  -- Disagree - Clinically unable to determine / Unknown  -- Refer to Clinical Documentation Reviewer    PROVIDER RESPONSE TEXT:    This patient has COPD with exacerbation.     Query created by: Villa Vaughn on 6/17/2021 3:39 PM      Electronically signed by:  Jeri Pedro MD 6/20/2021 12:24 PM

## 2021-06-20 NOTE — PROGRESS NOTES
Problem: Falls - Risk of  Goal: *Absence of Falls  Description: Document Vincent Elias Fall Risk and appropriate interventions in the flowsheet. Outcome: Progressing Towards Goal  Note: Fall Risk Interventions:  Mobility Interventions: Bed/chair exit alarm, Communicate number of staff needed for ambulation/transfer, OT consult for ADLs, Patient to call before getting OOB, PT Consult for mobility concerns, PT Consult for assist device competence, Strengthening exercises (ROM-active/passive), Mechanical lift         Medication Interventions: Bed/chair exit alarm, Evaluate medications/consider consulting pharmacy, Patient to call before getting OOB, Teach patient to arise slowly    Elimination Interventions: Bed/chair exit alarm, Call light in reach, Elevated toilet seat, Patient to call for help with toileting needs, Stay With Me (per policy), Toilet paper/wipes in reach, Toileting schedule/hourly rounds    History of Falls Interventions: Bed/chair exit alarm, Consult care management for discharge planning, Door open when patient unattended, Evaluate medications/consider consulting pharmacy, Investigate reason for fall, Utilize gait belt for transfer/ambulation, Assess for delayed presentation/identification of injury for 48 hrs (comment for end date), Vital signs minimum Q4HRs X 24 hrs (comment for end date)         Problem: Patient Education: Go to Patient Education Activity  Goal: Patient/Family Education  Outcome: Progressing Towards Goal     Problem: Pain  Goal: *Control of Pain  Outcome: Progressing Towards Goal     Problem: Patient Education: Go to Patient Education Activity  Goal: Patient/Family Education  Outcome: Progressing Towards Goal     Problem: Pressure Injury - Risk of  Goal: *Prevention of pressure injury  Description: Document Forrest Scale and appropriate interventions in the flowsheet.   Outcome: Progressing Towards Goal  Note: Pressure Injury Interventions:  Sensory Interventions: Assess changes in LOC, Assess need for specialty bed, Avoid rigorous massage over bony prominences, Chair cushion, Check visual cues for pain, Discuss PT/OT consult with provider, Float heels, Keep linens dry and wrinkle-free, Maintain/enhance activity level, Minimize linen layers, Monitor skin under medical devices, Pad between skin to skin, Pressure redistribution bed/mattress (bed type), Turn and reposition approx. every two hours (pillows and wedges if needed)    Moisture Interventions: Absorbent underpads, Internal/External urinary devices    Activity Interventions: Assess need for specialty bed, Chair cushion, Increase time out of bed, Pressure redistribution bed/mattress(bed type), PT/OT evaluation    Mobility Interventions: Assess need for specialty bed, Chair cushion, Float heels, Pressure redistribution bed/mattress (bed type), PT/OT evaluation, Turn and reposition approx.  every two hours(pillow and wedges)    Nutrition Interventions: Document food/fluid/supplement intake, Discuss nutritional consult with provider, Offer support with meals,snacks and hydration    Friction and Shear Interventions: Feet elevated on foot rest, Foam dressings/transparent film/skin sealants, Lift sheet, Lift team/patient mobility team, Minimize layers, Transferring/repositioning devices                Problem: Patient Education: Go to Patient Education Activity  Goal: Patient/Family Education  Outcome: Progressing Towards Goal     Problem: Patient Education: Go to Patient Education Activity  Goal: Patient/Family Education  Outcome: Progressing Towards Goal

## 2021-06-20 NOTE — ROUTINE PROCESS
Bedside and Verbal shift change report given to Eirc Rosas RN (oncoming nurse) by RAYMON Maria RN (offgoing nurse). Report included the following information SBAR, Kardex, Intake/Output, MAR and Recent Results.

## 2021-06-20 NOTE — PROGRESS NOTES
Problem: Falls - Risk of  Goal: *Absence of Falls  Description: Document Cesar Ulisses Fall Risk and appropriate interventions in the flowsheet.   Outcome: Progressing Towards Goal  Note: Fall Risk Interventions:  Mobility Interventions: Communicate number of staff needed for ambulation/transfer         Medication Interventions: Evaluate medications/consider consulting pharmacy    Elimination Interventions: Call light in reach, Patient to call for help with toileting needs    History of Falls Interventions: Consult care management for discharge planning, Door open when patient unattended, Evaluate medications/consider consulting pharmacy, Investigate reason for fall

## 2021-06-20 NOTE — PROGRESS NOTES
19:50 Assessment completed. O2 remains @1 LPM per NC. Lungs are decreased in the bases but are coarse in the middle & upper lobes. Offers 0 c/o CP, pressure,or SOB. Resting quietly in with TV on & using her tablet. 22:20 Shift assessment completed. See nsg flow sheet for details. 03:00 Reassessed with 0 changes noted. Resting quietly in bed with eyes closed between cares. 07:05 Bedside and Verbal shift change report given to Stephanie Brennan RN (oncoming nurse) by Donavan Borges RN (offgoing nurse). Report included the following information SBAR.

## 2021-06-21 ENCOUNTER — APPOINTMENT (OUTPATIENT)
Dept: CT IMAGING | Age: 76
DRG: 202 | End: 2021-06-21
Attending: HOSPITALIST
Payer: MEDICARE

## 2021-06-21 ENCOUNTER — APPOINTMENT (OUTPATIENT)
Dept: GENERAL RADIOLOGY | Age: 76
DRG: 202 | End: 2021-06-21
Attending: HOSPITALIST
Payer: MEDICARE

## 2021-06-21 LAB
ANION GAP SERPL CALC-SCNC: 6 MMOL/L (ref 3–18)
BASOPHILS # BLD: 0 K/UL (ref 0–0.1)
BASOPHILS NFR BLD: 0 % (ref 0–2)
BUN SERPL-MCNC: 29 MG/DL (ref 7–18)
BUN/CREAT SERPL: 31 (ref 12–20)
CALCIUM SERPL-MCNC: 9 MG/DL (ref 8.5–10.1)
CHLORIDE SERPL-SCNC: 104 MMOL/L (ref 100–111)
CO2 SERPL-SCNC: 28 MMOL/L (ref 21–32)
CREAT SERPL-MCNC: 0.93 MG/DL (ref 0.6–1.3)
DIFFERENTIAL METHOD BLD: ABNORMAL
EOSINOPHIL # BLD: 0 K/UL (ref 0–0.4)
EOSINOPHIL NFR BLD: 0 % (ref 0–5)
ERYTHROCYTE [DISTWIDTH] IN BLOOD BY AUTOMATED COUNT: 13.7 % (ref 11.6–14.5)
GLUCOSE BLD STRIP.AUTO-MCNC: 114 MG/DL (ref 70–110)
GLUCOSE BLD STRIP.AUTO-MCNC: 155 MG/DL (ref 70–110)
GLUCOSE BLD STRIP.AUTO-MCNC: 163 MG/DL (ref 70–110)
GLUCOSE BLD STRIP.AUTO-MCNC: 175 MG/DL (ref 70–110)
GLUCOSE SERPL-MCNC: 158 MG/DL (ref 74–99)
HCT VFR BLD AUTO: 35.6 % (ref 35–45)
HGB BLD-MCNC: 11.2 G/DL (ref 12–16)
LYMPHOCYTES # BLD: 1.2 K/UL (ref 0.9–3.6)
LYMPHOCYTES NFR BLD: 8 % (ref 21–52)
MCH RBC QN AUTO: 29.7 PG (ref 24–34)
MCHC RBC AUTO-ENTMCNC: 31.5 G/DL (ref 31–37)
MCV RBC AUTO: 94.4 FL (ref 74–97)
MONOCYTES # BLD: 0.8 K/UL (ref 0.05–1.2)
MONOCYTES NFR BLD: 6 % (ref 3–10)
NEUTS SEG # BLD: 12.8 K/UL (ref 1.8–8)
NEUTS SEG NFR BLD: 85 % (ref 40–73)
PLATELET # BLD AUTO: 302 K/UL (ref 135–420)
PMV BLD AUTO: 11.8 FL (ref 9.2–11.8)
POTASSIUM SERPL-SCNC: 4.6 MMOL/L (ref 3.5–5.5)
RBC # BLD AUTO: 3.77 M/UL (ref 4.2–5.3)
SODIUM SERPL-SCNC: 138 MMOL/L (ref 136–145)
WBC # BLD AUTO: 15.2 K/UL (ref 4.6–13.2)

## 2021-06-21 PROCEDURE — 74011000258 HC RX REV CODE- 258: Performed by: HOSPITALIST

## 2021-06-21 PROCEDURE — 80048 BASIC METABOLIC PNL TOTAL CA: CPT

## 2021-06-21 PROCEDURE — 71045 X-RAY EXAM CHEST 1 VIEW: CPT

## 2021-06-21 PROCEDURE — 82962 GLUCOSE BLOOD TEST: CPT

## 2021-06-21 PROCEDURE — 85025 COMPLETE CBC W/AUTO DIFF WBC: CPT

## 2021-06-21 PROCEDURE — 74011250636 HC RX REV CODE- 250/636: Performed by: HOSPITALIST

## 2021-06-21 PROCEDURE — 94640 AIRWAY INHALATION TREATMENT: CPT

## 2021-06-21 PROCEDURE — 36415 COLL VENOUS BLD VENIPUNCTURE: CPT

## 2021-06-21 PROCEDURE — 74011636637 HC RX REV CODE- 636/637: Performed by: HOSPITALIST

## 2021-06-21 PROCEDURE — 77010033678 HC OXYGEN DAILY

## 2021-06-21 PROCEDURE — 74011250637 HC RX REV CODE- 250/637: Performed by: HOSPITALIST

## 2021-06-21 PROCEDURE — 65660000000 HC RM CCU STEPDOWN

## 2021-06-21 PROCEDURE — 74011000250 HC RX REV CODE- 250: Performed by: HOSPITALIST

## 2021-06-21 PROCEDURE — 71250 CT THORAX DX C-: CPT

## 2021-06-21 RX ORDER — FUROSEMIDE 10 MG/ML
20 INJECTION INTRAMUSCULAR; INTRAVENOUS ONCE
Status: COMPLETED | OUTPATIENT
Start: 2021-06-21 | End: 2021-06-21

## 2021-06-21 RX ADMIN — OXYCODONE HYDROCHLORIDE 30 MG: 20 TABLET, FILM COATED, EXTENDED RELEASE ORAL at 22:19

## 2021-06-21 RX ADMIN — LEVOTHYROXINE SODIUM 125 MCG: 0.03 TABLET ORAL at 05:59

## 2021-06-21 RX ADMIN — Medication 10 ML: at 05:56

## 2021-06-21 RX ADMIN — ARFORMOTEROL TARTRATE 15 MCG: 15 SOLUTION RESPIRATORY (INHALATION) at 08:03

## 2021-06-21 RX ADMIN — ACETAMINOPHEN 650 MG: 325 TABLET ORAL at 04:59

## 2021-06-21 RX ADMIN — PREGABALIN 100 MG: 100 CAPSULE ORAL at 22:19

## 2021-06-21 RX ADMIN — METHYLPREDNISOLONE SODIUM SUCCINATE 40 MG: 125 INJECTION, POWDER, FOR SOLUTION INTRAMUSCULAR; INTRAVENOUS at 22:18

## 2021-06-21 RX ADMIN — DOXYCYCLINE 100 MG: 100 INJECTION, POWDER, LYOPHILIZED, FOR SOLUTION INTRAVENOUS at 06:01

## 2021-06-21 RX ADMIN — Medication 10 ML: at 22:19

## 2021-06-21 RX ADMIN — INSULIN LISPRO 2 UNITS: 100 INJECTION, SOLUTION INTRAVENOUS; SUBCUTANEOUS at 12:37

## 2021-06-21 RX ADMIN — ENOXAPARIN SODIUM 40 MG: 40 INJECTION SUBCUTANEOUS at 09:32

## 2021-06-21 RX ADMIN — FUROSEMIDE 20 MG: 10 INJECTION, SOLUTION INTRAMUSCULAR; INTRAVENOUS at 14:47

## 2021-06-21 RX ADMIN — PREGABALIN 100 MG: 100 CAPSULE ORAL at 18:18

## 2021-06-21 RX ADMIN — HYDROXYZINE HYDROCHLORIDE 50 MG: 25 TABLET, FILM COATED ORAL at 00:22

## 2021-06-21 RX ADMIN — SULFAMETHOXAZOLE AND TRIMETHOPRIM 1 TABLET: 800; 160 TABLET ORAL at 18:18

## 2021-06-21 RX ADMIN — GUAIFENESIN 600 MG: 600 TABLET, EXTENDED RELEASE ORAL at 09:32

## 2021-06-21 RX ADMIN — METHYLPREDNISOLONE SODIUM SUCCINATE 40 MG: 125 INJECTION, POWDER, FOR SOLUTION INTRAMUSCULAR; INTRAVENOUS at 05:55

## 2021-06-21 RX ADMIN — GUAIFENESIN 600 MG: 600 TABLET, EXTENDED RELEASE ORAL at 22:19

## 2021-06-21 RX ADMIN — INSULIN LISPRO 2 UNITS: 100 INJECTION, SOLUTION INTRAVENOUS; SUBCUTANEOUS at 18:19

## 2021-06-21 RX ADMIN — HYDROXYZINE HYDROCHLORIDE 50 MG: 25 TABLET, FILM COATED ORAL at 09:32

## 2021-06-21 RX ADMIN — IPRATROPIUM BROMIDE AND ALBUTEROL SULFATE 3 ML: .5; 3 SOLUTION RESPIRATORY (INHALATION) at 23:40

## 2021-06-21 RX ADMIN — DICLOFENAC SODIUM 2 G: 10 GEL TOPICAL at 19:54

## 2021-06-21 RX ADMIN — DULOXETINE HYDROCHLORIDE 60 MG: 60 CAPSULE, DELAYED RELEASE ORAL at 09:32

## 2021-06-21 RX ADMIN — IPRATROPIUM BROMIDE AND ALBUTEROL SULFATE 3 ML: .5; 3 SOLUTION RESPIRATORY (INHALATION) at 11:29

## 2021-06-21 RX ADMIN — PANTOPRAZOLE SODIUM 40 MG: 40 TABLET, DELAYED RELEASE ORAL at 05:58

## 2021-06-21 RX ADMIN — IPRATROPIUM BROMIDE AND ALBUTEROL SULFATE 3 ML: .5; 3 SOLUTION RESPIRATORY (INHALATION) at 15:37

## 2021-06-21 RX ADMIN — IPRATROPIUM BROMIDE AND ALBUTEROL SULFATE 3 ML: .5; 3 SOLUTION RESPIRATORY (INHALATION) at 19:49

## 2021-06-21 RX ADMIN — INSULIN LISPRO 2 UNITS: 100 INJECTION, SOLUTION INTRAVENOUS; SUBCUTANEOUS at 22:20

## 2021-06-21 RX ADMIN — BUTALBITAL, ACETAMINOPHEN, AND CAFFEINE 2 TABLET: 50; 325; 40 TABLET ORAL at 09:32

## 2021-06-21 RX ADMIN — ASPIRIN 81 MG: 81 TABLET, COATED ORAL at 09:32

## 2021-06-21 RX ADMIN — DICLOFENAC SODIUM 2 G: 10 GEL TOPICAL at 14:53

## 2021-06-21 RX ADMIN — DICLOFENAC SODIUM 2 G: 10 GEL TOPICAL at 05:00

## 2021-06-21 RX ADMIN — BUTALBITAL, ACETAMINOPHEN, AND CAFFEINE 2 TABLET: 50; 325; 40 TABLET ORAL at 23:27

## 2021-06-21 RX ADMIN — ACETAMINOPHEN 650 MG: 325 TABLET ORAL at 14:47

## 2021-06-21 RX ADMIN — ACETAMINOPHEN 650 MG: 325 TABLET ORAL at 22:26

## 2021-06-21 RX ADMIN — POLYETHYLENE GLYCOL 3350 17 G: 17 POWDER, FOR SOLUTION ORAL at 09:32

## 2021-06-21 RX ADMIN — PREGABALIN 100 MG: 100 CAPSULE ORAL at 09:32

## 2021-06-21 RX ADMIN — METHYLPREDNISOLONE SODIUM SUCCINATE 40 MG: 125 INJECTION, POWDER, FOR SOLUTION INTRAMUSCULAR; INTRAVENOUS at 14:46

## 2021-06-21 RX ADMIN — ARFORMOTEROL TARTRATE 15 MCG: 15 SOLUTION RESPIRATORY (INHALATION) at 19:49

## 2021-06-21 RX ADMIN — BUDESONIDE 500 MCG: 0.5 INHALANT RESPIRATORY (INHALATION) at 19:49

## 2021-06-21 RX ADMIN — OXYCODONE HYDROCHLORIDE 30 MG: 20 TABLET, FILM COATED, EXTENDED RELEASE ORAL at 09:33

## 2021-06-21 RX ADMIN — IPRATROPIUM BROMIDE AND ALBUTEROL SULFATE 3 ML: .5; 3 SOLUTION RESPIRATORY (INHALATION) at 08:03

## 2021-06-21 RX ADMIN — PROMETHAZINE HYDROCHLORIDE 12.5 MG: 25 TABLET ORAL at 09:32

## 2021-06-21 RX ADMIN — MONTELUKAST 10 MG: 10 TABLET, FILM COATED ORAL at 09:32

## 2021-06-21 RX ADMIN — IPRATROPIUM BROMIDE AND ALBUTEROL SULFATE 3 ML: .5; 3 SOLUTION RESPIRATORY (INHALATION) at 03:51

## 2021-06-21 RX ADMIN — BUDESONIDE 500 MCG: 0.5 INHALANT RESPIRATORY (INHALATION) at 08:03

## 2021-06-21 RX ADMIN — Medication 10 ML: at 14:47

## 2021-06-21 NOTE — PROGRESS NOTES
Problem: Falls - Risk of  Goal: *Absence of Falls  Description: Document Shanna Bauer Fall Risk and appropriate interventions in the flowsheet. Outcome: Progressing Towards Goal  Note: Fall Risk Interventions:  Mobility Interventions: Bed/chair exit alarm, Communicate number of staff needed for ambulation/transfer, Mechanical lift, OT consult for ADLs, Patient to call before getting OOB, PT Consult for mobility concerns, PT Consult for assist device competence, Strengthening exercises (ROM-active/passive), Utilize walker, cane, or other assistive device         Medication Interventions: Bed/chair exit alarm, Evaluate medications/consider consulting pharmacy, Patient to call before getting OOB, Teach patient to arise slowly    Elimination Interventions: Bed/chair exit alarm, Call light in reach, Elevated toilet seat, Patient to call for help with toileting needs, Stay With Me (per policy), Toilet paper/wipes in reach, Toileting schedule/hourly rounds    History of Falls Interventions: Bed/chair exit alarm, Consult care management for discharge planning, Door open when patient unattended, Evaluate medications/consider consulting pharmacy, Assess for delayed presentation/identification of injury for 48 hrs (comment for end date), Vital signs minimum Q4HRs X 24 hrs (comment for end date)         Problem: Patient Education: Go to Patient Education Activity  Goal: Patient/Family Education  Outcome: Progressing Towards Goal     Problem: Pain  Goal: *Control of Pain  Outcome: Progressing Towards Goal     Problem: Patient Education: Go to Patient Education Activity  Goal: Patient/Family Education  Outcome: Progressing Towards Goal     Problem: Pressure Injury - Risk of  Goal: *Prevention of pressure injury  Description: Document Forrest Scale and appropriate interventions in the flowsheet.   Outcome: Progressing Towards Goal  Note: Pressure Injury Interventions:  Sensory Interventions: Assess changes in LOC, Assess need for specialty bed, Avoid rigorous massage over bony prominences, Chair cushion, Check visual cues for pain, Discuss PT/OT consult with provider, Float heels, Keep linens dry and wrinkle-free, Maintain/enhance activity level, Minimize linen layers, Monitor skin under medical devices, Pad between skin to skin, Pressure redistribution bed/mattress (bed type), Turn and reposition approx. every two hours (pillows and wedges if needed)    Moisture Interventions: Absorbent underpads, Assess need for specialty bed, Internal/External urinary devices, Check for incontinence Q2 hours and as needed, Minimize layers, Offer toileting Q_hr, Limit adult briefs, Maintain skin hydration (lotion/cream)    Activity Interventions: Assess need for specialty bed, Chair cushion, Increase time out of bed, Pressure redistribution bed/mattress(bed type), PT/OT evaluation    Mobility Interventions: Assess need for specialty bed, Chair cushion, Float heels, Pressure redistribution bed/mattress (bed type), PT/OT evaluation, Turn and reposition approx.  every two hours(pillow and wedges)    Nutrition Interventions: Document food/fluid/supplement intake, Discuss nutritional consult with provider, Offer support with meals,snacks and hydration    Friction and Shear Interventions: Feet elevated on foot rest, Foam dressings/transparent film/skin sealants, Lift sheet, Lift team/patient mobility team, Minimize layers, Transferring/repositioning devices                Problem: Patient Education: Go to Patient Education Activity  Goal: Patient/Family Education  Outcome: Progressing Towards Goal     Problem: Patient Education: Go to Patient Education Activity  Goal: Patient/Family Education  Outcome: Progressing Towards Goal

## 2021-06-21 NOTE — PROGRESS NOTES
Transition of care to SNF: [facility] LTC when medically stable     Communication to Patient/Family:  Met with patient and family and they are agreeable to the transition plan. The Plan for Transition of Care is related to the following treatment goals: Acute exacerbation of COPD    The Patient and/or patient representative was provided with a choice of provider and agrees   with the discharge plan. Yes [x] No []    Freedom of choice list was provided with basic dialogue that supports the patient's individualized plan of care/goals and shares the quality data associated with the providers. Yes [x] No []    SNF/Rehab Transition:  Patient has been accepted to [facility] at [address] [place] [address] for SNF/Rehab and meets criteria for admission. Patient will transported by [facility] and expected to leave at [facility]**. Communication to SNF/Rehab:  Bedside RN has been notified to update the transition plan to the facility and call report [phone]. Discharge information has been updated on the AVS and communicated via [facility] and/or CC link. Discharge instructions to be fax'd to facility at [phone] per request.     Please include all hard scripts for controlled substances, med rec and dc summary in packet. Please medicate for pain prior to dc if possible and needed to help offset delay when patient first arrives to facility. Reviewed and confirmed with facility, [facility] can manage the patient care needs for the following:     Deb Rice with (X) only those applicable:  Medication:  [x]Medications are available at the facility  []IV Antibiotics    []Controlled Substance  hard copies available sent.   []Weekly Labs    Equipment:  []CPAP/BiPAP  []Wound Vacuum  []Ragland or Urinary Device  []PICC/Central Line  []Nebulizer  []Ventilator    Treatment:  []Isolation (for MRSA, VRE, etc.)  []Surgical Drain Management  []Tracheostomy Care  []Dressing Changes  []Dialysis with transportation  []PEG Care  []Oxygen  []Daily Weights for Heart Failure    Dietary:  [x]Any diet limitations  []Tube Feedings   []Total Parenteral Management (TPN)    Financial Resources:  []Medicaid Application Completed    []UAI Completed  and copy given to pt/family    []A screening has previously been completed. []Level II Completed    [] Private pay individual who will not become   financially eligible for Medicaid within 6 months from admission to a [address] [place] [address] facility. [] Individual refused to have screening conducted. []Medicaid Application Completed    []The screening denied because it was determined individual did not need/did not qualify for nursing facility level of care. [] Out of state residents seeking direct admission to a [address] facility. [] Individuals who are inpatients of an out of state hospital, or in state or out of state veterans/ hospital and seek direct admission to a [address] facility  [] Individuals who are pateints or residents of a state owned/operated facility that is licensed by [employer] and seek direct admission to [address] [place] [address]  [] A screening not required for enrollment in [address] [address] [address] services as set out in [address] [place] [address] 55-  [] [facility] staff shall perform screenings of the [facility] clients. Advanced Care Plan:  []Surrogate Decision Maker of Care  []POA  []Communicated Code Status and copy sent.     Other:   Previous LTC resident at [address] [place] [address] Management Interventions  Mode of Transport at Discharge: BLS  Transition of Care Consult (CM Consult): Discharge Planning, [facility] (from [place] [address])  Health Maintenance Reviewed: Yes  Physical Therapy Consult: Yes  Occupational Therapy Consult: Yes  Current Support Network: [address] [place] [address]  The Plan for Transition of Care is Related to the Following Treatment Goals : Return to [facility]  The Patient and/or Patient Representative was Provided with a Choice of Provider and Agrees with the Discharge Plan?: Yes  Name of the Patient Representative Who was Provided with a Choice of Provider and Agrees with the Discharge Plan: pt/family  Freedom of Choice List was Provided with Basic Dialogue that Supports the Patient's Individualized Plan of Care/Goals, Treatment Preferences and Shares the Quality Data Associated with the Providers?: Yes  Discharge Location  [facility]

## 2021-06-21 NOTE — PROGRESS NOTES
Physician Progress Note      Keya Marc  CSN #:                  [id number]  :                       1945  ADMIT DATE:       2021 12:29 PM  [address] [place] DATE:  RESPONDING  PROVIDER #:        Karlene SANCHEZ MD          QUERY TEXT:    Patient admitted with BMI 45.51. If possible, please document in progress notes and discharge summary if you are evaluating and /or treating any of the following: The medical record reflects the following:    Risk Factors: PMH obesity, Thyroid disease, chronic pain, bedridden, DDD, COPD with exacerbation    Clinical Indicators:  > BMI 45.51  > Weight 225 lb 4.8 oz    Treatment: Receiving SLP swallow study, POC tests    Thank you,  Shayne Cassidy, RN, BSN, [place], [facility] [place]  Options provided:  -- Obesity  -- Morbid obesity  -- Overweight  -- BMI not clinically significant  -- Other - I will add my own diagnosis  -- Disagree - Not applicable / Not valid  -- Disagree - Clinically unable to determine / Unknown  -- Refer to Clinical Documentation Reviewer    PROVIDER RESPONSE TEXT:    This patient has obesity.     Query created by: Smita Lara on 2021 12:51 PM      Electronically signed by:  Karlene Hardin MD 2021 12:58 PM

## 2021-06-21 NOTE — PROGRESS NOTES
Problem: Falls - Risk of  Goal: *Absence of Falls  Description: Document Inez Burr Fall Risk and appropriate interventions in the flowsheet.   Note: Fall Risk Interventions:  Mobility Interventions: Communicate number of staff needed for ambulation/transfer, Patient to call before getting OOB, PT Consult for mobility concerns         Medication Interventions: Assess postural VS orthostatic hypotension, Patient to call before getting OOB, Teach patient to arise slowly    Elimination Interventions: Call light in reach, Patient to call for help with toileting needs    History of Falls Interventions: Bed/chair exit alarm, Consult care management for discharge planning, Door open when patient unattended, Evaluate medications/consider consulting pharmacy, Investigate reason for fall, Utilize gait belt for transfer/ambulation, Assess for delayed presentation/identification of injury for 48 hrs (comment for end date), Vital signs minimum Q4HRs X 24 hrs (comment for end date)

## 2021-06-21 NOTE — PROGRESS NOTES
0730: report given to this nurse from [facility] RN    [phone]: MD Diya Boyce and this nurse communicate pt condition in reference to intake greater than output and lung sounds coarse and wet, MD makes this nurse aware matter to be addressed  Juan C Alas RN    8969: MD Diya Boyce makes this nurse aware of discussion with pt in reference to no d/c today, pt re-educated no d/c today and agrees to tx plan  AOfelia Kan RN    0766: CT communicates to this nurse transport pending to bring pt to [facility] RN

## 2021-06-22 VITALS
WEIGHT: 225.3 LBS | DIASTOLIC BLOOD PRESSURE: 65 MMHG | OXYGEN SATURATION: 97 % | TEMPERATURE: 98.3 F | BODY MASS INDEX: 45.42 KG/M2 | HEART RATE: 111 BPM | RESPIRATION RATE: 18 BRPM | HEIGHT: 59 IN | SYSTOLIC BLOOD PRESSURE: 128 MMHG

## 2021-06-22 LAB
GLUCOSE BLD STRIP.AUTO-MCNC: 122 MG/DL (ref 70–110)
GLUCOSE BLD STRIP.AUTO-MCNC: 129 MG/DL (ref 70–110)
GLUCOSE BLD STRIP.AUTO-MCNC: 185 MG/DL (ref 70–110)

## 2021-06-22 PROCEDURE — 74011636637 HC RX REV CODE- 636/637: Performed by: HOSPITALIST

## 2021-06-22 PROCEDURE — 74011000250 HC RX REV CODE- 250: Performed by: HOSPITALIST

## 2021-06-22 PROCEDURE — 74011250636 HC RX REV CODE- 250/636: Performed by: HOSPITALIST

## 2021-06-22 PROCEDURE — 74011250637 HC RX REV CODE- 250/637: Performed by: HOSPITALIST

## 2021-06-22 PROCEDURE — 82962 GLUCOSE BLOOD TEST: CPT

## 2021-06-22 PROCEDURE — 94640 AIRWAY INHALATION TREATMENT: CPT

## 2021-06-22 RX ORDER — OXYCODONE HCL 10 MG/1
30 TABLET, FILM COATED, EXTENDED RELEASE ORAL EVERY 12 HOURS
Qty: 2 TABLET | Refills: 0 | Status: SHIPPED | OUTPATIENT
Start: 2021-06-22 | End: 2021-06-23

## 2021-06-22 RX ORDER — FENTANYL 100 UG/H
1 PATCH TRANSDERMAL
Qty: 1 PATCH | Refills: 0 | Status: SHIPPED | OUTPATIENT
Start: 2021-06-22 | End: 2021-06-23

## 2021-06-22 RX ORDER — BUTALBITAL, ASPIRIN, AND CAFFEINE 325; 50; 40 MG/1; MG/1; MG/1
2 CAPSULE ORAL
Qty: 4 CAPSULE | Refills: 0 | Status: SHIPPED | OUTPATIENT
Start: 2021-06-22 | End: 2021-08-02

## 2021-06-22 RX ADMIN — ARFORMOTEROL TARTRATE 15 MCG: 15 SOLUTION RESPIRATORY (INHALATION) at 07:44

## 2021-06-22 RX ADMIN — DULOXETINE HYDROCHLORIDE 60 MG: 60 CAPSULE, DELAYED RELEASE ORAL at 08:44

## 2021-06-22 RX ADMIN — OXYCODONE HYDROCHLORIDE 30 MG: 20 TABLET, FILM COATED, EXTENDED RELEASE ORAL at 08:43

## 2021-06-22 RX ADMIN — LEVOTHYROXINE SODIUM 125 MCG: 0.03 TABLET ORAL at 08:42

## 2021-06-22 RX ADMIN — PREGABALIN 100 MG: 100 CAPSULE ORAL at 08:44

## 2021-06-22 RX ADMIN — Medication 10 ML: at 06:12

## 2021-06-22 RX ADMIN — ENOXAPARIN SODIUM 40 MG: 40 INJECTION SUBCUTANEOUS at 08:40

## 2021-06-22 RX ADMIN — INSULIN LISPRO 2 UNITS: 100 INJECTION, SOLUTION INTRAVENOUS; SUBCUTANEOUS at 12:29

## 2021-06-22 RX ADMIN — BUTALBITAL, ACETAMINOPHEN, AND CAFFEINE 2 TABLET: 50; 325; 40 TABLET ORAL at 08:42

## 2021-06-22 RX ADMIN — IPRATROPIUM BROMIDE AND ALBUTEROL SULFATE 3 ML: .5; 3 SOLUTION RESPIRATORY (INHALATION) at 03:26

## 2021-06-22 RX ADMIN — HYDROXYZINE HYDROCHLORIDE 50 MG: 25 TABLET, FILM COATED ORAL at 08:44

## 2021-06-22 RX ADMIN — HYDROXYZINE HYDROCHLORIDE 50 MG: 25 TABLET, FILM COATED ORAL at 01:51

## 2021-06-22 RX ADMIN — GUAIFENESIN 600 MG: 600 TABLET, EXTENDED RELEASE ORAL at 08:43

## 2021-06-22 RX ADMIN — BUDESONIDE 500 MCG: 0.5 INHALANT RESPIRATORY (INHALATION) at 07:43

## 2021-06-22 RX ADMIN — ASPIRIN 81 MG: 81 TABLET, COATED ORAL at 08:43

## 2021-06-22 RX ADMIN — METHYLPREDNISOLONE SODIUM SUCCINATE 40 MG: 125 INJECTION, POWDER, FOR SOLUTION INTRAMUSCULAR; INTRAVENOUS at 06:12

## 2021-06-22 RX ADMIN — POLYETHYLENE GLYCOL 3350 17 G: 17 POWDER, FOR SOLUTION ORAL at 08:41

## 2021-06-22 RX ADMIN — MONTELUKAST 10 MG: 10 TABLET, FILM COATED ORAL at 08:41

## 2021-06-22 RX ADMIN — IPRATROPIUM BROMIDE AND ALBUTEROL SULFATE 3 ML: .5; 3 SOLUTION RESPIRATORY (INHALATION) at 07:43

## 2021-06-22 RX ADMIN — PANTOPRAZOLE SODIUM 40 MG: 40 TABLET, DELAYED RELEASE ORAL at 08:45

## 2021-06-22 NOTE — PROGRESS NOTES
Transition of care to LTC: [facility] today @ 2:00 pm    1140: CM spoke with the patient at the bedside regarding plans for discharge today. CM informed the patient that transportation is being arranged and the patient is agreeable. The patient denies any additional questions or concerns at this time. CM to follow the patient's progress and be available to assist with discharge planning as needed. CMS referral placed. Communication to Patient/Family:  Met with patient and family and they are agreeable to the transition plan. The Plan for Transition of Care is related to the following treatment goals: Acute exacerbation of COPD    The Patient was provided with a choice of provider and agrees with the discharge plan. Yes [x] No []    Freedom of choice list was provided with basic dialogue that supports the patient's individualized plan of care/goals and shares the quality data associated with the providers. Yes [x] No []    SNF/Rehab Transition:  Patient has been accepted to [address] at [address] [place] [address] for SNF/Rehab and meets criteria for admission. Patient will transported by [facility] and expected to leave at 1400. Communication to SNF/Rehab:  Bedside RN has been notified to update the transition plan to the facility and call report [phone]. Discharge information has been updated on the AVS and communicated via [facility] and/or CC link. Discharge instructions to be fax'd to facility at [phone] per request.     Please include all hard scripts for controlled substances, med rec and dc summary in packet. Please medicate for pain prior to dc if possible and needed to help offset delay when patient first arrives to facility.     Reviewed and confirmed with facility, [facility] can manage the patient care needs for the following:     Sergio Bernardo with (X) only those applicable:  Medication:  [x]Medications are available at the facility  []IV Antibiotics []Controlled Substance  hard copies available sent. []Weekly Labs    Equipment:  []CPAP/BiPAP  []Wound Vacuum  []Ragland or Urinary Device  []PICC/Central Line  []Nebulizer  []Ventilator    Treatment:  []Isolation (for MRSA, VRE, etc.)  []Surgical Drain Management  []Tracheostomy Care  []Dressing Changes  []Dialysis with transportation  []PEG Care  []Oxygen  []Daily Weights for Heart Failure    Dietary:  [x]Any diet limitations  []Tube Feedings   []Total Parenteral Management (TPN)    Financial Resources:  []Medicaid Application Completed    []UAI Completed  and copy given to pt/family    []A screening has previously been completed. []Level II Completed    [] Private pay individual who will not become   financially eligible for Medicaid within 6 months from admission to a [address] [place] [address]. [] Individual refused to have screening conducted. []Medicaid Application Completed    []The screening denied because it was determined individual did not need/did not qualify for nursing facility level of care. [] Out of state residents seeking direct admission to a [address] facility. [] Individuals who are inpatients of an out of state hospital, or in state or out of state veterans/ hospital and seek direct admission to a [address] facility  [] Individuals who are pateints or residents of a state owned/operated facility that is licensed by [employer]) and seek direct admission to [address] [place] [address]  [] A screening not required for enrollment in [address] [address] [address] services as set out in [address] [place] [address] 54-  [] [facility]) staff shall perform screenings of the [facility] clients. Advanced Care Plan:  []Surrogate Decision Maker of Care  []POA  []Communicated Code Status and copy sent.     Other:   Current LTC resident at [address] [place] [address] Management Interventions  Mode of Transport at Discharge: BLS  Transition of Care Consult (CM Consult): Discharge Planning, [facility] Term Care (from [facility])  Health Maintenance Reviewed: Yes  Physical Therapy Consult: Yes  Occupational Therapy Consult: Yes  Current Support Network: [address] [place] [address]  The Plan for Transition of Care is Related to the Following Treatment Goals : Return to [facility]  The Patient and/or Patient Representative was Provided with a Choice of Provider and Agrees with the Discharge Plan?: Yes  Name of the Patient Representative Who was Provided with a Choice of Provider and Agrees with the Discharge Plan: pt/family  Freedom of Choice List was Provided with Basic Dialogue that Supports the Patient's Individualized Plan of Care/Goals, Treatment Preferences and Shares the Quality Data Associated with the Providers?: Yes  Discharge Location  Discharge Placement: [facility] ([facility])

## 2021-06-22 NOTE — PROGRESS NOTES
1953- Report and care received, assessment completed per flow sheet. Alert, oriented, NAD.     1087- Reassessment without change. 0400- Reassessment without change.

## 2021-06-22 NOTE — PROGRESS NOTES
13:30 Gave report to Lien at [facility] to  pt at 2 PM today   1400- [facility] here & pt transported in satisfactory condition to [facility]

## 2021-06-22 NOTE — DISCHARGE SUMMARY
Discharge Summary    Patient: Zenon Lancaster MRN: [id number]  CSN: [id number]    YOB: 1945  Age: 77 y.o. Sex: female    DOA: 6/16/2021 LOS:  LOS: 6 days   Discharge Date:      Primary Care Provider:  Estephanie Gerardo MD    Admission Diagnoses: Acute exacerbation of chronic obstructive pulmonary disease (COPD) ([facility] [J44.1]  Atypical chest pain [R07.89]    Discharge Diagnoses:    Hospital Problems  Date Reviewed: 5/13/2017        Codes Class Noted POA    * (Principal) Acute exacerbation of chronic obstructive pulmonary disease (COPD) ([facility] ICD-10-CM: J44.1  ICD-9-CM: 491.21  6/16/2021 Unknown        Atypical chest pain ICD-10-CM: R07.89  ICD-9-CM: 786.59  6/16/2021 Unknown        Bedridden ICD-10-CM: Z74.01  ICD-9-CM: V49.84  6/16/2021 Unknown        Chronic pain ICD-10-CM: G89.29  ICD-9-CM: 338.29  Unknown Unknown    Overview Signed 6/16/2021  6:23 PM by Hailey Alarcon MD     cervical, lumbar, knees, ankles, elbows             Psychiatric disorder ICD-10-CM: E52  ICD-9-CM: 298.9  Unknown Unknown    Overview Signed 6/16/2021  6:24 PM by Hailey Alarcon MD     anxiety, depression             Thyroid disease ICD-10-CM: E07.9  ICD-9-CM: 246. 9  Unknown Unknown              Discharge Condition: stable     Discharge Medications:     Current Discharge Medication List      CONTINUE these medications which have CHANGED    Details   fentaNYL (DURAGESIC) 100 mcg/hr PATCH 1 Patch by TransDERmal route every seventy-two (72) hours for 1 day. Max Daily Amount: 1 Patch. Qty: 1 Patch, Refills: 0  Start date: 6/22/2021, End date: 6/23/2021    Associated Diagnoses: H/O cervical spine surgery      oxyCODONE ER (OxyCONTIN) 10 mg ER tablet Take 3 Tablets by mouth every twelve (12) hours for 1 day. Max Daily Amount: 60 mg.  Indications: severe chronic pain requiring long-term opioid treatment  Qty: 2 Tablet, Refills: 0  Start date: 6/22/2021, End date: 6/23/2021    Associated Diagnoses: H/O cervical spine surgery butalbital-aspirin-caffeine (FiorinaL) capsule Take 2 Capsules by mouth every twelve (12) hours as needed for Headache. Max Daily Amount: 4 Capsules. Indications: a migraine headache  Qty: 4 Capsule, Refills: 0  Start date: 6/22/2021    Associated Diagnoses: Other migraine without status migrainosus, not intractable         CONTINUE these medications which have NOT CHANGED    Details   hydrOXYzine HCL (ATARAX) 50 mg tablet Take 50 mg by mouth every eight (8) hours as needed for Itching. carboxymethylcellulose sodium (Refresh Plus) 0.5 % dpet Administer 2 Drops to both eyes as needed. trimethoprim-sulfamethoxazole (Bactrim DS) 160-800 mg per tablet Take 1 Tablet by mouth Every Mon, Wed & Sun.      levothyroxine (Synthroid) 125 mcg tablet Take 125 mcg by mouth Daily (before breakfast). diclofenac (Voltaren) 1 % gel Apply 2 g to affected area two (2) times daily as needed for Pain. alum-mag hydroxide-simeth (MYLANTA) 200-200-20 mg/5 mL susp Take 15 mL by mouth four (4) times daily as needed. tiotropium (SPIRIVA WITH HANDIHALER) 18 mcg inhalation capsule Take 1 Cap by inhalation daily. multivitamin, tx-iron-ca-min (THERA-M W/ IRON) 9 mg iron-400 mcg tab tablet Take 1 Tab by mouth daily. Oxygen continuous. 3L/min NC      pregabalin (LYRICA) 100 mg capsule Take 100 mg by mouth three (3) times daily. fluticasone (FLONASE) 50 mcg/actuation nasal spray 1 Bellamy by Both Nostrils route daily. cyanocobalamin (VITAMIN B12) 1,000 mcg/mL injection 1,000 mcg by IntraMUSCular route every month. acetaminophen (TYLENOL) 325 mg tablet Take 650 mg by mouth every six (6) hours as needed for Pain. aspirin 81 mg tablet Take 81 mg by mouth daily. DULoxetine (CYMBALTA) 60 mg capsule Take 60 mg by mouth daily. Indications: CHRONIC MUSCULOSKELETAL PAIN      omeprazole (PRILOSEC) 20 mg capsule Take 20 mg by mouth daily.       nystatin (MYCOSTATIN) powder Apply  to affected area two (2) times a day. Qty: 1 Bottle, Refills: 0      albuterol (PROVENTIL VENTOLIN) 2.5 mg /3 mL (0.083 %) nebulizer solution 2.5 mg by Nebulization route every four (4) hours as needed. fluticasone-salmeterol (ADVAIR DISKUS) 500-50 mcg/dose diskus inhaler Take 1 Puff by inhalation every twelve (12) hours. May use inhaler on DOS      nitroglycerin (NITROSTAT) 0.4 mg SL tablet 0.4 mg by SubLINGual route every five (5) minutes as needed (Give one tab sublingual every 5 minutes x3 doses as needed for chest pain). guaiFENesin SR (MUCINEX) 600 mg SR tablet Take 600 mg by mouth two (2) times a day. montelukast (SINGULAIR) 10 mg tablet Take 10 mg by mouth daily. STOP taking these medications       fentaNYL (DURAGESIC) 25 mcg/hr PATCH Comments:   Reason for Stopping:         dronabinol (MARINOL) 2.5 mg capsule Comments:   Reason for Stopping:         lisinopril (PRINIVIL, ZESTRIL) 20 mg tablet Comments:   Reason for Stopping:         bumetanide (BUMEX) 1 mg tablet Comments:   Reason for Stopping:         Menthol-Zinc Oxide (RISAMINE) 0.44-20.6 % oint Comments:   Reason for Stopping:         cholestyramine-sucrose (QUESTRAN) 4 gram powder Comments:   Reason for Stopping:         atenolol (TENORMIN) 25 mg tablet Comments:   Reason for Stopping:         Cetirizine (ZYRTEC) 10 mg cap Comments:   Reason for Stopping:               Procedures : none     Consults: None      PHYSICAL EXAM   Visit Vitals  /65 (BP 1 Location: Right lower arm, BP Patient Position: At rest;Supine)   Pulse (!) 111   Temp 98.3 °F (36.8 °C)   Resp 18   Ht 4' 11\" (1.499 m)   Wt 102.2 kg (225 lb 4.8 oz)   SpO2 97%   BMI 45.51 kg/m²     General: Awake, cooperative, no acute distress    HEENT: NC, Atraumatic. PERRLA, EOMI. Anicteric sclerae. Lungs:  Expiratory coarse -resolved while breathing through nose   Heart:  Regular  rhythm,  No murmur, No Rubs, No Gallops  Abdomen: Soft, Non distended, Non tender.  +Bowel sounds, Extremities: No c/c/e  Psych:   +anxious, no  agitated. Neurologic:  No acute neurological deficits. Admission HPI :   Veto Navarro is a 77 y.o. female with asthma, COPD, GERD,  chronic pain on fentanyl patch presented to ER due to chest pain and cough wheezing. She reported she has chest pressure started this morning, she was giving aspirin and nitro prior to arrival ER. Also reported coughs, wheezing on and off for a week. Cardiac enzymes x2 , EKG no elevated ST segment. She received steroid, magnesium, breathing treatment in ER. Still wheezing. Hospitalist is called for admission. . She reported she has a chronic back pain, received fentanyl patch 100 MCG, also after pain medication twice daily. She completed COVID-19 vaccination. Her D-dimer 0.48, she reported she is bedridden, and constipation. She concerns about her pain medication      Hospital Course :     Veto Navarro is a 77 y.o. female with asthma, COPD, GERD,  chronic pain on fentanyl patch was admitted due to copd exacerbation. Since pt  was admitted, iv steroid/breathing treatment were administrated with empiric iv abx. Glucose was controlled per SSI. With the treatment, sob resolved and nc O2 down to 1 L -3 L at snf. Ct chest no infection and no fluid. She needs train her self  Relaxation and use incentive spirometry to open her lungs and recommend breathing through nose, not mouth. Continue breathing treatment at snf. Cardiac monitor and acs was ruled out. V/q scan was negative for  PE and echo done with normal ef . Cardiac enzyme was negative. Her chest tightness was due to copd exacerbation and anxiety. We continued pain management when she was in snf. She remained hemodynamic stable . Discharge planning discussed with patient, pt agrees  with the plan and no questions and concerns at this point.        Activity: Activity as tolerated    Diet: Regular Diet    Follow-up: PCP Disposition:snf     Minutes spent on discharge: 45 min       Labs: Results:       Chemistry Recent Labs     06/21/21  0120 06/20/21  0140   * 190*    140   K 4.6 4.5    104   CO2 28 28   BUN 29* 21*   CREA 0.93 0.96   CA 9.0 8.8   AGAP 6 8   BUCR 31* 22*      CBC w/Diff Recent Labs     06/21/21  0120 06/20/21  0140   WBC 15.2* 10.2   RBC 3.77* 4.12*   HGB 11.2* 12.5   HCT 35.6 40.1    263   GRANS 85* 78*   LYMPH 8* 18*   EOS 0 0      Cardiac Enzymes No results for input(s): CPK, CKND1, ADILSON in the last 72 hours. No lab exists for component: CKRMB, TROIP   Coagulation No results for input(s): PTP, INR, APTT, INREXT in the last 72 hours. Lipid Panel No results found for: CHOL, CHOLPOCT, CHOLX, CHLST, CHOLV, 495286, HDL, HDLP, LDL, LDLC, DLDLP, 333259, VLDLC, VLDL, TGLX, TRIGL, TRIGP, TGLPOCT, CHHD, CHHDX   BNP No results for input(s): BNPP in the last 72 hours. Liver Enzymes No results for input(s): TP, ALB, TBIL, AP in the last 72 hours. No lab exists for component: SGOT, GPT, DBIL   Thyroid Studies Lab Results   Component Value Date/Time    TSH 0.04 (L) 11/26/2016 05:39 AM          @micro    Significant Diagnostic Studies: NM LUNG SCAN PERF    Result Date: 6/17/2021  EXAM: NUCLEAR MEDICINE PULMONARY PERFUSION SCAN CLINICAL INDICATION/HISTORY: Chest pain. COMPARISON: None Available TECHNIQUE: No aerosolized 99mTc-DTPA ventilatory images of the lungs were obtained. After intravenous administration of 7.1 mCi 99mTc-MAA, perfusion images of the lungs were obtained in multiple projections. Images are correlated with portable chest dated 06/16/2021.    > Injection site:  Right antecubital fossa ________________________________ FINDINGS: Perfusion images show symmetric radiotracer distribution to both lungs, with no segmental perfusion defects to suggest the presence of pulmonary embolism. ________________________________     No pulmonary embolism.  Perfusion only scintigraphy was performed and correlated with radiographic evaluation of the chest. This exam is reported using a perfusion only modified PIOPED II interpretive criteria. CT CHEST WO CONT    Result Date: 6/22/2021  EXAM: CT chest INDICATION: wheezing not improving per Tx -Additional: None COMPARISON: 05/15/17 TECHNIQUE: Axial CT imaging from the thoracic inlet through the diaphragm without intravenous contrast. Multiplanar reformats were generated. One or more dose reduction techniques were used on this CT: automated exposure control, adjustment of the mAs and/or kVp according to patient size, and iterative reconstruction techniques. The specific techniques used on this CT exam have been documented in the patient's electronic medical record. Digital Imaging and Communications in Medicine (DICOM) format image data are available to nonaffiliated external healthcare facilities or entities on a secure, media free, reciprocally searchable basis with patient authorization for at least a 12-month period after this study. _______________ FINDINGS: LUNGS: No suspicious nodule or mass. Linear scarring is present at the right lung apex. Mild dependent atelectasis is present. PLEURA: Normal. AIRWAY: Normal. MEDIASTINUM: Normal heart size. No pericardial effusion. Great vessels unremarkable. LYMPH NODES: No enlarged lymph nodes. UPPER ABDOMEN: No suspicious abnormality. Small scattered hepatic cysts appear present. Status post cholecystectomy. OTHER: No acute or aggressive osseous abnormalities identified. Chronic deformity with secondary chronic degenerative changes are evident involving both shoulders.  Chronic vertebral spondylosis is present. _______________     No acute pulmonary abnormality Chronic findings as noted    XR CHEST PORT    Result Date: 6/21/2021  EXAM: CHEST RADIOGRAPH CLINICAL INDICATION/HISTORY: interval change -Additional: None COMPARISON: 6/16/2021 TECHNIQUE: Portable frontal view of the chest _______________ FINDINGS: SUPPORT DEVICES: None. HEART AND MEDIASTINUM: No appreciable cardiomegaly. Remaining mediastinal contours within normal limits. LUNGS AND PLEURAL SPACES: Clear. No consolidation, mass or effusion. BONY THORAX AND SOFT TISSUES: Status post cervical spine surgery. Large subacromial enthesophyte on the left side, significant risk factor for development of rotator cuff impingement. Bilateral glenohumeral DJD. _______________     No active cardiopulmonary disease. Skeletal findings as above. XR CHEST PORT    Result Date: 6/16/2021  EXAM: One-view chest CLINICAL HISTORY: Chest pain , COMPARISON: None FINDINGS: Frontal view of the chest demonstrate clear lungs. Cardiac silhouette is normal in size and contour. No acute bony or soft tissue abnormality. No acute pulmonary process identified. ECHO ADULT COMPLETE    Result Date: 6/17/2021  · Left Ventricle: Normal cavity size and systolic function (ejection fraction normal). Mild concentric hypertrophy. The estimated EF is 55 - 60%. There is mild (grade 1) left ventricular diastolic dysfunction E'E= 6.71. · Tricuspid Valve: Tricuspid valve not well visualized. No stenosis. Tricuspid regurgitation is inadequate for estimation of right ventricular systolic pressure.               [facility]     CC: Guille Trujillo MD

## 2021-06-23 ENCOUNTER — PATIENT OUTREACH (OUTPATIENT)
Dept: CASE MANAGEMENT | Age: 76
End: 2021-06-23

## 2021-06-23 NOTE — PROGRESS NOTES
Transition of Care Coordination/Hospital to Post Acute Facility:     Date/Time:  6/23/2021 9:59 AM    Patient was admitted to [facility]  on 6/16/21. Patient was discharged 6/22/2021  To [facility]  for continuation of care. Top Challenges reviewed    N/A at this time. Transition of care outreach postponed for approximately 14  days due to patient's discharge to non-preferred network SNF. CTN will attempt to determine if patient was discharged to SNF or LTC level of care.

## 2021-06-30 NOTE — PROGRESS NOTES
Physician Progress Note      Pamela Campo  [facility] #:                  [id number]  :                       1945  ADMIT DATE:       2021 12:29 PM  [address] [facility] [place] DATE:        2021 2:29 PM  RESPONDING  PROVIDER #:        Aminah King MD          QUERY TEXT:    Dear Hospitalist,    Patient was admitted with a COPD exacerbation. They are noted to have a history of asthma. Progress notes stated, \"Associated symptoms are cough, wheezing, shortness of breath. \" Patient was treated with Duonebs, Solumedrol, Brovana and Singulair. Based on clinical indicators and  treatment, can the patient's respiratory condition be further specified as: The medical record reflects the following:    Risk Factors: COPD, Asthma    Clinical Indicators:  > RR  9,10, 12, 13, 27  > Chief complaint of nonradiating chest tightness and pressure 45 minutes prior to arrival  > Associated symptoms are cough, wheezing, shortness of breath    Treatment: Received Duonebs, Solumedrol, Brovana and Singulair, O2 via nc    Thank you,  Noe Aaron, RN, BSN, [facility], [address] [facility]  Options provided:  -- COPD exacerbation with Asthma exacerbation  -- COPD exacerbation only  -- Other - I will add my own diagnosis  -- Disagree - Not applicable / Not valid  -- Disagree - Clinically unable to determine / Unknown  -- Refer to Clinical Documentation Reviewer    PROVIDER RESPONSE TEXT:    This patient has COPD exacerbation with asthma exacerbation.     Query created by: August Thompson on 2021 9:11 AM      Electronically signed by:  Aminah King MD 2021 8:20 AM

## 2021-07-07 ENCOUNTER — PATIENT OUTREACH (OUTPATIENT)
Dept: CASE MANAGEMENT | Age: 76
End: 2021-07-07

## 2021-07-07 NOTE — PROGRESS NOTES
Care Transitions Initial Call    Call within 2 business days of discharge:   N/A, as patient transitioned to a skilled nursing facility post hospital discharge. Patient: Hugo Lockwood Patient : 1945 MRN: [id number]    Last Discharge [address]       Complaint Diagnosis Description Type Department Provider    21 Chest Pain Acute exacerbation of chronic obstructive pulmonary disease (COPD) ([facility] . .. ED to Hosp-Admission (Discharged) (ADMIT) Corrine Alicia MD; Rosalio Aleman, DO          Was this an external facility discharge? No     Care Transitions Nurse ( CTN) attempted to contact patient via telephone call for follow up and to attempt to determine if patient was to be discharged from SNF or [facility]. Patient was not able to be reached at this time. A message was heard that the party you are calling is not available.

## 2021-07-16 ENCOUNTER — PATIENT OUTREACH (OUTPATIENT)
Dept: CASE MANAGEMENT | Age: 76
End: 2021-07-16

## 2021-07-16 NOTE — PROGRESS NOTES
Care Transitions Follow UP       Care Transitions Nurse ( CTN) attempted to reach patient via telephone call to both listed phone numbers to follow up if patient has been discharged from the skilled nursing facility or to another level of care. There was no answer at either phone number. No current PHI form noted to be on file.

## 2021-07-21 ENCOUNTER — PATIENT OUTREACH (OUTPATIENT)
Dept: CASE MANAGEMENT | Age: 76
End: 2021-07-21

## 2021-07-21 NOTE — PROGRESS NOTES
Care Transitions Initial Call    Call within 2 business days of discharge:  Unknown as patient transitioned to a skilled nursing facility post hospital discharge. Patient: Mandeep Maynard Patient : 1945 MRN: [id number]    Last Discharge [address]       Complaint Diagnosis Description Type Department Provider    21 Chest Pain Acute exacerbation of chronic obstructive pulmonary disease (COPD) ([facility] . .. ED to Hosp-Admission (Discharged) (ADMIT) Ingrid Ocampo MD; Hayden Blanchard DO        Care Transitions Nurse ( CTN) attempted to contact patient via telephone call regarding Transitions of Care Follow up. CTN was attempting to follow up status of patient and if she has been discharged from SNF or has transitioned to [facility]. CTN called both listed phone numbers and the calls could not be completed. There was no option to leave a voicemail  message.

## 2021-07-22 ENCOUNTER — PATIENT OUTREACH (OUTPATIENT)
Dept: CASE MANAGEMENT | Age: 76
End: 2021-07-22

## 2021-07-22 NOTE — PROGRESS NOTES
Transitions of Care     CTN unable to reach patient for follow up. No PHI form noted to be on file.       Transition of Care episode resolved  7-22-21

## 2021-07-30 ENCOUNTER — HOSPITAL ENCOUNTER (INPATIENT)
Age: 76
LOS: 3 days | Discharge: NURSING, REHAB OR LONG-TERM CARE | DRG: 190 | End: 2021-08-02
Attending: EMERGENCY MEDICINE | Admitting: FAMILY MEDICINE
Payer: MEDICARE

## 2021-07-30 ENCOUNTER — APPOINTMENT (OUTPATIENT)
Dept: GENERAL RADIOLOGY | Age: 76
DRG: 190 | End: 2021-07-30
Attending: EMERGENCY MEDICINE
Payer: MEDICARE

## 2021-07-30 DIAGNOSIS — R91.8 LEFT LOWER LOBE PULMONARY INFILTRATE: ICD-10-CM

## 2021-07-30 DIAGNOSIS — J44.1 ACUTE EXACERBATION OF CHRONIC OBSTRUCTIVE PULMONARY DISEASE (COPD) (HCC): Primary | ICD-10-CM

## 2021-07-30 DIAGNOSIS — D72.829 LEUKOCYTOSIS, UNSPECIFIED TYPE: ICD-10-CM

## 2021-07-30 DIAGNOSIS — G89.29 OTHER CHRONIC PAIN: ICD-10-CM

## 2021-07-30 PROBLEM — J96.02 ACUTE RESPIRATORY FAILURE WITH HYPOXIA AND HYPERCAPNIA (HCC): Status: ACTIVE | Noted: 2021-07-30

## 2021-07-30 PROBLEM — J96.01 ACUTE RESPIRATORY FAILURE WITH HYPOXIA AND HYPERCAPNIA (HCC): Status: ACTIVE | Noted: 2021-07-30

## 2021-07-30 LAB
ALBUMIN SERPL-MCNC: 3.7 G/DL (ref 3.4–5)
ALBUMIN/GLOB SERPL: 1 {RATIO} (ref 0.8–1.7)
ALP SERPL-CCNC: 98 U/L (ref 45–117)
ALT SERPL-CCNC: 48 U/L (ref 13–56)
ANION GAP SERPL CALC-SCNC: 5 MMOL/L (ref 3–18)
ARTERIAL PATENCY WRIST A: ABNORMAL
ARTERIAL PATENCY WRIST A: POSITIVE
AST SERPL-CCNC: 43 U/L (ref 10–38)
BASE EXCESS BLD CALC-SCNC: 8.1 MMOL/L
BASE EXCESS BLD CALC-SCNC: 9.7 MMOL/L
BASOPHILS # BLD: 0 K/UL (ref 0–0.1)
BASOPHILS NFR BLD: 0 % (ref 0–2)
BDY SITE: ABNORMAL
BDY SITE: ABNORMAL
BILIRUB SERPL-MCNC: 0.3 MG/DL (ref 0.2–1)
BNP SERPL-MCNC: 111 PG/ML (ref 0–900)
BODY TEMPERATURE: 98.6
BODY TEMPERATURE: 99.4
BUN SERPL-MCNC: 18 MG/DL (ref 7–18)
BUN/CREAT SERPL: 20 (ref 12–20)
CALCIUM SERPL-MCNC: 8.6 MG/DL (ref 8.5–10.1)
CHLORIDE SERPL-SCNC: 99 MMOL/L (ref 100–111)
CK MB CFR SERPL CALC: NORMAL % (ref 0–4)
CK MB SERPL-MCNC: <1 NG/ML (ref 5–25)
CK SERPL-CCNC: 70 U/L (ref 26–192)
CO2 SERPL-SCNC: 35 MMOL/L (ref 21–32)
CREAT SERPL-MCNC: 0.9 MG/DL (ref 0.6–1.3)
DIFFERENTIAL METHOD BLD: ABNORMAL
EOSINOPHIL # BLD: 0.5 K/UL (ref 0–0.4)
EOSINOPHIL NFR BLD: 4 % (ref 0–5)
ERYTHROCYTE [DISTWIDTH] IN BLOOD BY AUTOMATED COUNT: 13.2 % (ref 11.6–14.5)
GAS FLOW.O2 O2 DELIVERY SYS: ABNORMAL L/MIN
GAS FLOW.O2 O2 DELIVERY SYS: ABNORMAL L/MIN
GLOBULIN SER CALC-MCNC: 3.6 G/DL (ref 2–4)
GLUCOSE SERPL-MCNC: 130 MG/DL (ref 74–99)
HCO3 BLD-SCNC: 34.7 MMOL/L (ref 22–26)
HCO3 BLD-SCNC: 35.6 MMOL/L (ref 22–26)
HCT VFR BLD AUTO: 35.4 % (ref 35–45)
HGB BLD-MCNC: 10.9 G/DL (ref 12–16)
LACTATE BLD-SCNC: 1.77 MMOL/L (ref 0.4–2)
LYMPHOCYTES # BLD: 2.2 K/UL (ref 0.9–3.6)
LYMPHOCYTES NFR BLD: 15 % (ref 21–52)
MAGNESIUM SERPL-MCNC: 1.7 MG/DL (ref 1.6–2.6)
MCH RBC QN AUTO: 29 PG (ref 24–34)
MCHC RBC AUTO-ENTMCNC: 30.8 G/DL (ref 31–37)
MCV RBC AUTO: 94.1 FL (ref 74–97)
MONOCYTES # BLD: 0.9 K/UL (ref 0.05–1.2)
MONOCYTES NFR BLD: 7 % (ref 3–10)
NEUTS SEG # BLD: 10.5 K/UL (ref 1.8–8)
NEUTS SEG NFR BLD: 74 % (ref 40–73)
O2/TOTAL GAS SETTING VFR VENT: 28 %
PCO2 BLD: 53.7 MMHG (ref 35–45)
PCO2 BLD: 58.9 MMHG (ref 35–45)
PH BLD: 7.38 [PH] (ref 7.35–7.45)
PH BLD: 7.43 [PH] (ref 7.35–7.45)
PLATELET # BLD AUTO: 239 K/UL (ref 135–420)
PMV BLD AUTO: 11.4 FL (ref 9.2–11.8)
PO2 BLD: 69 MMHG (ref 80–100)
PO2 BLD: 90 MMHG (ref 80–100)
POTASSIUM SERPL-SCNC: 3.9 MMOL/L (ref 3.5–5.5)
PROT SERPL-MCNC: 7.3 G/DL (ref 6.4–8.2)
RBC # BLD AUTO: 3.76 M/UL (ref 4.2–5.3)
SAO2 % BLD: 93.2 % (ref 92–97)
SAO2 % BLD: 96.4 % (ref 92–97)
SERVICE CMNT-IMP: ABNORMAL
SERVICE CMNT-IMP: ABNORMAL
SODIUM SERPL-SCNC: 139 MMOL/L (ref 136–145)
SPECIMEN TYPE: ABNORMAL
SPECIMEN TYPE: ABNORMAL
TOTAL RESP. RATE, ITRR: 18
TROPONIN I SERPL-MCNC: <0.02 NG/ML (ref 0–0.04)
WBC # BLD AUTO: 14.2 K/UL (ref 4.6–13.2)

## 2021-07-30 PROCEDURE — 36600 WITHDRAWAL OF ARTERIAL BLOOD: CPT

## 2021-07-30 PROCEDURE — 74011000258 HC RX REV CODE- 258: Performed by: FAMILY MEDICINE

## 2021-07-30 PROCEDURE — 82803 BLOOD GASES ANY COMBINATION: CPT

## 2021-07-30 PROCEDURE — 82553 CREATINE MB FRACTION: CPT

## 2021-07-30 PROCEDURE — 93005 ELECTROCARDIOGRAM TRACING: CPT

## 2021-07-30 PROCEDURE — 83735 ASSAY OF MAGNESIUM: CPT

## 2021-07-30 PROCEDURE — 94640 AIRWAY INHALATION TREATMENT: CPT

## 2021-07-30 PROCEDURE — 83605 ASSAY OF LACTIC ACID: CPT

## 2021-07-30 PROCEDURE — 74011000250 HC RX REV CODE- 250: Performed by: FAMILY MEDICINE

## 2021-07-30 PROCEDURE — 74011250636 HC RX REV CODE- 250/636: Performed by: EMERGENCY MEDICINE

## 2021-07-30 PROCEDURE — 96374 THER/PROPH/DIAG INJ IV PUSH: CPT

## 2021-07-30 PROCEDURE — 96375 TX/PRO/DX INJ NEW DRUG ADDON: CPT

## 2021-07-30 PROCEDURE — 71045 X-RAY EXAM CHEST 1 VIEW: CPT

## 2021-07-30 PROCEDURE — 65660000000 HC RM CCU STEPDOWN

## 2021-07-30 PROCEDURE — 85025 COMPLETE CBC W/AUTO DIFF WBC: CPT

## 2021-07-30 PROCEDURE — 74011250637 HC RX REV CODE- 250/637: Performed by: FAMILY MEDICINE

## 2021-07-30 PROCEDURE — 80053 COMPREHEN METABOLIC PANEL: CPT

## 2021-07-30 PROCEDURE — 74011250636 HC RX REV CODE- 250/636: Performed by: FAMILY MEDICINE

## 2021-07-30 PROCEDURE — 83880 ASSAY OF NATRIURETIC PEPTIDE: CPT

## 2021-07-30 PROCEDURE — 87040 BLOOD CULTURE FOR BACTERIA: CPT

## 2021-07-30 PROCEDURE — 99285 EMERGENCY DEPT VISIT HI MDM: CPT

## 2021-07-30 PROCEDURE — 94761 N-INVAS EAR/PLS OXIMETRY MLT: CPT

## 2021-07-30 PROCEDURE — 74011000250 HC RX REV CODE- 250: Performed by: EMERGENCY MEDICINE

## 2021-07-30 RX ORDER — MORPHINE SULFATE 4 MG/ML
4 INJECTION INTRAVENOUS
Status: COMPLETED | OUTPATIENT
Start: 2021-07-30 | End: 2021-07-30

## 2021-07-30 RX ORDER — NALOXONE HYDROCHLORIDE 0.4 MG/ML
0.4 INJECTION, SOLUTION INTRAMUSCULAR; INTRAVENOUS; SUBCUTANEOUS AS NEEDED
Status: DISCONTINUED | OUTPATIENT
Start: 2021-07-30 | End: 2021-08-02 | Stop reason: HOSPADM

## 2021-07-30 RX ORDER — SODIUM CHLORIDE 0.9 % (FLUSH) 0.9 %
5-40 SYRINGE (ML) INJECTION AS NEEDED
Status: DISCONTINUED | OUTPATIENT
Start: 2021-07-30 | End: 2021-08-02 | Stop reason: HOSPADM

## 2021-07-30 RX ORDER — ONDANSETRON 2 MG/ML
4 INJECTION INTRAMUSCULAR; INTRAVENOUS
Status: COMPLETED | OUTPATIENT
Start: 2021-07-30 | End: 2021-07-30

## 2021-07-30 RX ORDER — BISACODYL 5 MG
5 TABLET, DELAYED RELEASE (ENTERIC COATED) ORAL DAILY PRN
Status: DISCONTINUED | OUTPATIENT
Start: 2021-07-30 | End: 2021-08-01 | Stop reason: SDUPTHER

## 2021-07-30 RX ORDER — ACETAMINOPHEN 325 MG/1
650 TABLET ORAL
Status: DISCONTINUED | OUTPATIENT
Start: 2021-07-30 | End: 2021-08-02 | Stop reason: HOSPADM

## 2021-07-30 RX ORDER — VANCOMYCIN 2 GRAM/500 ML IN 0.9 % SODIUM CHLORIDE INTRAVENOUS
2000 ONCE
Status: COMPLETED | OUTPATIENT
Start: 2021-07-30 | End: 2021-07-30

## 2021-07-30 RX ORDER — HEPARIN SODIUM 5000 [USP'U]/ML
5000 INJECTION, SOLUTION INTRAVENOUS; SUBCUTANEOUS EVERY 8 HOURS
Status: DISCONTINUED | OUTPATIENT
Start: 2021-07-30 | End: 2021-08-02 | Stop reason: HOSPADM

## 2021-07-30 RX ORDER — IPRATROPIUM BROMIDE AND ALBUTEROL SULFATE 2.5; .5 MG/3ML; MG/3ML
3 SOLUTION RESPIRATORY (INHALATION)
Status: COMPLETED | OUTPATIENT
Start: 2021-07-30 | End: 2021-07-30

## 2021-07-30 RX ORDER — BUDESONIDE 0.5 MG/2ML
500 INHALANT ORAL
Status: DISCONTINUED | OUTPATIENT
Start: 2021-07-30 | End: 2021-08-02 | Stop reason: HOSPADM

## 2021-07-30 RX ORDER — TRAMADOL HYDROCHLORIDE 50 MG/1
50 TABLET ORAL
Status: DISCONTINUED | OUTPATIENT
Start: 2021-07-30 | End: 2021-08-02 | Stop reason: HOSPADM

## 2021-07-30 RX ORDER — HYDROXYZINE 25 MG/1
50 TABLET, FILM COATED ORAL
Status: DISCONTINUED | OUTPATIENT
Start: 2021-07-30 | End: 2021-08-02 | Stop reason: HOSPADM

## 2021-07-30 RX ORDER — DULOXETIN HYDROCHLORIDE 60 MG/1
60 CAPSULE, DELAYED RELEASE ORAL DAILY
Status: DISCONTINUED | OUTPATIENT
Start: 2021-07-31 | End: 2021-08-02 | Stop reason: HOSPADM

## 2021-07-30 RX ORDER — PREGABALIN 100 MG/1
100 CAPSULE ORAL 3 TIMES DAILY
Status: DISCONTINUED | OUTPATIENT
Start: 2021-07-30 | End: 2021-08-02 | Stop reason: HOSPADM

## 2021-07-30 RX ORDER — POLYVINYL ALCOHOL 14 MG/ML
1 SOLUTION/ DROPS OPHTHALMIC AS NEEDED
Status: DISCONTINUED | OUTPATIENT
Start: 2021-07-30 | End: 2021-08-02 | Stop reason: HOSPADM

## 2021-07-30 RX ORDER — FLUTICASONE PROPIONATE 50 MCG
1 SPRAY, SUSPENSION (ML) NASAL DAILY
Status: DISCONTINUED | OUTPATIENT
Start: 2021-07-31 | End: 2021-08-02 | Stop reason: HOSPADM

## 2021-07-30 RX ORDER — SODIUM CHLORIDE 0.9 % (FLUSH) 0.9 %
5-40 SYRINGE (ML) INJECTION EVERY 8 HOURS
Status: DISCONTINUED | OUTPATIENT
Start: 2021-07-30 | End: 2021-08-02 | Stop reason: HOSPADM

## 2021-07-30 RX ORDER — MORPHINE SULFATE 2 MG/ML
2 INJECTION, SOLUTION INTRAMUSCULAR; INTRAVENOUS
Status: DISCONTINUED | OUTPATIENT
Start: 2021-07-30 | End: 2021-08-02 | Stop reason: HOSPADM

## 2021-07-30 RX ORDER — ONDANSETRON 2 MG/ML
4 INJECTION INTRAMUSCULAR; INTRAVENOUS
Status: DISCONTINUED | OUTPATIENT
Start: 2021-07-30 | End: 2021-08-02 | Stop reason: HOSPADM

## 2021-07-30 RX ORDER — IPRATROPIUM BROMIDE AND ALBUTEROL SULFATE 2.5; .5 MG/3ML; MG/3ML
3 SOLUTION RESPIRATORY (INHALATION)
Status: DISCONTINUED | OUTPATIENT
Start: 2021-07-30 | End: 2021-08-02 | Stop reason: HOSPADM

## 2021-07-30 RX ADMIN — PREGABALIN 100 MG: 100 CAPSULE ORAL at 21:45

## 2021-07-30 RX ADMIN — TRAMADOL HYDROCHLORIDE 50 MG: 50 TABLET, FILM COATED ORAL at 14:33

## 2021-07-30 RX ADMIN — ACETAMINOPHEN 650 MG: 325 TABLET ORAL at 21:29

## 2021-07-30 RX ADMIN — HEPARIN SODIUM 5000 UNITS: 5000 INJECTION INTRAVENOUS; SUBCUTANEOUS at 21:28

## 2021-07-30 RX ADMIN — IPRATROPIUM BROMIDE AND ALBUTEROL SULFATE 3 ML: .5; 3 SOLUTION RESPIRATORY (INHALATION) at 05:38

## 2021-07-30 RX ADMIN — Medication 10 ML: at 21:26

## 2021-07-30 RX ADMIN — VANCOMYCIN HYDROCHLORIDE 2000 MG: 10 INJECTION, POWDER, LYOPHILIZED, FOR SOLUTION INTRAVENOUS at 07:38

## 2021-07-30 RX ADMIN — HEPARIN SODIUM 5000 UNITS: 5000 INJECTION INTRAVENOUS; SUBCUTANEOUS at 14:19

## 2021-07-30 RX ADMIN — MORPHINE SULFATE 2 MG: 2 INJECTION, SOLUTION INTRAMUSCULAR; INTRAVENOUS at 19:40

## 2021-07-30 RX ADMIN — TRAMADOL HYDROCHLORIDE 50 MG: 50 TABLET, FILM COATED ORAL at 21:29

## 2021-07-30 RX ADMIN — VANCOMYCIN HYDROCHLORIDE 1000 MG: 1 INJECTION, POWDER, LYOPHILIZED, FOR SOLUTION INTRAVENOUS at 21:28

## 2021-07-30 RX ADMIN — BUDESONIDE 500 MCG: 0.5 INHALANT RESPIRATORY (INHALATION) at 21:33

## 2021-07-30 RX ADMIN — SODIUM CHLORIDE 500 ML: 900 INJECTION, SOLUTION INTRAVENOUS at 05:38

## 2021-07-30 RX ADMIN — MORPHINE SULFATE 2 MG: 2 INJECTION, SOLUTION INTRAMUSCULAR; INTRAVENOUS at 16:15

## 2021-07-30 RX ADMIN — ONDANSETRON 4 MG: 2 INJECTION INTRAMUSCULAR; INTRAVENOUS at 06:22

## 2021-07-30 RX ADMIN — ACETAMINOPHEN 650 MG: 325 TABLET ORAL at 16:15

## 2021-07-30 RX ADMIN — METHYLPREDNISOLONE SODIUM SUCCINATE 60 MG: 125 INJECTION, POWDER, FOR SOLUTION INTRAMUSCULAR; INTRAVENOUS at 14:18

## 2021-07-30 RX ADMIN — MORPHINE SULFATE 4 MG: 4 INJECTION INTRAVENOUS at 06:21

## 2021-07-30 RX ADMIN — PIPERACILLIN AND TAZOBACTAM 4.5 G: 4; .5 INJECTION, POWDER, LYOPHILIZED, FOR SOLUTION INTRAVENOUS; PARENTERAL at 23:47

## 2021-07-30 RX ADMIN — Medication 10 ML: at 16:17

## 2021-07-30 RX ADMIN — METHYLPREDNISOLONE SODIUM SUCCINATE 125 MG: 125 INJECTION, POWDER, FOR SOLUTION INTRAMUSCULAR; INTRAVENOUS at 05:38

## 2021-07-30 RX ADMIN — PIPERACILLIN AND TAZOBACTAM 4.5 G: 4; .5 INJECTION, POWDER, LYOPHILIZED, FOR SOLUTION INTRAVENOUS; PARENTERAL at 16:30

## 2021-07-30 RX ADMIN — METHYLPREDNISOLONE SODIUM SUCCINATE 60 MG: 125 INJECTION, POWDER, FOR SOLUTION INTRAMUSCULAR; INTRAVENOUS at 21:26

## 2021-07-30 NOTE — H&P
History & Physical    Patient: Kash Zuleta MRN: [id number]  CSN: [id number]    YOB: 1945  Age: 77 y.o. Sex: female      DOA: 7/30/2021  Primary Care Provider:  Alfreda Eden MD      Assessment/Plan   30-year-old female bedbound obese oxygen dependent female with a history of hypertension osteoarthritis coronary artery disease along with COPD/chronic bronchitis who is being admitted for COPD exacerbation.      Acute respiratory failure with hypoxia and hypercapnia -  PCO2 greater than 50, arterial blood gas 7.43/53 point 7/69/30 5.6/93.2%  Repeat arterial blood gas later today  Respiratory status improving, oxygen weaning process has already started, currently requiring 4 L of O2  Keep head of bed elevated at least 30 degrees  O2 supplementation to keep oxygen at 91%    Acute exacerbation of COPD/chronic bronchitis -  DuoNeb every 4 hours as needed  Pulmicort scheduled every 12 hours  Continue Solu-Medrol IV  Continue oxygen supplementation, wean as appropriate    Pneumonia, will treat as HCAP since patient recently hospitalized -  Will treat healthcare associated pneumonia IV antibiotics, complicating factor allergy to penicillins and Levaquin  Allergy to Levaquin with slight redness during infusion, will pretreat with Benadryl, already getting steroids, monitor closely  Cultures obtained in the ED, will follow    Hypertension -  Controlled, resume home medications as appropriate    Sinus tachycardia -  Due to respiratory distress  Improving    History of chronic renal disease  Creatinine within normal limits today 0.90    Chronic history of congestive heart failure, diastolic -  No issues today    Bedridden  Supportive care    Anxiety/depression-  Supportive care    Obesity  Supportive care      Patient Active Problem List   Diagnosis Code    Cataract H26.9    DDD (degenerative disc disease), cervical M50.30    H/O cervical spine surgery Z98.890    COPD (chronic obstructive pulmonary disease) ([facility] [address] J44.9    Acidosis E87.2    COPD exacerbation ([facility]) J44.1    Acute on chronic respiratory failure ([facility]) J96.20    Obesity E66.9    Benign hypertension I10    GERD (gastroesophageal reflux disease) K21.9    Acute diastolic CHF (congestive heart failure) ([facility]) I50.31    Acute encephalopathy G93.40    Toxic metabolic encephalopathy Y26    Acute renal failure (ARF) ([facility]) N17.9    Hyperkalemia E87.5    PNA (pneumonia) J18.9    Chest pain R07.9    Anxiety F41.9    Sinus tachycardia R00.0    Acute respiratory distress R06.03    Hypoxia R09.02    HCAP (healthcare-associated pneumonia) J18.9    Hypokalemia E87.6    Bacteremia due to methicillin resistant Staphylococcus aureus R78.81, B95.62    Oral thrush B37.0    Acute exacerbation of chronic obstructive pulmonary disease (COPD) ([facility]) J44.1    Atypical chest pain R07.89    Bedridden Z74.01    Chronic pain G89.29    Psychiatric disorder F99    Thyroid disease E07.9     Estimated length of stay : 3 to 4 days    CC: Respiratory distress       HPI:     Rajni Oliver is a 77 y.o. female who is a bedbound, oxygen dependent, obese patient who is a resident of [facility]. She also has a history of coronary artery disease, chronic bronchitis, chronic kidney disease stage III, COPD, hypertension, nonspecific psychiatric disorder, thyroid disease and osteoarthritis who presents in severe respiratory distress via EMS with [facility] low 80s [facility] upper [facility] In emergency room she was given steroids and nebs and oxygen supplementation and improved while she was in the ED, is now requiring only 4 L of O2. She was not treated for sepsis and lactic acid was less than two. White count was slightly increased to 14.2 with a left shift. Recently admitted here approximately a month to 2 months ago. Reports been fully vaccinated. Denies fever, chills, night sweats. Fully vaccinated.  C/o dry eyes     Past Medical History:   Diagnosis Date    Arthritis     Asthma     CAD (coronary artery disease)     angina, last episode 06/2012    Chronic bronchitis (HCC)     Chronic kidney disease     stage 3    Chronic obstructive pulmonary disease (HCC)     Chronic pain     cervical, lumbar, knees, ankles, elbows    Fibromyalgia     GERD (gastroesophageal reflux disease)     Hypertension 1993    Psychiatric disorder     anxiety, depression    Thyroid disease        Past Surgical History:   Procedure Laterality Date    HX ADENOIDECTOMY      HX APPENDECTOMY      HX CATARACT REMOVAL      OU    HX CERVICAL FUSION      anterior x 2    HX CERVICAL FUSION      posterior x 3    HX CERVICAL FUSION  1996    Removal of titanium plate and screws    HX CHOLECYSTECTOMY      HX HYSTERECTOMY      HX LITHOTRIPSY      x 4    HX OOPHORECTOMY      left    HX ORTHOPAEDIC      cervical  x5    HX SMALL BOWEL RESECTION      HX TONSILLECTOMY      HX UROLOGICAL  1984 and 1985    kidney stone surgery       History reviewed. No pertinent family history. Social History     Socioeconomic History    Marital status: SINGLE     Spouse name: Not on file    Number of children: Not on file    Years of education: Not on file    Highest education level: Not on file   Tobacco Use    Smoking status: Never Smoker    Smokeless tobacco: Never Used   Substance and Sexual Activity    Alcohol use: No    Drug use: No    Sexual activity: Yes     Birth control/protection: I.U.D., None     Social Determinants of Health     Financial Resource Strain:     Difficulty of Paying Living Expenses:    Food Insecurity:     Worried About Running Out of Food in the Last Year:     [address] in the Last Year:    Transportation Needs:     Lack of Transportation (Medical):      Lack of Transportation (Non-Medical):    Physical Activity:     Days of Exercise per Week:     Minutes of Exercise per Session:    Stress:     Feeling of Stress :    Social Connections:     Frequency of Communication with Friends and Family:     Frequency of Social Gatherings with Friends and Family:     Attends [employer] Services:     Active Member of Clubs or Organizations:     Attends Club or Organization Meetings:     Marital Status:        Prior to Admission medications    Medication Sig Start Date End Date Taking? Authorizing Provider   butalbital-aspirin-caffeine (FiorinaL) capsule Take 2 Capsules by mouth every twelve (12) hours as needed for Headache. Max Daily Amount: 4 Capsules. Indications: a migraine headache 6/22/21   Johnny Pascal MD   hydrOXYzine HCL (ATARAX) 50 mg tablet Take 50 mg by mouth every eight (8) hours as needed for Itching. Provider, Historical   carboxymethylcellulose sodium (Refresh Plus) 0.5 % dpet Administer 2 Drops to both eyes as needed. Provider, Historical   trimethoprim-sulfamethoxazole (Bactrim DS) 160-800 mg per tablet Take 1 Tablet by mouth Every Mon, Wed & Sun.    Provider, Historical   levothyroxine (Synthroid) 125 mcg tablet Take 125 mcg by mouth Daily (before breakfast). Provider, Historical   diclofenac (Voltaren) 1 % gel Apply 2 g to affected area two (2) times daily as needed for Pain. Provider, Historical   alum-mag hydroxide-simeth (MYLANTA) 200-200-20 mg/5 mL susp Take 15 mL by mouth four (4) times daily as needed. Provider, Historical   tiotropium (SPIRIVA WITH HANDIHALER) 18 mcg inhalation capsule Take 1 Cap by inhalation daily. Provider, Historical   multivitamin, tx-iron-ca-min (THERA-M W/ IRON) 9 mg iron-400 mcg tab tablet Take 1 Tab by mouth daily. Provider, Historical   Oxygen continuous. 3L/min NC    Provider, Historical   omeprazole (PRILOSEC) 20 mg capsule Take 20 mg by mouth daily. Patient not taking: Reported on 6/17/2021    Other, MD Irene   nystatin (MYCOSTATIN) powder Apply  to affected area two (2) times a day.  12/1/16   Deisy Verdugo MD   pregabalin (LYRICA) 100 mg capsule Take 100 mg by mouth three (3) times daily. Provider, Historical   fluticasone (FLONASE) 50 mcg/actuation nasal spray 1 [place] by Both Nostrils route daily. Provider, Historical   cyanocobalamin (VITAMIN B12) 1,000 mcg/mL injection 1,000 mcg by IntraMUSCular route every month. Provider, Historical   acetaminophen (TYLENOL) 325 mg tablet Take 650 mg by mouth every six (6) hours as needed for Pain. Provider, Historical   albuterol (PROVENTIL VENTOLIN) 2.5 mg /3 mL (0.083 %) nebulizer solution 2.5 mg by Nebulization route every four (4) hours as needed. Patient not taking: Reported on 6/17/2021    Provider, Historical   fluticasone-salmeterol (ADVAIR DISKUS) 500-50 mcg/dose diskus inhaler Take 1 Puff by inhalation every twelve (12) hours. May use inhaler on DOS  Patient not taking: Reported on 6/17/2021    Provider, Historical   nitroglycerin (NITROSTAT) 0.4 mg SL tablet 0.4 mg by SubLINGual route every five (5) minutes as needed (Give one tab sublingual every 5 minutes x3 doses as needed for chest pain). Provider, Historical   aspirin 81 mg tablet Take 81 mg by mouth daily. Provider, Historical   guaiFENesin SR (MUCINEX) 600 mg SR tablet Take 600 mg by mouth two (2) times a day. Provider, Historical   DULoxetine (CYMBALTA) 60 mg capsule Take 60 mg by mouth daily. Indications: CHRONIC MUSCULOSKELETAL PAIN    Provider, Historical   montelukast (SINGULAIR) 10 mg tablet Take 10 mg by mouth daily. Patient not taking: Reported on 6/17/2021    Provider, Historical       Allergies   Allergen Reactions    Ambien [Zolpidem] Other (comments)     Sleep drive    Aspirin Other (comments)     bruising    Codeine Hives    Darvon [Propoxyphene] Unknown (comments)    Iodinated Contrast Media Hives     Iodine on skin is ok per pt.  Pcn [Penicillins] Nausea and Vomiting    Shellfish Derived Hives, Shortness of Breath and Swelling     Iodine on skin is ok per pt.     Zanaflex [Tizanidine] Other (comments) disorientated    Levaquin [Levofloxacin] Hives     Red rash at IV site while infusing       Review of Systems  Gen: No fever, chills, malaise, weight loss/gain. Heent: No headache, rhinorrhea, epistaxis, ear pain, hearing loss, sinus pain, neck pain/stiffness, sore throat. Heart: No chest pain, palpitations, SCANLON, pnd, or orthopnea. Resp: No cough, hemoptysis, wheezing and shortness of breath. GI: No nausea, vomiting, diarrhea, constipation, melena or hematochezia. : No urinary obstruction, dysuria or hematuria. Derm: No rash, new skin lesion or pruritis. Musc/skeletal: no bone or joint complains. Vasc: No edema, cyanosis or claudication. Endo: No heat/cold intolerance, no polyuria,polydipsia or polyphagia. Neuro: No unilateral weakness, numbness, tingling. No seizures. Heme: No easy bruising or bleeding. Physical Exam:     Physical Exam:  Visit Vitals  BP (!) 118/55   Pulse 94   Temp 99.4 °F (37.4 °C)   Resp 11   Ht 5' 3\" (1.6 m)   Wt 113.4 kg (250 lb)   SpO2 95%   BMI 44.29 kg/m²    O2 Flow Rate (L/min): 3 l/min O2 Device: Nasal cannula    Temp (24hrs), Av.4 °F (37.4 °C), Min:99.4 °F (37.4 °C), Max:99.4 °F (37.4 °C)    No intake/output data recorded. No intake/output data recorded. Physical Exam  Constitutional:       Appearance: She is obese. HENT:      Head: Normocephalic and atraumatic. Right Ear: External ear normal.      Left Ear: External ear normal.      Nose: Nose normal.      Mouth/Throat:      Mouth: Mucous membranes are moist.   Eyes:      Comments: Dry eyes bilaterally with crusting    Cardiovascular:      Rate and Rhythm: Normal rate and regular rhythm. Pulmonary:      Breath sounds: Decreased air movement and transmitted upper airway sounds present. Wheezing present. Abdominal:      General: Bowel sounds are normal.      Palpations: Abdomen is soft.    Genitourinary:     Comments: Urine management system in place  Musculoskeletal:      Cervical back: Full passive range of motion without pain. Neurological:      Mental Status: She is alert. Extremities: Extremities normal, atraumatic, no cyanosis or edema. Capillary refill normal.   Pulses: 2+ and symmetric all extremities. Skin: Skin color as per ethnicity, turgor normal. No rashes or lesions   Neurologic: CNII-XII intact. No focal motor or sensory deficit. abs Reviewed:    Recent Results (from the past 24 hour(s))   EKG, 12 LEAD, INITIAL    Collection Time: 07/30/21  5:01 AM   Result Value Ref Range    Ventricular Rate 109 BPM    Atrial Rate 109 BPM    P-R Interval 170 ms    QRS Duration 80 ms    Q-T Interval 342 ms    QTC Calculation (Bezet) 460 ms    Calculated P Axis 53 degrees    Calculated R Axis 34 degrees    Calculated T Axis 26 degrees    Diagnosis       Sinus tachycardia  Inferior infarct , age undetermined  Abnormal ECG  When compared with ECG of 16-JUN-2021 12:40,  Vent. rate has increased BY  36 BPM  Inferior infarct is now present     BLOOD GAS, ARTERIAL POC    Collection Time: 07/30/21  5:09 AM   Result Value Ref Range    Device: NASAL CANNULA      pH (POC) 7.43 7.35 - 7.45      pCO2 (POC) 53.7 (H) 35.0 - 45.0 MMHG    pO2 (POC) 69 (L) 80 - 100 MMHG    HCO3 (POC) 35.6 (H) 22 - 26 MMOL/L    sO2 (POC) 93.2 92 - 97 %    Base excess (POC) 9.7 mmol/L    Allens test (POC) NOT APPLICABLE      Total resp. rate 18      Site RIGHT BRACHIAL      Patient temp.  99.4      Specimen type (POC) ARTERIAL      Performed by Selvin Garcia    CBC WITH AUTOMATED DIFF    Collection Time: 07/30/21  5:10 AM   Result Value Ref Range    WBC 14.2 (H) 4.6 - 13.2 K/uL    RBC 3.76 (L) 4.20 - 5.30 M/uL    HGB 10.9 (L) 12.0 - 16.0 g/dL    HCT 35.4 35.0 - 45.0 %    MCV 94.1 74.0 - 97.0 FL    MCH 29.0 24.0 - 34.0 PG    MCHC 30.8 (L) 31.0 - 37.0 g/dL    RDW 13.2 11.6 - 14.5 %    PLATELET 951 717 - 827 K/uL    MPV 11.4 9.2 - 11.8 FL    NEUTROPHILS 74 (H) 40 - 73 %    LYMPHOCYTES 15 (L) 21 - 52 %    MONOCYTES 7 3 - 10 %    EOSINOPHILS 4 0 - 5 %    BASOPHILS 0 0 - 2 %    ABS. NEUTROPHILS 10.5 (H) 1.8 - 8.0 K/UL    ABS. LYMPHOCYTES 2.2 0.9 - 3.6 K/UL    ABS. MONOCYTES 0.9 0.05 - 1.2 K/UL    ABS. EOSINOPHILS 0.5 (H) 0.0 - 0.4 K/UL    ABS. BASOPHILS 0.0 0.0 - 0.1 K/UL    DF AUTOMATED     METABOLIC PANEL, COMPREHENSIVE    Collection Time: 07/30/21  5:10 AM   Result Value Ref Range    Sodium 139 136 - 145 mmol/L    Potassium 3.9 3.5 - 5.5 mmol/L    Chloride 99 (L) 100 - 111 mmol/L    CO2 35 (H) 21 - 32 mmol/L    Anion gap 5 3.0 - 18 mmol/L    Glucose 130 (H) 74 - 99 mg/dL    BUN 18 7.0 - 18 MG/DL    Creatinine 0.90 0.6 - 1.3 MG/DL    BUN/Creatinine ratio 20 12 - 20      GFR est AA >60 >60 ml/min/1.73m2    GFR est non-AA >60 >60 ml/min/1.73m2    Calcium 8.6 8.5 - 10.1 MG/DL    Bilirubin, total 0.3 0.2 - 1.0 MG/DL    ALT (SGPT) 48 13 - 56 U/L    AST (SGOT) 43 (H) 10 - 38 U/L    Alk.  phosphatase 98 45 - 117 U/L    Protein, total 7.3 6.4 - 8.2 g/dL    Albumin 3.7 3.4 - 5.0 g/dL    Globulin 3.6 2.0 - 4.0 g/dL    A-G Ratio 1.0 0.8 - 1.7     CARDIAC PANEL,(CK, CKMB & TROPONIN)    Collection Time: 07/30/21  5:10 AM   Result Value Ref Range    CK - MB <1.0 <3.6 ng/ml    CK-MB Index  0.0 - 4.0 %     CALCULATION NOT PERFORMED WHEN RESULT IS BELOW LINEAR LIMIT    CK 70 26 - 192 U/L    Troponin-I, QT <0.02 0.0 - 0.045 NG/ML   MAGNESIUM    Collection Time: 07/30/21  5:10 AM   Result Value Ref Range    Magnesium 1.7 1.6 - 2.6 mg/dL   NT-PRO BNP    Collection Time: 07/30/21  5:10 AM   Result Value Ref Range    NT pro- 0 - 900 PG/ML   POC LACTIC ACID    Collection Time: 07/30/21  6:49 AM   Result Value Ref Range    Lactic Acid (POC) 1.77 0.40 - 2.00 mmol/L       Procedures/imaging: see electronic medical records for all procedures/Xrays and details which were not copied into this note but were reviewed prior to creation of Plan        CC: Pastor Fam MD

## 2021-07-30 NOTE — ED TRIAGE NOTES
Patient arrives from [facility] c/o sudden onset chest pain x1 hour PTA. 4 nitro given en route. Per EMS was 92% on room air. Normall yon 2L NC. Patient with crackles throughout. Wet breathing heard in triage. AOX4.

## 2021-07-30 NOTE — ED PROVIDER NOTES
EMERGENCY DEPARTMENT HISTORY AND PHYSICAL EXAM    Date: 7/30/2021  Patient Name: Karyna Lino    History of Presenting Illness     Chief Complaint   Patient presents with    Chest Pain    Respiratory Distress         History Provided By: Patient    Additional History (Context):   5:45 AM  Karyna Lino is a 77 y.o. female with PMHX of hypertension, asthma, reflux, chronic lumbar pain, coronary artery disease with no history of MI, anxiety and depression, CKD 3, COPD 3 L O2 dependent at her baseline, fibromyalgias who presents to the emergency department C/O respiratory difficulty. Patient is a resident of [address] [place] [address] who was brought here for no one for the chest pain came on at rest roughly 1 hour ago. She describes aching in the center of her chest and constant over the last 4 hours. She was given 4 nitro glycerin without any changes. She denies any fevers chills nausea or vomiting. She also denies any pleuritic component or exacerbation with breathing. Paramedics from patient describe difficulty breathing and the fact she arise looking more for a breathing problem than the pain problem. Social History  Denies smoking drinking or drugs    Family History  Positive for high blood pressure and diabetes but not premature heart disease      PCP: Anabel Cordova MD    Current Facility-Administered Medications   Medication Dose Route Frequency Provider Last Rate Last Admin    sodium chloride 0.9 % bolus infusion 500 mL  500 mL IntraVENous ONCE Imboden MD Blayne 500 mL/hr at 07/30/21 0538 500 mL at 07/30/21 0767     Current Outpatient Medications   Medication Sig Dispense Refill    butalbital-aspirin-caffeine (FiorinaL) capsule Take 2 Capsules by mouth every twelve (12) hours as needed for Headache. Max Daily Amount: 4 Capsules. Indications: a migraine headache 4 Capsule 0    hydrOXYzine HCL (ATARAX) 50 mg tablet Take 50 mg by mouth every eight (8) hours as needed for Itching.       carboxymethylcellulose sodium (Refresh Plus) 0.5 % dpet Administer 2 Drops to both eyes as needed.  trimethoprim-sulfamethoxazole (Bactrim DS) 160-800 mg per tablet Take 1 Tablet by mouth Every Mon, Wed & Sun.      levothyroxine (Synthroid) 125 mcg tablet Take 125 mcg by mouth Daily (before breakfast).  diclofenac (Voltaren) 1 % gel Apply 2 g to affected area two (2) times daily as needed for Pain.  alum-mag hydroxide-simeth (MYLANTA) 200-200-20 mg/5 mL susp Take 15 mL by mouth four (4) times daily as needed.  tiotropium (SPIRIVA WITH HANDIHALER) 18 mcg inhalation capsule Take 1 Cap by inhalation daily.  multivitamin, tx-iron-ca-min (THERA-M W/ IRON) 9 mg iron-400 mcg tab tablet Take 1 Tab by mouth daily.  Oxygen continuous. 3L/min NC      omeprazole (PRILOSEC) 20 mg capsule Take 20 mg by mouth daily. (Patient not taking: Reported on 6/17/2021)      nystatin (MYCOSTATIN) powder Apply  to affected area two (2) times a day. 1 Bottle 0    pregabalin (LYRICA) 100 mg capsule Take 100 mg by mouth three (3) times daily.  fluticasone (FLONASE) 50 mcg/actuation nasal spray 1 [place] by Both Nostrils route daily.  cyanocobalamin (VITAMIN B12) 1,000 mcg/mL injection 1,000 mcg by IntraMUSCular route every month.  acetaminophen (TYLENOL) 325 mg tablet Take 650 mg by mouth every six (6) hours as needed for Pain.  albuterol (PROVENTIL VENTOLIN) 2.5 mg /3 mL (0.083 %) nebulizer solution 2.5 mg by Nebulization route every four (4) hours as needed. (Patient not taking: Reported on 6/17/2021)      fluticasone-salmeterol (ADVAIR DISKUS) 500-50 mcg/dose diskus inhaler Take 1 Puff by inhalation every twelve (12) hours. May use inhaler on DOS (Patient not taking: Reported on 6/17/2021)      nitroglycerin (NITROSTAT) 0.4 mg SL tablet 0.4 mg by SubLINGual route every five (5) minutes as needed (Give one tab sublingual every 5 minutes x3 doses as needed for chest pain).       aspirin 81 mg tablet Take 81 mg by mouth daily.  guaiFENesin SR (MUCINEX) 600 mg SR tablet Take 600 mg by mouth two (2) times a day.  DULoxetine (CYMBALTA) 60 mg capsule Take 60 mg by mouth daily. Indications: CHRONIC MUSCULOSKELETAL PAIN      montelukast (SINGULAIR) 10 mg tablet Take 10 mg by mouth daily. (Patient not taking: Reported on 6/17/2021)         Past History     Past Medical History:  Past Medical History:   Diagnosis Date    Arthritis     Asthma     CAD (coronary artery disease)     angina, last episode 06/2012    Chronic bronchitis (HCC)     Chronic kidney disease     stage 3    Chronic obstructive pulmonary disease (HCC)     Chronic pain     cervical, lumbar, knees, ankles, elbows    Fibromyalgia     GERD (gastroesophageal reflux disease)     Hypertension 1993    Psychiatric disorder     anxiety, depression    Thyroid disease        Past Surgical History:  Past Surgical History:   Procedure Laterality Date    HX ADENOIDECTOMY      HX APPENDECTOMY      HX CATARACT REMOVAL      OU    HX CERVICAL FUSION      anterior x 2    HX CERVICAL FUSION      posterior x 3    HX CERVICAL FUSION  1996    Removal of titanium plate and screws    HX CHOLECYSTECTOMY      HX HYSTERECTOMY      HX LITHOTRIPSY      x 4    HX OOPHORECTOMY      left    HX ORTHOPAEDIC      cervical  x5    HX SMALL BOWEL RESECTION      HX TONSILLECTOMY      HX UROLOGICAL  1984 and 1985    kidney stone surgery       Family History:  History reviewed. No pertinent family history. Social History:  Social History     Tobacco Use    Smoking status: Never Smoker    Smokeless tobacco: Never Used   Substance Use Topics    Alcohol use: No    Drug use: No       Allergies:   Allergies   Allergen Reactions    Ambien [Zolpidem] Other (comments)     Sleep drive    Aspirin Other (comments)     bruising    Codeine Hives    Darvon [Propoxyphene] Unknown (comments)    Iodinated Contrast Media Hives     Iodine on skin is ok per pt.    Pcn [Penicillins] Nausea and Vomiting    Shellfish Derived Hives, Shortness of Breath and Swelling     Iodine on skin is ok per pt.  Zanaflex [Tizanidine] Other (comments)     disorientated    Levaquin [Levofloxacin] Hives     Red rash at IV site while infusing         Review of Systems   Review of Systems   Respiratory: Positive for chest tightness, shortness of breath and wheezing. Cardiovascular: Positive for chest pain. Negative for palpitations and leg swelling. Gastrointestinal: Negative for abdominal pain. All other systems reviewed and are negative. Physical Exam     Vitals:    07/30/21 0500 07/30/21 0501 07/30/21 0515   BP: (!) 121/51  124/67   Pulse: (!) 104     Resp: 15  12   Temp: 99.4 °F (37.4 °C)     SpO2: 94% 97% 97%   Weight: 113.4 kg (250 lb)     Height: 5' 3\" (1.6 m)       Physical Exam  Vitals and nursing note reviewed. Constitutional:       General: She is not in acute distress. Appearance: She is well-developed. She is morbidly obese. She is not toxic-appearing or diaphoretic. Interventions: Nasal cannula in place. Comments: Chronically ill-appearing   HENT:      Head: Normocephalic and atraumatic. Eyes:      General: No scleral icterus. Extraocular Movements:      Right eye: Normal extraocular motion. Left eye: Normal extraocular motion. Conjunctiva/sclera: Conjunctivae normal.      Pupils: Pupils are equal, round, and reactive to light. Neck:      Trachea: No tracheal deviation. Cardiovascular:      Rate and Rhythm: Regular rhythm. Tachycardia present. Heart sounds: Heart sounds are distant. Murmur heard. Systolic murmur is present with a grade of 2/6. Comments: Without pitting edema  Pulmonary:      Effort: Pulmonary effort is normal. No respiratory distress. Breath sounds: Decreased air movement present. No stridor. Examination of the right-upper field reveals wheezing.  Examination of the left-upper field reveals wheezing. Examination of the right-middle field reveals wheezing. Examination of the left-middle field reveals wheezing. Examination of the right-lower field reveals rales. Examination of the left-lower field reveals rales. Decreased breath sounds, wheezing and rales present. Abdominal:      General: Bowel sounds are normal. There is no distension. Palpations: Abdomen is soft. Tenderness: There is no abdominal tenderness. There is no rebound. Musculoskeletal:         General: No tenderness. Normal range of motion. Cervical back: Normal range of motion and neck supple. Right lower leg: Edema present. Left lower leg: Edema present. Comments: Grossly unremarkable without abnormalities   Skin:     General: Skin is warm and dry. Capillary Refill: Capillary refill takes less than 2 seconds. Findings: No erythema or rash. Neurological:      Mental Status: She is alert and oriented to person, place, and time. GCS: GCS eye subscore is 4. GCS verbal subscore is 5. GCS motor subscore is 6. Cranial Nerves: No cranial nerve deficit. Motor: No weakness. Gait: Gait is intact. Psychiatric:         Mood and Affect: Mood normal.         Behavior: Behavior normal.         Thought Content: Thought content normal.         Judgment: Judgment normal.       Diagnostic Study Results     Labs -  Recent Results (from the past 24 hour(s))   EKG, 12 LEAD, INITIAL    Collection Time: 07/30/21  5:01 AM   Result Value Ref Range    Ventricular Rate 109 BPM    Atrial Rate 109 BPM    P-R Interval 170 ms    QRS Duration 80 ms    Q-T Interval 342 ms    QTC Calculation (Bezet) 460 ms    Calculated P Axis 53 degrees    Calculated R Axis 34 degrees    Calculated T Axis 26 degrees    Diagnosis       Sinus tachycardia  Inferior infarct , age undetermined  Abnormal ECG  When compared with ECG of 16-JUN-2021 12:40,  Vent.  rate has increased BY  36 BPM  Inferior infarct is now present BLOOD GAS, ARTERIAL POC    Collection Time: 07/30/21  5:09 AM   Result Value Ref Range    Device: NASAL CANNULA      pH (POC) 7.43 7.35 - 7.45      pCO2 (POC) 53.7 (H) 35.0 - 45.0 MMHG    pO2 (POC) 69 (L) 80 - 100 MMHG    HCO3 (POC) 35.6 (H) 22 - 26 MMOL/L    sO2 (POC) 93.2 92 - 97 %    Base excess (POC) 9.7 mmol/L    Allens test (POC) NOT APPLICABLE      Total resp. rate 18      Site RIGHT BRACHIAL      Patient temp. 99.4      Specimen type (POC) ARTERIAL      Performed by Marvin Murrell    CBC WITH AUTOMATED DIFF    Collection Time: 07/30/21  5:10 AM   Result Value Ref Range    WBC 14.2 (H) 4.6 - 13.2 K/uL    RBC 3.76 (L) 4.20 - 5.30 M/uL    HGB 10.9 (L) 12.0 - 16.0 g/dL    HCT 35.4 35.0 - 45.0 %    MCV 94.1 74.0 - 97.0 FL    MCH 29.0 24.0 - 34.0 PG    MCHC 30.8 (L) 31.0 - 37.0 g/dL    RDW 13.2 11.6 - 14.5 %    PLATELET 763 366 - 040 K/uL    MPV 11.4 9.2 - 11.8 FL    NEUTROPHILS 74 (H) 40 - 73 %    LYMPHOCYTES 15 (L) 21 - 52 %    MONOCYTES 7 3 - 10 %    EOSINOPHILS 4 0 - 5 %    BASOPHILS 0 0 - 2 %    ABS. NEUTROPHILS 10.5 (H) 1.8 - 8.0 K/UL    ABS. LYMPHOCYTES 2.2 0.9 - 3.6 K/UL    ABS. MONOCYTES 0.9 0.05 - 1.2 K/UL    ABS. EOSINOPHILS 0.5 (H) 0.0 - 0.4 K/UL    ABS. BASOPHILS 0.0 0.0 - 0.1 K/UL    DF AUTOMATED     METABOLIC PANEL, COMPREHENSIVE    Collection Time: 07/30/21  5:10 AM   Result Value Ref Range    Sodium 139 136 - 145 mmol/L    Potassium 3.9 3.5 - 5.5 mmol/L    Chloride 99 (L) 100 - 111 mmol/L    CO2 35 (H) 21 - 32 mmol/L    Anion gap 5 3.0 - 18 mmol/L    Glucose 130 (H) 74 - 99 mg/dL    BUN 18 7.0 - 18 MG/DL    Creatinine 0.90 0.6 - 1.3 MG/DL    BUN/Creatinine ratio 20 12 - 20      GFR est AA >60 >60 ml/min/1.73m2    GFR est non-AA >60 >60 ml/min/1.73m2    Calcium 8.6 8.5 - 10.1 MG/DL    Bilirubin, total PENDING MG/DL    ALT (SGPT) 48 13 - 56 U/L    AST (SGOT) 43 (H) 10 - 38 U/L    Alk.  phosphatase PENDING U/L    Protein, total PENDING g/dL    Albumin 3.7 3.4 - 5.0 g/dL    Globulin PENDING g/dL A-G Ratio PENDING     MAGNESIUM    Collection Time: 07/30/21  5:10 AM   Result Value Ref Range    Magnesium 1.7 1.6 - 2.6 mg/dL        Radiologic Studies -   Preliminary findings  Portable chest x-ray  Increased pulmonary densities located involve the left cardiac silhouette. No supranuclear prominent infiltrate or pulmonary edema. Final radiology report pending  Anaid Birch MD    XR CHEST PORT    (Results Pending)     CT Results  (Last 48 hours)    None        CXR Results  (Last 48 hours)    None          Medications given in the ED-  Medications   sodium chloride 0.9 % bolus infusion 500 mL (500 mL IntraVENous New Bag 7/30/21 0538)   albuterol-ipratropium (DUO-NEB) 2.5 MG-0.5 MG/3 ML (3 mL Nebulization Given 7/30/21 0538)   albuterol-ipratropium (DUO-NEB) 2.5 MG-0.5 MG/3 ML (3 mL Nebulization Given 7/30/21 0538)   methylPREDNISolone (PF) (Solu-MEDROL) injection 125 mg (125 mg IntraVENous Given 7/30/21 0538)         Medical Decision Making   I am the first provider for this patient. I reviewed the vital signs, available nursing notes, past medical history, past surgical history, family history and social history. Vital Signs-Reviewed the patient's vital signs. Pulse Oximetry Analysis -94% on 3 L nasal cannula    Cardiac Monitor:  Rate: 105 bpm  Rhythm: Sinus tach    EKG interpretation: (Preliminary)  5:01 AM    Sinus tachycardia, rate 109, nonspecific ST segments with motion artifact, QTC is 460. There are Q waves inferior leads III and aVF  EKG read by Anaid Birch MD      Records Reviewed: NURSING NOTES AND PREVIOUS MEDICAL RECORDS    Provider Notes (Medical Decision Making):   Patient with extensive history COPD comes in with difficulty breathing. She was unable to tolerate BiPAP during transport was brought in with supplemental O2. Unlikely this represents ACS as he never had 1 before and this appears to be the same with her EKG.   She does have breathing difficulties and this is likely associated with her COPD exacerbation. Pneumonia is possible but unlikely. X-ray did show some changes within the left lingual region suspicious for infiltrate however recent hospital stay would require treatment for hospital-acquired pneumonia. Due to her allergy list she had Vanco and Zosyn. Lactic acid was not significantly elevated blood pressure was normal so there is no indication for sepsis fluids at this point. Procedures:  Procedures    ED Course:   5:45 AM: Initial assessment performed. The patients presenting problems have been discussed, and they are in agreement with the care plan formulated and outlined with them. I have encouraged them to ask questions as they arise throughout their visit. 8:45 AM  Progress note  Results reviewed with the patient. She is feeling better after her symptoms. While she is not acidotic she does show hypercarbia and has continued symptoms as well as be admitted for COPD exacerbation treatment for potential pneumonia. CONSULT NOTE:   8:07 AM   Carson Duran MD   spoke with Dr. Nicolle Mathew  Specialty: Hospitalist internal medicine  Discussed pt's hx, disposition, and available diagnostic and imaging results  over the telephone. Reviewed care plans. Consulting physician agrees with plans as outlined. Agrees with management is willing to admit patient to cardiac telemetry. .          Diagnosis and Disposition     Critical Care Time: 52 minutes  CRITICAL CARE NOTE:  8:34 AM  I have spent 52 minutes of critical care time involved in lab review, consultations with specialist, family decision-making, and documentation. During this entire length of time I was immediately available to the patient. Critical Care:   The reason for providing this level of medical care for this critically ill patient was due a critical illness that impaired one or more vital organ systems such that there was a high probability of imminent or life threatening deterioration in the patients condition. This care involved high complexity decision making to assess, manipulate, and support vital system functions, to treat this degreee vital organ system failure and to prevent further life threatening deterioration of the patients condition. Core Measures:  For Hospitalized Patients:    1. Hospitalization Decision Time:  The decision to hospitalize the patient was made by Stanford Kilgore MD   at 6:25 AM on 7/30/2021    2. Aspirin: Aspirin was not given because the patient did not present with a stroke at the time of their Emergency Department evaluation    8: 07 AM  Patient is being admitted to the hospital by Dr. Leila López. The results of their tests and reasons for their admission have been discussed with them and/or available family. They convey agreement and understanding for the need to be admitted and for their admission diagnosis. CONDITIONS ON ADMISSION:  Sepsis is not present at the time of admission. Deep Vein Thrombosis is not present at the time of admission. Thrombosis is not present at the time of admission. Urinary Tract Infection is not present at the time of admission. MRSA is not present at the time of admission. Wound infection erich not not present at the time of admission. Pressure Ulcer is not present at the time of admission. CLINICAL IMPRESSION:    No diagnosis found. _______________________________    This note was partially transcribed via voice recognition software. Although efforts have been made to catch any discrepancies, it may contain sound alike words, grammatical errors, or nonsensical words.

## 2021-07-30 NOTE — Clinical Note
Status[de-identified] INPATIENT [101]   Type of Bed: Telemetry [19]   Cardiac Monitoring Required?: Yes   Inpatient Hospitalization Certified Necessary for the Following Reasons: 3.  Patient receiving treatment that can only be provided in an inpatient setting (further clarification in H&P documentation)   Admitting Diagnosis: COPD with acute exacerbation [facility] [[id number]]   Admitting Diagnosis: Left pulmonary infiltrate on CXR [[id number]]   Admitting Diagnosis: Leukocytosis [200030]   Admitting Physician: Rosales Lara [[id number]]   Attending Physician: Rosales Lara [[id number]]   Estimated Length of Stay: 2 Midnights   Discharge Plan[de-identified] Home with Office Follow-up

## 2021-07-30 NOTE — ED NOTES
TRANSFER - OUT REPORT:    Verbal report given to Holden Bai RN(name) on [address]  being transferred to Tele(unit) for routine progression of care       Report consisted of patients Situation, Background, Assessment and   Recommendations(SBAR). Information from the following report(s) SBAR, Kardex, ED Summary, MAR, Accordion, Recent Results and Cardiac Rhythm sinus tach was reviewed with the receiving nurse. Lines:   Peripheral IV 07/30/21 Right; Outer Antecubital (Active)   Site Assessment Clean, dry, & intact 07/30/21 0510   Phlebitis Assessment 0 07/30/21 0510   Infiltration Assessment 0 07/30/21 0510   Dressing Status Clean, dry, & intact; Occlusive 07/30/21 0510   Dressing Type Tape;Transparent 07/30/21 0510   Hub Color/Line Status Blue;Flushed;Patent 07/30/21 0510   Action Taken Blood drawn 07/30/21 0510        Opportunity for questions and clarification was provided.       Patient transported with:   Monitor  O2 @ 2 liters  Registered Nurse

## 2021-07-30 NOTE — PROGRESS NOTES
Pharmacy Dosing Services: Vancomycin    Consult for Vancomycin Dosing by Pharmacy by Dr. Sandy Jackman provided for this 77y.o. year old female , for indication of HCAP. Day of Therapy 01    Ht Readings from Last 1 Encounters:   07/30/21 160 cm (63\")        Wt Readings from Last 1 Encounters:   07/30/21 113.4 kg (250 lb)        Other Current Antibiotics Zosyn   Significant Cultures pending   Serum Creatinine Lab Results   Component Value Date/Time    Creatinine 0.90 07/30/2021 05:10 AM      Creatinine Clearance Estimated Creatinine Clearance: 66.5 mL/min (based on SCr of 0.9 mg/dL). BUN Lab Results   Component Value Date/Time    BUN 18 07/30/2021 05:10 AM      WBC Lab Results   Component Value Date/Time    WBC 14.2 (H) 07/30/2021 05:10 AM      H/H Lab Results   Component Value Date/Time    HGB 10.9 (L) 07/30/2021 05:10 AM      Platelets Lab Results   Component Value Date/Time    PLATELET 333 83/40/8033 05:10 AM      Temp 99.4 °F (37.4 °C)     Start Vancomycin therapy, with loading dose of 2000 mg IV once @643 on 7/30/21. Followed with maintenance dose of  Vancomycin 1000 mg IV q12h. Dose calculated to approximate a therapeutic trough of 15-20 mcg/mL. AUC 0-24 /SHYLA (based on an SHYLA of 1 mg/L):   475.3  ~Target range:  400 - 600. Pharmacy to follow daily and will make changes to dose and/or frequency based on clinical status.     Pharmacist Mariluz

## 2021-07-30 NOTE — PROGRESS NOTES
Verified current medications with [facility]. Patient requesting to resume 100 mg tid lyrica. Reported patients request to on call MD after stating patient requesting Lyrica, on call MD stated wait until Dr. China Liao returns to address medication because on call MD states she is not familiar with patient.

## 2021-07-31 LAB
ANION GAP SERPL CALC-SCNC: 2 MMOL/L (ref 3–18)
ATRIAL RATE: 109 BPM
BUN SERPL-MCNC: 17 MG/DL (ref 7–18)
BUN/CREAT SERPL: 21 (ref 12–20)
CALCIUM SERPL-MCNC: 8.4 MG/DL (ref 8.5–10.1)
CALCULATED P AXIS, ECG09: 53 DEGREES
CALCULATED R AXIS, ECG10: 34 DEGREES
CALCULATED T AXIS, ECG11: 26 DEGREES
CHLORIDE SERPL-SCNC: 103 MMOL/L (ref 100–111)
CO2 SERPL-SCNC: 33 MMOL/L (ref 21–32)
CREAT SERPL-MCNC: 0.82 MG/DL (ref 0.6–1.3)
DIAGNOSIS, 93000: NORMAL
ERYTHROCYTE [DISTWIDTH] IN BLOOD BY AUTOMATED COUNT: 13.1 % (ref 11.6–14.5)
GLUCOSE SERPL-MCNC: 196 MG/DL (ref 74–99)
HCT VFR BLD AUTO: 29.4 % (ref 35–45)
HGB BLD-MCNC: 9.4 G/DL (ref 12–16)
MCH RBC QN AUTO: 30.2 PG (ref 24–34)
MCHC RBC AUTO-ENTMCNC: 32 G/DL (ref 31–37)
MCV RBC AUTO: 94.5 FL (ref 74–97)
P-R INTERVAL, ECG05: 170 MS
PLATELET # BLD AUTO: 193 K/UL (ref 135–420)
PMV BLD AUTO: 12 FL (ref 9.2–11.8)
POTASSIUM SERPL-SCNC: 4.2 MMOL/L (ref 3.5–5.5)
Q-T INTERVAL, ECG07: 342 MS
QRS DURATION, ECG06: 80 MS
QTC CALCULATION (BEZET), ECG08: 460 MS
RBC # BLD AUTO: 3.11 M/UL (ref 4.2–5.3)
SODIUM SERPL-SCNC: 138 MMOL/L (ref 136–145)
VENTRICULAR RATE, ECG03: 109 BPM
WBC # BLD AUTO: 10.2 K/UL (ref 4.6–13.2)

## 2021-07-31 PROCEDURE — 74011000250 HC RX REV CODE- 250: Performed by: FAMILY MEDICINE

## 2021-07-31 PROCEDURE — 74011250637 HC RX REV CODE- 250/637: Performed by: FAMILY MEDICINE

## 2021-07-31 PROCEDURE — 80048 BASIC METABOLIC PNL TOTAL CA: CPT

## 2021-07-31 PROCEDURE — 94640 AIRWAY INHALATION TREATMENT: CPT

## 2021-07-31 PROCEDURE — 74011000258 HC RX REV CODE- 258: Performed by: FAMILY MEDICINE

## 2021-07-31 PROCEDURE — 74011250637 HC RX REV CODE- 250/637: Performed by: HOSPITALIST

## 2021-07-31 PROCEDURE — 74011250636 HC RX REV CODE- 250/636: Performed by: HOSPITALIST

## 2021-07-31 PROCEDURE — 36415 COLL VENOUS BLD VENIPUNCTURE: CPT

## 2021-07-31 PROCEDURE — 65660000000 HC RM CCU STEPDOWN

## 2021-07-31 PROCEDURE — 74011250636 HC RX REV CODE- 250/636: Performed by: FAMILY MEDICINE

## 2021-07-31 PROCEDURE — 85027 COMPLETE CBC AUTOMATED: CPT

## 2021-07-31 RX ORDER — FACIAL-BODY WIPES
10 EACH TOPICAL DAILY PRN
Status: DISCONTINUED | OUTPATIENT
Start: 2021-07-31 | End: 2021-08-02 | Stop reason: HOSPADM

## 2021-07-31 RX ORDER — POLYETHYLENE GLYCOL 3350 17 G/17G
17 POWDER, FOR SOLUTION ORAL DAILY
Status: DISCONTINUED | OUTPATIENT
Start: 2021-07-31 | End: 2021-08-02 | Stop reason: HOSPADM

## 2021-07-31 RX ORDER — DEXTROMETHORPHAN POLISTIREX 30 MG/5 ML
SUSPENSION, EXTENDED RELEASE 12 HR ORAL AS NEEDED
Status: DISCONTINUED | OUTPATIENT
Start: 2021-07-31 | End: 2021-08-02 | Stop reason: HOSPADM

## 2021-07-31 RX ORDER — MAGNESIUM CITRATE
296 SOLUTION, ORAL ORAL
Status: COMPLETED | OUTPATIENT
Start: 2021-07-31 | End: 2021-07-31

## 2021-07-31 RX ADMIN — HEPARIN SODIUM 5000 UNITS: 5000 INJECTION INTRAVENOUS; SUBCUTANEOUS at 04:51

## 2021-07-31 RX ADMIN — PIPERACILLIN AND TAZOBACTAM 4.5 G: 4; .5 INJECTION, POWDER, LYOPHILIZED, FOR SOLUTION INTRAVENOUS; PARENTERAL at 12:16

## 2021-07-31 RX ADMIN — HEPARIN SODIUM 5000 UNITS: 5000 INJECTION INTRAVENOUS; SUBCUTANEOUS at 12:21

## 2021-07-31 RX ADMIN — VANCOMYCIN HYDROCHLORIDE 1000 MG: 1 INJECTION, POWDER, LYOPHILIZED, FOR SOLUTION INTRAVENOUS at 08:53

## 2021-07-31 RX ADMIN — FLUTICASONE PROPIONATE 1 SPRAY: 50 SPRAY, METERED NASAL at 08:59

## 2021-07-31 RX ADMIN — METHYLPREDNISOLONE SODIUM SUCCINATE 40 MG: 40 INJECTION, POWDER, FOR SOLUTION INTRAMUSCULAR; INTRAVENOUS at 14:25

## 2021-07-31 RX ADMIN — VANCOMYCIN HYDROCHLORIDE 1000 MG: 1 INJECTION, POWDER, LYOPHILIZED, FOR SOLUTION INTRAVENOUS at 21:05

## 2021-07-31 RX ADMIN — METHYLPREDNISOLONE SODIUM SUCCINATE 60 MG: 125 INJECTION, POWDER, FOR SOLUTION INTRAMUSCULAR; INTRAVENOUS at 08:51

## 2021-07-31 RX ADMIN — TRAMADOL HYDROCHLORIDE 50 MG: 50 TABLET, FILM COATED ORAL at 15:58

## 2021-07-31 RX ADMIN — TRAMADOL HYDROCHLORIDE 50 MG: 50 TABLET, FILM COATED ORAL at 03:43

## 2021-07-31 RX ADMIN — IPRATROPIUM BROMIDE AND ALBUTEROL SULFATE 3 ML: .5; 3 SOLUTION RESPIRATORY (INHALATION) at 08:10

## 2021-07-31 RX ADMIN — POLYETHYLENE GLYCOL 3350 17 G: 17 POWDER, FOR SOLUTION ORAL at 12:21

## 2021-07-31 RX ADMIN — PREGABALIN 100 MG: 100 CAPSULE ORAL at 08:51

## 2021-07-31 RX ADMIN — MAGNESIUM CITRATE 296 ML: 1.75 LIQUID ORAL at 12:17

## 2021-07-31 RX ADMIN — PIPERACILLIN AND TAZOBACTAM 4.5 G: 4; .5 INJECTION, POWDER, LYOPHILIZED, FOR SOLUTION INTRAVENOUS; PARENTERAL at 05:14

## 2021-07-31 RX ADMIN — HEPARIN SODIUM 5000 UNITS: 5000 INJECTION INTRAVENOUS; SUBCUTANEOUS at 21:06

## 2021-07-31 RX ADMIN — METHYLPREDNISOLONE SODIUM SUCCINATE 60 MG: 125 INJECTION, POWDER, FOR SOLUTION INTRAMUSCULAR; INTRAVENOUS at 02:36

## 2021-07-31 RX ADMIN — Medication 10 ML: at 05:17

## 2021-07-31 RX ADMIN — PREGABALIN 100 MG: 100 CAPSULE ORAL at 21:05

## 2021-07-31 RX ADMIN — PREGABALIN 100 MG: 100 CAPSULE ORAL at 15:55

## 2021-07-31 RX ADMIN — BUDESONIDE 500 MCG: 0.5 INHALANT RESPIRATORY (INHALATION) at 19:44

## 2021-07-31 RX ADMIN — METHYLPREDNISOLONE SODIUM SUCCINATE 40 MG: 40 INJECTION, POWDER, FOR SOLUTION INTRAMUSCULAR; INTRAVENOUS at 21:06

## 2021-07-31 RX ADMIN — ACETAMINOPHEN 650 MG: 325 TABLET ORAL at 03:35

## 2021-07-31 RX ADMIN — PIPERACILLIN AND TAZOBACTAM 4.5 G: 4; .5 INJECTION, POWDER, LYOPHILIZED, FOR SOLUTION INTRAVENOUS; PARENTERAL at 17:18

## 2021-07-31 RX ADMIN — Medication 10 ML: at 14:04

## 2021-07-31 RX ADMIN — PIPERACILLIN AND TAZOBACTAM 4.5 G: 4; .5 INJECTION, POWDER, LYOPHILIZED, FOR SOLUTION INTRAVENOUS; PARENTERAL at 23:08

## 2021-07-31 RX ADMIN — LEVOTHYROXINE SODIUM 125 MCG: 0.03 TABLET ORAL at 08:51

## 2021-07-31 RX ADMIN — MINERAL OIL 133 ML: 100 ENEMA RECTAL at 14:25

## 2021-07-31 RX ADMIN — BUDESONIDE 500 MCG: 0.5 INHALANT RESPIRATORY (INHALATION) at 08:10

## 2021-07-31 RX ADMIN — DULOXETINE HYDROCHLORIDE 60 MG: 60 CAPSULE, DELAYED RELEASE ORAL at 08:52

## 2021-07-31 RX ADMIN — ACETAMINOPHEN 650 MG: 325 TABLET ORAL at 12:21

## 2021-07-31 NOTE — ROUTINE PROCESS
Report received from day shift RN Patricia Anders. Patient complaining about not having her Lyrica meds ordered.

## 2021-07-31 NOTE — ROUTINE PROCESS
Bedside and Verbal shift change report given to Jn Sheridan RN (oncoming nurse) by Richie Ponce RN (offgoing nurse). Report included the following information SBAR and Kardex.

## 2021-07-31 NOTE — PROGRESS NOTES
2355-Resting in bed, stable. White board updated. 0141-Assessment complete. Stable, resting in bed; cell phone and reading material at bedside with patient. No complaints. External catheter in place; patent, draining. 0530-Resting in bed, eyes closed, arouses easily. Stable. 0650-Resting in bed. Stable. Shift Summary:  Stable at shift change report, resting quietly in bed.

## 2021-07-31 NOTE — PROGRESS NOTES
PHYSICAL THERAPY         Patient: Hugo Lockwood (79 y.o. female)  Room: 356/01    Date: 7/31/2021  Time:  12:40  Primary Diagnosis: COPD with acute exacerbation ([facility] [address] [J44.1]  Left pulmonary infiltrate on CXR [R91.8]  Leukocytosis [D72.829]    Orders reviewed, chart reviewed. Communicated with patient's nurse (patient ok to be seen by PT). Physical therapy was attempted, however patient declined stating she is trying to have a BM and is getting an enema soon. Will follow up as schedule permits.       Jocelyn Kelley, PT

## 2021-07-31 NOTE — PROGRESS NOTES
Weekend coverage-    Pt admitted to [facility] for COPD. Pt with RRAT 22% - MOD - May benefit from [facility] or [facility]. CM will be following for transition of care needs and will be available via hospital  on weekends     Reason for Admission:   COPD                    RUR Score:  22%                PCP: First and Last name:   Helga Woods MD     Name of Practice:    Are you a current patient: Yes/No:    Approximate date of last visit:    Can you participate in a virtual visit if needed:     Do you (patient/family) have any concerns for transition/discharge?                    Plan for utilizing home health:   TBD    Current Advanced Directive/Advance Care Plan:  Full Code      Healthcare Decision Maker:   Click here to complete [employer] including selection of the Healthcare Decision Maker Relationship (ie \"Primary\")            Primary Decision Maker: Manuel Jorge Sangeeta Child - [phone]    Transition of Care Plan:     TBD    Care Management Interventions  Transition of Care Consult (CM Consult): Discharge Planning

## 2021-07-31 NOTE — PROGRESS NOTES
0800   Pt is awake, alert, oriented x4. Pt is hard of hearing. Pt is on Humidified O2 at 2L nc.   0905   Pt is resting in bed. Denies pain. Lungs are clear but diminished. Pt with occasional non productive cough. Abdomen soft with active BS. Pedal pulses palpable. No new complaints. Legs are edematous and heels are reddened. 1227  Pt was seen by Dr Delgado Celestin. Pt has constipation. Last Bm was Tuesday as per pt. Dr Delgado Celestin ordered MagCitrate and MIralax. Pt will get Mineral oil enema if needed as per Pharmacy. 1433   Pt did not have any BM after MagCitrate and Miralax. Requesting for  The Mineral oil enema and was given. 1502   Pt is awake, alert, oriented x4. Resting in bed. Pt had large amt of hard formed stools,. Kept clean and comfortable. 1559   Pt medicated with Ultram 50 mg and gabapentin for generalized pain with pain level 7-8/10.   1722  Pt able to sleep fter pain med. Pain level 6/10 when awake.

## 2021-07-31 NOTE — PROGRESS NOTES
Occupational Therapy Screening:  Services are not indicated at this time. [facility] based on results obtained during the nursing admission assessment. The patients chart was reviewed and the patient is not appropriate for a skilled therapy evaluation at this time. Please consult occupational therapy if any therapy needs arise. Thank you.     Evette Verdin, OTR/L

## 2021-07-31 NOTE — PROGRESS NOTES
Hospitalist Progress Note    Patient: Macey Rollins MRN: [id number]  CSN: [id number]    YOB: 1945  Age: 77 y.o.   Sex: female    DOA: 7/30/2021 LOS:  LOS: 1 day          Chief Complaint:    SOB      Assessment/Plan     52-year-old female bedbound obese oxygen dependent female with a history of hypertension osteoarthritis coronary artery disease along with COPD/chronic bronchitis who is being admitted for COPD exacerbation.      Acute respiratory failure with hypoxia and hypercapnia -improved  On chronic 02 at [facility]  Continue steroids, weaning dose, and IV abx for bronchitis, early pneumonia    Stool impaction-enema and softeners ordered     Acute exacerbation of COPD/chronic bronchitis -  DuoNeb every 4 hours as needed  Pulmicort scheduled every 12 hours    Hypertension -  Controlled, resume home medications as appropriate     Sinus tachycardia -  improved     chronic renal disease  Creatinine within normal limits today 0.90     congestive heart failure, diastolic -  No issues     Bedridden  Supportive care    Psych disorder-no changes     Anxiety/depression-  Supportive care     Obesity  Supportive care        Disposition :NH in 48 hrs  Patient Active Problem List   Diagnosis Code    Cataract H26.9    DDD (degenerative disc disease), cervical M50.30    H/O cervical spine surgery Z98.890    COPD (chronic obstructive pulmonary disease) ([facility]) J44.9    Acidosis E87.2    COPD exacerbation ([facility]) J44.1    Acute on chronic respiratory failure ([facility]) J96.20    Obesity E66.9    Benign hypertension I10    GERD (gastroesophageal reflux disease) K21.9    Acute diastolic CHF (congestive heart failure) ([facility]) I50.31    Acute encephalopathy G93.40    Toxic metabolic encephalopathy J47    Acute renal failure (ARF) ([facility]) N17.9    Hyperkalemia E87.5    PNA (pneumonia) J18.9    Chest pain R07.9    Anxiety F41.9    Sinus tachycardia R00.0    Acute respiratory distress R06.03    Hypoxia R09.02    HCAP (healthcare-associated pneumonia) J18.9    Hypokalemia E87.6    Bacteremia due to methicillin resistant Staphylococcus aureus R78.81, B95.62    Oral thrush B37.0    Acute exacerbation of chronic obstructive pulmonary disease [facility]) [facility] J44.1    Atypical chest pain R07.89    Bedridden Z74.01    Chronic pain G89.29    Psychiatric disorder F99    Thyroid disease E07.9    Leukocytosis D72.829    COPD with acute exacerbation ([facility]) J44.1    Left pulmonary infiltrate on CXR R91.8    Acute respiratory failure with hypoxia and hypercapnia ([facility]) J96.01, J96.02       Subjective:    \"I have stuff in my nose  I cant poop, I need to get it out , I have no sphincter\"    Denies inc SOB, no fevers, no chest pain    Review of systems:      Gastrointestinal: denies nausea, vomiting, diarrhea      Vital signs/Intake and Output:  Visit Vitals  BP (!) 141/80   Pulse (!) 108   Temp 98.5 °F (36.9 °C)   Resp 20   Ht 5' 3\" (1.6 m)   Wt 113.4 kg (250 lb)   SpO2 96%   BMI 44.29 kg/m²     Current Shift:  No intake/output data recorded.   Last three shifts:  07/29 1901 - 07/31 0700  In: 600 [I.V.:600]  Out: -     Exam:    General: obese debilitated eldelry WF, alert, NAD, OX3  Head/Neck: NCAT, supple, No masses, No lymphadenopathy  CVS:Regular rate and rhythm, no M/R/G, S1/S2 heard, no thrill  Lungs:Clear to auscultation bilaterally, no wheezes, rhonchi, or rales  Abdomen: Soft, Nontender, No distention, obese  Extremities: chronic edema LE BL  Talkative, no distress, anxious                Labs: Results:       Chemistry Recent Labs     07/31/21  0225 07/30/21  0510   * 130*    139   K 4.2 3.9    99*   CO2 33* 35*   BUN 17 18   CREA 0.82 0.90   CA 8.4* 8.6   AGAP 2* 5   BUCR 21* 20   AP  --  98   TP  --  7.3   ALB  --  3.7   GLOB  --  3.6   AGRAT  --  1.0      CBC w/Diff Recent Labs     07/31/21  0225 07/30/21  0510   WBC 10.2 14.2*   RBC 3.11* 3.76*   HGB 9.4* 10.9*   HCT 29.4* 35.4    239 GRANS  --  74*   LYMPH  --  15*   EOS  --  4      Cardiac Enzymes Recent Labs     07/30/21  0510   CPK 70   CKND1 CALCULATION NOT PERFORMED WHEN RESULT IS BELOW LINEAR LIMIT      Coagulation No results for input(s): PTP, INR, APTT, INREXT in the last 72 hours. Lipid Panel No results found for: CHOL, CHOLPOCT, CHOLX, CHLST, CHOLV, 062951, HDL, HDLP, LDL, LDLC, DLDLP, 863776, VLDLC, VLDL, TGLX, TRIGL, TRIGP, TGLPOCT, CHHD, CHHDX   BNP No results for input(s): BNPP in the last 72 hours.    Liver Enzymes Recent Labs     07/30/21  0510   TP 7.3   ALB 3.7   AP 98      Thyroid Studies Lab Results   Component Value Date/Time    TSH 0.04 (L) 11/26/2016 05:39 AM        Procedures/imaging: see electronic medical records for all procedures/Xrays and details which were not copied into this note but were reviewed prior to creation of Saqib Aleman MD

## 2021-07-31 NOTE — ROUTINE PROCESS
1955  Bedside and Verbal shift change report given to KELSEY Iraheta RN (oncoming nurse) by Rekha Cottrell RN (offgoing nurse). Report included the following information Woody KO and MAR.

## 2021-08-01 LAB
ANION GAP SERPL CALC-SCNC: 6 MMOL/L (ref 3–18)
BUN SERPL-MCNC: 17 MG/DL (ref 7–18)
BUN/CREAT SERPL: 21 (ref 12–20)
CALCIUM SERPL-MCNC: 8 MG/DL (ref 8.5–10.1)
CHLORIDE SERPL-SCNC: 104 MMOL/L (ref 100–111)
CO2 SERPL-SCNC: 32 MMOL/L (ref 21–32)
CREAT SERPL-MCNC: 0.8 MG/DL (ref 0.6–1.3)
ERYTHROCYTE [DISTWIDTH] IN BLOOD BY AUTOMATED COUNT: 13.2 % (ref 11.6–14.5)
GLUCOSE SERPL-MCNC: 160 MG/DL (ref 74–99)
HCT VFR BLD AUTO: 33.4 % (ref 35–45)
HGB BLD-MCNC: 10.5 G/DL (ref 12–16)
MCH RBC QN AUTO: 29.7 PG (ref 24–34)
MCHC RBC AUTO-ENTMCNC: 31.4 G/DL (ref 31–37)
MCV RBC AUTO: 94.4 FL (ref 74–97)
PLATELET # BLD AUTO: 221 K/UL (ref 135–420)
PMV BLD AUTO: 11.9 FL (ref 9.2–11.8)
POTASSIUM SERPL-SCNC: 3.7 MMOL/L (ref 3.5–5.5)
RBC # BLD AUTO: 3.54 M/UL (ref 4.2–5.3)
SODIUM SERPL-SCNC: 142 MMOL/L (ref 136–145)
VANCOMYCIN TROUGH SERPL-MCNC: 21.8 UG/ML (ref 10–20)
WBC # BLD AUTO: 13.1 K/UL (ref 4.6–13.2)

## 2021-08-01 PROCEDURE — 80202 ASSAY OF VANCOMYCIN: CPT

## 2021-08-01 PROCEDURE — 94640 AIRWAY INHALATION TREATMENT: CPT

## 2021-08-01 PROCEDURE — 65660000000 HC RM CCU STEPDOWN

## 2021-08-01 PROCEDURE — 36415 COLL VENOUS BLD VENIPUNCTURE: CPT

## 2021-08-01 PROCEDURE — 74011000250 HC RX REV CODE- 250: Performed by: FAMILY MEDICINE

## 2021-08-01 PROCEDURE — 74011250636 HC RX REV CODE- 250/636: Performed by: FAMILY MEDICINE

## 2021-08-01 PROCEDURE — 74011250637 HC RX REV CODE- 250/637: Performed by: HOSPITALIST

## 2021-08-01 PROCEDURE — 85027 COMPLETE CBC AUTOMATED: CPT

## 2021-08-01 PROCEDURE — 74011250637 HC RX REV CODE- 250/637: Performed by: FAMILY MEDICINE

## 2021-08-01 PROCEDURE — 74011000258 HC RX REV CODE- 258: Performed by: FAMILY MEDICINE

## 2021-08-01 PROCEDURE — 80048 BASIC METABOLIC PNL TOTAL CA: CPT

## 2021-08-01 PROCEDURE — 74011250636 HC RX REV CODE- 250/636: Performed by: HOSPITALIST

## 2021-08-01 RX ORDER — FENTANYL 100 UG/H
1 PATCH TRANSDERMAL
Status: DISCONTINUED | OUTPATIENT
Start: 2021-08-01 | End: 2021-08-02 | Stop reason: HOSPADM

## 2021-08-01 RX ADMIN — PIPERACILLIN AND TAZOBACTAM 4.5 G: 4; .5 INJECTION, POWDER, LYOPHILIZED, FOR SOLUTION INTRAVENOUS; PARENTERAL at 11:26

## 2021-08-01 RX ADMIN — BUDESONIDE 500 MCG: 0.5 INHALANT RESPIRATORY (INHALATION) at 20:00

## 2021-08-01 RX ADMIN — METHYLPREDNISOLONE SODIUM SUCCINATE 40 MG: 40 INJECTION, POWDER, FOR SOLUTION INTRAMUSCULAR; INTRAVENOUS at 08:24

## 2021-08-01 RX ADMIN — VANCOMYCIN HYDROCHLORIDE 1000 MG: 1 INJECTION, POWDER, LYOPHILIZED, FOR SOLUTION INTRAVENOUS at 08:23

## 2021-08-01 RX ADMIN — HEPARIN SODIUM 5000 UNITS: 5000 INJECTION INTRAVENOUS; SUBCUTANEOUS at 04:09

## 2021-08-01 RX ADMIN — LEVOTHYROXINE SODIUM 125 MCG: 0.03 TABLET ORAL at 06:35

## 2021-08-01 RX ADMIN — IPRATROPIUM BROMIDE AND ALBUTEROL SULFATE 3 ML: .5; 3 SOLUTION RESPIRATORY (INHALATION) at 08:00

## 2021-08-01 RX ADMIN — TRAMADOL HYDROCHLORIDE 50 MG: 50 TABLET, FILM COATED ORAL at 06:35

## 2021-08-01 RX ADMIN — TRAMADOL HYDROCHLORIDE 50 MG: 50 TABLET, FILM COATED ORAL at 18:51

## 2021-08-01 RX ADMIN — HEPARIN SODIUM 5000 UNITS: 5000 INJECTION INTRAVENOUS; SUBCUTANEOUS at 11:28

## 2021-08-01 RX ADMIN — PREGABALIN 100 MG: 100 CAPSULE ORAL at 15:23

## 2021-08-01 RX ADMIN — PREGABALIN 100 MG: 100 CAPSULE ORAL at 08:23

## 2021-08-01 RX ADMIN — BUDESONIDE 500 MCG: 0.5 INHALANT RESPIRATORY (INHALATION) at 08:00

## 2021-08-01 RX ADMIN — METHYLPREDNISOLONE SODIUM SUCCINATE 20 MG: 40 INJECTION, POWDER, FOR SOLUTION INTRAMUSCULAR; INTRAVENOUS at 21:17

## 2021-08-01 RX ADMIN — PIPERACILLIN AND TAZOBACTAM 4.5 G: 4; .5 INJECTION, POWDER, LYOPHILIZED, FOR SOLUTION INTRAVENOUS; PARENTERAL at 04:09

## 2021-08-01 RX ADMIN — IPRATROPIUM BROMIDE AND ALBUTEROL SULFATE 3 ML: .5; 3 SOLUTION RESPIRATORY (INHALATION) at 04:37

## 2021-08-01 RX ADMIN — HEPARIN SODIUM 5000 UNITS: 5000 INJECTION INTRAVENOUS; SUBCUTANEOUS at 21:16

## 2021-08-01 RX ADMIN — ACETAMINOPHEN 650 MG: 325 TABLET ORAL at 15:29

## 2021-08-01 RX ADMIN — MORPHINE SULFATE 2 MG: 2 INJECTION, SOLUTION INTRAMUSCULAR; INTRAVENOUS at 08:19

## 2021-08-01 RX ADMIN — METHYLPREDNISOLONE SODIUM SUCCINATE 40 MG: 40 INJECTION, POWDER, FOR SOLUTION INTRAMUSCULAR; INTRAVENOUS at 04:09

## 2021-08-01 RX ADMIN — BISACODYL 10 MG: 10 SUPPOSITORY RECTAL at 00:34

## 2021-08-01 RX ADMIN — PREGABALIN 100 MG: 100 CAPSULE ORAL at 21:17

## 2021-08-01 RX ADMIN — PIPERACILLIN AND TAZOBACTAM 4.5 G: 4; .5 INJECTION, POWDER, LYOPHILIZED, FOR SOLUTION INTRAVENOUS; PARENTERAL at 22:15

## 2021-08-01 RX ADMIN — IPRATROPIUM BROMIDE AND ALBUTEROL SULFATE 3 ML: .5; 3 SOLUTION RESPIRATORY (INHALATION) at 21:35

## 2021-08-01 RX ADMIN — POLYETHYLENE GLYCOL 3350 17 G: 17 POWDER, FOR SOLUTION ORAL at 08:24

## 2021-08-01 RX ADMIN — Medication 10 ML: at 15:23

## 2021-08-01 RX ADMIN — PIPERACILLIN AND TAZOBACTAM 4.5 G: 4; .5 INJECTION, POWDER, LYOPHILIZED, FOR SOLUTION INTRAVENOUS; PARENTERAL at 17:44

## 2021-08-01 RX ADMIN — DULOXETINE HYDROCHLORIDE 60 MG: 60 CAPSULE, DELAYED RELEASE ORAL at 08:23

## 2021-08-01 RX ADMIN — FLUTICASONE PROPIONATE 1 SPRAY: 50 SPRAY, METERED NASAL at 11:27

## 2021-08-01 RX ADMIN — METHYLPREDNISOLONE SODIUM SUCCINATE 20 MG: 40 INJECTION, POWDER, FOR SOLUTION INTRAMUSCULAR; INTRAVENOUS at 15:23

## 2021-08-01 NOTE — PROGRESS NOTES
Problem: Pressure Injury - Risk of  Goal: *Prevention of pressure injury  Description: Document Forerst Scale and appropriate interventions in the flowsheet. Outcome: Progressing Towards Goal  Note: Pressure Injury Interventions:       Moisture Interventions: Internal/External urinary devices    Activity Interventions: Pressure redistribution bed/mattress(bed type), PT/OT evaluation    Mobility Interventions: HOB 30 degrees or less, Pressure redistribution bed/mattress (bed type)    Nutrition Interventions: Document food/fluid/supplement intake    Friction and Shear Interventions: HOB 30 degrees or less                Problem: Patient Education: Go to Patient Education Activity  Goal: Patient/Family Education  Outcome: Progressing Towards Goal     Problem: Falls - Risk of  Goal: *Absence of Falls  Description: Document Maddi Fall Risk and appropriate interventions in the flowsheet.   Outcome: Progressing Towards Goal  Note: Fall Risk Interventions:  Mobility Interventions: Patient to call before getting OOB         Medication Interventions: Teach patient to arise slowly    Elimination Interventions: Call light in reach    History of Falls Interventions: Door open when patient unattended         Problem: Patient Education: Go to Patient Education Activity  Goal: Patient/Family Education  Outcome: Progressing Towards Goal     Problem: Chronic Obstructive Pulmonary Disease (COPD)  Goal: *Oxygen saturation during activity within specified parameters  Outcome: Progressing Towards Goal  Goal: *Able to remain out of bed as prescribed  Outcome: Progressing Towards Goal  Goal: *Absence of hypoxia  Outcome: Progressing Towards Goal  Goal: *Optimize nutritional status  Outcome: Progressing Towards Goal

## 2021-08-01 NOTE — PROGRESS NOTES
[id number] Shift Summary: Pt rested well with no complaints. No new clinical concerns noted.      Nightshift Chart Audit Completed

## 2021-08-01 NOTE — PROGRESS NOTES
Problem: Pressure Injury - Risk of  Goal: *Prevention of pressure injury  Description: Document Forrest Scale and appropriate interventions in the flowsheet. Outcome: Progressing Towards Goal  Note: Pressure Injury Interventions:       Moisture Interventions: Absorbent underpads    Activity Interventions: Pressure redistribution bed/mattress(bed type)    Mobility Interventions: Pressure redistribution bed/mattress (bed type)    Nutrition Interventions: Document food/fluid/supplement intake    Friction and Shear Interventions: HOB 30 degrees or less                Problem: Patient Education: Go to Patient Education Activity  Goal: Patient/Family Education  Outcome: Progressing Towards Goal     Problem: Falls - Risk of  Goal: *Absence of Falls  Description: Document Maddi Fall Risk and appropriate interventions in the flowsheet.   Outcome: Progressing Towards Goal  Note: Fall Risk Interventions:  Mobility Interventions: Communicate number of staff needed for ambulation/transfer              Elimination Interventions: Call light in reach    History of Falls Interventions: Room close to nurse's station         Problem: Patient Education: Go to Patient Education Activity  Goal: Patient/Family Education  Outcome: Progressing Towards Goal

## 2021-08-01 NOTE — PROGRESS NOTES
Hospitalist Progress Note    Patient: Nicanor Mao MRN: [id number]  CSN: [id number]    YOB: 1945  Age: 77 y.o.   Sex: female    DOA: 7/30/2021 LOS:  LOS: 2 days          Chief Complaint:    SOB      Assessment/Plan     80-year-old female bedbound obese oxygen dependent female with a history of hypertension osteoarthritis coronary artery disease along with COPD/chronic bronchitis admitted for COPD exacerbation.      Acute respiratory failure with hypoxia and hypercapnia -improved  On chronic 02 at [facility]  Continue steroids, weaning dose, and IV abx for bronchitis, early pneumonia     Stool impaction-enema and softeners ordered, treated     Acute exacerbation of COPD/chronic bronchitis -  DuoNeb every 4 hours as needed  Pulmicort scheduled every 12 hours     Hypertension -  Controlled, resume home medications as appropriate    Chronic pain, resume her duragesic patch     Sinus tachycardia -  improved     chronic renal disease  stable     congestive heart failure, diastolic -  No acute volume  issues     Bedridden  Supportive care     Psych disorder-no changes   Anxiety/depression  Supportive care     Obesity  Supportive care    D/c planning to [facility] in 24 hrs     Disposition :  Patient Active Problem List   Diagnosis Code    Cataract H26.9    DDD (degenerative disc disease), cervical M50.30    H/O cervical spine surgery Z98.890    COPD (chronic obstructive pulmonary disease) ([facility] J44.9    Acidosis E87.2    COPD exacerbation ([facility] J44.1    Acute on chronic respiratory failure ([facility] J96.20    Obesity E66.9    Benign hypertension I10    GERD (gastroesophageal reflux disease) K21.9    Acute diastolic CHF (congestive heart failure) ([facility]) I50.31    Acute encephalopathy G93.40    Toxic metabolic encephalopathy F92    Acute renal failure (ARF) ([facility]) N17.9    Hyperkalemia E87.5    PNA (pneumonia) J18.9    Chest pain R07.9    Anxiety F41.9    Sinus tachycardia R00.0    Acute respiratory distress R06.03    Hypoxia R09.02    HCAP (healthcare-associated pneumonia) J18.9    Hypokalemia E87.6    Bacteremia due to methicillin resistant Staphylococcus aureus R78.81, B95.62    Oral thrush B37.0    Acute exacerbation of chronic obstructive pulmonary disease (COPD) ([facility]) J44.1    Atypical chest pain R07.89    Bedridden Z74.01    Chronic pain G89.29    Psychiatric disorder F99    Thyroid disease E07.9    Leukocytosis D72.829    COPD with acute exacerbation ([facility]) J44.1    Left pulmonary infiltrate on CXR R91.8    Acute respiratory failure with hypoxia and hypercapnia ([facility]) J96.01, J96.02       Subjective:  Doing fine'wants her duragesic reordered  No nursing issues  No resp concerns  Breathing better  Has resolution of stool impaction now      Review of systems:    Constitutional: denies fevers, chills  Respiratory: denies SOB  Cardiovascular: denies chest pain, palpitations  Gastrointestinal: denies nausea, vomiting, diarrhea      Vital signs/Intake and Output:  Visit Vitals  BP (!) 160/72   Pulse 77   Temp 97.9 °F (36.6 °C)   Resp 18   Ht 5' 3\" (1.6 m)   Wt 103.2 kg (227 lb 9.6 oz)   SpO2 96%   BMI 40.32 kg/m²     Current Shift:  No intake/output data recorded.   Last three shifts:  07/30 1901 - 08/01 0700  In: -   Out: 1500 [Urine:1500]    Exam:    General: elderly debilitated Wf, alert, NAD, OX3  Head/Neck: NCAT, supple, No masses, No lymphadenopathy  CVS:Regular rate and rhythm, no M/R/G, S1/S2 heard, no thrill  Lungs:Clear to auscultation bilaterally, no wheezes, rhonchi, or rales  Abdomen: Soft, Nontender, No distention, Normal Bowel sounds, No hepatomegaly  Extremities: No C/C/E, pulses palpable 2+  Neuro:grossly normal , follows commands  Psych:appropriate                Labs: Results:       Chemistry Recent Labs     08/01/21  0121 07/31/21  0225 07/30/21  0510   * 196* 130*    138 139   K 3.7 4.2 3.9    103 99*   CO2 32 33* 35*   BUN 17 17 18   CREA 0.80 0. 82 0.90   CA 8.0* 8.4* 8.6   AGAP 6 2* 5   BUCR 21* 21* 20   AP  --   --  98   TP  --   --  7.3   ALB  --   --  3.7   GLOB  --   --  3.6   AGRAT  --   --  1.0      CBC w/Diff Recent Labs     08/01/21  0121 07/31/21  0225 07/30/21  0510   WBC 13.1 10.2 14.2*   RBC 3.54* 3.11* 3.76*   HGB 10.5* 9.4* 10.9*   HCT 33.4* 29.4* 35.4    193 239   GRANS  --   --  74*   LYMPH  --   --  15*   EOS  --   --  4      Cardiac Enzymes Recent Labs     07/30/21  0510   CPK 70   CKND1 CALCULATION NOT PERFORMED WHEN RESULT IS BELOW LINEAR LIMIT      Coagulation No results for input(s): PTP, INR, APTT, INREXT in the last 72 hours. Lipid Panel No results found for: CHOL, CHOLPOCT, CHOLX, CHLST, CHOLV, 646233, HDL, HDLP, LDL, LDLC, DLDLP, 747589, VLDLC, VLDL, TGLX, TRIGL, TRIGP, TGLPOCT, CHHD, CHHDX   BNP No results for input(s): BNPP in the last 72 hours.    Liver Enzymes Recent Labs     07/30/21  0510   TP 7.3   ALB 3.7   AP 98      Thyroid Studies Lab Results   Component Value Date/Time    TSH 0.04 (L) 11/26/2016 05:39 AM        Procedures/imaging: see electronic medical records for all procedures/Xrays and details which were not copied into this note but were reviewed prior to creation of Lucy Flores MD

## 2021-08-01 NOTE — PROGRESS NOTES
Physical Therapy Evaluation/Treatment Attempt     Chart reviewed. Attempted Physical Therapy Evaluation, however, patient unable to be seen due to:  []  Nausea/vomiting  []  Eating  []  Pain  []  Patient too lethargic  []  Off Unit for testing/procedure  []  Dialysis treatment in progress   []  Telemetry Results  [x]  Other: Pt refusing physical therapy at this time. Pt reporting pain in back and L knee, saying, \"I am not doing physical therapy at all today. My pain is not under control. I can't even do physical therapy in the home. \" Will attempt 1-2 more times, and will sign off if pt continues to refuse participation. Will follow up tomorrow as patient's schedule allows.    Thank you for this referral.    Brigida Ovalles, PT, DPT

## 2021-08-01 NOTE — PROGRESS NOTES
Dulcolax supp administered as ordered for constipation. Pt had a small hard stool. Pt with good bed mobility. [address] care dne and incontinent brief applied post large BM which was initially hard then loose. Resp. therapist called for Neb treatment for wheezing post activity    0630 Pt stated she needs a Fentanyl patch for generalized pain. Given Tramadol at this time. Incontinent brief on    0727 Verbal shift change report given to Myrna Andujar RN (oncoming nurse) by Teresa Graff RN   (offgoing nurse). Report included the following information SBAR, Kardex, Intake/Output, MAR, Recent Results and Cardiac Rhythm NSR.

## 2021-08-01 NOTE — ROUTINE PROCESS
Bedside and Verbal shift change report given to Justine Milner (oncoming nurse) by Elliott Allen RN (offgoing nurse). Report included the following information SBAR and Kardex.

## 2021-08-01 NOTE — ROUTINE PROCESS
Bedside and Verbal shift change report given to Jeanne Cunningham RN  (oncoming nurse) by Beny Solorio RN  (offgoing nurse). Report given with SBAR, Kardex, Intake/Output and Recent Results.

## 2021-08-02 VITALS
RESPIRATION RATE: 17 BRPM | OXYGEN SATURATION: 99 % | HEIGHT: 63 IN | TEMPERATURE: 98.5 F | SYSTOLIC BLOOD PRESSURE: 147 MMHG | WEIGHT: 227.6 LBS | BODY MASS INDEX: 40.33 KG/M2 | DIASTOLIC BLOOD PRESSURE: 59 MMHG | HEART RATE: 89 BPM

## 2021-08-02 LAB
ANION GAP SERPL CALC-SCNC: 6 MMOL/L (ref 3–18)
BUN SERPL-MCNC: 16 MG/DL (ref 7–18)
BUN/CREAT SERPL: 19 (ref 12–20)
CALCIUM SERPL-MCNC: 7.9 MG/DL (ref 8.5–10.1)
CHLORIDE SERPL-SCNC: 106 MMOL/L (ref 100–111)
CO2 SERPL-SCNC: 29 MMOL/L (ref 21–32)
CREAT SERPL-MCNC: 0.85 MG/DL (ref 0.6–1.3)
ERYTHROCYTE [DISTWIDTH] IN BLOOD BY AUTOMATED COUNT: 13.4 % (ref 11.6–14.5)
GLUCOSE SERPL-MCNC: 155 MG/DL (ref 74–99)
HCT VFR BLD AUTO: 32.9 % (ref 35–45)
HGB BLD-MCNC: 10.4 G/DL (ref 12–16)
MCH RBC QN AUTO: 29.6 PG (ref 24–34)
MCHC RBC AUTO-ENTMCNC: 31.6 G/DL (ref 31–37)
MCV RBC AUTO: 93.7 FL (ref 74–97)
PLATELET # BLD AUTO: 211 K/UL (ref 135–420)
PMV BLD AUTO: 12.1 FL (ref 9.2–11.8)
POTASSIUM SERPL-SCNC: 3.7 MMOL/L (ref 3.5–5.5)
RBC # BLD AUTO: 3.51 M/UL (ref 4.2–5.3)
SODIUM SERPL-SCNC: 141 MMOL/L (ref 136–145)
WBC # BLD AUTO: 10.2 K/UL (ref 4.6–13.2)

## 2021-08-02 PROCEDURE — 94760 N-INVAS EAR/PLS OXIMETRY 1: CPT

## 2021-08-02 PROCEDURE — 74011000258 HC RX REV CODE- 258: Performed by: FAMILY MEDICINE

## 2021-08-02 PROCEDURE — 36415 COLL VENOUS BLD VENIPUNCTURE: CPT

## 2021-08-02 PROCEDURE — 80048 BASIC METABOLIC PNL TOTAL CA: CPT

## 2021-08-02 PROCEDURE — 74011250637 HC RX REV CODE- 250/637: Performed by: HOSPITALIST

## 2021-08-02 PROCEDURE — 74011250636 HC RX REV CODE- 250/636: Performed by: FAMILY MEDICINE

## 2021-08-02 PROCEDURE — 74011250637 HC RX REV CODE- 250/637: Performed by: FAMILY MEDICINE

## 2021-08-02 PROCEDURE — 85027 COMPLETE CBC AUTOMATED: CPT

## 2021-08-02 PROCEDURE — 94640 AIRWAY INHALATION TREATMENT: CPT

## 2021-08-02 PROCEDURE — 77010033678 HC OXYGEN DAILY

## 2021-08-02 PROCEDURE — 74011000250 HC RX REV CODE- 250: Performed by: FAMILY MEDICINE

## 2021-08-02 PROCEDURE — 74011250636 HC RX REV CODE- 250/636: Performed by: HOSPITALIST

## 2021-08-02 RX ORDER — BUTALBITAL, ACETAMINOPHEN AND CAFFEINE 50; 325; 40 MG/1; MG/1; MG/1
1 TABLET ORAL 2 TIMES DAILY
Status: DISCONTINUED | OUTPATIENT
Start: 2021-08-02 | End: 2021-08-02 | Stop reason: HOSPADM

## 2021-08-02 RX ORDER — PREDNISONE 20 MG/1
20 TABLET ORAL
Qty: 5 TABLET | Refills: 0 | Status: SHIPPED
Start: 2021-08-02 | End: 2021-08-07

## 2021-08-02 RX ORDER — FENTANYL 100 UG/H
1 PATCH TRANSDERMAL
Qty: 1 PATCH | Refills: 0 | Status: SHIPPED | OUTPATIENT
Start: 2021-08-04 | End: 2021-09-03

## 2021-08-02 RX ORDER — POLYETHYLENE GLYCOL 3350 17 G/17G
17 POWDER, FOR SOLUTION ORAL DAILY
Qty: 30 PACKET | Refills: 0 | Status: SHIPPED
Start: 2021-08-03

## 2021-08-02 RX ORDER — AMOXICILLIN AND CLAVULANATE POTASSIUM 875; 125 MG/1; MG/1
1 TABLET, FILM COATED ORAL 2 TIMES DAILY
Qty: 10 TABLET | Refills: 0 | Status: SHIPPED
Start: 2021-08-02

## 2021-08-02 RX ORDER — PANTOPRAZOLE SODIUM 40 MG/1
40 TABLET, DELAYED RELEASE ORAL
Status: DISCONTINUED | OUTPATIENT
Start: 2021-08-02 | End: 2021-08-02 | Stop reason: HOSPADM

## 2021-08-02 RX ORDER — IPRATROPIUM BROMIDE AND ALBUTEROL SULFATE 2.5; .5 MG/3ML; MG/3ML
3 SOLUTION RESPIRATORY (INHALATION)
Qty: 30 NEBULE | Refills: 0 | Status: SHIPPED
Start: 2021-08-02

## 2021-08-02 RX ORDER — OXYCODONE AND ACETAMINOPHEN 5; 325 MG/1; MG/1
1 TABLET ORAL
Qty: 12 TABLET | Refills: 0 | Status: SHIPPED | OUTPATIENT
Start: 2021-08-02 | End: 2021-08-05

## 2021-08-02 RX ADMIN — Medication 10 ML: at 16:12

## 2021-08-02 RX ADMIN — FLUTICASONE PROPIONATE 1 SPRAY: 50 SPRAY, METERED NASAL at 08:59

## 2021-08-02 RX ADMIN — METHYLPREDNISOLONE SODIUM SUCCINATE 20 MG: 40 INJECTION, POWDER, FOR SOLUTION INTRAMUSCULAR; INTRAVENOUS at 05:27

## 2021-08-02 RX ADMIN — BUTALBITAL, ACETAMINOPHEN, AND CAFFEINE 1 TABLET: 50; 325; 40 TABLET ORAL at 10:34

## 2021-08-02 RX ADMIN — PANTOPRAZOLE SODIUM 40 MG: 40 TABLET, DELAYED RELEASE ORAL at 10:34

## 2021-08-02 RX ADMIN — LEVOTHYROXINE SODIUM 125 MCG: 0.03 TABLET ORAL at 05:27

## 2021-08-02 RX ADMIN — DULOXETINE HYDROCHLORIDE 60 MG: 60 CAPSULE, DELAYED RELEASE ORAL at 08:58

## 2021-08-02 RX ADMIN — MORPHINE SULFATE 2 MG: 2 INJECTION, SOLUTION INTRAMUSCULAR; INTRAVENOUS at 11:52

## 2021-08-02 RX ADMIN — PREGABALIN 100 MG: 100 CAPSULE ORAL at 08:58

## 2021-08-02 RX ADMIN — VANCOMYCIN HYDROCHLORIDE 750 MG: 750 INJECTION, POWDER, LYOPHILIZED, FOR SOLUTION INTRAVENOUS at 10:34

## 2021-08-02 RX ADMIN — TRAMADOL HYDROCHLORIDE 50 MG: 50 TABLET, FILM COATED ORAL at 16:10

## 2021-08-02 RX ADMIN — TRAMADOL HYDROCHLORIDE 50 MG: 50 TABLET, FILM COATED ORAL at 01:00

## 2021-08-02 RX ADMIN — HEPARIN SODIUM 5000 UNITS: 5000 INJECTION INTRAVENOUS; SUBCUTANEOUS at 11:52

## 2021-08-02 RX ADMIN — HEPARIN SODIUM 5000 UNITS: 5000 INJECTION INTRAVENOUS; SUBCUTANEOUS at 05:27

## 2021-08-02 RX ADMIN — TRAMADOL HYDROCHLORIDE 50 MG: 50 TABLET, FILM COATED ORAL at 08:59

## 2021-08-02 RX ADMIN — PIPERACILLIN AND TAZOBACTAM 4.5 G: 4; .5 INJECTION, POWDER, LYOPHILIZED, FOR SOLUTION INTRAVENOUS; PARENTERAL at 05:27

## 2021-08-02 RX ADMIN — BUDESONIDE 500 MCG: 0.5 INHALANT RESPIRATORY (INHALATION) at 08:07

## 2021-08-02 RX ADMIN — METHYLPREDNISOLONE SODIUM SUCCINATE 20 MG: 40 INJECTION, POWDER, FOR SOLUTION INTRAMUSCULAR; INTRAVENOUS at 16:11

## 2021-08-02 RX ADMIN — PREGABALIN 100 MG: 100 CAPSULE ORAL at 16:10

## 2021-08-02 NOTE — PROGRESS NOTES
0730 Bedside and Verbal shift change report given to CRISTI Pool RN (oncoming nurse) by JESSIE Iraheta RN (offgoing nurse). Report included the following information SBAR, Kardex, Intake/Output, and MAR.     0859 Pt assessed. No signs of acute distress. Pt in bed. Pt given tramadol for pain    1151 Pt assessed. No signs of acute distress. Pt in bed. Pt given morphine for pain. 1610 Pt assessed. No signs of acute distress. Pt in bed. Pt given tramadol for pain.      1650 Report given to Rudy Chavez at [address] [place] [address] Pt d/c to [address] [place] [address] via transport

## 2021-08-02 NOTE — ROUTINE PROCESS
Bedside and Verbal shift change report given to Dorina Villatoro RN  (oncoming nurse) by Ruiz Lester RN  (offgoing nurse). Report given with SBAR, Kardex, Intake/Output and Recent Results.

## 2021-08-02 NOTE — PROGRESS NOTES
2521-0429 Shift Summary: Pt rested fair overnight with c/o pain once. Medicated per MAR. No new clinical concerns noted.      Nightshift Chart Audit Completed

## 2021-08-02 NOTE — PROGRESS NOTES
Problem: Pressure Injury - Risk of  Goal: *Prevention of pressure injury  Description: Document Forrest Scale and appropriate interventions in the flowsheet. Outcome: Resolved/Met  Note: Pressure Injury Interventions:       Moisture Interventions: Absorbent underpads    Activity Interventions: Increase time out of bed, Pressure redistribution bed/mattress(bed type)    Mobility Interventions: HOB 30 degrees or less, Pressure redistribution bed/mattress (bed type)    Nutrition Interventions: Document food/fluid/supplement intake    Friction and Shear Interventions: HOB 30 degrees or less                Problem: Patient Education: Go to Patient Education Activity  Goal: Patient/Family Education  Outcome: Resolved/Met     Problem: Falls - Risk of  Goal: *Absence of Falls  Description: Document Maddi Fall Risk and appropriate interventions in the flowsheet.   Outcome: Resolved/Met  Note: Fall Risk Interventions:  Mobility Interventions: Communicate number of staff needed for ambulation/transfer         Medication Interventions: Patient to call before getting OOB, Teach patient to arise slowly    Elimination Interventions: Call light in reach    History of Falls Interventions: Door open when patient unattended         Problem: Patient Education: Go to Patient Education Activity  Goal: Patient/Family Education  Outcome: Resolved/Met     Problem: Chronic Obstructive Pulmonary Disease (COPD)  Goal: *Oxygen saturation during activity within specified parameters  Outcome: Resolved/Met  Goal: *Able to remain out of bed as prescribed  Outcome: Resolved/Met  Goal: *Absence of hypoxia  Outcome: Resolved/Met  Goal: *Optimize nutritional status  Outcome: Resolved/Met

## 2021-08-02 NOTE — PROGRESS NOTES
Patient Name: Su Herrmann   Age: 77 y.o. Sex:  female  YOB: 1945   Medical Record Number: [id number]      Antimicrobial  Current Antimicrobial Therapy (168h ago, onward)       Ordered     Start Stop    08/01/21 2116  vancomycin (VANCOCIN) 750 mg in 0.9% sodium chloride 250 mL (VIAL-MATE)  750 mg,   IntraVENous,   EVERY 12 HOURS      08/02/21 1000 --    07/30/21 1014  piperacillin-tazobactam (ZOSYN) 4.5 g in 0.9% sodium chloride (MBP/ADV) 100 mL MBP  4.5 g,   IntraVENous,   EVERY 6 HOURS      07/30/21 1300 --                   Indication HCAP   Last Level (if applicable) Lab Results   Component Value Date/Time    Reported dose: 1250 MG 05/22/2017 01:30 AM    Reported dose time: 200 05/22/2017 01:30 AM    Reported dose date [id number] 05/22/2017 01:30 AM     Vancomycin   Lab Results   Component Value Date/Time    Vancomycin,trough 21.8 (HH) 08/01/2021 07:40 PM      Gentamicin   No results found for: GENP, GENT, GENR]  Tobramycin   No results found for: TOBP, TOBT, TOBR   Amikacin   No results found for: Eugene Stauffer, FANG, VICENTE DUGGAN     Cultures (7 most recent)   GRAM STAIN   Date Value Ref Range Status   05/14/2017 10-25 WBC/lpf   Final   05/14/2017 <10 EPI/lpf   Final   05/14/2017 MUCUS PRESENT   Final   05/14/2017    Final    MODERATE  GRAM POSITIVE COCCI  IN PAIRS  IN GROUPS       Culture result:   Date Value Ref Range Status   07/30/2021 NO GROWTH 2 DAYS   Preliminary   07/30/2021 NO GROWTH 2 DAYS   Preliminary   05/19/2017 NO GROWTH 6 DAYS   Final   05/19/2017 NO GROWTH 6 DAYS   Final   05/15/2017 (A)   Final    MRSA target DNA is detected (presumptive positive for MRSA colonization).    05/15/2017   Final    MRSA CALLED TO Shalini Rob RN ICU BY [facility] AT 2042 ON [id number]   05/14/2017 (A)   Final    FEW  **METHICILLIN RESISTANT STAPHYLOCOCCUS AUREUS**     05/14/2017 FEW  NORMAL RESPIRATORY SERVANDO     Final   05/14/2017 PATIENT IS A KNOWN MRSA   Final      Renal Overview (72 hr)         Recent Labs     21  01221  0510   BUN 17 17 18   CREA 0.80 0.82 0.90       CrCl (Current): Serum creatinine: 0.8 mg/dL 21  Estimated creatinine clearance: 70.8 mL/min      Lactic Acid    (Sepsis Criteria: >2.0 mmol/L) Lab Results   Component Value Date/Time    Lactic acid 0.8 2017 11:35 PM    Lactic acid 0.9 02/10/2017 12:55 PM      Procalcitonin   No results found for: PCT  Suspected Sepsis:   <0.50 ng/mL      Low likelihood of sepsis. 0.50-2.00 ng/mL     Increased likelihood of sepsis. Antibioticsencouraged. >2.00 ng/mL   High risk of sepsis/shock. Antibiotics strongly encouraged. Suspected Lower Resp Tract Infections:   <0.24 ng/mL     Low likelihood of bacterial infection. >0.24 ng/mL     Increased likelihood of bacterial infection. Antibiotics encouraged. Hepatic Overview (72 hr) Recent Labs     21  0510   ALT 48   AP 98      Temp (24-hr Max) Temp (24hrs), Av.2 °F (36.8 °C), Min:97.9 °F (36.6 °C), Max:98.8 °F (37.1 °C)       Hematology Recent Labs     21  0510   WBC 13.1 10.2 14.2*   HGB 10.5* 9.4* 10.9*   HCT 33.4* 29.4* 35.4    193 239   GRANS  --   --  74*   ANEU  --   --  10.5*        DOT  04     Notes Trough resulted at 21.8 mcg/mL  Spoke with [facility] Nurse to not administer the 2100 dose. Dc'd the Vanco 1000 mg q12h. Decreased dose to Vancomycin 750 mg IV q12h starting at 10am on 21.          Mariluz  Clinical Pharmacist  [phone]

## 2021-08-02 NOTE — PROGRESS NOTES
Problem: Pressure Injury - Risk of  Goal: *Prevention of pressure injury  Description: Document Forrest Scale and appropriate interventions in the flowsheet. Outcome: Progressing Towards Goal  Note: Pressure Injury Interventions:       Moisture Interventions: Internal/External urinary devices    Activity Interventions: Pressure redistribution bed/mattress(bed type), PT/OT evaluation    Mobility Interventions: HOB 30 degrees or less, Pressure redistribution bed/mattress (bed type)    Nutrition Interventions: Document food/fluid/supplement intake    Friction and Shear Interventions: HOB 30 degrees or less                Problem: Patient Education: Go to Patient Education Activity  Goal: Patient/Family Education  Outcome: Progressing Towards Goal     Problem: Falls - Risk of  Goal: *Absence of Falls  Description: Document Maddi Fall Risk and appropriate interventions in the flowsheet.   Outcome: Progressing Towards Goal  Note: Fall Risk Interventions:  Mobility Interventions: Patient to call before getting OOB         Medication Interventions: Teach patient to arise slowly    Elimination Interventions: Call light in reach    History of Falls Interventions: Door open when patient unattended         Problem: Patient Education: Go to Patient Education Activity  Goal: Patient/Family Education  Outcome: Progressing Towards Goal     Problem: Chronic Obstructive Pulmonary Disease (COPD)  Goal: *Oxygen saturation during activity within specified parameters  Outcome: Progressing Towards Goal  Goal: *Able to remain out of bed as prescribed  Outcome: Progressing Towards Goal  Goal: *Absence of hypoxia  Outcome: Progressing Towards Goal  Goal: *Optimize nutritional status  Outcome: Progressing Towards Goal

## 2021-08-02 NOTE — DISCHARGE SUMMARY
[facility] SUMMARY    Name:  Antoine Saenz  MR#:   [id number]  :  1945  ACCOUNT #:  [de-identified]  ADMIT DATE:  2021  DISCHARGE DATE:  2021    The patient is being discharged back to [facility] today. DISCHARGE DIAGNOSES:  1. Acute chronic obstructive pulmonary disease exacerbation. 2.  Constipation and rectal impaction of stool. 3.  Morbid obesity. 4.  Bed-bound status. 5.  Anxiety and depression. 6.  Hypertension. 7.  Chronic pain. 8.  Chronic renal disease. 9.  Diastolic congestive heart failure. 10.  Osteoarthritis. MEDICATIONS FOR DISCHARGE:  Include the followin. Augmentin 875 one p.o. twice daily for 5 days. 2.  Tylenol 650 every 6 hours as needed for pain. 3.  Advair 1 puff twice daily. 4.  Albuterol nebs every 4 hours as needed. 5.  DuoNeb every 4 hours as needed for wheezing. 6.  She has aspirin 81 mg daily. 7.  Bactrim DS 1 tablet every Monday, Wednesday, . 8.  B12 1000 mcg intramuscular every month. 9.  Cymbalta 60 mg daily p.o.  10.  Fentanyl 100 mcg patch every 72 hours. 11.  Flonase 1 spray by nostrils daily. 12.  Atarax 50 mg every 8 hours needed for itching. 13.  Lyrica 100 mg three times daily. 14.  Mucinex 600 mg twice daily. 15.  Multivitamin once a day. 16.  Nitro 0.4 mg sublingually every 5 minutes as needed. 17.  Percocet 1 tablet every 6 hours as needed for severe pain for breakthrough only. 18.  Oxygen 3 L per minute nasal cannula. 19.  MiraLax 17 g daily. 20.  Prednisone 20 mg daily for 5 days. 21.  Prilosec 20 mg daily. 22.  Refresh 2 drops both eyes as needed daily. 23.  Singulair 10 mg daily. 24.  Spiriva one cap inhalation daily. 25.  Synthroid 125 mcg daily. 26.  Voltaren gel to affected areas on her knees daily as needed for pain. HOSPITAL SUMMARY:  This is a 78-year-old female who came into the emergency room on .   She was sent from [facility] where she has been for the last few years. She is bedridden. She is on chronic oxygen therapy. She has a multitude of medical issues. She was admitted for acute exacerbation of COPD. There was no evidence for pneumonia. There was no sepsis. White count was only slightly elevated. She had been here approximately 2 months ago. She has been fully vaccinated per report. The patient responded well to steroids, nebulizer treatments, and antibiotics here. She has improved. She had a significant rectal impaction of stool that was resolved and she is back on stool softeners now. White count is within normal limits for the past 3 days. H and H 10.4 and 32.9, platelets are 670. Her metabolic panel is within normal range. A blood culture is no growth for 3 days x2. The chest x-ray on 07/30 said there was no acute pulmonary process and EKG on admission showed sinus tachycardia, which is improved. Breathing is much better. She is anxious. She has chronic pain. Her primary issues surround her pain medication, her Duragesic patch and anxiolytic medication. Today's temperature is 97.6-99.7, blood pressure 149/102, respiratory rate 17, SaO2 is 96% on 2 L. Lungs:  Few expiratory wheezes, otherwise moving air effectively, in no respiratory distress. Abdomen:  Benign. Cardiac exam:  Regular rate and rhythm. Lower extremities, chronic venous stasis edema. PLAN:  Discharge back to the facility on the aforementioned medications, most of which are at her baseline and continue her regular low-sodium diet as tolerated. Resume usual care at the facility as she has. She has a full code status here and 40 minutes discharge time.       Kristine Lugo MD      [id number]  D:  08/02/2021 12:13  T:  08/02/2021 13:12  JOB #:  [id number]

## 2021-08-02 NOTE — PROGRESS NOTES
Transition of care to LTC: [facility] today @ 4:00 pm     Communication to Patient/Family:  Met with patient and family and they are agreeable to the transition plan. The Plan for Transition of Care is related to the following treatment goals: COPD with acute exacerbation    1225: CM met with the patient at the bedside to discuss plans for discharge. The patient states that her plan is to return to [address] [place] [address] and is pleased that she is being discharged today at 4:00 pm. The patient denies any questions or concerns at this time. The Patient was provided with a choice of provider and agrees   with the discharge plan. Yes [x] No []    Freedom of choice list was provided with basic dialogue that supports the patient's individualized plan of care/goals and shares the quality data associated with the providers. Yes [x] No []    SNF/Rehab Transition:  Patient has been accepted to [facility] at [address] [place] [address] for SNF/Rehab and meets criteria for admission. Patient will transported by [email or web address] and expected to leave at 4:00 pm.    Communication to SNF/Rehab:  Bedside RN  has been notified to update the transition plan to the facility and call report [phone]. Discharge information has been updated on the AVS and communicated via [facility] and/or CC link. Discharge instructions to be fax'd to facility at [phone] per request.     Please include all hard scripts for controlled substances, med rec and dc summary in packet. Please medicate for pain prior to dc if possible and needed to help offset delay when patient first arrives to facility. Reviewed and confirmed with facility, Dimas FINNEY can manage the patient care needs for the following:     Shane Cartagena with (X) only those applicable:  Medication:  [x]Medications are available at the facility  []IV Antibiotics    []Controlled Substance  hard copies available sent.   []Weekly Labs Equipment:  []CPAP/BiPAP  []Wound Vacuum  []Ragland or Urinary Device  []PICC/Central Line  []Nebulizer  []Ventilator    Treatment:  []Isolation (for MRSA, VRE, etc.)  []Surgical Drain Management  []Tracheostomy Care  []Dressing Changes  []Dialysis with transportation  []PEG Care  []Oxygen  []Daily Weights for Heart Failure    Dietary:  [x]Any diet limitations  []Tube Feedings   []Total Parenteral Management (TPN)    Financial Resources:  []Medicaid Application Completed    []UAI Completed  and copy given to pt/family    []A screening has previously been completed. []Level II Completed    [] Private pay individual who will not become   financially eligible for Medicaid within 6 months from admission to a [address] [place] [address] facility. [] Individual refused to have screening conducted. []Medicaid Application Completed    []The screening denied because it was determined individual did not need/did not qualify for nursing facility level of care. [] Out of state residents seeking direct admission to a [address] facility. [] Individuals who are inpatients of an out of state hospital, or in state or out of state veterans/ hospital and seek direct admission to a [address] facility  [] Individuals who are pateints or residents of a state owned/operated facility that is licensed by Department of [employer] ([facility] and seek direct admission to [address] [place] [address]  [] A screening not required for enrollment in [facility] services as set out in [address] [place] [address] 91-  [] [facility]) staff shall perform screenings of the [facility] clients. Advanced Care Plan:  []Surrogate Decision Maker of Care  []POA  []Communicated Code Status and copy sent.     Other:   LTC Resident at [address] [place] [address] Management Interventions  Mode of Transport at Discharge: BLS  Transition of Care Consult (CM Consult): [address]  Current Support Network: [address] ([address] [place] [address] Street)  The Plan for Transition of Care is Related to the Following Treatment Goals : COPD with acute exacerbation  Discharge Location  Discharge Placement: [address] [facility] ([facility])

## 2021-08-02 NOTE — PROGRESS NOTES
8/2/2021 PT note: consult received and chart reviewed. Evaluation attempted. Pt declines participation, stating \"I'm not doing any, I'm not interested. I'm going home today, but thanky you very much. \"  Pt refuses participation and is scheduled for discharge back to [place] [address] today at 4:00 per chart. Will acknowledge and cancel PT orders in view of same.   Thank you for this referral.   Andrés Multani, PT

## 2021-08-03 ENCOUNTER — PATIENT OUTREACH (OUTPATIENT)
Dept: CASE MANAGEMENT | Age: 76
End: 2021-08-03

## 2021-08-03 PROBLEM — J18.9 PNA (PNEUMONIA): Status: RESOLVED | Noted: 2017-02-13 | Resolved: 2021-08-03

## 2021-08-03 NOTE — PROGRESS NOTES
Patient was readmitted to hospital from [facility] on 7/30/21. Presenting complaint of sudden onset chest pain not relieved by 4 nitro given en rout. Patient also had O2 sat of 92% on room air and crackles through out, per ED triage note. Patient was previously admitted to [facility] for Acute exacerbation of chronic obstructive pulmonary disease 6/16/21 - 6/22/21. Patient returned to [facility] on 8/2/21. Transition of care outreach postponed for 14 days due to patient's discharge to SNF.

## 2021-08-06 LAB
BACTERIA SPEC CULT: NORMAL
BACTERIA SPEC CULT: NORMAL
SERVICE CMNT-IMP: NORMAL
SERVICE CMNT-IMP: NORMAL

## 2021-08-09 NOTE — PROGRESS NOTES
Physician Progress Note      Alana Thao  CSN #:                  [id number]  :                       1945  ADMIT DATE:       2021 4:55 AM  DISCH DATE:        2021 5:30 PM  RESPONDING  PROVIDER #:        Tiera Dasilva MD          QUERY TEXT:    Patient admitted with COPD exacerbation  . Noted documentation of Acute respiratory failure with hypoxia and hypercapnia in hospitalist H&P progress notes, . In order to support the diagnosis of acute respiratory failure, please include additional clinical indicators in your documentation. Or please document if the diagnosis of acute respiratory failure has been ruled out after further study. The medical record reflects the following:  Risk Factors: COPD exacerbation  Clinical Indicators: PCO2 greater than 50, arterial blood gas 7.43/53 point  5.6/93.2%  Treatment: requiring 3L of O2 NC  at baseline during hospital stay. Acute Respiratory Failure Clinical Indicators per [employer] Training Guide and Quick Reference Guide:  pO2 < 60 mmHg or SpO2 (pulse oximetry) < 91% breathing room air  pCO2 > 50 and pH < 7.35  P/F ratio (pO2 / FIO2) < 300  pO2 decrease or pCO2 increase by 10 mmHg from baseline (if known)  Supplemental oxygen of 40% or more  Presence of respiratory distress, tachypnea, dyspnea, shortness of breath, wheezing  Unable to speak in complete sentences  Use of accessory muscles to breathe  Extreme anxiety and feeling of impending doom  Tripod position  Confusion/altered mental status/obtunded    Thank You  Hola Forman RN CDI CRCR  [phone]  Options provided:  -- Acute Respiratory Failure with hypoxia and hypercapnia as evidenced by, Please document evidence.   -- Acute Respiratory Failure  with hypoxia and hypercapnia ruled out after study  -- Other - I will add my own diagnosis  -- Disagree - Not applicable / Not valid  -- Disagree - Clinically unable to determine / Unknown  -- Refer to Clinical Documentation Reviewer    PROVIDER RESPONSE TEXT:    This patient is in acute respiratory failure with hypoxia and hypercapnia as evidenced by see chart    Query created by: Jennifer Tobin on 8/6/2021 1:39 PM      Electronically signed by:  Mark Lara MD 8/9/2021 4:29 PM

## 2021-08-17 ENCOUNTER — PATIENT OUTREACH (OUTPATIENT)
Dept: CASE MANAGEMENT | Age: 76
End: 2021-08-17

## 2021-08-18 NOTE — PROGRESS NOTES
Patient continues at [facility]. No plan for discharge at this time. Episode postponed for 7 days.

## 2021-08-25 ENCOUNTER — PATIENT OUTREACH (OUTPATIENT)
Dept: CASE MANAGEMENT | Age: 76
End: 2021-08-25

## 2021-08-25 NOTE — PROGRESS NOTES
Call placed to [facility].   Message left in the [facility] office asking if patient is still in residence

## 2021-08-25 NOTE — PROGRESS NOTES
Received call back from [facility]. Naheed Wright stated the patient is a long term care resident. CTN will close episode at this time.

## 2021-10-31 NOTE — ROUTINE PROCESS
0722:.Bedside and Verbal shift change report given to Clement Godfrey RN (oncoming nurse) by Charmayne Callander RN (offgoing nurse). Report included the following information SBAR, Kardex, Intake/Output, MAR, Recent Results and Cardiac Rhythm NSR. Home

## 2023-10-23 NOTE — ROUTINE PROCESS
PICC ready for use Libtayo Pregnancy And Lactation Text: This medication is contraindicated in pregnancy and when breast feeding.
